# Patient Record
Sex: MALE | Race: WHITE | Employment: OTHER | URBAN - METROPOLITAN AREA
[De-identification: names, ages, dates, MRNs, and addresses within clinical notes are randomized per-mention and may not be internally consistent; named-entity substitution may affect disease eponyms.]

---

## 2017-01-12 ENCOUNTER — TELEPHONE (OUTPATIENT)
Dept: FAMILY MEDICINE | Facility: CLINIC | Age: 82
End: 2017-01-12

## 2017-01-12 NOTE — TELEPHONE ENCOUNTER
Reason for call:  Patient reporting a symptom    Symptom or request: Passed Out Shoveling Snow    Duration (how long have symptoms been present): 2 days    Have you been treated for this before? Yes    Additional comments: He passed out shoveling snow.  He got lightheaded just before before he passed out.  He was done shoveling when he passed out.  He did not hit his head.  He says that he feels fine now.    Phone Number patient can be reached at:  Home number on file [phone] (home)    Best Time:  any    Can we leave a detailed message on this number:  YES    Call taken on 1/12/2017 at 3:38 PM by Monica Menjivar

## 2017-01-12 NOTE — TELEPHONE ENCOUNTER
He was using equipment in his large driveway. He has power steering and hydraulics. He went in the house to undress and then woke up and found he had pasted out. Advised er for eval as he may need monitoring and diagnostics that cant be figured out in a 20 minute office visit. He is asymptomatic now, but we don't know what caused this and he lives alone. He has a cardiac history.   Darlene Joe RN

## 2017-01-13 ENCOUNTER — APPOINTMENT (OUTPATIENT)
Dept: GENERAL RADIOLOGY | Facility: CLINIC | Age: 82
End: 2017-01-13
Attending: EMERGENCY MEDICINE
Payer: COMMERCIAL

## 2017-01-13 ENCOUNTER — HOSPITAL ENCOUNTER (EMERGENCY)
Facility: CLINIC | Age: 82
Discharge: HOME | End: 2017-01-13
Attending: EMERGENCY MEDICINE | Admitting: EMERGENCY MEDICINE
Payer: COMMERCIAL

## 2017-01-13 ENCOUNTER — APPOINTMENT (OUTPATIENT)
Dept: CT IMAGING | Facility: CLINIC | Age: 82
End: 2017-01-13
Attending: EMERGENCY MEDICINE
Payer: COMMERCIAL

## 2017-01-13 ENCOUNTER — HOSPITAL ENCOUNTER (OUTPATIENT)
Dept: CARDIOLOGY | Facility: CLINIC | Age: 82
End: 2017-01-13
Attending: EMERGENCY MEDICINE
Payer: COMMERCIAL

## 2017-01-13 VITALS
RESPIRATION RATE: 16 BRPM | DIASTOLIC BLOOD PRESSURE: 68 MMHG | TEMPERATURE: 97.6 F | SYSTOLIC BLOOD PRESSURE: 132 MMHG | OXYGEN SATURATION: 97 %

## 2017-01-13 DIAGNOSIS — R55 SYNCOPE, UNSPECIFIED SYNCOPE TYPE: ICD-10-CM

## 2017-01-13 LAB
ALBUMIN SERPL-MCNC: 3.3 G/DL (ref 3.4–5)
ALP SERPL-CCNC: 57 U/L (ref 40–150)
ALT SERPL W P-5'-P-CCNC: 23 U/L (ref 0–70)
ANION GAP SERPL CALCULATED.3IONS-SCNC: 9 MMOL/L (ref 3–14)
AST SERPL W P-5'-P-CCNC: 23 U/L (ref 0–45)
BASOPHILS # BLD AUTO: 0.1 10E9/L (ref 0–0.2)
BASOPHILS NFR BLD AUTO: 0.5 %
BILIRUB SERPL-MCNC: 0.7 MG/DL (ref 0.2–1.3)
BUN SERPL-MCNC: 26 MG/DL (ref 7–30)
CALCIUM SERPL-MCNC: 8.7 MG/DL (ref 8.5–10.1)
CHLORIDE SERPL-SCNC: 109 MMOL/L (ref 94–109)
CO2 SERPL-SCNC: 25 MMOL/L (ref 20–32)
CREAT SERPL-MCNC: 1.18 MG/DL (ref 0.66–1.25)
D DIMER PPP FEU-MCNC: 2.6 UG/ML FEU (ref 0–0.5)
DIFFERENTIAL METHOD BLD: ABNORMAL
EOSINOPHIL # BLD AUTO: 0.6 10E9/L (ref 0–0.7)
EOSINOPHIL NFR BLD AUTO: 6.1 %
ERYTHROCYTE [DISTWIDTH] IN BLOOD BY AUTOMATED COUNT: 12.8 % (ref 10–15)
GFR SERPL CREATININE-BSD FRML MDRD: 58 ML/MIN/1.7M2
GLUCOSE SERPL-MCNC: 134 MG/DL (ref 70–99)
HCT VFR BLD AUTO: 38.9 % (ref 40–53)
HGB BLD-MCNC: 12.8 G/DL (ref 13.3–17.7)
IMM GRANULOCYTES # BLD: 0 10E9/L (ref 0–0.4)
IMM GRANULOCYTES NFR BLD: 0.2 %
LYMPHOCYTES # BLD AUTO: 4.4 10E9/L (ref 0.8–5.3)
LYMPHOCYTES NFR BLD AUTO: 44.9 %
MAGNESIUM SERPL-MCNC: 2 MG/DL (ref 1.6–2.3)
MCH RBC QN AUTO: 30.5 PG (ref 26.5–33)
MCHC RBC AUTO-ENTMCNC: 32.9 G/DL (ref 31.5–36.5)
MCV RBC AUTO: 93 FL (ref 78–100)
MONOCYTES # BLD AUTO: 0.8 10E9/L (ref 0–1.3)
MONOCYTES NFR BLD AUTO: 7.8 %
NEUTROPHILS # BLD AUTO: 4 10E9/L (ref 1.6–8.3)
NEUTROPHILS NFR BLD AUTO: 40.5 %
NT-PROBNP SERPL-MCNC: 258 PG/ML (ref 0–1800)
PLATELET # BLD AUTO: 233 10E9/L (ref 150–450)
POTASSIUM SERPL-SCNC: 4.2 MMOL/L (ref 3.4–5.3)
PROT SERPL-MCNC: 7.3 G/DL (ref 6.8–8.8)
RBC # BLD AUTO: 4.19 10E12/L (ref 4.4–5.9)
SODIUM SERPL-SCNC: 143 MMOL/L (ref 133–144)
T4 FREE SERPL-MCNC: 1.12 NG/DL (ref 0.76–1.46)
TROPONIN I SERPL-MCNC: 0.02 UG/L (ref 0–0.04)
TSH SERPL DL<=0.005 MIU/L-ACNC: 4.12 MU/L (ref 0.4–4)
WBC # BLD AUTO: 9.8 10E9/L (ref 4–11)

## 2017-01-13 PROCEDURE — 25500064 ZZH RX 255 OP 636: Performed by: RADIOLOGY

## 2017-01-13 PROCEDURE — 80053 COMPREHEN METABOLIC PANEL: CPT | Performed by: EMERGENCY MEDICINE

## 2017-01-13 PROCEDURE — 84439 ASSAY OF FREE THYROXINE: CPT | Performed by: EMERGENCY MEDICINE

## 2017-01-13 PROCEDURE — 85379 FIBRIN DEGRADATION QUANT: CPT | Performed by: EMERGENCY MEDICINE

## 2017-01-13 PROCEDURE — 0296T ZIO PATCH HOLTER: CPT | Performed by: EMERGENCY MEDICINE

## 2017-01-13 PROCEDURE — 93005 ELECTROCARDIOGRAM TRACING: CPT

## 2017-01-13 PROCEDURE — 84484 ASSAY OF TROPONIN QUANT: CPT | Performed by: EMERGENCY MEDICINE

## 2017-01-13 PROCEDURE — 99285 EMERGENCY DEPT VISIT HI MDM: CPT | Mod: 25

## 2017-01-13 PROCEDURE — 84443 ASSAY THYROID STIM HORMONE: CPT | Performed by: EMERGENCY MEDICINE

## 2017-01-13 PROCEDURE — 93010 ELECTROCARDIOGRAM REPORT: CPT | Performed by: EMERGENCY MEDICINE

## 2017-01-13 PROCEDURE — 85025 COMPLETE CBC W/AUTO DIFF WBC: CPT | Performed by: EMERGENCY MEDICINE

## 2017-01-13 PROCEDURE — 96360 HYDRATION IV INFUSION INIT: CPT

## 2017-01-13 PROCEDURE — 25000125 ZZHC RX 250: Performed by: RADIOLOGY

## 2017-01-13 PROCEDURE — 25000128 H RX IP 250 OP 636: Performed by: EMERGENCY MEDICINE

## 2017-01-13 PROCEDURE — 71260 CT THORAX DX C+: CPT

## 2017-01-13 PROCEDURE — 96361 HYDRATE IV INFUSION ADD-ON: CPT

## 2017-01-13 PROCEDURE — 83735 ASSAY OF MAGNESIUM: CPT | Performed by: EMERGENCY MEDICINE

## 2017-01-13 PROCEDURE — 99285 EMERGENCY DEPT VISIT HI MDM: CPT | Mod: 25 | Performed by: EMERGENCY MEDICINE

## 2017-01-13 PROCEDURE — 83880 ASSAY OF NATRIURETIC PEPTIDE: CPT | Performed by: EMERGENCY MEDICINE

## 2017-01-13 RX ORDER — IOPAMIDOL 755 MG/ML
80 INJECTION, SOLUTION INTRAVASCULAR ONCE
Status: COMPLETED | OUTPATIENT
Start: 2017-01-13 | End: 2017-01-13

## 2017-01-13 RX ADMIN — IOPAMIDOL 80 ML: 755 INJECTION, SOLUTION INTRAVENOUS at 08:57

## 2017-01-13 RX ADMIN — SODIUM CHLORIDE 107 ML: 9 INJECTION, SOLUTION INTRAVENOUS at 08:58

## 2017-01-13 RX ADMIN — SODIUM CHLORIDE 500 ML: 9 INJECTION, SOLUTION INTRAVENOUS at 08:52

## 2017-01-13 NOTE — ED AVS SNAPSHOT
[facility] Emergency Department    [address] [facility] [address]    Phone:  [phone]    Fax:  [phone]                                       Colin Shell   MRN: [id number]    Department:  [facility] Emergency Department   Date of Visit:  1/13/2017           After Visit Summary Signature Page     I have received my discharge instructions, and my questions have been answered. I have discussed any challenges I see with this plan with the nurse or doctor.    ..........................................................................................................................................  Patient/Patient Representative Signature      ..........................................................................................................................................  Patient Representative Print Name and Relationship to Patient    ..................................................               ................................................  Date                                            Time    ..........................................................................................................................................  Reviewed by Signature/Title    ...................................................              ..............................................  Date                                                            Time

## 2017-01-13 NOTE — ED AVS SNAPSHOT
[facility] Emergency Department    [address] [facility] [address]    Phone:  [phone]    Fax:  [phone]                                       Colin Shell   MRN: [id number]    Department:  [facility] Emergency Department   Date of Visit:  1/13/2017           Patient Information     Date Of Birth          6/25/1927        Your diagnoses for this visit were:     Syncope, unspecified syncope type        You were seen by Liang James MD.      Follow-up Information     Follow up with [facility] Emergency Department.    Specialty:  EMERGENCY MEDICINE    Why:  If symptoms worsen, further fainting spells, chest pain or new problems or concerns.    Contact information:    [address] [place] [address] [phone]    Additional information:    The medical center is located at   [address] [place] [address] (between [address]   [facility] in [place], [place] miles north   of [place]).        Schedule an appointment as soon as possible for a visit with Casie Watts DO.    Specialty:  Internal Medicine    Why:    next week for recheck    Contact information:    [facility]  [address] [place] [id number]  [phone]        Discharge References/Attachments     SYNCOPE, WHAT IS (ENGLISH)    SYNCOPE, UNK CAUSE (ENGLISH)    SYNCOPE, TREATING: PREVENTION (ENGLISH)    ANGINA, WHAT IS (ENGLISH)      Future Appointments        Provider Department Dept Phone Center    1/27/2017 10:15 AM [facility] ROOM 1 [facility] [phone] [facility]      24 Hour Appointment Hotline       To make an appointment at any [facility], call [phone] ([phone]). If you don't have a family doctor or clinic, we will help you find one. [facility] are conveniently located to serve the needs of you and your family.          ED Discharge Orders     NM Lexiscan stress test       The type of stress to be performed (pharmacologic or  physical) will be at thejscript:void(0) discretion of the supervising physician as per department protocol.            Zio Patch Holter                    Review of your medicines      Our records show that you are taking the medicines listed below. If these are incorrect, please call your family doctor or clinic.        Dose / Directions Last dose taken    ALBUTEROL INHALER 17GM   Dose:  2 puff   Quantity:  1 Inhaler        Inhale 2 puffs into the lungs every 4 hours as needed This product no longer available   Refills:  3        allopurinol 100 MG tablet   Commonly known as:  ZYLOPRIM   Dose:  50 mg   Quantity:  45 tablet        Take 0.5 tablets (50 mg) by mouth daily Gout   Refills:  3        amLODIPine 10 MG tablet   Commonly known as:  NORVASC   Dose:  10 mg   Quantity:  90 tablet        Take 1 tablet (10 mg) by mouth daily   Refills:  3        aspirin 325 MG EC tablet   Quantity:  100        1 tab po QD (Once per day)   Refills:  3        atorvastatin 40 MG tablet   Commonly known as:  LIPITOR   Dose:  40 mg   Quantity:  90 tablet        Take 1 tablet (40 mg) by mouth daily   Refills:  3        CENTRUM SILVER per tablet   Dose:  1 tablet        Take 1 tablet by mouth daily   Refills:  0        CLARITIN 10 MG tablet   Dose:  10 mg   Generic drug:  loratadine        Take 10 mg by mouth daily   Refills:  0        finasteride 5 MG tablet   Commonly known as:  PROSCAR   Dose:  5 mg   Quantity:  90 tablet        Take 1 tablet (5 mg) by mouth daily   Refills:  3        furosemide 20 MG tablet   Commonly known as:  LASIX   Dose:  10 mg   Quantity:  45 tablet        Take 0.5 tablets (10 mg) by mouth daily   Refills:  3        losartan 100 MG tablet   Commonly known as:  COZAAR   Dose:  100 mg   Quantity:  90 tablet        Take 1 tablet (100 mg) by mouth daily   Refills:  3                Procedures and tests performed during your visit     CBC with platelets, differential    CT Chest Pulmonary Embolism w Contrast     Comprehensive metabolic panel    D dimer quantitative    EKG 12-lead, tracing only    Magnesium    NT pro BNP    T4 free    TSH with free T4 reflex    Troponin I      Orders Needing Specimen Collection     None      Pending Results     Date and Time Order Name Status Description    1/13/2017 0836 CT Chest Pulmonary Embolism w Contrast Preliminary             Pending Culture Results     No orders found from 1/12/2017 to 1/14/2017.       Test Results from your hospital stay           1/13/2017  8:19 AM - Interface, Flexilab Results      Component Results     Component Value Ref Range & Units Status    WBC 9.8 4.0 - 11.0 10e9/L Final    RBC Count 4.19 (L) 4.4 - 5.9 10e12/L Final    Hemoglobin 12.8 (L) 13.3 - 17.7 g/dL Final    Hematocrit 38.9 (L) 40.0 - 53.0 % Final    MCV 93 78 - 100 fl Final    MCH 30.5 26.5 - 33.0 pg Final    MCHC 32.9 31.5 - 36.5 g/dL Final    RDW 12.8 10.0 - 15.0 % Final    Platelet Count 233 150 - 450 10e9/L Final    Diff Method Automated Method  Final    % Neutrophils 40.5 % Final    % Lymphocytes 44.9 % Final    % Monocytes 7.8 % Final    % Eosinophils 6.1 % Final    % Basophils 0.5 % Final    % Immature Granulocytes 0.2 % Final    Absolute Neutrophil 4.0 1.6 - 8.3 10e9/L Final    Absolute Lymphocytes 4.4 0.8 - 5.3 10e9/L Final    Absolute Monocytes 0.8 0.0 - 1.3 10e9/L Final    Absolute Eosinophils 0.6 0.0 - 0.7 10e9/L Final    Absolute Basophils 0.1 0.0 - 0.2 10e9/L Final    Abs Immature Granulocytes 0.0 0 - 0.4 10e9/L Final         1/13/2017  8:39 AM - Interface, Flexilab Results      Component Results     Component Value Ref Range & Units Status    Sodium 143 133 - 144 mmol/L Final    Potassium 4.2 3.4 - 5.3 mmol/L Final    Chloride 109 94 - 109 mmol/L Final    Carbon Dioxide 25 20 - 32 mmol/L Final    Anion Gap 9 3 - 14 mmol/L Final    Glucose 134 (H) 70 - 99 mg/dL Final    Urea Nitrogen 26 7 - 30 mg/dL Final    Creatinine 1.18 0.66 - 1.25 mg/dL Final    GFR Estimate 58 (L) >60  mL/min/1.7m2 Final    Non  GFR Calc    GFR Estimate If Black 70 >60 mL/min/1.7m2 Final    African American GFR Calc    Calcium 8.7 8.5 - 10.1 mg/dL Final    Bilirubin Total 0.7 0.2 - 1.3 mg/dL Final    Albumin 3.3 (L) 3.4 - 5.0 g/dL Final    Protein Total 7.3 6.8 - 8.8 g/dL Final    Alkaline Phosphatase 57 40 - 150 U/L Final    ALT 23 0 - 70 U/L Final    AST 23 0 - 45 U/L Final         1/13/2017  8:39 AM - Interface, Flexilab Results      Component Results     Component Value Ref Range & Units Status    Troponin I ES 0.023 0.000 - 0.045 ug/L Final    The 99th percentile for upper reference range is 0.045 ug/L.  Troponin values   in   the range of 0.045 - 0.120 ug/L may be associated with risks of adverse   clinical events.           1/13/2017  8:45 AM - Interface, Flexilab Results      Component Results     Component Value Ref Range & Units Status    N-Terminal Pro BNP Inpatient 258 0 - 1800 pg/mL Final    Reference range shown and results flagged as abnormal are suggested inpatient   cut points for confirming diagnosis if CHF in an acute setting. Establishing   a   baseline value for each individual patient is useful for follow-up. An   inpatient or emergency department NT-proPBNP <300 pg/mL effectively rules out   acute CHF, with 99% negative predictive value.  The outpatient non-acute reference range for ruling out CHF is:   0-125 pg/mL (age 18 to less than 75)   0-450 pg/mL (age 75 yrs and older)           1/13/2017  8:28 AM - Interface, Flexilab Results      Component Results     Component Value Ref Range & Units Status    D Dimer 2.6 (H) 0.0 - 0.50 ug/ml FEU Final    This D-dimer assay is intended for use in conjuntion with a clinical pretest   probability assessment model to exclude pulmonary embolism (PE) and as an aid   in the diagnosis of deep venous thrombosis (DVT) in outpatients suspected of   PE   or DVT. The cut-off value is 0.5 g/mL FEU.           1/13/2017  8:39 AM - Interface,  Flexilab Results      Component Results     Component Value Ref Range & Units Status    Magnesium 2.0 1.6 - 2.3 mg/dL Final         1/13/2017  8:45 AM - Interface, Flexilab Results      Component Results     Component Value Ref Range & Units Status    TSH 4.12 (H) 0.40 - 4.00 mU/L Final         1/13/2017  9:42 AM - Interface, Radiant Ib      Narrative     CT CHEST PULMONARY EMBOLISM WITH CONTRAST  1/13/2017 9:12 AM     HISTORY:   Syncope. Elevated D-dimer.    TECHNIQUE:    Helical axial scans from lung apices through lung bases  with 80 mL Isovue 370 IV contrast. Radiation dose for this scan was  reduced using automated exposure control, adjustment of the mA and/or  kV according to patient size, or iterative reconstruction technique.    COMPARISON:    4/30/2012.    FINDINGS:    There is no CT evidence for pulmonary embolism or other  acute vascular abnormality of the chest. Vascular calcifications,  including coronary artery calcifications. There are a few slightly  enlarged mediastinal lymph nodes. One of these is in the pretracheal  region measuring 1.0 x 1.7 cm, increased slightly since the prior exam  (0.9 x 1.5 cm). Another is in the aortopulmonary window region showing  maximum diameter of 1.8 cm (image 42 of series 6) increased from 1.2  cm on the prior exam. A few additional borderline to slightly enlarged  lymph nodes are also seen in in the pretracheal and aortopulmonary  window regions, increased since the prior exam. No definite hilar  adenopathy. No axillary adenopathy. Small to moderate-sized hiatal  hernia is again noted.    0.6 cm smoothly marginated right middle lobe lateral segment nodule  appears unchanged since 2012 indicating that this is benign. No other  nodules are identified. Again seen is chronic interstitial changes in  the peripheral lung bases bilaterally associated with bronchiectasis.  There are a few tiny punctate pleural calcifications scattered  bilaterally which were also present  "on the prior exam and are of  uncertain significance. Prior median sternotomy is again noted.    Visualized upper abdomen again shows cholelithiasis and no acute  abnormalities.        Impression     IMPRESSION:  1. No evidence for pulmonary embolism.  2. Vascular calcifications, including coronary artery calcifications.  3. Mild mediastinal adenopathy, increased since the prior exam.  4. Hiatal hernia.  5. 0.6 cm stable nodule right middle lobe requires no additional  follow-up.  6. Stable chronic-appearing bibasilar peripheral interstitial changes  associated with bronchiectasis.  7. Cholelithiasis.         2017  8:58 AM - Interface, Flexilab Results      Component Results     Component Value Ref Range & Units Status    T4 Free 1.12 0.76 - 1.46 ng/dL Final                Thank you for choosing [facility]       Thank you for choosing [facility] for your care. Our goal is always to provide you with excellent care. Hearing back from our patients is one way we can continue to improve our services. Please take a few minutes to complete the written survey that you may receive in the mail after you visit with us. Thank you!        [employer] Information     [employer] lets you send messages to your doctor, view your test results, renew your prescriptions, schedule appointments and more. To sign up, go to [email or web address] . Click on \"Log in\" on the left side of the screen, which will take you to the Welcome page. Then click on \"Sign up Now\" on the right side of the page.     You will be asked to enter the access code listed below, as well as some personal information. Please follow the directions to create your username and password.     Your access code is: [email or web address]  Expires: 3/30/2017 11:13 AM     Your access code will  in 90 days. If you need help or a new code, please call your [facility] clinic or [phone].        Care EveryWhere ID     This is your Care EveryWhere ID. This could be used by other " organizations to access your [facility] medical records  [id number]        After Visit Summary       This is your record. Keep this with you and show to your community pharmacist(s) and doctor(s) at your next visit.

## 2017-01-13 NOTE — ED NOTES
Pt states that he passed out last Tuesday am after plowing snow and shoveling  - woke up on basement floor uninjured - had felt lightheaded prior to incident - states he gets same feeling when going from sitting to standing - feels fine today but wanted to get checked out

## 2017-01-13 NOTE — ED PROVIDER NOTES
"  History     Chief Complaint   Patient presents with     Loss of Consciousness     syncopal episode 3 days ago after shoveling     HPI  Colin Shell is a 89 year old male who had a syncopal episode approximately 3 days ago after shoveling and plowing snow.  He now presents for evaluation of this episode.  He spent asymptomatic since the syncopal event.  Patient shoveled snow for 5 or 10 minutes and then got into his tractor and plowed snow for an hour or so.  While walking back into his house after this, an estimated 40-50 feet he developed a sensation of feeling lightheaded and \"woozy\".  He had no associated chest pain, palpitations, shortness of breath, nausea or sweating.  He walked into the house, into his basement and felt his legs give out causing him to collapse to the floor without injury or trauma.  He had unwitnessed syncope with LOC for up to \"3 or 4 minutes\" and awoke at baseline.  He lives alone and episode was unwitnessed.  He never experienced any associated chest pain, other anginal symptoms or palpitations during this period.  No other symptoms to suggest a seizure.  He has been asymptomatic since this episode.  He denies previous syncope.  Examining her he had episodes of rapid palpitations that he could resolve with relaxation and slowing his breathing.  He never had any evaluation of this.  History of five-vessel CABG with symptoms of chest pressure as anginal preceding this.    Patient takes a full strength aspirin daily.  He lives alone.    I have reviewed the Medications, Allergies, Past Medical and Surgical History, and Social History in the Epic system.  Patient Active Problem List   Diagnosis     Hypertension goal BP (blood pressure) < 140/90     Gout     Coronary artery disease involving coronary bypass graft of native heart without angina pectoris     Chronic kidney disease, stage III (moderate)     Obesity     BPH (benign prostatic hypertrophy)     HYPERLIPIDEMIA LDL GOAL <100     " Health Care Home     Bronchiectasis (H)     Cholelithiasis     Fibrosis of lung (H)     Pulmonary nodule, right     Advance Care Planning     Congestive heart failure, unspecified congestive heart failure chronicity, unspecified congestive heart failure type (H)     Past Medical History   Diagnosis Date     Foreign body in unspecified site on external eye      Rhabdomyolysis      2003- in setting of Ecoli UTI, gemfibrozil/lipitor     Microscopic hematuria       microscopic hematuria 2001 with  IVP and cystoscopy     Dizziness and giddiness 11/24/2007     uncertain etiology- MRI/MRA head , carotid duplex normal 2007     Hypertension      Past Surgical History   Procedure Laterality Date     Surgical history of -        heel spurs     Surgical history of -   2005, 2006     bilateral eye cataract     Surgical history of -   1996     CABG x 5 vessels     Tonsillectomy & adenoidectomy       Surgical history of -   2010     circumcision     No current facility-administered medications for this encounter.     Current Outpatient Prescriptions   Medication     losartan (COZAAR) 100 MG tablet     amLODIPine (NORVASC) 10 MG tablet     furosemide (LASIX) 20 MG tablet     allopurinol (ZYLOPRIM) 100 MG tablet     finasteride (PROSCAR) 5 MG tablet     atorvastatin (LIPITOR) 40 MG tablet     Multiple Vitamins-Minerals (CENTRUM SILVER) per tablet     ASPIRIN 325 MG OR TBEC     ALBUTEROL INHALER 17GM     loratadine (CLARITIN) 10 MG tablet     Allergies   Allergen Reactions     Flomax [Tamsulosin Hydrochloride]      Hctz Other (See Comments)     Caused loss of balance     Lisinopril Cough     Simvastatin Nausea and Vomiting and Diarrhea     Vytorin Nausea and Diarrhea     Social History   Substance Use Topics     Smoking status: Former Smoker     Smokeless tobacco: Never Used     Alcohol Use: Yes      Comment: 12 pack a year     Family History   Problem Relation Age of Onset     DIABETES Father      DIABETES Brother      DIABETES  Brother      Hypertension Son      Unknown/Adopted Maternal Grandmother      Unknown/Adopted Maternal Grandfather      Unknown/Adopted Paternal Grandmother      Unknown/Adopted Paternal Grandfather      Dementia Sister        Review of Systems  As mentioned above in the history present illness.  All other systems were reviewed and are negative.    Physical Exam   BP: 142/85 mmHg  Heart Rate: 82  Temp: 97.6  F (36.4  C)  Resp: 16  SpO2: 98 %  Physical Exam   Constitutional: He is oriented to person, place, and time. He appears well-developed and well-nourished. No distress.   HENT:   Head: Normocephalic and atraumatic.   Mouth/Throat: Oropharynx is clear and moist. No oropharyngeal exudate.   Eyes: Conjunctivae and EOM are normal. Pupils are equal, round, and reactive to light. No scleral icterus.   Neck: Normal range of motion. Neck supple. Normal carotid pulses and no JVD present. Carotid bruit is not present. No tracheal deviation present. No thyromegaly present.   Cardiovascular: Normal rate, regular rhythm, normal heart sounds and intact distal pulses.  Exam reveals no gallop and no friction rub.    No murmur heard.  Pulmonary/Chest: Effort normal and breath sounds normal. No respiratory distress. He has no wheezes. He has no rales.   Abdominal: Soft. There is no tenderness.   Musculoskeletal: Normal range of motion. He exhibits edema (1+ pitting on the left, normal per patient (side of his bypass graft harvest), no leg pain on palpation or cords). He exhibits no tenderness.   Neurological: He is alert and oriented to person, place, and time. He has normal strength. No cranial nerve deficit (2-12 intact) or sensory deficit. He exhibits normal muscle tone. Coordination normal.   Skin: Skin is warm and dry. No rash noted. He is not diaphoretic. No erythema. No pallor.   Psychiatric: He has a normal mood and affect. His behavior is normal.   Nursing note and vitals reviewed.      ED Course   Procedures              EKG Interpretation:      Interpreted by Liang James MD  Time reviewed: 0723 AM  Symptoms at time of EKG: Asymptomatic  Rhythm: sinus rhythm with first-degree AV block  Rate: normal  Axis: Left axis deviation  Ectopy: none  Conduction: Right bundle branch block, left anterior fascicular block  ST Segments/ T Waves: ST-T wave changes bundle branch block and LVH with strain  Q Waves: none  Comparison to prior: No significant change from 3/23/12  Clinical Impression: no significant acute EKG changes versus 3/23/12         Results for orders placed or performed during the hospital encounter of 01/13/17   CT Chest Pulmonary Embolism w Contrast    Narrative    CT CHEST PULMONARY EMBOLISM WITH CONTRAST  1/13/2017 9:12 AM     HISTORY:   Syncope. Elevated D-dimer.    TECHNIQUE:    Helical axial scans from lung apices through lung bases  with 80 mL Isovue 370 IV contrast. Radiation dose for this scan was  reduced using automated exposure control, adjustment of the mA and/or  kV according to patient size, or iterative reconstruction technique.    COMPARISON:    4/30/2012.    FINDINGS:    There is no CT evidence for pulmonary embolism or other  acute vascular abnormality of the chest. Vascular calcifications,  including coronary artery calcifications. There are a few slightly  enlarged mediastinal lymph nodes. One of these is in the pretracheal  region measuring 1.0 x 1.7 cm, increased slightly since the prior exam  (0.9 x 1.5 cm). Another is in the aortopulmonary window region showing  maximum diameter of 1.8 cm (image 42 of series 6) increased from 1.2  cm on the prior exam. A few additional borderline to slightly enlarged  lymph nodes are also seen in in the pretracheal and aortopulmonary  window regions, increased since the prior exam. No definite hilar  adenopathy. No axillary adenopathy. Small to moderate-sized hiatal  hernia is again noted.    0.6 cm smoothly marginated right middle lobe lateral segment  nodule  appears unchanged since 2012 indicating that this is benign. No other  nodules are identified. Again seen is chronic interstitial changes in  the peripheral lung bases bilaterally associated with bronchiectasis.  There are a few tiny punctate pleural calcifications scattered  bilaterally which were also present on the prior exam and are of  uncertain significance. Prior median sternotomy is again noted.    Visualized upper abdomen again shows cholelithiasis and no acute  abnormalities.      Impression    IMPRESSION:  1. No evidence for pulmonary embolism.  2. Vascular calcifications, including coronary artery calcifications.  3. Mild mediastinal adenopathy, increased since the prior exam.  4. Hiatal hernia.  5. 0.6 cm stable nodule right middle lobe requires no additional  follow-up.  6. Stable chronic-appearing bibasilar peripheral interstitial changes  associated with bronchiectasis.  7. Cholelithiasis.   CBC with platelets, differential   Result Value Ref Range    WBC 9.8 4.0 - 11.0 10e9/L    RBC Count 4.19 (L) 4.4 - 5.9 10e12/L    Hemoglobin 12.8 (L) 13.3 - 17.7 g/dL    Hematocrit 38.9 (L) 40.0 - 53.0 %    MCV 93 78 - 100 fl    MCH 30.5 26.5 - 33.0 pg    MCHC 32.9 31.5 - 36.5 g/dL    RDW 12.8 10.0 - 15.0 %    Platelet Count 233 150 - 450 10e9/L    Diff Method Automated Method     % Neutrophils 40.5 %    % Lymphocytes 44.9 %    % Monocytes 7.8 %    % Eosinophils 6.1 %    % Basophils 0.5 %    % Immature Granulocytes 0.2 %    Absolute Neutrophil 4.0 1.6 - 8.3 10e9/L    Absolute Lymphocytes 4.4 0.8 - 5.3 10e9/L    Absolute Monocytes 0.8 0.0 - 1.3 10e9/L    Absolute Eosinophils 0.6 0.0 - 0.7 10e9/L    Absolute Basophils 0.1 0.0 - 0.2 10e9/L    Abs Immature Granulocytes 0.0 0 - 0.4 10e9/L   Comprehensive metabolic panel   Result Value Ref Range    Sodium 143 133 - 144 mmol/L    Potassium 4.2 3.4 - 5.3 mmol/L    Chloride 109 94 - 109 mmol/L    Carbon Dioxide 25 20 - 32 mmol/L    Anion Gap 9 3 - 14 mmol/L     Glucose 134 (H) 70 - 99 mg/dL    Urea Nitrogen 26 7 - 30 mg/dL    Creatinine 1.18 0.66 - 1.25 mg/dL    GFR Estimate 58 (L) >60 mL/min/1.7m2    GFR Estimate If Black 70 >60 mL/min/1.7m2    Calcium 8.7 8.5 - 10.1 mg/dL    Bilirubin Total 0.7 0.2 - 1.3 mg/dL    Albumin 3.3 (L) 3.4 - 5.0 g/dL    Protein Total 7.3 6.8 - 8.8 g/dL    Alkaline Phosphatase 57 40 - 150 U/L    ALT 23 0 - 70 U/L    AST 23 0 - 45 U/L   Troponin I   Result Value Ref Range    Troponin I ES 0.023 0.000 - 0.045 ug/L   NT pro BNP   Result Value Ref Range    N-Terminal Pro BNP Inpatient 258 0 - 1800 pg/mL   D dimer quantitative   Result Value Ref Range    D Dimer 2.6 (H) 0.0 - 0.50 ug/ml FEU   Magnesium   Result Value Ref Range    Magnesium 2.0 1.6 - 2.3 mg/dL   TSH with free T4 reflex   Result Value Ref Range    TSH 4.12 (H) 0.40 - 4.00 mU/L   T4 free   Result Value Ref Range    T4 Free 1.12 0.76 - 1.46 ng/dL     10:10 AM - Reviewed case with the Hospitalist on-call Dr. Carroll (who used to be his primary care provider).  We discussed his evaluation and disposition plan.    10:25 AM -  I reviewed the results of the patient's evaluation with him and he is comfortable with discharge home and declines admission for observation and cardiac monitoring.  He understands that the cause of the syncope is unclear and that this could be secondary to a dysrhythmia, anginal event for a potentially life-threatening cause.  He accepts this and was comfortable at discharge and outpatient follow-up and workup of this.      Assessments & Plan (with Medical Decision Making)   Elderly patient with a syncopal episode 3 days ago of unclear etiology.  Asymptomatic since that time.   Today's evaluation was unremarkable, including EKG, troponin, BNP, CBC, comprehensive metabolic panel, magnesium, TSH, and CT scan of the chest (PE protocol ).  Doubt TIA, stroke or seizure.  Patient is comfortable with discharge home and outpatient evaluation to include a nuclear stress test  and Holter monitor study. Will follow up in primary care clinic next week.  Will continue a full strength aspirin daily Patient was provided instructions for supportive care and will return as needed for worsened condition or worsening symptoms, or new problems or concerns.    I have reviewed the nursing notes.    I have reviewed the findings, diagnosis, plan and need for follow up with the patient.    New Prescriptions    No medications on file       Final diagnoses:   Syncope, unspecified syncope type       1/13/2017   [facility] EMERGENCY DEPARTMENT      Liang James MD  01/13/17 [id number]

## 2017-01-18 ENCOUNTER — HOSPITAL ENCOUNTER (OUTPATIENT)
Dept: NUCLEAR MEDICINE | Facility: CLINIC | Age: 82
Setting detail: NUCLEAR MEDICINE
Discharge: HOME | End: 2017-01-18
Attending: EMERGENCY MEDICINE | Admitting: EMERGENCY MEDICINE
Payer: COMMERCIAL

## 2017-01-18 DIAGNOSIS — R55 SYNCOPE, UNSPECIFIED SYNCOPE TYPE: ICD-10-CM

## 2017-01-18 PROCEDURE — 34300033 ZZH RX 343: Performed by: EMERGENCY MEDICINE

## 2017-01-18 PROCEDURE — 93017 CV STRESS TEST TRACING ONLY: CPT

## 2017-01-18 PROCEDURE — 78452 HT MUSCLE IMAGE SPECT MULT: CPT | Mod: 26 | Performed by: INTERNAL MEDICINE

## 2017-01-18 PROCEDURE — 25000125 ZZHC RX 250: Performed by: FAMILY MEDICINE

## 2017-01-18 PROCEDURE — 78452 HT MUSCLE IMAGE SPECT MULT: CPT

## 2017-01-18 PROCEDURE — 93018 CV STRESS TEST I&R ONLY: CPT | Performed by: INTERNAL MEDICINE

## 2017-01-18 PROCEDURE — A9502 TC99M TETROFOSMIN: HCPCS | Performed by: EMERGENCY MEDICINE

## 2017-01-18 PROCEDURE — 93016 CV STRESS TEST SUPVJ ONLY: CPT | Performed by: INTERNAL MEDICINE

## 2017-01-18 RX ORDER — REGADENOSON 0.08 MG/ML
0.4 INJECTION, SOLUTION INTRAVENOUS ONCE
Status: COMPLETED | OUTPATIENT
Start: 2017-01-18 | End: 2017-01-18

## 2017-01-18 RX ADMIN — TETROFOSMIN 32.9 MCI.: 0.23 INJECTION, POWDER, LYOPHILIZED, FOR SOLUTION INTRAVENOUS at 09:40

## 2017-01-18 RX ADMIN — REGADENOSON 0.4 MG: 0.08 INJECTION, SOLUTION INTRAVENOUS at 09:40

## 2017-01-18 RX ADMIN — TETROFOSMIN 10.6 MCI.: 0.23 INJECTION, POWDER, LYOPHILIZED, FOR SOLUTION INTRAVENOUS at 08:30

## 2017-01-19 ENCOUNTER — OFFICE VISIT (OUTPATIENT)
Dept: FAMILY MEDICINE | Facility: CLINIC | Age: 82
End: 2017-01-19
Payer: COMMERCIAL

## 2017-01-19 VITALS
HEIGHT: 67 IN | TEMPERATURE: 96.1 F | RESPIRATION RATE: 20 BRPM | BODY MASS INDEX: 32.33 KG/M2 | SYSTOLIC BLOOD PRESSURE: 133 MMHG | DIASTOLIC BLOOD PRESSURE: 73 MMHG | WEIGHT: 206 LBS | HEART RATE: 59 BPM

## 2017-01-19 DIAGNOSIS — R55 SYNCOPE, UNSPECIFIED SYNCOPE TYPE: ICD-10-CM

## 2017-01-19 DIAGNOSIS — I25.810 CORONARY ARTERY DISEASE INVOLVING CORONARY BYPASS GRAFT OF NATIVE HEART WITHOUT ANGINA PECTORIS: ICD-10-CM

## 2017-01-19 DIAGNOSIS — R94.39 ABNORMAL CARDIOVASCULAR STRESS TEST: Primary | ICD-10-CM

## 2017-01-19 PROCEDURE — 99214 OFFICE O/P EST MOD 30 MIN: CPT | Performed by: INTERNAL MEDICINE

## 2017-01-19 NOTE — PATIENT INSTRUCTIONS
Thank you for choosing [facility].  You may be receiving a survey in the mail from Maryellen Dockery regarding your visit today.  Please take a few minutes to complete and return the survey to let us know how we are doing.      If you have questions or concerns, please contact us via [place] or you can contact your care team at [phone].    Our Clinic hours are:  Monday 6:40 am  to 7:00 pm  Tuesday -Friday 6:40 am to 5:00 pm    The [place] outpatient lab hours are:  Monday - Friday 6:10 am to 4:45 pm  Saturdays 7:00 am to 11:00 am  Appointments are required, call [phone]    If you have clinical questions after hours or would like to schedule an appointment,  call the clinic at [phone].      [facility] [address] [place] [address] - [address]      1. You should see a Cardiologist  2. Come back to ER right away with any chest pain, shortness of breath or more fainting episodes.

## 2017-01-19 NOTE — PROGRESS NOTES
SUBJECTIVE:                                                    Colin Shell is a 89 year old male who presents to clinic today for the following health issues:      ED/UC Followup:    Facility:  [facility]  Date of visit: 1/13/17  Reason for visit: coughing but also fainted at home  Current Status: better hasn't fainted since but still has the annoying cough     Chief Complaint   Patient presents with     Hospital F/U     ED 1/13/17 snycope     Results     stress test back but holter in process   he was shoveling, using .  He was walking inside, and felt dizzy, like he was going to pass out.  He then passed out on the floor.  He states he has had many episodes of syncope, in the winter.  Previous PCP Dr. Carroll said it was due to over-medication, and meds were reduced.  No further syncope episodes of years, until recently.    This episode was not associated with chest pain or shortness of breath  History of CABG 21 years ago.    Echo 2007: Akinesis of the basilar and mid inferior and inferoseptum. 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 No other regional wall motion   abnormalities. Septal motion is consistent with conduction abnormality. The   visual ejection fraction is estimated at 45-50%    Stress test 1/2017 - Myocardial perfusion imaging using single isotope technique demonstrated transmural scar of the inferior basal inferolateral wall with mild ellie-infarct ischemia in the basal inferior septal wall. Gated images demonstrated normal LV cavity size with mildly reduced LV systolic function.  The left ventricular systolic function is 46%.   On the rest images, a similar absence of counts is noted in the basal to mid inferior wall with moderate to severe count reduction in the distal inferior wall extension in the basal inferolateral wall however there is reversibility seen in the  basal inferior septal wall.    URI:  Still having cough.  Is improved, but not resolved.   He has  "scarring and bronchiectasis in bibasilar lungs.      Current Outpatient Prescriptions   Medication Sig Dispense Refill     losartan (COZAAR) 100 MG tablet Take 1 tablet (100 mg) by mouth daily 90 tablet 3     amLODIPine (NORVASC) 10 MG tablet Take 1 tablet (10 mg) by mouth daily 90 tablet 3     allopurinol (ZYLOPRIM) 100 MG tablet Take 0.5 tablets (50 mg) by mouth daily Gout 45 tablet 3     finasteride (PROSCAR) 5 MG tablet Take 1 tablet (5 mg) by mouth daily 90 tablet 3     atorvastatin (LIPITOR) 40 MG tablet Take 1 tablet (40 mg) by mouth daily 90 tablet 3     Multiple Vitamins-Minerals (CENTRUM SILVER) per tablet Take 1 tablet by mouth daily       ASPIRIN 325 MG OR TBEC 1 tab po QD (Once per day) 100 3     furosemide (LASIX) 20 MG tablet Take 0.5 tablets (10 mg) by mouth daily 45 tablet 3     loratadine (CLARITIN) 10 MG tablet Take 10 mg by mouth daily       Problem list, Medication list, Allergies, and Medical/Social/Surgical histories reviewed in [facility] and updated as appropriate.    ROS:  Constitutional, HEENT, cardiovascular, pulmonary, gi and gu systems are negative, except as otherwise noted.    OBJECTIVE:                                                    /73 mmHg  Pulse 59  Temp(Src) 96.1  F (35.6  C) (Tympanic)  Resp 20  Ht 5' 7.25\" (1.708 m)  Wt 206 lb (93.441 kg)  BMI 32.03 kg/m2  Body mass index is 32.03 kg/(m^2).  GENERAL APPEARANCE: healthy, alert and no distress  HENT: ear canals and TM's normal and nose and mouth without ulcers or lesions  RESP: lungs clear to auscultation - no rales, rhonchi or wheezes  CV: regular rates and rhythm, normal S1 S2, no S3 or S4 and no murmur, click or rub  LYMPHATICS: no leg swelling    Diagnostic Test Results:  Results for orders placed or performed during the hospital encounter of 01/18/17   NM Lexiscan stress test    Narrative    GATED MYOCARDIAL PERFUSION SCINTIGRAPHY WITH INTRAVENOUS PHARMACOLOGIC  VASODILATATION LEXISCAN -ONE DAY STUDY     1/18/2017 " 11:06 AM  HALI ANAYA  89 years  Male  6/25/1927.    Indication/Clinical History: Syncopal episode    Impression  1.  Myocardial perfusion imaging using single isotope technique  demonstrated transmural scar of the inferior basal inferolateral wall  with mild ellie-infarct ischemia in the basal inferior septal wall.   2. Gated images demonstrated normal LV cavity size with mildly reduced  LV systolic function.  The left ventricular systolic function is 46%.  3. Compared to the prior study from no prior study .    Procedure  Pharmacologic stress testing was performed with Lexiscan at a rate of  0.08 mg/ml rapid bolus injection, for 15 seconds, 0.4 mg/5ml  intravenously. Low-level exercise was not performed along with the  vasodilator infusion.  The heart rate was 70 at baseline and [place]lin to  91 beats per minute during the Lexiscan infusion. The rest blood  pressure was 142/64 mmHg and was 138/68 mm Hg during Lexiscan  infusion. The patient experienced no symptoms  during the test.    Myocardial perfusion imaging was performed at rest, approximately 45  minutes after the injection intravenously of 10.6 mCi of Tc-99m  Myoview. At peak pharmacologic effect, 10-20 seconds after Lexiscan,   the patient was injected intravenously with 32.9 mCi of  Tc-99m  Myoview. The post-stress tomographic imaging was performed  approximately 60 minutes after stress.    EKG Findings  The resting EKG demonstrated atrial fibrillation with a nonspecific  interventricular conduction delay. The stress EKG demonstrated no  ischemic changes.    Tomographic Findings  Overall, the study quality is adequate . On the stress images, there  is absence of counts seen in the basal to mid inferior wall and  moderate severe count reduction in the distal inferior wall. There is  extension of the basal lateral wall as well as basal inferior septal  walls. On the rest images, a similar absence of counts is noted in the  basal to mid inferior wall with  moderate to severe count reduction in  the distal inferior wall extension in the basal inferolateral wall  however there is reversibility seen in the  basal inferior septal  wall. Results suggest transmural infarction in the territory of the  right coronary artery with mild basal inferior septal ellie-infarct  ischemia . Gated images demonstrated a normal LV cavity size with  end-diastolic volumes measured at 118 and 10 9 mL and rest and stress  respectively. The LV systolic function is mildly reduced. The left  ventricular ejection fraction was calculated to be 46%. TID was  absent.    KANDI GARDUNO MD        ASSESSMENT/PLAN:                                                        ICD-10-CM    1. Abnormal cardiovascular stress test R94.39 CARDIOLOGY EVAL ADULT REFERRAL     CARDIOLOGY EVAL ADULT REFERRAL   2. Syncope, unspecified syncope type R55 CARDIOLOGY EVAL ADULT REFERRAL     CARDIOLOGY EVAL ADULT REFERRAL   3. Coronary artery disease involving coronary bypass graft of native heart without angina pectoris I25.810 CARDIOLOGY EVAL ADULT REFERRAL     CARDIOLOGY EVAL ADULT REFERRAL   patient unsure he even wants to see cards.    WMA on stress echo with reversibility suggesting graft failure or new occlusion.  Could be easily treated with cath.  Patient unsure if he wants any intervention    1. You should see a Cardiologist  2. Come back to ER right away with any chest pain, shortness of breath or more fainting episodes.      Casie Watts,   [facility]

## 2017-01-19 NOTE — MR AVS SNAPSHOT
After Visit Summary   1/19/2017    Colin Shell    MRN: [id number]           Patient Information     Date Of Birth          6/25/1927        Visit Information        Provider Department      1/19/2017 11:00 AM Casie Watts, DO [facility]        Today's Diagnoses     Abnormal cardiovascular stress test    -  1     Syncope, unspecified syncope type         Coronary artery disease involving coronary bypass graft of native heart without angina pectoris         Pulmonary nodule, right           Care Instructions          Thank you for choosing [facility].  You may be receiving a survey in the mail from Maryellen Dockery regarding your visit today.  Please take a few minutes to complete and return the survey to let us know how we are doing.      If you have questions or concerns, please contact us via TurnTide or you can contact your care team at [phone].    Our Clinic hours are:  Monday 6:40 am  to 7:00 pm  Tuesday -Friday 6:40 am to 5:00 pm    The [place] outpatient lab hours are:  Monday - Friday 6:10 am to 4:45 pm  Saturdays 7:00 am to 11:00 am  Appointments are required, call [phone]    If you have clinical questions after hours or would like to schedule an appointment,  call the clinic at [phone].      [facility] [address] [place] [address] - [facility] [address]      1. You should see a Cardiologist  2. Come back to ER right away with any chest pain, shortness of breath or more fainting episodes.        Follow-ups after your visit        Additional Services     CARDIOLOGY EVAL ADULT REFERRAL       Your provider has referred you to:  FMG: [facility] [phone]   [email or web address]  UMP: [facility] [phone]   [email or web address]    Please be aware that coverage of these services  is subject to the terms and limitations of your health insurance plan.  Call member services at your health plan with any benefit or coverage questions.      Type of Referral:  New Cardiology Consult    Timeframe requested:  1 Week    Please bring the following to your appointment:  >>   Any x-rays, CTs or MRIs which have been performed.  Contact the facility where they were done to arrange for  prior to your scheduled appointment.    >>   List of current medications  >>   This referral request   >>   Any documents/labs given to you for this referral            CARDIOLOGY EVAL ADULT REFERRAL       Your provider has referred you to:  [facility]: [facility] [phone]   [email or web address]    Please be aware that coverage of these services is subject to the terms and limitations of your health insurance plan.  Call member services at your health plan with any benefit or coverage questions.      Type of Referral:  New Cardiology Consult    Timeframe requested:  1 Week    Please bring the following to your appointment:  >>   Any x-rays, CTs or MRIs which have been performed.  Contact the facility where they were done to arrange for  prior to your scheduled appointment.    >>   List of current medications  >>   This referral request   >>   Any documents/labs given to you for this referral                  Your next 10 appointments already scheduled     Jan 27, 2017 10:15 AM   CT CHEST W/O CONTRAST with [facility]   [facility] CT ([facility])    [address] [address] [phone]           Please bring any scans or X-rays taken at other hospitals, if similar tests were done. Also bring a list of your medicines, including vitamins, minerals and over-the-counter drugs. It is safest to leave personal items at home.  Be sure to tell your doctor:   If you have any allergies.   If there s any  "chance you are pregnant.   If you are breastfeeding.   If you have any special needs.  You do not need to do anything special to prepare.  Please wear loose clothing, such as a sweat suit or jogging clothes. Avoid snaps, zippers and other metal. We may ask you to undress and put on a hospital gown.              Who to contact     If you have questions or need follow up information about today's clinic visit or your schedule please contact [facility] directly at [phone].  Normal or non-critical lab and imaging results will be communicated to you by Crowdpachart, letter or phone within 4 business days after the clinic has received the results. If you do not hear from us within 7 days, please contact the clinic through Vtapt or phone. If you have a critical or abnormal lab result, we will notify you by phone as soon as possible.  Submit refill requests through [employer] or call your pharmacy and they will forward the refill request to us. Please allow 3 business days for your refill to be completed.          Additional Information About Your Visit        [employer] Information     [employer] lets you send messages to your doctor, view your test results, renew your prescriptions, schedule appointments and more. To sign up, go to [email or web address] . Click on \"Log in\" on the left side of the screen, which will take you to the Welcome page. Then click on \"Sign up Now\" on the right side of the page.     You will be asked to enter the access code listed below, as well as some personal information. Please follow the directions to create your username and password.     Your access code is: [email or web address]  Expires: 3/30/2017 11:13 AM     Your access code will  in 90 days. If you need help or a new code, please call your [facility] or [phone].        Care EveryWhere ID     This is your Care EveryWhere ID. This could be used by other organizations to access your [place] medical records  [id number]   " "     Your Vitals Were     Pulse Temperature Respirations Height BMI (Body Mass Index)       59 96.1  F (35.6  C) (Tympanic) 20 5' 7.25\" (1.708 m) 32.03 kg/m2        Blood Pressure from Last 3 Encounters:   01/19/17 133/73   01/13/17 132/68   12/30/16 146/81    Weight from Last 3 Encounters:   01/19/17 206 lb (93.441 kg)   12/30/16 208 lb (94.348 kg)   06/21/16 203 lb (92.08 kg)              We Performed the Following     CARDIOLOGY EVAL ADULT REFERRAL     CARDIOLOGY EVAL ADULT REFERRAL        Primary Care Provider Office Phone # Fax #    Casie WattsDO [phone]       [facility] [address] [place] [id number]        Thank you!     Thank you for choosing [facility]  for your care. Our goal is always to provide you with excellent care. Hearing back from our patients is one way we can continue to improve our services. Please take a few minutes to complete the written survey that you may receive in the mail after your visit with us. Thank you!             Your Updated Medication List - Protect others around you: Learn how to safely use, store and throw away your medicines at www.disposemymeds.org.          This list is accurate as of: 1/19/17 11:49 AM.  Always use your most recent med list.                   Brand Name Dispense Instructions for use    allopurinol 100 MG tablet    ZYLOPRIM    45 tablet    Take 0.5 tablets (50 mg) by mouth daily Gout       amLODIPine 10 MG tablet    NORVASC    90 tablet    Take 1 tablet (10 mg) by mouth daily       aspirin 325 MG EC tablet     100    1 tab po QD (Once per day)       atorvastatin 40 MG tablet    LIPITOR    90 tablet    Take 1 tablet (40 mg) by mouth daily       CENTRUM SILVER per tablet      Take 1 tablet by mouth daily       CLARITIN 10 MG tablet   Generic drug:  loratadine      Take 10 mg by mouth daily       finasteride 5 MG tablet    PROSCAR    90 tablet    Take 1 tablet (5 mg) by mouth daily       furosemide " 20 MG tablet    LASIX    45 tablet    Take 0.5 tablets (10 mg) by mouth daily       losartan 100 MG tablet    COZAAR    90 tablet    Take 1 tablet (100 mg) by mouth daily

## 2017-01-31 ENCOUNTER — OFFICE VISIT (OUTPATIENT)
Dept: CARDIOLOGY | Facility: CLINIC | Age: 82
End: 2017-01-31
Attending: INTERNAL MEDICINE
Payer: COMMERCIAL

## 2017-01-31 ENCOUNTER — HOSPITAL ENCOUNTER (OUTPATIENT)
Dept: CARDIOLOGY | Facility: CLINIC | Age: 82
Discharge: HOME | End: 2017-01-31
Attending: INTERNAL MEDICINE | Admitting: INTERNAL MEDICINE
Payer: COMMERCIAL

## 2017-01-31 VITALS
HEART RATE: 82 BPM | SYSTOLIC BLOOD PRESSURE: 152 MMHG | WEIGHT: 207 LBS | DIASTOLIC BLOOD PRESSURE: 80 MMHG | OXYGEN SATURATION: 97 % | BODY MASS INDEX: 32.19 KG/M2

## 2017-01-31 DIAGNOSIS — E78.5 HYPERLIPIDEMIA LDL GOAL <70: ICD-10-CM

## 2017-01-31 DIAGNOSIS — R55 SYNCOPE, UNSPECIFIED SYNCOPE TYPE: ICD-10-CM

## 2017-01-31 DIAGNOSIS — I10 BENIGN ESSENTIAL HYPERTENSION: ICD-10-CM

## 2017-01-31 DIAGNOSIS — Z95.1 S/P CABG (CORONARY ARTERY BYPASS GRAFT): ICD-10-CM

## 2017-01-31 DIAGNOSIS — I25.10 CORONARY ARTERY DISEASE INVOLVING NATIVE CORONARY ARTERY OF NATIVE HEART WITHOUT ANGINA PECTORIS: ICD-10-CM

## 2017-01-31 DIAGNOSIS — I48.0 PAROXYSMAL ATRIAL FIBRILLATION (H): ICD-10-CM

## 2017-01-31 DIAGNOSIS — R55 SYNCOPE, UNSPECIFIED SYNCOPE TYPE: Primary | ICD-10-CM

## 2017-01-31 PROCEDURE — 25500064 ZZH RX 255 OP 636: Performed by: INTERNAL MEDICINE

## 2017-01-31 PROCEDURE — 40000264 ECHO COMPLETE WITH LUMASON

## 2017-01-31 PROCEDURE — 93306 TTE W/DOPPLER COMPLETE: CPT | Mod: 26 | Performed by: INTERNAL MEDICINE

## 2017-01-31 PROCEDURE — 99215 OFFICE O/P EST HI 40 MIN: CPT | Mod: 25 | Performed by: INTERNAL MEDICINE

## 2017-01-31 RX ADMIN — SULFUR HEXAFLUORIDE 5 ML: KIT at 13:32

## 2017-01-31 NOTE — PROGRESS NOTES
Quick Note:    Patient has appointment today with cardiology, - he has new atrial fibrillation, may be the cause of his syncope.  ______

## 2017-01-31 NOTE — PATIENT INSTRUCTIONS
"Thank you for your [facility] visit today. Your provider has recommended the following:  Medication Changes:  None today. Continue taking your medications as you have been.  Recommendations:  1. Echocardiogram to be done prior to seeing Dr. Longo on 2/7/17.  Follow-up:  1. See Dr. Longo, Electrophysiology \"EP\" Cardiologist for cardiology consult on: Tuesday, February 7th at Noon.  2. See Dr. Erickson for cardiology follow up on: Tuesday, March 21st at 11:00 am. With fasting labs to be done 1-2 days prior to this revisit.  We kindly ask that you call cardiology scheduling at [phone] three months prior to requested revisit date to schedule future cardiology appointments.  Reminder:  1. Please bring in your current medication list or your medication, over the counter supplements and vitamin bottles as we will review these at each office visit.               [facility]  [facility]~[address]~[place], [place]~[address]  Questions about your visit today?   Call your Cardiology Clinic RN's-Ekaterina Sewell and/or Monica Ramirez at [phone].  "

## 2017-01-31 NOTE — LETTER
"1/31/2017    Casie Watts, DO  [facility]  [address] [place] [address]    RE: Colin Shell       Dear Colleague,    I had the pleasure of seeing Colin Shell in the [facility].    Colin Shell is an 89-year-old mildly overweight white male with longstanding history of ischemic heart disease approximately 21 years status post coronary bypass surgery times multiple vessels who presents to Cardiology Clinic for consultation because of recent episode of apparent complete syncope.  He apparently was snowplowing and removing some snow off of his property, came into the house and after going up stairs had an episode where he felt extremely \"woozy\" and collapsed for at least a few minutes with total loss of consciousness ending up on the floor without apparently physically being hurt.  Because of the syncopal episode he was evaluated in the emergency room with no acute changes on his electrocardiogram or blood work.  A Zio patch was placed that showed evidence of paroxysmal atrial fibrillation/flutter as well as a 4-beat run of ventricular tachycardia.  Overall, heart rate varied anywhere from  with an average rate of.  The patient subsequently underwent a Cardiolite stress test because of his history of ischemic heart disease that demonstrated a transmural scar in the inferior basilar/inferior lateral wall with mild ellie-infarct ischemia in the basilar inferior septum.  Gated images demonstrated a mildly reduced left ventricular systolic function with ejection fraction of 46%.  The patient denies divya symptoms of chest discomfort, shortness of breath, palpitations, nausea, vomiting, diaphoresis.  He had had some apparent episodes of lightheadedness and near-syncope a few years ago at which time it would appear his beta blocker therapy that had been low dose was discontinued and he did not have any recurrent symptoms for this past month.  " He states that he is able to function reasonably well without symptoms of chest discomfort, shortness of breath.  He does have easy fatigability and general malaise.  He denies PND, orthopnea, fevers, chills or sweats.  His risk factors of coronary disease are positive for cigarette smoking, discontinued in 1960, hypertension treated for the past 15-20 years as well as hyperlipidemia treated for the same time and has had evidence of hyperglycemia, but no firm diagnosis of diabetes mellitus.  No family history of premature coronary disease.  He drinks 1-2 cups of caffeinated beverage per day with rare alcoholic beverage.  He has not had, until this recent stress test, much in the way of cardiac assessment over the past 20 years since his bypass and clinically has done quite well without any documentation of angina pectoris or congestive heart failure.      REVIEW OF SYSTEMS:  Unremarkable for severe headaches, seizure, stroke.  He has the syncopal episode as just described.  He does not relate dysphasia, asthma, pneumonia, bronchitis.  There is no history of active peptic ulcer disease, bowel or bladder dysfunction other than intermittent constipation requiring laxative therapy.  He has some swollen lower extremities but no focal weakness.  Remainder review of systems is noncontributory to cardiac status.  The patient was referred to Cardiology for further assessment of his syncopal episode as well as in the setting of his ischemic heart disease.  He was also noted to have significant conduction system disease and history of intermittent paroxysmal atrial fibrillation/flutter.      PHYSICAL EXAMINATION:   GENERAL:  The patient is an elderly white male in no acute distress.   VITAL SIGNS:  Blood pressure 150/80 with a heart rate of 80-90, somewhat irregular with occasional frequent premature beats.  Weight was 207 pounds, which appears to be relatively stable.   HEENT:  Pupils equal, round, react to light and  accommodate.  Ear, nose and throat unremarkable.   NECK:  Without jugular venous distention, carotid bruit or palpable thyroid.   CHEST:  Essentially clear to percussion and auscultation, with slight decreased breath sounds at the bases and slightly prolonged expiratory phase.   CARDIAC:  Regular rhythm with occasional to frequent premature beats.  There was a soft S1, physiologically split S2, soft S4 gallop that appears somewhat intermittent, 1-2/6 systolic murmur at the left sternal border radiating towards the apex.  No diastolic murmur, rub or S3.   ABDOMEN:  Overweight, soft, nontender without obvious palpable liver, spleen or masses.  Bowel sounds are present.   EXTREMITIES:  Without significant cyanosis or erythema.  There was trace to 1+ edema present.  Pulses were 1-2+ below the femorals, 2+ above.   NEUROLOGIC:  Not done in detail but appeared to be grossly intact with patient moving all extremities and respond to sensory stimuli.      LABORATORY DATA:  Electrocardiogram showed a normal sinus rhythm with first degree AV block, right bundle branch block and left anterior fascicular block.     Outpatient Encounter Prescriptions as of 1/31/2017   Medication Sig Dispense Refill     losartan (COZAAR) 100 MG tablet Take 1 tablet (100 mg) by mouth daily 90 tablet 3     amLODIPine (NORVASC) 10 MG tablet Take 1 tablet (10 mg) by mouth daily 90 tablet 3     allopurinol (ZYLOPRIM) 100 MG tablet Take 0.5 tablets (50 mg) by mouth daily Gout 45 tablet 3     finasteride (PROSCAR) 5 MG tablet Take 1 tablet (5 mg) by mouth daily 90 tablet 3     atorvastatin (LIPITOR) 40 MG tablet Take 1 tablet (40 mg) by mouth daily 90 tablet 3     Multiple Vitamins-Minerals (CENTRUM SILVER) per tablet Take 1 tablet by mouth daily       loratadine (CLARITIN) 10 MG tablet Take 10 mg by mouth daily       ASPIRIN 325 MG OR TBEC 1 tab po QD (Once per day) 100 3     [DISCONTINUED] furosemide (LASIX) 20 MG tablet Take 0.5 tablets (10 mg) by  mouth daily 45 tablet 3     No facility-administered encounter medications on file as of 1/31/2017.         CLINICAL IMPRESSION:   1.  Syncope of unclear etiology, not in the setting of vasovagal maneuver but with the setting of previously known conduction system disease.   2.  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   3.  History of ischemic heart disease, status post multivessel coronary bypass surgery in 1996.   4.  History of hypertension with slight elevation of systolic blood pressure.   5.  Distant history of cigarette smoking, stopped in 1966.     6.  History of hypercholesterolemia.   7.  History of hyperglycemia.   8.  History of benign prostatic hypertrophy.   9.  Minimal mild renal insufficiency.   10.  Previous history of paroxysmal atrial fibrillation.      DISCUSSION:  The patient presents with an episode of syncope with significant to severe conduction system disease and previous history of ischemic heart disease with abnormal Cardiolite stress test.  The patient would appear to represent a good candidate for a permanent dual-chamber pacemaker, especially in light of his previous symptoms that may have improved off beta blocker therapy, his episode of fainting and need for possibly additional medication for blood pressure control and treatment of angina.  The patient will have an echocardiogram to verify left ventricular function and ejection fraction as well as to valvular dysfunction before seeing Electrophysiology in consultation with Dr. Ruddy Longo for discussion of possible pacemaker implantation.  The patient should also be considered because of the previous history of paroxysmal atrial fibrillation, the patient should also be considered for possible anticoagulation with elevated CHADS VASC2 score, but is not ready to make that decision at this time.  If he is against anticoagulation,  consideration could be given for an alternative approach to be watched during the procedure.  The patient will need close followup of serum lipids, basic metabolic panel and blood pressure monitor but what appears to be most needed at this time is consideration for permanent dual-chamber pacemaker insertion.  The patient was instructed with previous recent history of total syncope that he should not drive a vehicle independently and should be very careful with stairs and in the bathroom to avoid falling.      RECOMMENDATIONS:   1.  Continue present medications.  May need additional adjustment over the spring.   2.  Electrophysiology consultation with Dr. Ruddy Longo to discuss possible dual-chamber pacemaker insertion and approaches to the patient's underlying paroxysmal atrial fibrillation.   3.  Close followup of serum lipids, basic metabolic panel and blood pressure with followup with nurse practitioner in 2-3 weeks and possibly pacemaker followup.   4.  Diet and exercise as tolerated, especially after consideration for a pacemaker placement, would avoid vigorous activity at this time.   5.  Consideration of anticoagulation for stroke prevention for patient with paroxysmal atrial fibrillation and elevated CHADS VASC2 score.   6.  Routine medical followup.   7.  Cardiology followup in 2-3 months.     Again, thank you for allowing me to participate in the care of your patient.      Sincerely,    Norm Erickson MD

## 2017-01-31 NOTE — PROGRESS NOTES
"HISTORY OF PRESENT ILLNESS:  Colin Shell is an 89-year-old mildly overweight white male with longstanding history of ischemic heart disease approximately 21 years status post coronary bypass surgery times multiple vessels who presents to Cardiology Clinic for consultation because of recent episode of apparent complete syncope.  He apparently was snowplowing and removing some snow off of his property, came into the house and after going up stairs had an episode where he felt extremely \"woozy\" and collapsed for at least a few minutes with total loss of consciousness ending up on the floor without apparently physically being hurt.  Because of the syncopal episode he was evaluated in the emergency room with no acute changes on his electrocardiogram or blood work.  A Zio patch was placed that showed evidence of paroxysmal atrial fibrillation/flutter as well as a 4-beat run of ventricular tachycardia.  Overall, heart rate varied anywhere from  with an average rate of.  The patient subsequently underwent a Cardiolite stress test because of his history of ischemic heart disease that demonstrated a transmural scar in the inferior basilar/inferior lateral wall with mild ellie-infarct ischemia in the basilar inferior septum.  Gated images demonstrated a mildly reduced left ventricular systolic function with ejection fraction of 46%.  The patient denies divya symptoms of chest discomfort, shortness of breath, palpitations, nausea, vomiting, diaphoresis.  He had had some apparent episodes of lightheadedness and near-syncope a few years ago at which time it would appear his beta blocker therapy that had been low dose was discontinued and he did not have any recurrent symptoms for this past month.  He states that he is able to function reasonably well without symptoms of chest discomfort, shortness of breath.  He does have easy fatigability and general malaise.  He denies PND, orthopnea, fevers, chills or sweats.  His " risk factors of coronary disease are positive for cigarette smoking, discontinued in 1960, hypertension treated for the past 15-20 years as well as hyperlipidemia treated for the same time and has had evidence of hyperglycemia, but no firm diagnosis of diabetes mellitus.  No family history of premature coronary disease.  He drinks 1-2 cups of caffeinated beverage per day with rare alcoholic beverage.  He has not had, until this recent stress test, much in the way of cardiac assessment over the past 20 years since his bypass and clinically has done quite well without any documentation of angina pectoris or congestive heart failure.      REVIEW OF SYSTEMS:  Unremarkable for severe headaches, seizure, stroke.  He has the syncopal episode as just described.  He does not relate dysphasia, asthma, pneumonia, bronchitis.  There is no history of active peptic ulcer disease, bowel or bladder dysfunction other than intermittent constipation requiring laxative therapy.  He has some swollen lower extremities but no focal weakness.  Remainder review of systems is noncontributory to cardiac status.  The patient was referred to Cardiology for further assessment of his syncopal episode as well as in the setting of his ischemic heart disease.  He was also noted to have significant conduction system disease and history of intermittent paroxysmal atrial fibrillation/flutter.      PHYSICAL EXAMINATION:   GENERAL:  The patient is an elderly white male in no acute distress.   VITAL SIGNS:  Blood pressure 150/80 with a heart rate of 80-90, somewhat irregular with occasional frequent premature beats.  Weight was 207 pounds, which appears to be relatively stable.   HEENT:  Pupils equal, round, react to light and accommodate.  Ear, nose and throat unremarkable.   NECK:  Without jugular venous distention, carotid bruit or palpable thyroid.   CHEST:  Essentially clear to percussion and auscultation, with slight decreased breath sounds at the  bases and slightly prolonged expiratory phase.   CARDIAC:  Regular rhythm with occasional to frequent premature beats.  There was a soft S1, physiologically split S2, soft S4 gallop that appears somewhat intermittent, 1-2/6 systolic murmur at the left sternal border radiating towards the apex.  No diastolic murmur, rub or S3.   ABDOMEN:  Overweight, soft, nontender without obvious palpable liver, spleen or masses.  Bowel sounds are present.   EXTREMITIES:  Without significant cyanosis or erythema.  There was trace to 1+ edema present.  Pulses were 1-2+ below the femorals, 2+ above.   NEUROLOGIC:  Not done in detail but appeared to be grossly intact with patient moving all extremities and respond to sensory stimuli.      LABORATORY DATA:  Electrocardiogram showed a normal sinus rhythm with first degree AV block, right bundle branch block and left anterior fascicular block.      CLINICAL IMPRESSION:   1.  Syncope of unclear etiology, not in the setting of vasovagal maneuver but with the setting of previously known conduction system disease.   2.  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   3.  History of ischemic heart disease, status post multivessel coronary bypass surgery in 1996.   4.  History of hypertension with slight elevation of systolic blood pressure.   5.  Distant history of cigarette smoking, stopped in 1966.     6.  History of hypercholesterolemia.   7.  History of hyperglycemia.   8.  History of benign prostatic hypertrophy.   9.  Minimal mild renal insufficiency.   10.  Previous history of paroxysmal atrial fibrillation.      DISCUSSION:  The patient presents with an episode of syncope with significant to severe conduction system disease and previous history of ischemic heart disease with abnormal Cardiolite stress test.  The patient would appear to represent a good candidate for a permanent  dual-chamber pacemaker, especially in light of his previous symptoms that may have improved off beta blocker therapy, his episode of fainting and need for possibly additional medication for blood pressure control and treatment of angina.  The patient will have an echocardiogram to verify left ventricular function and ejection fraction as well as to valvular dysfunction before seeing Electrophysiology in consultation with Dr. Ruddy Longo for discussion of possible pacemaker implantation.  The patient should also be considered because of the previous history of paroxysmal atrial fibrillation, the patient should also be considered for possible anticoagulation with elevated CHADS VASC2 score, but is not ready to make that decision at this time.  If he is against anticoagulation, consideration could be given for an alternative approach to be watched during the procedure.  The patient will need close followup of serum lipids, basic metabolic panel and blood pressure monitor but what appears to be most needed at this time is consideration for permanent dual-chamber pacemaker insertion.  The patient was instructed with previous recent history of total syncope that he should not drive a vehicle independently and should be very careful with stairs and in the bathroom to avoid falling.      RECOMMENDATIONS:   1.  Continue present medications.  May need additional adjustment over the spring.   2.  Electrophysiology consultation with Dr. Ruddy Longo to discuss possible dual-chamber pacemaker insertion and approaches to the patient's underlying paroxysmal atrial fibrillation.   3.  Close followup of serum lipids, basic metabolic panel and blood pressure with followup with nurse practitioner in 2-3 weeks and possibly pacemaker followup.   4.  Diet and exercise as tolerated, especially after consideration for a pacemaker placement, would avoid vigorous activity at this time.   5.  Consideration of anticoagulation for stroke prevention  for patient with paroxysmal atrial fibrillation and elevated CHADS VASC2 score.   6.  Routine medical followup.   7.  Cardiology followup in 2-3 months.         LESIA QUEEN MD, [facility]             D: 2017 15:31   T: 2017 16:03   MT: SEVERO#[id number]      Name:     HALI ANAYA   MRN:      [id number]        Account:      [id number]   :      1927           Visit Date:   2017      Document: [id number]       cc: Casie Watts DO

## 2017-01-31 NOTE — PROGRESS NOTES
CURRENT MEDICATIONS:  Current Outpatient Prescriptions   Medication Sig Dispense Refill     losartan (COZAAR) 100 MG tablet Take 1 tablet (100 mg) by mouth daily 90 tablet 3     amLODIPine (NORVASC) 10 MG tablet Take 1 tablet (10 mg) by mouth daily 90 tablet 3     allopurinol (ZYLOPRIM) 100 MG tablet Take 0.5 tablets (50 mg) by mouth daily Gout 45 tablet 3     finasteride (PROSCAR) 5 MG tablet Take 1 tablet (5 mg) by mouth daily 90 tablet 3     atorvastatin (LIPITOR) 40 MG tablet Take 1 tablet (40 mg) by mouth daily 90 tablet 3     Multiple Vitamins-Minerals (CENTRUM SILVER) per tablet Take 1 tablet by mouth daily       loratadine (CLARITIN) 10 MG tablet Take 10 mg by mouth daily       ASPIRIN 325 MG OR TBEC 1 tab po QD (Once per day) 100 3       ALLERGIES     Allergies   Allergen Reactions     Flomax [Tamsulosin Hydrochloride]      Hctz Other (See Comments)     Caused loss of balance     Lisinopril Cough     Simvastatin Nausea and Vomiting and Diarrhea     Vytorin Nausea and Diarrhea       PAST MEDICAL HISTORY:  Past Medical History   Diagnosis Date     Foreign body in unspecified site on external eye      Rhabdomyolysis      2003- in setting of Ecoli UTI, gemfibrozil/lipitor     Microscopic hematuria       microscopic hematuria 2001 with  IVP and cystoscopy     Dizziness and giddiness 11/24/2007     uncertain etiology- MRI/MRA head , carotid duplex normal 2007     Hypertension        PAST SURGICAL HISTORY:  Past Surgical History   Procedure Laterality Date     Surgical history of -        heel spurs     Surgical history of -   2005, 2006     bilateral eye cataract     Surgical history of -   1996     CABG x 5 vessels     Tonsillectomy & adenoidectomy       Surgical history of -   2010     circumcision       Social History:  Social History   Substance Use Topics     Smoking status: Former Smoker     Smokeless tobacco: Never Used     Alcohol Use: Yes      Comment: 12 pack a year       FAMILY HISTORY:  Family History    Problem Relation Age of Onset     DIABETES Father      DIABETES Brother      DIABETES Brother      Hypertension Son      Unknown/Adopted Maternal Grandmother      Unknown/Adopted Maternal Grandfather      Unknown/Adopted Paternal Grandmother      Unknown/Adopted Paternal Grandfather      Dementia Sister        Review of Systems:  Skin:  Negative       Eyes:  Negative      ENT:  Negative      Respiratory:  Positive for shortness of breath     Cardiovascular:    palpitations;Positive for;syncope or near-syncope;lightheadedness    Gastroenterology: Negative      Genitourinary:  Positive for   CKD- patient does not see nephrology  Musculoskeletal:  Negative      Neurologic:  Positive for numbness or tingling of feet;memory problems    Psychiatric:  Negative      Heme/Lymph/Imm:  Negative      Endocrine:  Negative

## 2017-01-31 NOTE — MR AVS SNAPSHOT
"              After Visit Summary   1/31/2017    Colin Shell    MRN: [id number]           Patient Information     Date Of Birth          6/25/1927        Visit Information        Provider Department      1/31/2017 11:30 AM Norm Erickson MD [facility] PHYSICIAN HEART AT [facility]        Today's Diagnoses     Syncope, unspecified syncope type    -  1     Paroxysmal atrial fibrillation (H)         Coronary artery disease involving native coronary artery of native heart without angina pectoris         S/P CABG (coronary artery bypass graft)         Benign essential hypertension         Hyperlipidemia LDL goal <70           Care Instructions    Thank you for your  Heart Care visit today. Your provider has recommended the following:  Medication Changes:  None today. Continue taking your medications as you have been.  Recommendations:  1. Echocardiogram to be done prior to seeing Dr. Longo on 2/7/17.  Follow-up:  1. See Dr. Longo, Electrophysiology \"EP\" Cardiologist for cardiology consult on: Tuesday, February 7th at Noon.  2. See Dr. Erickson for cardiology follow up on: Tuesday, March 21st at 11:00 am. With fasting labs to be done 1-2 days prior to this revisit.  We kindly ask that you call cardiology scheduling at [phone] three months prior to requested revisit date to schedule future cardiology appointments.  Reminder:  1. Please bring in your current medication list or your medication, over the counter supplements and vitamin bottles as we will review these at each office visit.               [facility] HEART CARE  [facility]~[address]~[place], [place]~[address]  Questions about your visit today?   Call your Cardiology Clinic RN's-Ekaterina Sewell and/or Monica Ramirez at [phone].        Follow-ups after your visit        Additional Services     Follow-Up with Cardiologist           Follow-Up with Cardiologist                 Your next 10 " appointments already scheduled     Feb 07, 2017 12:00 PM   EP NEW with Ruddy Garcia MD   [facility] PHYSICIAN HEART AT [facility] ([facility])    [address] [address]   [phone]            Mar 21, 2017 11:00 AM   Return Visit with Norm Erickson MD   [facility] PHYSICIAN HEART AT [facility] ([facility])    [address] [address]   [phone]              Future tests that were ordered for you today     Open Future Orders        Priority Expected Expires Ordered    Basic metabolic panel Routine 5/1/2017 1/31/2018 1/31/2017    Follow-Up with Cardiologist Routine 5/1/2017 1/31/2018 1/31/2017    Lipid Profile Routine 5/1/2017 1/31/2018 1/31/2017    ALT Routine 5/1/2017 1/31/2018 1/31/2017    Echocardiogram Routine 2/7/2017 1/31/2018 1/31/2017    Follow-Up with Cardiologist Routine 2/7/2017 1/31/2018 1/31/2017            Who to contact     If you have questions or need follow up information about today's clinic visit or your schedule please contact [facility] PHYSICIAN HEART [facility] directly at [phone].  Normal or non-critical lab and imaging results will be communicated to you by MyChart, letter or phone within 4 business days after the clinic has received the results. If you do not hear from us within 7 days, please contact the clinic through MyChart or phone. If you have a critical or abnormal lab result, we will notify you by phone as soon as possible.  Submit refill requests through what3words or call your pharmacy and they will forward the refill request to us. Please allow 3 business days for your refill to be completed.          Additional Information About Your Visit        MultifondsharCrowdly Information     what3words lets you send messages to your doctor, view your test results, renew your prescriptions, schedule appointments and more. To sign up, go to [email or web address] . Click on  "\"Log in\" on the left side of the screen, which will take you to the Welcome page. Then click on \"Sign up Now\" on the right side of the page.     You will be asked to enter the access code listed below, as well as some personal information. Please follow the directions to create your username and password.     Your access code is: [email or web address]  Expires: 3/30/2017 11:13 AM     Your access code will  in 90 days. If you need help or a new code, please call your [place] clinic or [phone].        Care EveryWhere ID     This is your Care EveryWhere ID. This could be used by other organizations to access your [facility] [place] medical records  [id number]        Your Vitals Were     Pulse Pulse Oximetry                82 97%           Blood Pressure from Last 3 Encounters:   17 152/80   17 133/73   17 132/68    Weight from Last 3 Encounters:   17 93.895 kg (207 lb)   17 93.441 kg (206 lb)   16 94.348 kg (208 lb)               Primary Care Provider Office Phone # Fax #    Casie Watts,  [phone]       [address] [address]        Thank you!     Thank you for choosing [facility] PHYSICIAN HEART AT [facility]  for your care. Our goal is always to provide you with excellent care. Hearing back from our patients is one way we can continue to improve our services. Please take a few minutes to complete the written survey that you may receive in the mail after your visit with us. Thank you!             Your Updated Medication List - Protect others around you: Learn how to safely use, store and throw away your medicines at www.disposemymeds.org.          This list is accurate as of: 17 12:47 PM.  Always use your most recent med list.                   Brand Name Dispense Instructions for use    allopurinol 100 MG tablet    ZYLOPRIM    45 tablet    Take 0.5 tablets (50 mg) by mouth daily Gout       amLODIPine " 10 MG tablet    NORVASC    90 tablet    Take 1 tablet (10 mg) by mouth daily       aspirin 325 MG EC tablet     100    1 tab po QD (Once per day)       atorvastatin 40 MG tablet    LIPITOR    90 tablet    Take 1 tablet (40 mg) by mouth daily       CENTRUM SILVER per tablet      Take 1 tablet by mouth daily       CLARITIN 10 MG tablet   Generic drug:  loratadine      Take 10 mg by mouth daily       finasteride 5 MG tablet    PROSCAR    90 tablet    Take 1 tablet (5 mg) by mouth daily       losartan 100 MG tablet    COZAAR    90 tablet    Take 1 tablet (100 mg) by mouth daily

## 2017-02-07 ENCOUNTER — OFFICE VISIT (OUTPATIENT)
Dept: CARDIOLOGY | Facility: CLINIC | Age: 82
End: 2017-02-07
Payer: COMMERCIAL

## 2017-02-07 VITALS
SYSTOLIC BLOOD PRESSURE: 138 MMHG | WEIGHT: 203.8 LBS | DIASTOLIC BLOOD PRESSURE: 69 MMHG | HEART RATE: 59 BPM | OXYGEN SATURATION: 98 % | BODY MASS INDEX: 31.69 KG/M2

## 2017-02-07 DIAGNOSIS — R55 SYNCOPE, UNSPECIFIED SYNCOPE TYPE: Primary | ICD-10-CM

## 2017-02-07 PROCEDURE — 99205 OFFICE O/P NEW HI 60 MIN: CPT | Performed by: INTERNAL MEDICINE

## 2017-02-07 NOTE — PROGRESS NOTES
CURRENT MEDICATIONS:  Current Outpatient Prescriptions   Medication Sig Dispense Refill     losartan (COZAAR) 100 MG tablet Take 1 tablet (100 mg) by mouth daily 90 tablet 3     amLODIPine (NORVASC) 10 MG tablet Take 1 tablet (10 mg) by mouth daily 90 tablet 3     allopurinol (ZYLOPRIM) 100 MG tablet Take 0.5 tablets (50 mg) by mouth daily Gout 45 tablet 3     finasteride (PROSCAR) 5 MG tablet Take 1 tablet (5 mg) by mouth daily 90 tablet 3     atorvastatin (LIPITOR) 40 MG tablet Take 1 tablet (40 mg) by mouth daily 90 tablet 3     Multiple Vitamins-Minerals (CENTRUM SILVER) per tablet Take 1 tablet by mouth daily       loratadine (CLARITIN) 10 MG tablet Take 10 mg by mouth daily       ASPIRIN 325 MG OR TBEC 1 tab po QD (Once per day) 100 3       ALLERGIES     Allergies   Allergen Reactions     Flomax [Tamsulosin Hydrochloride]      Hctz Other (See Comments)     Caused loss of balance     Lisinopril Cough     Simvastatin Nausea and Vomiting and Diarrhea     Vytorin Nausea and Diarrhea       PAST MEDICAL HISTORY:  Past Medical History   Diagnosis Date     Foreign body in unspecified site on external eye      Rhabdomyolysis      2003- in setting of Ecoli UTI, gemfibrozil/lipitor     Microscopic hematuria       microscopic hematuria 2001 with  IVP and cystoscopy     Dizziness and giddiness 11/24/2007     uncertain etiology- MRI/MRA head , carotid duplex normal 2007     Hypertension        PAST SURGICAL HISTORY:  Past Surgical History   Procedure Laterality Date     Surgical history of -        heel spurs     Surgical history of -   2005, 2006     bilateral eye cataract     Surgical history of -   1996     CABG x 5 vessels     Tonsillectomy & adenoidectomy       Surgical history of -   2010     circumcision       Social History:  Social History   Substance Use Topics     Smoking status: Former Smoker     Smokeless tobacco: Never Used     Alcohol Use: Yes      Comment: 12 pack a year       FAMILY HISTORY:  Family History    Problem Relation Age of Onset     DIABETES Father      DIABETES Brother      DIABETES Brother      Hypertension Son      Unknown/Adopted Maternal Grandmother      Unknown/Adopted Maternal Grandfather      Unknown/Adopted Paternal Grandmother      Unknown/Adopted Paternal Grandfather      Dementia Sister        Review of Systems:  Skin:  Negative       Eyes:  Negative      ENT:  Negative      Respiratory:  Positive for shortness of breath     Cardiovascular:    palpitations;Positive for;syncope or near-syncope;lightheadedness    Gastroenterology: Negative      Genitourinary:  Positive for   CKD- patient does not see nephrology  Musculoskeletal:         Neurologic:  Positive for numbness or tingling of feet;memory problems    Psychiatric:  Negative      Heme/Lymph/Imm:  Negative      Endocrine:  Negative

## 2017-02-07 NOTE — Clinical Note
"2017      Casie Watts,     [facility]    [address] [place]  [address]       RE: Colin Shell   MRN: [id number]   : 1927      Dear Chanda:      Thank you for allowing me to participate in the care of this very delightful patient.  As you know, Mr. Shell is an 89-year-old gentleman with a history of stable coronary artery disease, status post 5-vessel bypass in , who recently had a syncopal episode and saw my partner, Dr. Erickson, for it.  Dr. Erickson subsequently asked the patient to see me for further evaluation.        The patient has been living independently at home and quite healthy in most regards without any recent hospitalizations except a few weeks ago when he was hospitalized for recurrent syncope.  As you may recall, the patient on that day was snow-plowing around his property.  Afterward he felt quite lightheaded without any palpitations and walked to his house about 70 feet away from his truck.  His dizziness got worse, and then he went downstairs to change, where he collapsed onto the floor for a few minutes.  He did not seek medical attention until a few days later.  The last episode of syncope occurred about 4 years ago.  At that time the patient was thought to be \"overmedicated.\"  His amlodipine was reduced along with his metoprolol.  The patient was given a diagnosis of orthostatic hypotension at that time and since then had not had any episodes.  Interestingly, he stated that prior to that hospitalization in , he had recurrent syncope almost in the wintertime when he either was shoveling or some other physical activities.      The patient subsequently underwent cardiac ischemic evaluation with a nuclear stress test which demonstrated a transmural scar in the inferobasal and inferolateral wall with mild ellie-infarct.  EF was estimated at 46%.  Echocardiogram also demonstrates the same area of hypokinesis with an ejection " fraction of about 45%.  Additionally, a Zio Patch monitor which the patient wore for 7 days demonstrated a predominant sinus rhythm.  The patient, however, was noted to be in atrial fibrillation with a ventricular rate of about  beats per minute 12% of the time.  He has very rare PVCs but did have a 4-beat run of nonsustained VT at a rate of 120 beats per minute.  The patient was asymptomatic during these episodes.      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  This has worsened over the past 4 years since the last EKG.      The patient otherwise denies any chest pain or chest discomfort, remains very physically active without any change in exercise tolerance.  Given his history of myocardial infarction and incident of nonsustained VT and the fact that this episode occurred with activities, I am a bit concerned about potential ventricular tachyarrhythmias as a cause for it.  Additionally, one cannot rule out the fact that he does have conduction abnormality as I described and therefore bradyarrhythmia could be the cause as well.  We discussed at length the fact that he is 89 years old, but the patient stated that he felt quite good and healthy, and I would like to support the continuation of his independent lifestyle.      In light of that, I would recommend an EP study for risk stratification and also to look at his conduction abnormality as well.  If the EP study shows a readily inducible ventricular tachyarrhythmia, then an ICD implant.  If, however, that is negative but has significant conduction abnormality, a pacemaker can be helpful. An implantable loop recorder can be considered if no abnormalities found.  Regarding his paroxysmal atrial fibrillation for which the patient is asymptomatic, he would benefit from long-term anticoagulation given his CHADS-VASc score of at least 4, and I would  encourage him to be on one of anticoagulants after the above procedure.  In the meantime, I would agree that he should not drive for the next few months to complete 3 months since his syncope.      Sincerely,            Ruddy Longo MD

## 2017-02-07 NOTE — PROGRESS NOTES
HPI and Plan:   See dictation    Orders Placed This Encounter   Procedures     EP Ablation/ EP Studies       No orders of the defined types were placed in this encounter.       There are no discontinued medications.      Encounter Diagnosis   Name Primary?     Syncope, unspecified syncope type Yes       CURRENT MEDICATIONS:  Current Outpatient Prescriptions   Medication Sig Dispense Refill     losartan (COZAAR) 100 MG tablet Take 1 tablet (100 mg) by mouth daily 90 tablet 3     amLODIPine (NORVASC) 10 MG tablet Take 1 tablet (10 mg) by mouth daily 90 tablet 3     allopurinol (ZYLOPRIM) 100 MG tablet Take 0.5 tablets (50 mg) by mouth daily Gout 45 tablet 3     finasteride (PROSCAR) 5 MG tablet Take 1 tablet (5 mg) by mouth daily 90 tablet 3     atorvastatin (LIPITOR) 40 MG tablet Take 1 tablet (40 mg) by mouth daily 90 tablet 3     Multiple Vitamins-Minerals (CENTRUM SILVER) per tablet Take 1 tablet by mouth daily       loratadine (CLARITIN) 10 MG tablet Take 10 mg by mouth daily       ASPIRIN 325 MG OR TBEC 1 tab po QD (Once per day) 100 3       ALLERGIES     Allergies   Allergen Reactions     Flomax [Tamsulosin Hydrochloride]      Hctz Other (See Comments)     Caused loss of balance     Lisinopril Cough     Simvastatin Nausea and Vomiting and Diarrhea     Vytorin Nausea and Diarrhea       PAST MEDICAL HISTORY:  Past Medical History   Diagnosis Date     Foreign body in unspecified site on external eye      Rhabdomyolysis      2003- in setting of Ecoli UTI, gemfibrozil/lipitor     Microscopic hematuria       microscopic hematuria 2001 with  IVP and cystoscopy     Dizziness and giddiness 11/24/2007     uncertain etiology- MRI/MRA head , carotid duplex normal 2007     Hypertension        PAST SURGICAL HISTORY:  Past Surgical History   Procedure Laterality Date     Surgical history of -        heel spurs     Surgical history of -   2005, 2006     bilateral eye cataract     Surgical history of -   1996     CABG x 5  vessels     Tonsillectomy & adenoidectomy       Surgical history of -   2010     circumcision       FAMILY HISTORY:  Family History   Problem Relation Age of Onset     DIABETES Father      DIABETES Brother      DIABETES Brother      Hypertension Son      Unknown/Adopted Maternal Grandmother      Unknown/Adopted Maternal Grandfather      Unknown/Adopted Paternal Grandmother      Unknown/Adopted Paternal Grandfather      Dementia Sister        SOCIAL HISTORY:  Social History     Social History     Marital Status:      Spouse Name: N/A     Number of Children: N/A     Years of Education: N/A     Social History Main Topics     Smoking status: Former Smoker     Smokeless tobacco: Never Used     Alcohol Use: Yes      Comment: 12 pack a year     Drug Use: No     Sexual Activity: Yes     Other Topics Concern     Parent/Sibling W/ Cabg, Mi Or Angioplasty Before 65f 55m? No     Social History Narrative       Review of Systems:  Skin:  Negative       Eyes:  Negative      ENT:  Negative      Respiratory:  Positive for shortness of breath     Cardiovascular:    palpitations;Positive for;syncope or near-syncope;lightheadedness    Gastroenterology: Negative      Genitourinary:  Positive for   CKD- patient does not see nephrology  Musculoskeletal:         Neurologic:  Positive for numbness or tingling of feet;memory problems    Psychiatric:  Negative      Heme/Lymph/Imm:  Negative      Endocrine:  Negative        Physical Exam:  Vitals: /69 mmHg  Pulse 59  Wt 92.443 kg (203 lb 12.8 oz)  SpO2 98%    Constitutional:  cooperative, alert and oriented, well developed, well nourished, in no acute distress appears younger than stated age      Skin:  warm and dry to the touch, no apparent skin lesions or masses noted        Head:  normocephalic, no masses or lesions        Eyes:  pupils equal and round, conjunctivae and lids unremarkable, sclera white, no xanthalasma, EOMS intact, no nystagmus        ENT:  no pallor or  cyanosis, dentition good        Neck:  carotid pulses are full and equal bilaterally, JVP normal, no carotid bruit, no thyromegaly        Chest:  normal breath sounds, clear to auscultation, normal A-P diameter, normal symmetry, normal respiratory excursion, no use of accessory muscles          Cardiac: regular rhythm, normal S1/S2, no S3 or S4, apical impulse not displaced, no murmurs, gallops or rubs                  Abdomen:  abdomen soft, non-tender, BS normoactive, no mass, no HSM, no bruits        Vascular: pulses full and equal, no bruits auscultated                                        Extremities and Back:  no deformities, clubbing, cyanosis, erythema observed              Neurological:  affect appropriate, oriented to time, person and place              CC  No referring provider defined for this encounter.

## 2017-02-08 NOTE — PROGRESS NOTES
"2017      Casie Watts,     [facility]    [address] [place]  [address]       RE: Colin Shell   MRN: [id number]   : 1927      Dear Chanda:      Thank you for allowing me to participate in the care of this very delightful patient.  As you know, Mr. Shell is an 89-year-old gentleman with a history of stable coronary artery disease, status post 5-vessel bypass in , who recently had a syncopal episode and saw my partner, Dr. Erickson, for it.  Dr. Erickson subsequently asked the patient to see me for further evaluation.        The patient has been living independently at home and quite healthy in most regards without any recent hospitalizations except a few weeks ago when he was hospitalized for recurrent syncope.  As you may recall, the patient on that day was snow-plowing around his property.  Afterward he felt quite lightheaded without any palpitations and walked to his house about 70 feet away from his truck.  His dizziness got worse, and then he went downstairs to change, where he collapsed onto the floor for a few minutes.  He did not seek medical attention until a few days later.  The last episode of syncope occurred about 4 years ago.  At that time the patient was thought to be \"overmedicated.\"  His amlodipine was reduced along with his metoprolol.  The patient was given a diagnosis of orthostatic hypotension at that time and since then had not had any episodes.  Interestingly, he stated that prior to that hospitalization in , he had recurrent syncope almost in the wintertime when he either was shoveling or some other physical activities.      The patient subsequently underwent cardiac ischemic evaluation with a nuclear stress test which demonstrated a transmural scar in the inferobasal and inferolateral wall with mild ellie-infarct.  EF was estimated at 46%.  Echocardiogram also demonstrates the same area of hypokinesis with an ejection " fraction of about 45%.  Additionally, a Zio Patch monitor which the patient wore for 7 days demonstrated a predominant sinus rhythm.  The patient, however, was noted to be in atrial fibrillation with a ventricular rate of about  beats per minute 12% of the time.  He has very rare PVCs but did have a 4-beat run of nonsustained VT at a rate of 120 beats per minute.  The patient was asymptomatic during these episodes.      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  This has worsened over the past 4 years since the last EKG.      The patient otherwise denies any chest pain or chest discomfort, remains very physically active without any change in exercise tolerance.  Given his history of myocardial infarction and incident of nonsustained VT and the fact that this episode occurred with activities, I am a bit concerned about potential ventricular tachyarrhythmias as a cause for it.  Additionally, one cannot rule out the fact that he does have conduction abnormality as I described and therefore bradyarrhythmia could be the cause as well.  We discussed at length the fact that he is 89 years old, but the patient stated that he felt quite good and healthy, and I would like to support the continuation of his independent lifestyle.      In light of that, I would recommend an EP study for risk stratification and also to look at his conduction abnormality as well.  If the EP study shows a readily inducible ventricular tachyarrhythmia, then an ICD implant.  If, however, that is negative but has significant conduction abnormality, a pacemaker can be helpful. An implantable loop recorder can be considered if no abnormalities found.  Regarding his paroxysmal atrial fibrillation for which the patient is asymptomatic, he would benefit from long-term anticoagulation given his CHADS-VASc score of at least 4, and I would  encourage him to be on one of anticoagulants after the above procedure.  In the meantime, I would agree that he should not drive for the next few months to complete 3 months since his syncope.      Sincerely,            MD CHIQUIS Rosenberg MD             D: 2017 12:40   T: 2017 21:07   MT: ALLISON      Name:     HALI ANAYA   MRN:      [id number]        Account:      [id number]   :      1927           Service Date: 2017      Document: [id number]

## 2017-02-13 DIAGNOSIS — R55 SYNCOPE, UNSPECIFIED SYNCOPE TYPE: Primary | ICD-10-CM

## 2017-02-13 RX ORDER — SODIUM CHLORIDE 450 MG/100ML
INJECTION, SOLUTION INTRAVENOUS CONTINUOUS
Status: CANCELLED | OUTPATIENT
Start: 2017-02-13

## 2017-02-13 RX ORDER — LIDOCAINE 40 MG/G
CREAM TOPICAL
Status: CANCELLED | OUTPATIENT
Start: 2017-02-13

## 2017-02-14 ENCOUNTER — HOSPITAL ENCOUNTER (OUTPATIENT)
Facility: CLINIC | Age: 82
Discharge: HOME | End: 2017-02-14
Attending: INTERNAL MEDICINE | Admitting: INTERNAL MEDICINE
Payer: COMMERCIAL

## 2017-02-14 ENCOUNTER — APPOINTMENT (OUTPATIENT)
Dept: CARDIOLOGY | Facility: CLINIC | Age: 82
End: 2017-02-14
Attending: INTERNAL MEDICINE
Payer: COMMERCIAL

## 2017-02-14 ENCOUNTER — APPOINTMENT (OUTPATIENT)
Dept: GENERAL RADIOLOGY | Facility: CLINIC | Age: 82
End: 2017-02-14
Attending: INTERNAL MEDICINE
Payer: COMMERCIAL

## 2017-02-14 VITALS
HEART RATE: 66 BPM | TEMPERATURE: 97.4 F | BODY MASS INDEX: 30.57 KG/M2 | HEIGHT: 69 IN | RESPIRATION RATE: 16 BRPM | DIASTOLIC BLOOD PRESSURE: 72 MMHG | WEIGHT: 206.4 LBS | OXYGEN SATURATION: 96 % | SYSTOLIC BLOOD PRESSURE: 132 MMHG

## 2017-02-14 DIAGNOSIS — R55 SYNCOPE, UNSPECIFIED SYNCOPE TYPE: ICD-10-CM

## 2017-02-14 LAB
ANION GAP SERPL CALCULATED.3IONS-SCNC: 7 MMOL/L (ref 3–14)
BUN SERPL-MCNC: 26 MG/DL (ref 7–30)
CALCIUM SERPL-MCNC: 8.7 MG/DL (ref 8.5–10.1)
CHLORIDE SERPL-SCNC: 109 MMOL/L (ref 94–109)
CO2 SERPL-SCNC: 26 MMOL/L (ref 20–32)
CREAT SERPL-MCNC: 1.11 MG/DL (ref 0.66–1.25)
ERYTHROCYTE [DISTWIDTH] IN BLOOD BY AUTOMATED COUNT: 13.4 % (ref 10–15)
GFR SERPL CREATININE-BSD FRML MDRD: 62 ML/MIN/1.7M2
GLUCOSE SERPL-MCNC: 97 MG/DL (ref 70–99)
HCT VFR BLD AUTO: 38.4 % (ref 40–53)
HGB BLD-MCNC: 13.2 G/DL (ref 13.3–17.7)
MCH RBC QN AUTO: 31.7 PG (ref 26.5–33)
MCHC RBC AUTO-ENTMCNC: 34.4 G/DL (ref 31.5–36.5)
MCV RBC AUTO: 92 FL (ref 78–100)
PLATELET # BLD AUTO: 206 10E9/L (ref 150–450)
POTASSIUM SERPL-SCNC: 4.2 MMOL/L (ref 3.4–5.3)
RBC # BLD AUTO: 4.16 10E12/L (ref 4.4–5.9)
SODIUM SERPL-SCNC: 142 MMOL/L (ref 133–144)
WBC # BLD AUTO: 11 10E9/L (ref 4–11)

## 2017-02-14 PROCEDURE — C1777 LEAD, AICD, ENDO SINGLE COIL: HCPCS

## 2017-02-14 PROCEDURE — 40000065 ZZH STATISTIC EKG NON-CHARGEABLE

## 2017-02-14 PROCEDURE — 27210784 ZZH KIT PACEMAKER CR8

## 2017-02-14 PROCEDURE — C1721 AICD, DUAL CHAMBER: HCPCS

## 2017-02-14 PROCEDURE — 02HK3KZ INSERTION OF DEFIBRILLATOR LEAD INTO RIGHT VENTRICLE, PERCUTANEOUS APPROACH: ICD-10-PCS | Performed by: INTERNAL MEDICINE

## 2017-02-14 PROCEDURE — 85027 COMPLETE CBC AUTOMATED: CPT | Performed by: INTERNAL MEDICINE

## 2017-02-14 PROCEDURE — 93010 ELECTROCARDIOGRAM REPORT: CPT | Performed by: INTERNAL MEDICINE

## 2017-02-14 PROCEDURE — 99153 MOD SED SAME PHYS/QHP EA: CPT | Performed by: INTERNAL MEDICINE

## 2017-02-14 PROCEDURE — C1898 LEAD, PMKR, OTHER THAN TRANS: HCPCS

## 2017-02-14 PROCEDURE — 99152 MOD SED SAME PHYS/QHP 5/>YRS: CPT | Performed by: INTERNAL MEDICINE

## 2017-02-14 PROCEDURE — 93620 COMP EP EVL R AT VEN PAC&REC: CPT

## 2017-02-14 PROCEDURE — 4A0234Z MEASUREMENT OF CARDIAC ELECTRICAL ACTIVITY, PERCUTANEOUS APPROACH: ICD-10-PCS | Performed by: INTERNAL MEDICINE

## 2017-02-14 PROCEDURE — 27210965

## 2017-02-14 PROCEDURE — 27210795 ZZH PAD DEFIB QUICK CR4

## 2017-02-14 PROCEDURE — 99153 MOD SED SAME PHYS/QHP EA: CPT

## 2017-02-14 PROCEDURE — 25000125 ZZHC RX 250: Performed by: INTERNAL MEDICINE

## 2017-02-14 PROCEDURE — 0JH608Z INSERTION OF DEFIBRILLATOR GENERATOR INTO CHEST SUBCUTANEOUS TISSUE AND FASCIA, OPEN APPROACH: ICD-10-PCS | Performed by: INTERNAL MEDICINE

## 2017-02-14 PROCEDURE — 27210782 ZZH KIT EP TOTES DISP CR7

## 2017-02-14 PROCEDURE — 93641 EP EVL 1/2CHMB PAC CVDFB TST: CPT | Mod: 26 | Performed by: INTERNAL MEDICINE

## 2017-02-14 PROCEDURE — 80048 BASIC METABOLIC PNL TOTAL CA: CPT | Performed by: INTERNAL MEDICINE

## 2017-02-14 PROCEDURE — 40000940 XR CHEST 2 VW

## 2017-02-14 PROCEDURE — C1731 CATH, EP, 20 OR MORE ELEC: HCPCS

## 2017-02-14 PROCEDURE — 40000235 ZZH STATISTIC TELEMETRY

## 2017-02-14 PROCEDURE — 02H63KZ INSERTION OF DEFIBRILLATOR LEAD INTO RIGHT ATRIUM, PERCUTANEOUS APPROACH: ICD-10-PCS | Performed by: INTERNAL MEDICINE

## 2017-02-14 PROCEDURE — 4A023FZ MEASUREMENT OF CARDIAC RHYTHM, PERCUTANEOUS APPROACH: ICD-10-PCS | Performed by: INTERNAL MEDICINE

## 2017-02-14 PROCEDURE — 93621 COMP EP EVL L PAC&REC C SINS: CPT

## 2017-02-14 PROCEDURE — 33249 INSJ/RPLCMT DEFIB W/LEAD(S): CPT | Mod: Q0 | Performed by: INTERNAL MEDICINE

## 2017-02-14 PROCEDURE — 25000128 H RX IP 250 OP 636: Performed by: INTERNAL MEDICINE

## 2017-02-14 PROCEDURE — C1892 INTRO/SHEATH,FIXED,PEEL-AWAY: HCPCS

## 2017-02-14 PROCEDURE — 33249 INSJ/RPLCMT DEFIB W/LEAD(S): CPT

## 2017-02-14 PROCEDURE — 40000852 ZZH STATISTIC HEART CATH LAB OR EP LAB

## 2017-02-14 PROCEDURE — 93620 COMP EP EVL R AT VEN PAC&REC: CPT | Mod: 26 | Performed by: INTERNAL MEDICINE

## 2017-02-14 PROCEDURE — 93005 ELECTROCARDIOGRAM TRACING: CPT

## 2017-02-14 PROCEDURE — 99152 MOD SED SAME PHYS/QHP 5/>YRS: CPT

## 2017-02-14 PROCEDURE — C1730 CATH, EP, 19 OR FEW ELECT: HCPCS

## 2017-02-14 PROCEDURE — 27210995 ZZH RX 272: Performed by: INTERNAL MEDICINE

## 2017-02-14 RX ORDER — BUPIVACAINE HYDROCHLORIDE 2.5 MG/ML
10-30 INJECTION, SOLUTION EPIDURAL; INFILTRATION; INTRACAUDAL
Status: COMPLETED | OUTPATIENT
Start: 2017-02-14 | End: 2017-02-14

## 2017-02-14 RX ORDER — LIDOCAINE HYDROCHLORIDE 10 MG/ML
10-30 INJECTION, SOLUTION EPIDURAL; INFILTRATION; INTRACAUDAL; PERINEURAL
Status: COMPLETED | OUTPATIENT
Start: 2017-02-14 | End: 2017-02-14

## 2017-02-14 RX ORDER — NALOXONE HYDROCHLORIDE 0.4 MG/ML
0.4 INJECTION, SOLUTION INTRAMUSCULAR; INTRAVENOUS; SUBCUTANEOUS EVERY 5 MIN PRN
Status: DISCONTINUED | OUTPATIENT
Start: 2017-02-14 | End: 2017-02-14 | Stop reason: HOSPADM

## 2017-02-14 RX ORDER — ADENOSINE 3 MG/ML
6-12 INJECTION, SOLUTION INTRAVENOUS EVERY 5 MIN PRN
Status: DISCONTINUED | OUTPATIENT
Start: 2017-02-14 | End: 2017-02-14 | Stop reason: HOSPADM

## 2017-02-14 RX ORDER — BUPIVACAINE HYDROCHLORIDE 2.5 MG/ML
10-30 INJECTION, SOLUTION EPIDURAL; INFILTRATION; INTRACAUDAL
Status: DISCONTINUED | OUTPATIENT
Start: 2017-02-14 | End: 2017-02-14 | Stop reason: HOSPADM

## 2017-02-14 RX ORDER — PROTAMINE SULFATE 10 MG/ML
5-40 INJECTION, SOLUTION INTRAVENOUS EVERY 10 MIN PRN
Status: DISCONTINUED | OUTPATIENT
Start: 2017-02-14 | End: 2017-02-14 | Stop reason: HOSPADM

## 2017-02-14 RX ORDER — PROMETHAZINE HYDROCHLORIDE 25 MG/ML
6.25-25 INJECTION, SOLUTION INTRAMUSCULAR; INTRAVENOUS EVERY 4 HOURS PRN
Status: DISCONTINUED | OUTPATIENT
Start: 2017-02-14 | End: 2017-02-14 | Stop reason: HOSPADM

## 2017-02-14 RX ORDER — KETOROLAC TROMETHAMINE 30 MG/ML
15 INJECTION, SOLUTION INTRAMUSCULAR; INTRAVENOUS
Status: DISCONTINUED | OUTPATIENT
Start: 2017-02-14 | End: 2017-02-14 | Stop reason: HOSPADM

## 2017-02-14 RX ORDER — NALOXONE HYDROCHLORIDE 0.4 MG/ML
.1-.4 INJECTION, SOLUTION INTRAMUSCULAR; INTRAVENOUS; SUBCUTANEOUS
Status: DISCONTINUED | OUTPATIENT
Start: 2017-02-14 | End: 2017-02-14 | Stop reason: HOSPADM

## 2017-02-14 RX ORDER — SODIUM CHLORIDE 450 MG/100ML
INJECTION, SOLUTION INTRAVENOUS CONTINUOUS
Status: DISCONTINUED | OUTPATIENT
Start: 2017-02-14 | End: 2017-02-14 | Stop reason: HOSPADM

## 2017-02-14 RX ORDER — CEFAZOLIN SODIUM 1 G/3ML
2 INJECTION, POWDER, FOR SOLUTION INTRAMUSCULAR; INTRAVENOUS
Status: COMPLETED | OUTPATIENT
Start: 2017-02-14 | End: 2017-02-14

## 2017-02-14 RX ORDER — PROTAMINE SULFATE 10 MG/ML
1-5 INJECTION, SOLUTION INTRAVENOUS
Status: DISCONTINUED | OUTPATIENT
Start: 2017-02-14 | End: 2017-02-14 | Stop reason: HOSPADM

## 2017-02-14 RX ORDER — LORAZEPAM 2 MG/ML
.5-2 INJECTION INTRAMUSCULAR EVERY 10 MIN PRN
Status: DISCONTINUED | OUTPATIENT
Start: 2017-02-14 | End: 2017-02-14 | Stop reason: HOSPADM

## 2017-02-14 RX ORDER — DOBUTAMINE HYDROCHLORIDE 200 MG/100ML
5-40 INJECTION INTRAVENOUS CONTINUOUS PRN
Status: DISCONTINUED | OUTPATIENT
Start: 2017-02-14 | End: 2017-02-14 | Stop reason: HOSPADM

## 2017-02-14 RX ORDER — IBUTILIDE FUMARATE 1 MG/10ML
0.01 INJECTION, SOLUTION INTRAVENOUS
Status: DISCONTINUED | OUTPATIENT
Start: 2017-02-14 | End: 2017-02-14 | Stop reason: HOSPADM

## 2017-02-14 RX ORDER — MORPHINE SULFATE 2 MG/ML
1-2 INJECTION, SOLUTION INTRAMUSCULAR; INTRAVENOUS EVERY 5 MIN PRN
Status: DISCONTINUED | OUTPATIENT
Start: 2017-02-14 | End: 2017-02-14 | Stop reason: HOSPADM

## 2017-02-14 RX ORDER — FUROSEMIDE 10 MG/ML
20-100 INJECTION INTRAMUSCULAR; INTRAVENOUS
Status: DISCONTINUED | OUTPATIENT
Start: 2017-02-14 | End: 2017-02-14 | Stop reason: HOSPADM

## 2017-02-14 RX ORDER — LIDOCAINE 40 MG/G
CREAM TOPICAL
Status: DISCONTINUED | OUTPATIENT
Start: 2017-02-14 | End: 2017-02-14 | Stop reason: HOSPADM

## 2017-02-14 RX ORDER — DIPHENHYDRAMINE HYDROCHLORIDE 50 MG/ML
25-50 INJECTION INTRAMUSCULAR; INTRAVENOUS
Status: DISCONTINUED | OUTPATIENT
Start: 2017-02-14 | End: 2017-02-14 | Stop reason: HOSPADM

## 2017-02-14 RX ORDER — LIDOCAINE HYDROCHLORIDE AND EPINEPHRINE 10; 10 MG/ML; UG/ML
10-30 INJECTION, SOLUTION INFILTRATION; PERINEURAL
Status: DISCONTINUED | OUTPATIENT
Start: 2017-02-14 | End: 2017-02-14 | Stop reason: HOSPADM

## 2017-02-14 RX ORDER — IBUTILIDE FUMARATE 1 MG/10ML
1 INJECTION, SOLUTION INTRAVENOUS
Status: DISCONTINUED | OUTPATIENT
Start: 2017-02-14 | End: 2017-02-14 | Stop reason: HOSPADM

## 2017-02-14 RX ORDER — FENTANYL CITRATE 50 UG/ML
25-50 INJECTION, SOLUTION INTRAMUSCULAR; INTRAVENOUS
Status: DISCONTINUED | OUTPATIENT
Start: 2017-02-14 | End: 2017-02-14 | Stop reason: HOSPADM

## 2017-02-14 RX ORDER — HEPARIN SODIUM 1000 [USP'U]/ML
1000-10000 INJECTION, SOLUTION INTRAVENOUS; SUBCUTANEOUS EVERY 5 MIN PRN
Status: DISCONTINUED | OUTPATIENT
Start: 2017-02-14 | End: 2017-02-14 | Stop reason: HOSPADM

## 2017-02-14 RX ORDER — ONDANSETRON 2 MG/ML
4 INJECTION INTRAMUSCULAR; INTRAVENOUS EVERY 4 HOURS PRN
Status: DISCONTINUED | OUTPATIENT
Start: 2017-02-14 | End: 2017-02-14 | Stop reason: HOSPADM

## 2017-02-14 RX ORDER — FLUMAZENIL 0.1 MG/ML
0.2 INJECTION, SOLUTION INTRAVENOUS
Status: DISCONTINUED | OUTPATIENT
Start: 2017-02-14 | End: 2017-02-14 | Stop reason: HOSPADM

## 2017-02-14 RX ADMIN — FENTANYL CITRATE 25 MCG: 50 INJECTION, SOLUTION INTRAMUSCULAR; INTRAVENOUS at 08:49

## 2017-02-14 RX ADMIN — FENTANYL CITRATE 25 MCG: 50 INJECTION, SOLUTION INTRAMUSCULAR; INTRAVENOUS at 09:40

## 2017-02-14 RX ADMIN — FENTANYL CITRATE 25 MCG: 50 INJECTION, SOLUTION INTRAMUSCULAR; INTRAVENOUS at 10:08

## 2017-02-14 RX ADMIN — SODIUM CHLORIDE 25000 UNITS: 900 IRRIGANT IRRIGATION at 09:53

## 2017-02-14 RX ADMIN — LIDOCAINE HYDROCHLORIDE 100 MG: 10 INJECTION, SOLUTION EPIDURAL; INFILTRATION; INTRACAUDAL; PERINEURAL at 09:48

## 2017-02-14 RX ADMIN — MIDAZOLAM HYDROCHLORIDE 0.5 MG: 1 INJECTION, SOLUTION INTRAMUSCULAR; INTRAVENOUS at 09:50

## 2017-02-14 RX ADMIN — MIDAZOLAM HYDROCHLORIDE 1 MG: 1 INJECTION, SOLUTION INTRAMUSCULAR; INTRAVENOUS at 08:53

## 2017-02-14 RX ADMIN — BUPIVACAINE HYDROCHLORIDE 25 MG: 2.5 INJECTION, SOLUTION EPIDURAL; INFILTRATION; INTRACAUDAL at 09:48

## 2017-02-14 RX ADMIN — FENTANYL CITRATE 25 MCG: 50 INJECTION, SOLUTION INTRAMUSCULAR; INTRAVENOUS at 09:50

## 2017-02-14 RX ADMIN — SODIUM CHLORIDE: 4.5 INJECTION, SOLUTION INTRAVENOUS at 07:17

## 2017-02-14 RX ADMIN — LIDOCAINE HYDROCHLORIDE 100 MG: 10 INJECTION, SOLUTION EPIDURAL; INFILTRATION; INTRACAUDAL; PERINEURAL at 08:52

## 2017-02-14 RX ADMIN — CEFAZOLIN 2 G: 1 INJECTION, POWDER, FOR SOLUTION INTRAMUSCULAR; INTRAVENOUS at 09:00

## 2017-02-14 RX ADMIN — MIDAZOLAM HYDROCHLORIDE 1 MG: 1 INJECTION, SOLUTION INTRAMUSCULAR; INTRAVENOUS at 08:49

## 2017-02-14 NOTE — PROGRESS NOTES
Murmur, no femoral bruit.  Here with son for EP study.  Dr Longo here and pt aware pt had milk and toast at 0330.  Explained pre procedure and answered questions.  Pt resting in bed with call light in reach.

## 2017-02-14 NOTE — PROCEDURES
EPS performed for recurrent near-syncope and syncope. Easily inducible hemodynamically unstable ventricular tachyarrhythmias with triples requiring defibrillation. A dual chamber ICD was implanted. Home in 4 hours after bedrest.

## 2017-02-14 NOTE — DISCHARGE INSTRUCTIONS
Given ICD booklet and card.    ICD Implant Discharge Instructions    After you go home:      Have an adult stay with you until tomorrow.    You may resume your normal diet.       For 24 hours - due to the sedation you received:    Relax and take it easy.    Do NOT make any important or legal decisions.    Do NOT drive or operate machines at home or at work.    Do NOT drink alcohol.    Care of Chest Incision:      Keep the bandage on at least 3 days. You may remove the dressing on Feb 17, Friday. Change it only if it gets loose or soaked. If you need to change it, use 4x4-inch gauze and a large clear bandage.     If there is a pressure dressing (gauze & tape) - 24 hours after your procedure you may remove ONLY the top dressing. Leave the bottom dressing on.    Leave the strips of tape on. They will fall off on their own, or we will remove them at your first check-up.    Check your wound daily for signs of infection, such as increased redness, severe swelling or draining. Fever may also be a sign of infection. Call us if you see any of these signs.    If there are no signs of infection, you may shower after the bandage comes off in 3 days. If you take a tub bath, keep the wound dry.    No soaking the incision (swimming pool, bathtub, hot tub) for 2 weeks.    You may have mild to medium pain for 3 to 5 days. Take Acetaminophen (Tylenol) or Ibuprofen (Advil) for the pain. If the pain persists or is severe, call us.    Activity:      For at least 2 weeks: Do not raise your elbow above your shoulder. You can begin to use your arm as it feels comfortable to you.    Do not use arm on implant side to lift more than 10 pounds for 2 weeks.    In 6 to 8 weeks: You may begin to golf, play tennis, swim and do similar activities.    No driving for one day & limit to necessary driving for one week. Please talk to your doctor for specific recommendations.    Bleeding:      If you start bleeding from the incision site, sit down and  press firmly on the site for 10 minutes.     Once bleeding stops, call [facility] as soon as you can.    Call 911 right away if you have heavy bleeding or bleeding that does not stop.      Medicines:      Take your medications, including blood thinners, unless your provider tells you not to.    If you have stopped any other medicines, check with your provider about when to restart them.    Follow Up Appointments:      Follow up with Device Clinic at [facility] of patient preference in 7-10 days.    Call the clinic if:      You have a large or growing hard lump around the site.    The site is red, swollen, hot or tender.    Blood or fluid is draining from the site.    You have chills or a fever greater than 101 F (38 C).    You feel dizzy or light-headed.    Questions or concerns.    Telling others about your device:      Before you leave the hospital, you will receive a temporary ID card. A permanent card will be mailed to you about 6 to 8 weeks later. Always carry the ID card with you. It has important details about your device.    You may also get a Medical Alert bracelet or tag that says you have a defibrillator (ICD).  Go to [email or web address].     Always tell doctors, dentists and other care providers that you have a device implanted in you.    Let us know before you plan any surgeries. Your care team must take special steps to keep you safe during certain procedures. These steps will depend on the type of device you have. Your provider will need to see your ID card. They may need to call us for instructions.    Device Safety:      Please refer to device  s booklet for further information.          [facility] [place]:    [phone] [facility] (7 days a week)          EP Study  Discharge Instructions    After you go home:      Have an adult stay with you until tomorrow.    You may resume your normal diet.       For 24 hours - due to the sedation you  received:    Relax and take it easy.    Do NOT make any important or legal decisions.    Do NOT drive or operate machines at home or at work.    Do NOT drink alcohol.        Care of Groin Puncture Site:      For the first 24 hrs - check the puncture site every 1-2 hours while awake.    For the first 2 days, when you cough, sneeze, laugh or move your bowels, hold your hand over the puncture site and press firmly.    Remove the bandaid after 24 hours. If there is minor oozing, apply another bandaid and remove it after 12 hours.    It is normal to have a small bruise or pea size lump at the site.    Do NOT take a bath, or use a hot tub or pool for at least 3 days. Do NOT scrub the site. Do not use lotion or powder near the puncture site.    Activity    For 2 days:    No stooping or squatting    Do NOT do any heavy activity such as exercise, lifting, or straining.     No housework, yard work or any activity that make you sweat    Do NOT lift more than 10 pounds    Bleeding:      If you start bleeding from the site in your groin, lie down flat and press firmly on the site for 10 minutes.     Once bleeding stops, lay flat for 2 hours.     Call [facility] as soon as you can.       Call 911 right away if you have heavy bleeding or bleeding that does not stop.      Medicines:      Take your medications, including blood thinners, unless your provider tells you not to.    If you have stopped any other medicines, check with your provider about when to restart them.    If you have pain or shortness of breath, you may take Advil (ibuprofen) or Tylenol (acetaminophen).    Follow Up Appointments:      Follow up with Device Clinic at [facility] of patient preference in 7-10 days.    Follow up with [facility] Nurse Practitioner at [facility] of patient preference in 7-10 days.    Call the clinic if:      You have increased pain or a large or growing hard lump around the site.    The site is red, swollen, hot or  tender.    Blood or fluid is draining from the site.    You have chills or a fever greater than 101 F (38 C).    Your leg turns feels numb, cool or changes color.    Increased pain in the chest and/or groin.    New pain in the back or belly that you cannot control with Tylenol.    Shortness of breath or chest pain that is not relieved by Advil or Tylenol.    Recurrent irregular or fast heart rate lasting over 2 hours.    Any questions or concerns.    Heart rhythms:    You may have some premature heartbeats. These feel very strong. They may make you feel that the fast heart rhythm (tachycardia) is going to start again.  Give it time. The premature beats should occur less often.    Telling others about your device:      Before you leave the hospital, you will receive a temporary ID card. A permanent card will be mailed to you about 6 to 8 weeks later. Always carry the ID card with you. It has important details about your device.    You may also get a Medical Alert bracelet or tag that says you have a pacemaker or a defibrillator (ICD). Go to [email or web address].     Always tell doctors, dentists and other care providers that you have a device implanted in you.    Let us know before you plan any surgeries. Your care team must take special steps to keep you safe during certain procedures. These steps will depend on the type of device you have. Your provider will need to see your ID card. They may need to call us for instructions.    Device Safety:      Please refer to device  s booklet for further information.      [facility] [place]:    [phone] [facility] (7 days a week)

## 2017-02-14 NOTE — PROGRESS NOTES
Has been sleepy since ICD-see flow sheet is more awake now and eating.  Denies c/o. Bed rest until 1430 and the CXR.  Weaning O2 off.

## 2017-02-14 NOTE — IP AVS SNAPSHOT
MRN:[id number]                      After Visit Summary   2/14/2017    Colin Shell    MRN: [id number]           Visit Information        Department      2/14/2017  6:02 AM [facility]          Review of your medicines      CONTINUE these medicines which have NOT CHANGED        Dose / Directions    allopurinol 100 MG tablet   Commonly known as:  ZYLOPRIM   Used for:  Gout of foot, unspecified cause, unspecified chronicity, unspecified laterality        Dose:  50 mg   Take 0.5 tablets (50 mg) by mouth daily Gout   Quantity:  45 tablet   Refills:  3       amLODIPine 10 MG tablet   Commonly known as:  NORVASC   Used for:  Essential hypertension with goal blood pressure less than 140/90        Dose:  10 mg   Take 1 tablet (10 mg) by mouth daily   Quantity:  90 tablet   Refills:  3       aspirin 325 MG EC tablet   Used for:  Coronary atherosclerosis of unspecified type of vessel, native or graft        1 tab po QD (Once per day)   Quantity:  100   Refills:  3       atorvastatin 40 MG tablet   Commonly known as:  LIPITOR   Used for:  Hyperlipidemia LDL goal <100        Dose:  40 mg   Take 1 tablet (40 mg) by mouth daily   Quantity:  90 tablet   Refills:  3       CENTRUM SILVER per tablet        Dose:  1 tablet   Take 1 tablet by mouth daily   Refills:  0       CLARITIN 10 MG tablet   Generic drug:  loratadine        Dose:  10 mg   Take 10 mg by mouth daily   Refills:  0       finasteride 5 MG tablet   Commonly known as:  PROSCAR   Used for:  Benign non-nodular prostatic hyperplasia without lower urinary tract symptoms        Dose:  5 mg   Take 1 tablet (5 mg) by mouth daily   Quantity:  90 tablet   Refills:  3       losartan 100 MG tablet   Commonly known as:  COZAAR   Used for:  Essential hypertension with goal blood pressure less than 140/90        Dose:  100 mg   Take 1 tablet (100 mg) by mouth daily   Quantity:  90 tablet   Refills:  3                Protect others around  you: Learn how to safely use, store and throw away your medicines at www.disposemymeds.org.         Follow-ups after your visit        Your next 10 appointments already scheduled     Mar 20, 2017  9:55 AM CDT   LAB with [facility] [facility]   [facility]    [address] [address]   [phone]           Patient must bring picture ID.  Patient should be prepared to give a urine specimen  Please do not eat 10-12 hours before your appointment if you are coming in fasting for labs on lipids, cholesterol, or glucose (sugar).  Pregnant women should follow their Care Team instructions. Water with medications is okay. Do not drink coffee or other fluids.   If you have concerns about taking  your medications, please ask at office or if scheduling via [place], send a message by clicking on Secure Messaging, Message Your Care Team.            Mar 21, 2017 11:00 AM CDT   Return Visit with Norm Erickson MD   [facility] ([facility])    [address] [address]   [phone]               Care Instructions        After Care Instructions     Discharge Instructions - Do not raise elbow of the implant arm over shoulder level for 2 weeks       Do not raise elbow of the implant arm over shoulder level for 2 weeks.            Discharge Instructions - Follow up with Device Check RN        Follow up with Device Check RN in 7-10 days.            Discharge Instructions - Keep incision dry for 72 hours       Keep incision dry for 72 hours (unless Derma Leija was applied)                  Further instructions from your care team       Given ICD booklet and card.    ICD Implant Discharge Instructions    After you go home:      Have an adult stay with you until tomorrow.    You may resume your normal diet.       For 24 hours - due to the sedation you received:    Relax and take it easy.    Do NOT make any  important or legal decisions.    Do NOT drive or operate machines at home or at work.    Do NOT drink alcohol.    Care of Chest Incision:      Keep the bandage on at least 3 days. You may remove the dressing on Feb 17, Friday. Change it only if it gets loose or soaked. If you need to change it, use 4x4-inch gauze and a large clear bandage.     If there is a pressure dressing (gauze & tape) - 24 hours after your procedure you may remove ONLY the top dressing. Leave the bottom dressing on.    Leave the strips of tape on. They will fall off on their own, or we will remove them at your first check-up.    Check your wound daily for signs of infection, such as increased redness, severe swelling or draining. Fever may also be a sign of infection. Call us if you see any of these signs.    If there are no signs of infection, you may shower after the bandage comes off in 3 days. If you take a tub bath, keep the wound dry.    No soaking the incision (swimming pool, bathtub, hot tub) for 2 weeks.    You may have mild to medium pain for 3 to 5 days. Take Acetaminophen (Tylenol) or Ibuprofen (Advil) for the pain. If the pain persists or is severe, call us.    Activity:      For at least 2 weeks: Do not raise your elbow above your shoulder. You can begin to use your arm as it feels comfortable to you.    Do not use arm on implant side to lift more than 10 pounds for 2 weeks.    In 6 to 8 weeks: You may begin to golf, play tennis, swim and do similar activities.    No driving for one day & limit to necessary driving for one week. Please talk to your doctor for specific recommendations.    Bleeding:      If you start bleeding from the incision site, sit down and press firmly on the site for 10 minutes.     Once bleeding stops, call [facility] as soon as you can.    Call 911 right away if you have heavy bleeding or bleeding that does not stop.      Medicines:      Take your medications, including blood thinners, unless your  provider tells you not to.    If you have stopped any other medicines, check with your provider about when to restart them.    Follow Up Appointments:      Follow up with Device Clinic at [facility] of patient preference in 7-10 days.    Call the clinic if:      You have a large or growing hard lump around the site.    The site is red, swollen, hot or tender.    Blood or fluid is draining from the site.    You have chills or a fever greater than 101 F (38 C).    You feel dizzy or light-headed.    Questions or concerns.    Telling others about your device:      Before you leave the hospital, you will receive a temporary ID card. A permanent card will be mailed to you about 6 to 8 weeks later. Always carry the ID card with you. It has important details about your device.    You may also get a Medical Alert bracelet or tag that says you have a defibrillator (ICD).  Go to [email or web address].     Always tell doctors, dentists and other care providers that you have a device implanted in you.    Let us know before you plan any surgeries. Your care team must take special steps to keep you safe during certain procedures. These steps will depend on the type of device you have. Your provider will need to see your ID card. They may need to call us for instructions.    Device Safety:      Please refer to device  s booklet for further information.          [facility] at [place]:    [phone] [facility] (7 days a week)          EP Study  Discharge Instructions    After you go home:      Have an adult stay with you until tomorrow.    You may resume your normal diet.       For 24 hours - due to the sedation you received:    Relax and take it easy.    Do NOT make any important or legal decisions.    Do NOT drive or operate machines at home or at work.    Do NOT drink alcohol.        Care of Groin Puncture Site:      For the first 24 hrs - check the puncture site every 1-2 hours while  awake.    For the first 2 days, when you cough, sneeze, laugh or move your bowels, hold your hand over the puncture site and press firmly.    Remove the bandaid after 24 hours. If there is minor oozing, apply another bandaid and remove it after 12 hours.    It is normal to have a small bruise or pea size lump at the site.    Do NOT take a bath, or use a hot tub or pool for at least 3 days. Do NOT scrub the site. Do not use lotion or powder near the puncture site.    Activity    For 2 days:    No stooping or squatting    Do NOT do any heavy activity such as exercise, lifting, or straining.     No housework, yard work or any activity that make you sweat    Do NOT lift more than 10 pounds    Bleeding:      If you start bleeding from the site in your groin, lie down flat and press firmly on the site for 10 minutes.     Once bleeding stops, lay flat for 2 hours.     Call [facility] as soon as you can.       Call 911 right away if you have heavy bleeding or bleeding that does not stop.      Medicines:      Take your medications, including blood thinners, unless your provider tells you not to.    If you have stopped any other medicines, check with your provider about when to restart them.    If you have pain or shortness of breath, you may take Advil (ibuprofen) or Tylenol (acetaminophen).    Follow Up Appointments:      Follow up with Device Clinic at [facility] of patient preference in 7-10 days.    Follow up with [facility] Nurse Practitioner at [facility] of patient preference in 7-10 days.    Call the clinic if:      You have increased pain or a large or growing hard lump around the site.    The site is red, swollen, hot or tender.    Blood or fluid is draining from the site.    You have chills or a fever greater than 101 F (38 C).    Your leg turns feels numb, cool or changes color.    Increased pain in the chest and/or groin.    New pain in the back or belly that you cannot control with  "Tylenol.    Shortness of breath or chest pain that is not relieved by Advil or Tylenol.    Recurrent irregular or fast heart rate lasting over 2 hours.    Any questions or concerns.    Heart rhythms:    You may have some premature heartbeats. These feel very strong. They may make you feel that the fast heart rhythm (tachycardia) is going to start again.  Give it time. The premature beats should occur less often.    Telling others about your device:      Before you leave the hospital, you will receive a temporary ID card. A permanent card will be mailed to you about 6 to 8 weeks later. Always carry the ID card with you. It has important details about your device.    You may also get a Medical Alert bracelet or tag that says you have a pacemaker or a defibrillator (ICD). Go to [email or web address].     Always tell doctors, dentists and other care providers that you have a device implanted in you.    Let us know before you plan any surgeries. Your care team must take special steps to keep you safe during certain procedures. These steps will depend on the type of device you have. Your provider will need to see your ID card. They may need to call us for instructions.    Device Safety:      Please refer to device  s booklet for further information.      [facility] at [place]:    [phone] [facility] (7 days a week)              Statement of Approval     Ordered          02/14/17 1013  I have reviewed and agree with all the recommendations and orders detailed in this document.  EFFECTIVE NOW     Approved and electronically signed by:  Ruddy Longo MD              Additional Information About Your Visit        MyChart Information     [employer] lets you send messages to your doctor, view your test results, renew your prescriptions, schedule appointments and more. To sign up, go to [email or web address] . Click on \"Log in\" on the left side of the screen, which will take you to the " "Welcome page. Then click on \"Sign up Now\" on the right side of the page.     You will be asked to enter the access code listed below, as well as some personal information. Please follow the directions to create your username and password.     Your access code is: [email or web address]  Expires: 3/30/2017 11:13 AM     Your access code will  in 90 days. If you need help or a new code, please call your [facility] clinic or [phone].        Care EveryWhere ID     This is your Care EveryWhere ID. This could be used by other organizations to access your [facility] medical records  [id number]        Your Vitals Were     Blood Pressure Pulse Temperature Respirations Height Weight    127/57 66 97.4  F (36.3  C) (Oral) 18 1.753 m (5' 9\") 93.6 kg (206 lb 6.4 oz)    Pulse Oximetry BMI (Body Mass Index)                99% 30.48 kg/m2           Primary Care Provider Office Phone # Fax #    Casie AhnDO dasha [phone]      Thank you!     Thank you for choosing [facility] for your care. Our goal is always to provide you with excellent care. Hearing back from our patients is one way we can continue to improve our services. Please take a few minutes to complete the written survey that you may receive in the mail after you visit with us. Thank you!             Medication List: This is a list of all your medications and when to take them. Check marks below indicate your daily home schedule. Keep this list as a reference.      Medications           Morning Afternoon Evening Bedtime As Needed    allopurinol 100 MG tablet   Commonly known as:  ZYLOPRIM   Take 0.5 tablets (50 mg) by mouth daily Gout                                amLODIPine 10 MG tablet   Commonly known as:  NORVASC   Take 1 tablet (10 mg) by mouth daily                                aspirin 325 MG EC tablet   1 tab po QD (Once per day)                                atorvastatin 40 MG tablet   Commonly known as:  LIPITOR   Take 1 tablet (40 mg) by mouth " daily                                CENTRUM SILVER per tablet   Take 1 tablet by mouth daily                                CLARITIN 10 MG tablet   Take 10 mg by mouth daily   Generic drug:  loratadine                                finasteride 5 MG tablet   Commonly known as:  PROSCAR   Take 1 tablet (5 mg) by mouth daily                                losartan 100 MG tablet   Commonly known as:  COZAAR   Take 1 tablet (100 mg) by mouth daily

## 2017-02-14 NOTE — IP AVS SNAPSHOT
[place] [address]    [place] [address]    Phone:  [phone]                                       After Visit Summary   2/14/2017    Colin Shell    MRN: [id number]           After Visit Summary Signature Page     I have received my discharge instructions, and my questions have been answered. I have discussed any challenges I see with this plan with the nurse or doctor.    ..........................................................................................................................................  Patient/Patient Representative Signature      ..........................................................................................................................................  Patient Representative Print Name and Relationship to Patient    ..................................................               ................................................  Date                                            Time    ..........................................................................................................................................  Reviewed by Signature/Title    ...................................................              ..............................................  Date                                                            Time

## 2017-02-14 NOTE — PROGRESS NOTES
Pt taken via wheelchair to radiology 1440.  1450 Chest xray complete et pt returned to [facility].  1510 Dr. Longo here et saw pt. Device representative here earlier et interrogated pacemaker. Pt has been up et ambulated et voided in bathroom. Denies pain. Pacemaker dressing CDI; no bleeding or hematoma noted. Pt meets discharge criteria. Dr. Longo stated pt could be discharged; stated no pneumothorax et leads in correct placement.  1530 Pt taken via wheelchair to car accompanied by nurse

## 2017-02-16 LAB — INTERPRETATION ECG - MUSE: NORMAL

## 2017-02-21 ENCOUNTER — HOSPITAL ENCOUNTER (OUTPATIENT)
Dept: CARDIOLOGY | Facility: CLINIC | Age: 82
Discharge: HOME | End: 2017-02-21
Attending: INTERNAL MEDICINE | Admitting: INTERNAL MEDICINE
Payer: COMMERCIAL

## 2017-02-21 ENCOUNTER — DOCUMENTATION ONLY (OUTPATIENT)
Dept: CARDIOLOGY | Facility: CLINIC | Age: 82
End: 2017-02-21

## 2017-02-21 PROCEDURE — 93289 INTERROG DEVICE EVAL HEART: CPT | Mod: 26 | Performed by: INTERNAL MEDICINE

## 2017-02-21 PROCEDURE — 93289 INTERROG DEVICE EVAL HEART: CPT

## 2017-02-21 NOTE — PROGRESS NOTES
Medtronic Evera 7 Day Post ICD Device Check  AP: 3.9 % : 93.5 %  Mode: DDD 50        Underlying Rhythm: SR with Ist degree AVB and PACs  Heart Rate: Stable with excellent variability  Sensing: Stable    Pacing Threshold: Stable    Impedance: Stable  Battery Status: 7.9 years  Incision: Edges well approximated without redness, drainage, or swelling noted.   Atrial Arrhythmia: 1 episode for 4.1 % of the time. Episode lasted 9 hours. EGM shows AF with controlled VR, patient only on aspirin  Ventricular Arrhythmia: None  ATP: None    Shocks: None  Setting Change: Atrial auto capture turned off due to PACs and programmed 2.0 V, Paced and Sensed AVD increased to 230 ms from 180 and 150 respectively to allow for intrinsic conduction    Care Plan: Patient reminded to avoid lifting left arm above shoulder for another 2 weeks. 6 week ICD follow-up scheduled slightly earlier in order to coordinate OV with Dr. Erickson. Per Dr. Longo's last OV note, patient should be on NOAC after device implant due to high CHADs-VASc score. Patient states that he would prefer to wait to start OAC when he sees Dr. Erickson in March. Patient was given names of NOAC to provide insurance company to see which NOAC was covered under his insurance as he would prefer not to take Warfarin. Mehdi

## 2017-03-20 DIAGNOSIS — E78.5 HYPERLIPIDEMIA LDL GOAL <70: ICD-10-CM

## 2017-03-20 DIAGNOSIS — R55 SYNCOPE, UNSPECIFIED SYNCOPE TYPE: ICD-10-CM

## 2017-03-20 DIAGNOSIS — I48.0 PAROXYSMAL ATRIAL FIBRILLATION (H): ICD-10-CM

## 2017-03-20 DIAGNOSIS — I25.10 CORONARY ARTERY DISEASE INVOLVING NATIVE CORONARY ARTERY OF NATIVE HEART WITHOUT ANGINA PECTORIS: ICD-10-CM

## 2017-03-20 DIAGNOSIS — I10 BENIGN ESSENTIAL HYPERTENSION: ICD-10-CM

## 2017-03-20 DIAGNOSIS — Z95.1 S/P CABG (CORONARY ARTERY BYPASS GRAFT): ICD-10-CM

## 2017-03-20 LAB
ALT SERPL W P-5'-P-CCNC: 22 U/L (ref 0–70)
ANION GAP SERPL CALCULATED.3IONS-SCNC: 8 MMOL/L (ref 3–14)
BUN SERPL-MCNC: 28 MG/DL (ref 7–30)
CALCIUM SERPL-MCNC: 9.3 MG/DL (ref 8.5–10.1)
CHLORIDE SERPL-SCNC: 104 MMOL/L (ref 94–109)
CHOLEST SERPL-MCNC: 221 MG/DL
CO2 SERPL-SCNC: 25 MMOL/L (ref 20–32)
CREAT SERPL-MCNC: 1.01 MG/DL (ref 0.66–1.25)
GFR SERPL CREATININE-BSD FRML MDRD: 69 ML/MIN/1.7M2
GLUCOSE SERPL-MCNC: 122 MG/DL (ref 70–99)
HDLC SERPL-MCNC: 41 MG/DL
LDLC SERPL CALC-MCNC: 137 MG/DL
NONHDLC SERPL-MCNC: 180 MG/DL
POTASSIUM SERPL-SCNC: 4.4 MMOL/L (ref 3.4–5.3)
SODIUM SERPL-SCNC: 137 MMOL/L (ref 133–144)
TRIGL SERPL-MCNC: 217 MG/DL

## 2017-03-20 PROCEDURE — 84460 ALANINE AMINO (ALT) (SGPT): CPT | Performed by: INTERNAL MEDICINE

## 2017-03-20 PROCEDURE — 80061 LIPID PANEL: CPT | Performed by: INTERNAL MEDICINE

## 2017-03-20 PROCEDURE — 80048 BASIC METABOLIC PNL TOTAL CA: CPT | Performed by: INTERNAL MEDICINE

## 2017-03-20 PROCEDURE — 36415 COLL VENOUS BLD VENIPUNCTURE: CPT | Performed by: INTERNAL MEDICINE

## 2017-03-21 ENCOUNTER — HOSPITAL ENCOUNTER (OUTPATIENT)
Dept: CARDIOLOGY | Facility: CLINIC | Age: 82
Discharge: HOME | End: 2017-03-21
Attending: INTERNAL MEDICINE | Admitting: INTERNAL MEDICINE
Payer: COMMERCIAL

## 2017-03-21 ENCOUNTER — DOCUMENTATION ONLY (OUTPATIENT)
Dept: CARDIOLOGY | Facility: CLINIC | Age: 82
End: 2017-03-21

## 2017-03-21 ENCOUNTER — OFFICE VISIT (OUTPATIENT)
Dept: CARDIOLOGY | Facility: CLINIC | Age: 82
End: 2017-03-21
Payer: COMMERCIAL

## 2017-03-21 VITALS
SYSTOLIC BLOOD PRESSURE: 142 MMHG | DIASTOLIC BLOOD PRESSURE: 77 MMHG | HEART RATE: 62 BPM | OXYGEN SATURATION: 98 % | BODY MASS INDEX: 30.13 KG/M2 | WEIGHT: 204 LBS

## 2017-03-21 DIAGNOSIS — I25.10 CORONARY ARTERY DISEASE INVOLVING NATIVE CORONARY ARTERY OF NATIVE HEART WITHOUT ANGINA PECTORIS: ICD-10-CM

## 2017-03-21 DIAGNOSIS — I10 ESSENTIAL HYPERTENSION WITH GOAL BLOOD PRESSURE LESS THAN 140/90: ICD-10-CM

## 2017-03-21 DIAGNOSIS — E78.5 HYPERLIPIDEMIA LDL GOAL <70: ICD-10-CM

## 2017-03-21 DIAGNOSIS — I48.0 PAROXYSMAL ATRIAL FIBRILLATION (H): ICD-10-CM

## 2017-03-21 DIAGNOSIS — I10 BENIGN ESSENTIAL HYPERTENSION: ICD-10-CM

## 2017-03-21 DIAGNOSIS — R55 SYNCOPE, UNSPECIFIED SYNCOPE TYPE: ICD-10-CM

## 2017-03-21 DIAGNOSIS — Z95.1 S/P CABG (CORONARY ARTERY BYPASS GRAFT): ICD-10-CM

## 2017-03-21 PROCEDURE — 99214 OFFICE O/P EST MOD 30 MIN: CPT | Mod: 25 | Performed by: INTERNAL MEDICINE

## 2017-03-21 PROCEDURE — 93289 INTERROG DEVICE EVAL HEART: CPT

## 2017-03-21 PROCEDURE — 93289 INTERROG DEVICE EVAL HEART: CPT | Mod: 26 | Performed by: INTERNAL MEDICINE

## 2017-03-21 RX ORDER — AMLODIPINE BESYLATE 10 MG/1
10 TABLET ORAL DAILY
Qty: 90 TABLET | Refills: 3 | Status: SHIPPED | OUTPATIENT
Start: 2017-03-21 | End: 2018-01-08

## 2017-03-21 NOTE — LETTER
"3/21/2017    Casie Watts, DO  [facility]   [address] [address]    RE: Colin KORIN Shell       Dear Colleague,    I had the pleasure of seeing Colin Shell in the [facility].    The patient is an 89-year-old mildly overweight white male with a longstanding history of ischemic heart disease, approximately 21-22 years status post coronary bypass surgery times multiple vessels, who presented to Cardiology Clinic initially for an episode of apparent complete syncope.  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      He had a ZIO Patch placed that showed some evidence of atrial tachycardia/atrial dysrhythmia with a 4-beat run of ventricular tachycardia.  He subsequently underwent a Cardiolite stress test because of the history of ischemic heart disease that demonstrated a transmural scar in the inferior basilar/inferolateral wall with mild ellie-infarct ischemia in the basilar inferior septum.  Gated images demonstrated mildly reduced left ventricular systolic function with ejection fraction of 46%.        The patient has denied symptoms of divya chest discomfort, shortness of breath, palpitations, nausea, vomiting or diaphoresis.  He has had apparent episodes of lightheadedness/near-syncope for apparently a few years prior to the most recent event.  He has had some difficulty with bradycardia which limited beta blocker therapy.  He subsequently underwent an EP study and had an AICD implanted because of left ventricular dysfunction and history of ischemic heart disease as well as tachydysrhythmia concern.        He continues to drink 1-2 cups of caffeinated beverage a day with a rare alcoholic beverage.  His previous history of risk factors for coronary " disease are positive for cigarette smoking, discontinued in 1960, hypertension for the past 15-20 years with hyperlipidemia but no diagnosis of diabetes mellitus or family history of premature coronary disease.  Since the placement of device, he has had no other episodes of palpitations, lightheadedness or syncope.        MEDICATIONS:   1.  Amlodipine 10 mg a day.   2.  Magnesium hydroxide as needed.   3.  Losartan 100 mg a day.   4.  Allopurinol 50 mg a day.   5.  Finasteride 5 mg a day.   6.  Atorvastatin 40 mg a day.   7.  Multivitamins 1 per day.   8.  Loratadine 10 mg a day.   9.  Aspirin 325 mg a day.      LABORATORY DATA:  Demonstrated cholesterol of 221, HDL 41, , triglyceride 217.  Sodium 137, potassium 4.4, BUN 20, creatinine 1.01.  Hemoglobin 13.2, platelet count 206,000.  AST 22.      The patient presents to Cardiology Clinic for followup of his ischemic heart disease and recent EP evaluation and intervention.  He has had episodes of isolated atrial tachycardia that are brief but no evidence of sustained ventricular dysrhythmia or irregular atrial dysrhythmia at this time by interrogation of his device.      PHYSICAL EXAMINATION:   VITAL SIGNS:  Blood pressure 142/77 with a heart rate of 60-70 and essentially regular.  Weight was 204 pounds.   NECK:  Without significant jugular venous distention, carotid bruit or palpable thyroid.   CHEST:  Essentially clear to percussion and auscultation with slight decreased breath sounds at the bases, slightly prolonged expiratory phase.   CARDIAC:  Regular rhythm with occasional premature beat.  There is a soft S4 gallop.  There is a 1-2/6 systolic murmur at the left sternal border radiating to the apex.  No diastolic murmur, rub or S3.   EXTREMITIES:  Without significant cyanosis or erythema.  There was trace to 1+ edema present.      CLINICAL IMPRESSION:   1.  Stable cardiac condition.   2.  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   3.  History of ischemic heart disease, status post multivessel coronary bypass surgery in 1996.   4.  History of hypertension with slight increased elevation in systolic blood pressure.   5.  Status post AICD implantation because of atrial and ventricular dysrhythmia as well as bradycardia and conduction system disease.   6.  Distant history of cigarette smoking, discontinued in 1960.   7.  History of hypercholesterolemia.   8.  History of hyperglycemia.   9.  History of benign prostatic hypertrophy.   10.  Previous history of paroxysmal atrial fibrillation.      DISCUSSION:  The patient appears to be doing well status post his AICD implantation.  He has had no further episodes of lightheadedness, dizziness or syncope.  He has had no palpitations.  The patient was felt to be a good candidate for a dual-chamber pacemaker in the first place and it was decided to do an AICD after his electrophysiologic testing.  The patient does have an elevated CHADS-VASc score and he is not anxious to go on anticoagulation beyond aspirin at this time.  The burden of his atrial dysrhythmia is not great and recent atrial fibrillation has been difficult to discern on both ZIO Patch and device interrogation.  He will need close followup of serum lipids, basic metabolic panel and blood pressure.      RECOMMENDATIONS:   1.  Continue present medications.   2.  Device followup.   3.  Close followup of serum lipids, basic metabolic panel and blood pressure.   4.  Diet and exercise as tolerated.   5.  Consider anticoagulation for stroke prevention if any further documentation of paroxysmal atrial fibrillation on the device.   6.  Routine medical followup.   7.  Cardiology followup in 3 months.     Again, thank you for allowing me to participate in the care of your patient.      Sincerely,    Norm Erickson MD     [facility]

## 2017-03-21 NOTE — PROGRESS NOTES
Medtronic Evera ICD 6 week Post ICD Device Check/ [place]/ Dr Erickson  AP: 5.4 % : 84.8 %  Mode: DDD        Underlying Rhythm: SR  Heart Rate: Histogram shows a stable HR distribution  Sensing: Stable    Pacing Threshold: Stable    Impedance: WNL  Battery Status: 9.4 years  Incision: Has healed well  Atrial Arrhythmia: 2 AHR; AT vs Flutter. Reviewed with Dr Longo. Durations 1hour and 6 hours. [place] 1.1%  NOT ON ANTICOAGUALTION  Ventricular Arrhythmia: none  ATP: none    Shocks: none  Setting Change: none    Care Plan: Remote in 3 months

## 2017-03-21 NOTE — PATIENT INSTRUCTIONS
Thank you for your [facility] visit today. Your provider has recommended the following:  Medication Changes:  None today. Continue taking your medications as you have been.  Recommendations:  As discussed at your visit today with Dr. Erickson  Follow-up:  See Dr. Erickson for cardiology follow up in 3-4 months with fasting labs done 1-2 days prior.  We kindly ask that you call cardiology scheduling at [phone] three months prior to requested revisit date to schedule future cardiology appointments.  Reminder:  1. Please bring in your current medication list or your medication, over the counter supplements and vitamin bottles as we will review these at each office visit.               [facility] HEART CARE  [facility]~[address]~[place], [place]~[address]  Questions about your visit today?  Call your Cardiology Clinic RN's-Ekaterina Sewell and/or Monica Ramirez at [phone].

## 2017-03-21 NOTE — MR AVS SNAPSHOT
After Visit Summary   3/21/2017    Colin Shell    MRN: [id number]           Patient Information     Date Of Birth          6/25/1927        Visit Information        Provider Department      3/21/2017 11:00 AM Norm Erickson MD [facility] PHYSICIAN HEART AT [facility]        Today's Diagnoses     Syncope, unspecified syncope type        Paroxysmal atrial fibrillation (H)        Coronary artery disease involving native coronary artery of native heart without angina pectoris        S/P CABG (coronary artery bypass graft)        Benign essential hypertension        Hyperlipidemia LDL goal <70        Essential hypertension with goal blood pressure less than 140/90          Care Instructions    Thank you for your  Heart Care visit today. Your provider has recommended the following:  Medication Changes:  None today. Continue taking your medications as you have been.  Recommendations:  As discussed at your visit today with Dr. Erickson  Follow-up:  See Dr. Erickson for cardiology follow up in 3-4 months with fasting labs done 1-2 days prior.  We kindly ask that you call cardiology scheduling at [phone] three months prior to requested revisit date to schedule future cardiology appointments.  Reminder:  1. Please bring in your current medication list or your medication, over the counter supplements and vitamin bottles as we will review these at each office visit.               [facility] HEART CARE  [facility]~[address]~[place], [place]~[address]  Questions about your visit today?  Call your Cardiology Clinic RN's-Ekaterina Sewell and/or Monica Ramirez at [phone].              Follow-ups after your visit        Additional Services     Follow-Up with Cardiologist                 Your next 10 appointments already scheduled     Jun 28, 2017  4:30 PM CDT   Remote ICD Check with CASEY DCR2   [facility] HEART AT [place]  "([facility])    [id number]  [place] [address] [phone]           This appointment is for a remote check of your debrillator.  This is not an appointment at the office.              Future tests that were ordered for you today     Open Future Orders        Priority Expected Expires Ordered    Basic metabolic panel Routine 7/19/2017 3/21/2018 3/21/2017    Lipid Profile Routine 7/19/2017 3/21/2018 3/21/2017    ALT Routine 7/19/2017 3/21/2018 3/21/2017    Follow-Up with Cardiologist Routine 7/19/2017 3/21/2018 3/21/2017            Who to contact     If you have questions or need follow up information about today's clinic visit or your schedule please contact [facility] PHYSICIAN HEART AT [facility] directly at [phone].  Normal or non-critical lab and imaging results will be communicated to you by MyChart, letter or phone within 4 business days after the clinic has received the results. If you do not hear from us within 7 days, please contact the clinic through [facility] [employer] or phone. If you have a critical or abnormal lab result, we will notify you by phone as soon as possible.  Submit refill requests through [employer] or call your pharmacy and they will forward the refill request to us. Please allow 3 business days for your refill to be completed.          Additional Information About Your Visit        [facility] [employer] Information     [employer] lets you send messages to your doctor, view your test results, renew your prescriptions, schedule appointments and more. To sign up, go to [email or web address] . Click on \"Log in\" on the left side of the screen, which will take you to the Welcome page. Then click on \"Sign up Now\" on the right side of the page.     You will be asked to enter the access code listed below, as well as some personal information. Please follow the directions to create your username and password.     Your access code is: [email or web address]  Expires: 3/30/2017 12:13 " PM     Your access code will  in 90 days. If you need help or a new code, please call your [facility] clinic or [phone].        Care EveryWhere ID     This is your Care EveryWhere ID. This could be used by other organizations to access your [facility] medical records  [id number]        Your Vitals Were     Pulse Pulse Oximetry BMI (Body Mass Index)             62 98% 30.13 kg/m2          Blood Pressure from Last 3 Encounters:   17 142/77   17 132/72   17 138/69    Weight from Last 3 Encounters:   17 92.5 kg (204 lb)   17 93.6 kg (206 lb 6.4 oz)   17 92.4 kg (203 lb 12.8 oz)              We Performed the Following     Follow-Up with Cardiologist          Where to get your medicines      These medications were sent to [facility] [address]   [address] [facility] [address]     Phone:  [phone]     amLODIPine 10 MG tablet          Primary Care Provider Office Phone # Fax #    Casie Watts,  [phone]       [address] [address]        Thank you!     Thank you for choosing [facility] PHYSICIAN HEART AT [facility]  for your care. Our goal is always to provide you with excellent care. Hearing back from our patients is one way we can continue to improve our services. Please take a few minutes to complete the written survey that you may receive in the mail after your visit with us. Thank you!             Your Updated Medication List - Protect others around you: Learn how to safely use, store and throw away your medicines at www.disposemymeds.org.          This list is accurate as of: 3/21/17 11:52 AM.  Always use your most recent med list.                   Brand Name Dispense Instructions for use    allopurinol 100 MG tablet    ZYLOPRIM    45 tablet    Take 0.5 tablets (50 mg) by mouth daily Gout       amLODIPine 10 MG tablet    NORVASC    90 tablet     Take 1 tablet (10 mg) by mouth daily       aspirin 325 MG EC tablet     100    1 tab po QD (Once per day)       atorvastatin 40 MG tablet    LIPITOR    90 tablet    Take 1 tablet (40 mg) by mouth daily       CENTRUM SILVER per tablet      Take 1 tablet by mouth daily       CLARITIN 10 MG tablet   Generic drug:  loratadine      Take 10 mg by mouth daily       finasteride 5 MG tablet    PROSCAR    90 tablet    Take 1 tablet (5 mg) by mouth daily       losartan 100 MG tablet    COZAAR    90 tablet    Take 1 tablet (100 mg) by mouth daily       ELIJAH MILK OF MAGNESIA PO

## 2017-03-21 NOTE — PROGRESS NOTES
"HISTORY OF PRESENT ILLNESS:  The patient is an 89-year-old mildly overweight white male with a longstanding history of ischemic heart disease, approximately 21-22 years status post coronary bypass surgery times multiple vessels, who presented to Cardiology Clinic initially for an episode of apparent complete syncope.  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      He had a ZIO Patch placed that showed some evidence of atrial tachycardia/atrial dysrhythmia with a 4-beat run of ventricular tachycardia.  He subsequently underwent a Cardiolite stress test because of the history of ischemic heart disease that demonstrated a transmural scar in the inferior basilar/inferolateral wall with mild ellie-infarct ischemia in the basilar inferior septum.  Gated images demonstrated mildly reduced left ventricular systolic function with ejection fraction of 46%.        The patient has denied symptoms of divya chest discomfort, shortness of breath, palpitations, nausea, vomiting or diaphoresis.  He has had apparent episodes of lightheadedness/near-syncope for apparently a few years prior to the most recent event.  He has had some difficulty with bradycardia which limited beta blocker therapy.  He subsequently underwent an EP study and had an AICD implanted because of left ventricular dysfunction and history of ischemic heart disease as well as tachydysrhythmia concern.        He continues to drink 1-2 cups of caffeinated beverage a day with a rare alcoholic beverage.  His previous history of risk factors for coronary disease are positive for cigarette smoking, discontinued in 1960, hypertension for the past 15-20 years with hyperlipidemia but no diagnosis of diabetes mellitus or family history of premature coronary disease.  Since the placement " of device, he has had no other episodes of palpitations, lightheadedness or syncope.      MEDICATIONS:   1.  Amlodipine 10 mg a day.   2.  Magnesium hydroxide as needed.   3.  Losartan 100 mg a day.   4.  Allopurinol 50 mg a day.   5.  Finasteride 5 mg a day.   6.  Atorvastatin 40 mg a day.   7.  Multivitamins 1 per day.   8.  Loratadine 10 mg a day.   9.  Aspirin 325 mg a day.      LABORATORY DATA:  Demonstrated cholesterol of 221, HDL 41, , triglyceride 217.  Sodium 137, potassium 4.4, BUN 20, creatinine 1.01.  Hemoglobin 13.2, platelet count 206,000.  AST 22.      The patient presents to Cardiology Clinic for followup of his ischemic heart disease and recent EP evaluation and intervention.  He has had episodes of isolated atrial tachycardia that are brief but no evidence of sustained ventricular dysrhythmia or irregular atrial dysrhythmia at this time by interrogation of his device.      PHYSICAL EXAMINATION:   VITAL SIGNS:  Blood pressure 142/77 with a heart rate of 60-70 and essentially regular.  Weight was 204 pounds.   NECK:  Without significant jugular venous distention, carotid bruit or palpable thyroid.   CHEST:  Essentially clear to percussion and auscultation with slight decreased breath sounds at the bases, slightly prolonged expiratory phase.   CARDIAC:  Regular rhythm with occasional premature beat.  There is a soft S4 gallop.  There is a 1-2/6 systolic murmur at the left sternal border radiating to the apex.  No diastolic murmur, rub or S3.   EXTREMITIES:  Without significant cyanosis or erythema.  There was trace to 1+ edema present.      CLINICAL IMPRESSION:   1.  Stable cardiac condition.   2.  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   3.  History of ischemic heart disease, status post multivessel coronary bypass surgery in 1996.   4.  History of hypertension with  slight increased elevation in systolic blood pressure.   5.  Status post AICD implantation because of atrial and ventricular dysrhythmia as well as bradycardia and conduction system disease.   6.  Distant history of cigarette smoking, discontinued in .   7.  History of hypercholesterolemia.   8.  History of hyperglycemia.   9.  History of benign prostatic hypertrophy.   10.  Previous history of paroxysmal atrial fibrillation.      DISCUSSION:  The patient appears to be doing well status post his AICD implantation.  He has had no further episodes of lightheadedness, dizziness or syncope.  He has had no palpitations.  The patient was felt to be a good candidate for a dual-chamber pacemaker in the first place and it was decided to do an AICD after his electrophysiologic testing.  The patient does have an elevated CHADS-VASc score and he is not anxious to go on anticoagulation beyond aspirin at this time.  The burden of his atrial dysrhythmia is not great and recent atrial fibrillation has been difficult to discern on both ZIO Patch and device interrogation.  He will need close followup of serum lipids, basic metabolic panel and blood pressure.      RECOMMENDATIONS:   1.  Continue present medications.   2.  Device followup.   3.  Close followup of serum lipids, basic metabolic panel and blood pressure.   4.  Diet and exercise as tolerated.   5.  Consider anticoagulation for stroke prevention if any further documentation of paroxysmal atrial fibrillation on the device.   6.  Routine medical followup.   7.  Cardiology followup in 3 months.      cc:      [place] Watts, [address] [address]         LESIA QUEEN MD, [facility]             D: 2017 12:10   T: 2017 13:31   MT: OMAR      Name:     HALI ANAYA   MRN:      [id number]        Account:      [id number]   :      1927           Service Date: 2017      Document: [id number]

## 2017-03-29 ENCOUNTER — OFFICE VISIT (OUTPATIENT)
Dept: DERMATOLOGY | Facility: CLINIC | Age: 82
End: 2017-03-29
Payer: COMMERCIAL

## 2017-03-29 VITALS — SYSTOLIC BLOOD PRESSURE: 150 MMHG | DIASTOLIC BLOOD PRESSURE: 73 MMHG | HEART RATE: 70 BPM | OXYGEN SATURATION: 98 %

## 2017-03-29 DIAGNOSIS — D48.5 NEOPLASM OF UNCERTAIN BEHAVIOR OF SKIN: ICD-10-CM

## 2017-03-29 DIAGNOSIS — L57.0 AK (ACTINIC KERATOSIS): Primary | ICD-10-CM

## 2017-03-29 PROCEDURE — 11100 HC BIOPSY SKIN/SUBQ/MUC MEM, SINGLE LESION: CPT | Performed by: PHYSICIAN ASSISTANT

## 2017-03-29 PROCEDURE — 99203 OFFICE O/P NEW LOW 30 MIN: CPT | Mod: 25 | Performed by: PHYSICIAN ASSISTANT

## 2017-03-29 PROCEDURE — 88305 TISSUE EXAM BY PATHOLOGIST: CPT | Performed by: PHYSICIAN ASSISTANT

## 2017-03-29 RX ORDER — FLUOROURACIL 50 MG/G
CREAM TOPICAL
Qty: 40 G | Refills: 0 | Status: SHIPPED | OUTPATIENT
Start: 2017-03-29 | End: 2017-09-06

## 2017-03-29 NOTE — PATIENT INSTRUCTIONS
Wound Care Instructions     FOR SUPERFICIAL WOUNDS     [facility] [phone]    [facility] [phone]    Left cheek                   AFTER 24 HOURS YOU SHOULD REMOVE THE BANDAGE AND BEGIN DAILY DRESSING CHANGES AS FOLLOWS:     1) Remove Dressing.     2) Clean and dry the area with tap water using a Q-tip or sterile gauze pad.     3) Apply Vaseline, Aquaphor, Polysporin ointment or Bacitracin ointment over entire wound.  Do NOT use Neosporin ointment.     4) Cover the wound with a band-aid, or a sterile non-stick gauze pad and micropore paper tape      REPEAT THESE INSTRUCTIONS AT LEAST ONCE A DAY UNTIL THE WOUND HAS COMPLETELY HEALED.    It is an old wives tale that a wound heals better when it is exposed to air and allowed to dry out. The wound will heal faster with a better cosmetic result if it is kept moist with ointment and covered with a bandage.    **Do not let the wound dry out.**      Supplies Needed:      *Cotton tipped applicators (Q-tips)    *Polysporin Ointment or Bacitracin Ointment (NOT NEOSPORIN)    *Band-aids or non-stick gauze pads and micropore paper tape.      PATIENT INFORMATION:    During the healing process you will notice a number of changes. All wounds develop a small halo of redness surrounding the wound.  This means healing is occurring. Severe itching with extensive redness usually indicates sensitivity to the ointment or bandage tape used to dress the wound.  You should call our office if this develops.      Swelling  and/or discoloration around your surgical site is common, particularly when performed around the eye.    All wounds normally drain.  The larger the wound the more drainage there will be.  After 7-10 days, you will notice the wound beginning to shrink and new skin will begin to grow.  The wound is healed when you can see skin has formed over the entire area.  A healed wound has a healthy, shiny look to the surface and is red to dark pink  in color to normalize.  Wounds may take approximately 4-6 weeks to heal.  Larger wounds may take 6-8 weeks.  After the wound is healed you may discontinue dressing changes.    You may experience a sensation of tightness as your wound heals. This is normal and will gradually subside.    Your healed wound may be sensitive to temperature changes. This sensitivity improves with time, but if you re having a lot of discomfort, try to avoid temperature extremes.    Patients frequently experience itching after their wound appears to have healed because of the continue healing under the skin.  Plain Vaseline will help relieve the itching.        POSSIBLE COMPLICATIONS    BLEEDIN. Leave the bandage in place.  2. Use tightly rolled up gauze or a cloth to apply direct pressure over the bandage for 30  minutes.  3. Reapply pressure for an additional 30 minutes if necessary  4. Use additional gauze and tape to maintain pressure once the bleeding has stopped.

## 2017-03-29 NOTE — MR AVS SNAPSHOT
After Visit Summary   3/29/2017    Colin Shell    MRN: [id number]           Patient Information     Date Of Birth          6/25/1927        Visit Information        Provider Department      3/29/2017 10:00 AM Kristen Gonzalez PA-C [facility]        Today's Diagnoses     AK (actinic keratosis)    -  1      Care Instructions          Wound Care Instructions     FOR SUPERFICIAL WOUNDS     [facility] [phone]    [facility] [phone]    Left cheek                   AFTER 24 HOURS YOU SHOULD REMOVE THE BANDAGE AND BEGIN DAILY DRESSING CHANGES AS FOLLOWS:     1) Remove Dressing.     2) Clean and dry the area with tap water using a Q-tip or sterile gauze pad.     3) Apply Vaseline, Aquaphor, Polysporin ointment or Bacitracin ointment over entire wound.  Do NOT use Neosporin ointment.     4) Cover the wound with a band-aid, or a sterile non-stick gauze pad and micropore paper tape      REPEAT THESE INSTRUCTIONS AT LEAST ONCE A DAY UNTIL THE WOUND HAS COMPLETELY HEALED.    It is an old wives tale that a wound heals better when it is exposed to air and allowed to dry out. The wound will heal faster with a better cosmetic result if it is kept moist with ointment and covered with a bandage.    **Do not let the wound dry out.**      Supplies Needed:      *Cotton tipped applicators (Q-tips)    *Polysporin Ointment or Bacitracin Ointment (NOT NEOSPORIN)    *Band-aids or non-stick gauze pads and micropore paper tape.      PATIENT INFORMATION:    During the healing process you will notice a number of changes. All wounds develop a small halo of redness surrounding the wound.  This means healing is occurring. Severe itching with extensive redness usually indicates sensitivity to the ointment or bandage tape used to dress the wound.  You should call our office if this develops.      Swelling  and/or discoloration around your surgical site is common, particularly  when performed around the eye.    All wounds normally drain.  The larger the wound the more drainage there will be.  After 7-10 days, you will notice the wound beginning to shrink and new skin will begin to grow.  The wound is healed when you can see skin has formed over the entire area.  A healed wound has a healthy, shiny look to the surface and is red to dark pink in color to normalize.  Wounds may take approximately 4-6 weeks to heal.  Larger wounds may take 6-8 weeks.  After the wound is healed you may discontinue dressing changes.    You may experience a sensation of tightness as your wound heals. This is normal and will gradually subside.    Your healed wound may be sensitive to temperature changes. This sensitivity improves with time, but if you re having a lot of discomfort, try to avoid temperature extremes.    Patients frequently experience itching after their wound appears to have healed because of the continue healing under the skin.  Plain Vaseline will help relieve the itching.        POSSIBLE COMPLICATIONS    BLEEDIN. Leave the bandage in place.  2. Use tightly rolled up gauze or a cloth to apply direct pressure over the bandage for 30  minutes.  3. Reapply pressure for an additional 30 minutes if necessary  4. Use additional gauze and tape to maintain pressure once the bleeding has stopped.          Follow-ups after your visit        Your next 10 appointments already scheduled     2017  4:30 PM CDT   Remote ICD Check with CASEY DCR2   [facility] PHYSICIANS [facility] [place] [facility]    [address] [place] [id number] [phone]           This appointment is for a remote check of your debrillator.  This is not an appointment at the office.              Who to contact     If you have questions or need follow up information about today's clinic visit or your schedule please contact [facility] directly at [phone].  Normal  "or non-critical lab and imaging results will be communicated to you by [facility], letter or phone within 4 business days after the clinic has received the results. If you do not hear from us within 7 days, please contact the clinic through [facility] or phone. If you have a critical or abnormal lab result, we will notify you by phone as soon as possible.  Submit refill requests through [facility] or call your pharmacy and they will forward the refill request to us. Please allow 3 business days for your refill to be completed.          Additional Information About Your Visit        [facility] Information     [facility] lets you send messages to your doctor, view your test results, renew your prescriptions, schedule appointments and more. To sign up, go to [email or web address] . Click on \"Log in\" on the left side of the screen, which will take you to the Welcome page. Then click on \"Sign up Now\" on the right side of the page.     You will be asked to enter the access code listed below, as well as some personal information. Please follow the directions to create your username and password.     Your access code is: [email or web address]  Expires: 3/30/2017 12:13 PM     Your access code will  in 90 days. If you need help or a new code, please call your [facility] clinic or [phone].        Care EveryWhere ID     This is your Care EveryWhere ID. This could be used by other organizations to access your [facility] medical records  [id number]        Your Vitals Were     Pulse Pulse Oximetry                70 98%           Blood Pressure from Last 3 Encounters:   17 150/73   17 142/77   17 132/72    Weight from Last 3 Encounters:   17 92.5 kg (204 lb)   17 93.6 kg (206 lb 6.4 oz)   17 92.4 kg (203 lb 12.8 oz)              Today, you had the following     No orders found for display         Today's Medication Changes          These changes are accurate as of: 3/29/17 10:17 AM.  If you have any " questions, ask your nurse or doctor.               Start taking these medicines.        Dose/Directions    fluorouracil 5 % cream   Commonly known as:  EFUDEX   Used for:  AK (actinic keratosis)   Started by:  Kristen Gonzalez PA-C        Apply sparingly twice daily to scalp, ears for 2-3 weeks.   Quantity:  40 g   Refills:  0            Where to get your medicines      These medications were sent to [facility] - [place], [place] - [address]   [address] [facility] [address]     Phone:  [phone]     fluorouracil 5 % cream                Primary Care Provider Office Phone # Fax #    Casie Watts,  [phone]       [facility] [address] [facility] [address]        Thank you!     Thank you for choosing [facility]  for your care. Our goal is always to provide you with excellent care. Hearing back from our patients is one way we can continue to improve our services. Please take a few minutes to complete the written survey that you may receive in the mail after your visit with us. Thank you!             Your Updated Medication List - Protect others around you: Learn how to safely use, store and throw away your medicines at www.disposemymeds.org.          This list is accurate as of: 3/29/17 10:17 AM.  Always use your most recent med list.                   Brand Name Dispense Instructions for use    allopurinol 100 MG tablet    ZYLOPRIM    45 tablet    Take 0.5 tablets (50 mg) by mouth daily Gout       amLODIPine 10 MG tablet    NORVASC    90 tablet    Take 1 tablet (10 mg) by mouth daily       aspirin 325 MG EC tablet     100    1 tab po QD (Once per day)       atorvastatin 40 MG tablet    LIPITOR    90 tablet    Take 1 tablet (40 mg) by mouth daily       CENTRUM SILVER per tablet      Take 1 tablet by mouth daily       CLARITIN 10 MG tablet   Generic drug:  loratadine      Take 10 mg by mouth daily       finasteride 5 MG tablet     PROSCAR    90 tablet    Take 1 tablet (5 mg) by mouth daily       fluorouracil 5 % cream    EFUDEX    40 g    Apply sparingly twice daily to scalp, ears for 2-3 weeks.       losartan 100 MG tablet    COZAAR    90 tablet    Take 1 tablet (100 mg) by mouth daily       NAVA MILK OF MAGNESIA PO

## 2017-03-29 NOTE — NURSING NOTE
"Estimated body mass index is 30.13 kg/(m^2) as calculated from the following:    Height as of 2/14/17: 1.753 m (5' 9\").    Weight as of 3/21/17: 92.5 kg (204 lb).  BP Readings from Last 1 Encounters:   03/29/17 150/73   ]  BP cuff size:  adeola Watts LPN.................3/29/2017    "

## 2017-03-31 LAB — COPATH REPORT: NORMAL

## 2017-04-03 NOTE — PROGRESS NOTES
Colin Shell is a 89 year old year old male patient here today for scabs on scalp.  Patient states this has been present for many years.  Patient reports the following symptoms:  none .  Patient reports that he does have a sensitive area on left cheek. He denies any bleeding. No pain with any other lesions on his head.  Remainder of the HPI, Meds, PMH, Allergies, FH, and SH was reviewed in chart.    Pertinent Hx:   No personal history of skin cancer.   Past Medical History:   Diagnosis Date     Dizziness and giddiness 11/24/2007    uncertain etiology- MRI/MRA head , carotid duplex normal 2007     Foreign body in unspecified site on external eye      Hypertension      Microscopic hematuria      microscopic hematuria 2001 with  IVP and cystoscopy     Rhabdomyolysis     2003- in setting of Ecoli UTI, gemfibrozil/lipitor       Past Surgical History:   Procedure Laterality Date     SURGICAL HISTORY OF -       heel spurs     SURGICAL HISTORY OF -   2005, 2006    bilateral eye cataract     SURGICAL HISTORY OF -   1996    CABG x 5 vessels     SURGICAL HISTORY OF -   2010    circumcision     TONSILLECTOMY & ADENOIDECTOMY          Family History   Problem Relation Age of Onset     DIABETES Father      DIABETES Brother      DIABETES Brother      Hypertension Son      Unknown/Adopted Maternal Grandmother      Unknown/Adopted Maternal Grandfather      Unknown/Adopted Paternal Grandmother      Unknown/Adopted Paternal Grandfather      Dementia Sister        Social History     Social History     Marital status:      Spouse name: N/A     Number of children: N/A     Years of education: N/A     Occupational History     Not on file.     Social History Main Topics     Smoking status: Former Smoker     Smokeless tobacco: Never Used      Comment: Quit at age 32     Alcohol use Yes      Comment: 12 pack a year     Drug use: No     Sexual activity: Yes     Other Topics Concern     Parent/Sibling W/ Cabg, Mi Or Angioplasty  Before 65f 55m? No     Social History Narrative       Outpatient Encounter Prescriptions as of 3/29/2017   Medication Sig Dispense Refill     fluorouracil (EFUDEX) 5 % cream Apply sparingly twice daily to scalp, ears for 2-3 weeks. 40 g 0     amLODIPine (NORVASC) 10 MG tablet Take 1 tablet (10 mg) by mouth daily 90 tablet 3     Magnesium Hydroxide (NAVA MILK OF MAGNESIA PO)        losartan (COZAAR) 100 MG tablet Take 1 tablet (100 mg) by mouth daily 90 tablet 3     allopurinol (ZYLOPRIM) 100 MG tablet Take 0.5 tablets (50 mg) by mouth daily Gout 45 tablet 3     finasteride (PROSCAR) 5 MG tablet Take 1 tablet (5 mg) by mouth daily 90 tablet 3     atorvastatin (LIPITOR) 40 MG tablet Take 1 tablet (40 mg) by mouth daily 90 tablet 3     Multiple Vitamins-Minerals (CENTRUM SILVER) per tablet Take 1 tablet by mouth daily       loratadine (CLARITIN) 10 MG tablet Take 10 mg by mouth daily       ASPIRIN 325 MG OR TBEC 1 tab po QD (Once per day) 100 3     No facility-administered encounter medications on file as of 3/29/2017.              Review Of Systems  Skin: As above  Eyes: negative  Ears/Nose/Throat: negative  Respiratory: No shortness of breath, dyspnea on exertion, cough, or hemoptysis  Cardiovascular: negative  Gastrointestinal: negative  Genitourinary: negative  Musculoskeletal: negative  Neurologic: negative  Psychiatric: negative  Hematologic/Lymphatic/Immunologic: negative  Endocrine: negative      O:   NAD, WDWN, Alert & Oriented, Mood & Affect wnl, Vitals stable   Here today alone   /73  Pulse 70  SpO2 98%   General appearance normal   Vitals stable   Alert, oriented and in no acute distress      Pink gritty papules on scalp, face    1.4 cm pink scaly papule on left cheek       Eyes: Conjunctivae/lids:Normal     ENT: Lips    MSK:Normal    Pulm: Breathing Normal    Neuro/Psych: Orientation:Normal; Mood/Affect:Normal  A/P:  1. R/O AK vs SCIS on left cheek   TANGENTIAL BIOPSY SENT OUT:  After consent,  anesthesia with LEC and prep, tangential excision performed and specimen sent out for permanent section histology.  No complications and routine wound care. Patient told to call our office in 1-2 weeks for result.      2. Actinic Keratoses on face and scalp.   Discussed PDT vs efudex. Informational handout was given regarding PDT.   Patient preferred efudex as long as insurance covered this well.   Apply sparingly twice daily to affect areas on scalp, ears for 2-3 weeks. Will get red and raw.   Return in 1-2 months for recheck.

## 2017-04-25 ENCOUNTER — OFFICE VISIT (OUTPATIENT)
Dept: DERMATOLOGY | Facility: CLINIC | Age: 82
End: 2017-04-25
Payer: COMMERCIAL

## 2017-04-25 VITALS — HEART RATE: 71 BPM | OXYGEN SATURATION: 96 % | SYSTOLIC BLOOD PRESSURE: 149 MMHG | DIASTOLIC BLOOD PRESSURE: 79 MMHG

## 2017-04-25 DIAGNOSIS — L30.9 DERMATITIS: ICD-10-CM

## 2017-04-25 DIAGNOSIS — L57.0 AK (ACTINIC KERATOSIS): Primary | ICD-10-CM

## 2017-04-25 PROCEDURE — 99213 OFFICE O/P EST LOW 20 MIN: CPT | Performed by: PHYSICIAN ASSISTANT

## 2017-04-25 RX ORDER — DESONIDE 0.5 MG/G
CREAM TOPICAL
Qty: 15 G | Refills: 0 | Status: SHIPPED | OUTPATIENT
Start: 2017-04-25 | End: 2017-09-06

## 2017-04-25 NOTE — PROGRESS NOTES
Colin Shell is a 89 year old year old male patient here today for recheck after efudex. Patient reports that he stopped efudex about one week ago. He reports that redness and scabbing have improved since he has stopped the treatment. Remainder of the HPI, Meds, PMH, Allergies, FH, and SH was reviewed in chart.   Past Medical History:   Diagnosis Date     Dizziness and giddiness 11/24/2007    uncertain etiology- MRI/MRA head , carotid duplex normal 2007     Foreign body in unspecified site on external eye      Hypertension      Microscopic hematuria      microscopic hematuria 2001 with  IVP and cystoscopy     Rhabdomyolysis     2003- in setting of Ecoli UTI, gemfibrozil/lipitor       Past Surgical History:   Procedure Laterality Date     SURGICAL HISTORY OF -       heel spurs     SURGICAL HISTORY OF -   2005, 2006    bilateral eye cataract     SURGICAL HISTORY OF -   1996    CABG x 5 vessels     SURGICAL HISTORY OF -   2010    circumcision     TONSILLECTOMY & ADENOIDECTOMY          Family History   Problem Relation Age of Onset     DIABETES Father      DIABETES Brother      DIABETES Brother      Hypertension Son      Unknown/Adopted Maternal Grandmother      Unknown/Adopted Maternal Grandfather      Unknown/Adopted Paternal Grandmother      Unknown/Adopted Paternal Grandfather      Dementia Sister        Social History     Social History     Marital status:      Spouse name: N/A     Number of children: N/A     Years of education: N/A     Occupational History     Not on file.     Social History Main Topics     Smoking status: Former Smoker     Smokeless tobacco: Never Used      Comment: Quit at age 32     Alcohol use Yes      Comment: 12 pack a year     Drug use: No     Sexual activity: Yes     Other Topics Concern     Parent/Sibling W/ Cabg, Mi Or Angioplasty Before 65f 55m? No     Social History Narrative       Outpatient Encounter Prescriptions as of 4/25/2017   Medication Sig Dispense Refill      desonide (DESOWEN) 0.05 % cream Apply sparingly to affected area twice daily as needed for next two weeks. 15 g 0     fluorouracil (EFUDEX) 5 % cream Apply sparingly twice daily to scalp, ears for 2-3 weeks. 40 g 0     amLODIPine (NORVASC) 10 MG tablet Take 1 tablet (10 mg) by mouth daily 90 tablet 3     Magnesium Hydroxide (NAVA MILK OF MAGNESIA PO)        losartan (COZAAR) 100 MG tablet Take 1 tablet (100 mg) by mouth daily 90 tablet 3     allopurinol (ZYLOPRIM) 100 MG tablet Take 0.5 tablets (50 mg) by mouth daily Gout 45 tablet 3     finasteride (PROSCAR) 5 MG tablet Take 1 tablet (5 mg) by mouth daily 90 tablet 3     atorvastatin (LIPITOR) 40 MG tablet Take 1 tablet (40 mg) by mouth daily 90 tablet 3     Multiple Vitamins-Minerals (CENTRUM SILVER) per tablet Take 1 tablet by mouth daily       loratadine (CLARITIN) 10 MG tablet Take 10 mg by mouth daily       ASPIRIN 325 MG OR TBEC 1 tab po QD (Once per day) 100 3     No facility-administered encounter medications on file as of 4/25/2017.              Review Of Systems  Skin: As above  Eyes: negative  Ears/Nose/Throat: negative  Respiratory: No shortness of breath, dyspnea on exertion, cough, or hemoptysis  Cardiovascular: negative  Gastrointestinal: negative  Genitourinary: negative  Musculoskeletal: negative  Neurologic: negative  Psychiatric: negative  Hematologic/Lymphatic/Immunologic: negative  Endocrine: negative      O:   NAD, WDWN, Alert & Oriented, Mood & Affect wnl, Vitals stable   Here today alone   /79  Pulse 71  SpO2 96%   General appearance normal   Vitals stable   Alert, oriented and in no acute distress      Erythematous scabbed thin plaques on scalp       Eyes: Conjunctivae/lids:Normal     ENT: Lips    MSK:Normal    Pulm: Breathing Normal    Neuro/Psych: Orientation:Normal; Mood/Affect:Normal  A/P:  1. Actinic Keratoses- normal reaction after efudex.   Too soon to tell how efudex did, since skin is still healing.   Apply desonide  cream twice daily as needed for next two weeks.   Return in one month to see if needed a second treatment with efudex.

## 2017-04-25 NOTE — NURSING NOTE
"Initial /79  Pulse 71  SpO2 96% Estimated body mass index is 30.13 kg/(m^2) as calculated from the following:    Height as of 2/14/17: 1.753 m (5' 9\").    Weight as of 3/21/17: 92.5 kg (204 lb). .      "

## 2017-04-25 NOTE — PATIENT INSTRUCTIONS
Apply desonide cream twice daily to affected areas on face and scalp for next two weeks.     Recheck in one month.

## 2017-04-25 NOTE — MR AVS SNAPSHOT
After Visit Summary   4/25/2017    Colin Shell    MRN: [id number]           Patient Information     Date Of Birth          6/25/1927        Visit Information        Provider Department      4/25/2017 9:00 AM Kristen Gonzalez PA-C [facility]        Today's Diagnoses     AK (actinic keratosis)    -  1    Dermatitis          Care Instructions      Apply desonide cream twice daily to affected areas on face and scalp for next two weeks.     Recheck in one month.         Follow-ups after your visit        Your next 10 appointments already scheduled     Apr 25, 2017  9:00 AM CDT   Return Visit with Kristen Gonzalez PA-C   [facility] ([facility]    [address] [address]   [phone]            Jun 28, 2017  4:30 PM CDT   Remote ICD Check with CASEY DCR2   [facility] PHYSICIANS HEART AT [place] ([facility])    [address] [place] [id number] [phone]           This appointment is for a remote check of your debrillator.  This is not an appointment at the office.            Jul 07, 2017  9:30 AM CDT   LAB with [facility] ([facility])    [address] [address]   [phone]           Patient must bring picture ID.  Patient should be prepared to give a urine specimen  Please do not eat 10-12 hours before your appointment if you are coming in fasting for labs on lipids, cholesterol, or glucose (sugar).  Pregnant women should follow their Care Team instructions. Water with medications is okay. Do not drink coffee or other fluids.   If you have concerns about taking  your medications, please ask at office or if scheduling via [employer], send a message by clicking on Secure Messaging, Message Your Care Team.            Jul 10, 2017 10:30 AM CDT   Return Visit with Norm Erickson MD   [facility] PHYSICIAN  "HEART AT [facility] ([facility])    [address]  [facility] [address] [phone]              Who to contact     If you have questions or need follow up information about today's clinic visit or your schedule please contact [facility] directly at [phone].  Normal or non-critical lab and imaging results will be communicated to you by MyChart, letter or phone within 4 business days after the clinic has received the results. If you do not hear from us within 7 days, please contact the clinic through MyChart or phone. If you have a critical or abnormal lab result, we will notify you by phone as soon as possible.  Submit refill requests through MoPix or call your pharmacy and they will forward the refill request to us. Please allow 3 business days for your refill to be completed.          Additional Information About Your Visit        [employer] Information     MoPix lets you send messages to your doctor, view your test results, renew your prescriptions, schedule appointments and more. To sign up, go to [email or web address] . Click on \"Log in\" on the left side of the screen, which will take you to the Welcome page. Then click on \"Sign up Now\" on the right side of the page.     You will be asked to enter the access code listed below, as well as some personal information. Please follow the directions to create your username and password.     Your access code is: [email or web address]  Expires: 2017  8:56 AM     Your access code will  in 90 days. If you need help or a new code, please call your [facility] or [phone].        Care EveryWhere ID     This is your Care EveryWhere ID. This could be used by other organizations to access your [place] medical records  [id number]        Your Vitals Were     Pulse Pulse Oximetry                71 96%           Blood Pressure from Last 3 Encounters:   17 149/79   17 150/73   17 142/77    Weight from Last " 3 Encounters:   03/21/17 92.5 kg (204 lb)   02/14/17 93.6 kg (206 lb 6.4 oz)   02/07/17 92.4 kg (203 lb 12.8 oz)              Today, you had the following     No orders found for display         Today's Medication Changes          These changes are accurate as of: 4/25/17  8:56 AM.  If you have any questions, ask your nurse or doctor.               Start taking these medicines.        Dose/Directions    desonide 0.05 % cream   Commonly known as:  DESOWEN   Used for:  AK (actinic keratosis), Dermatitis   Started by:  Kristen Gonzalez PA-C        Apply sparingly to affected area twice daily as needed for next two weeks.   Quantity:  15 g   Refills:  0            Where to get your medicines      These medications were sent to [facility] - [place], [place] - [address]  [address] [facility] [address]     Phone:  [phone]     desonide 0.05 % cream                Primary Care Provider Office Phone # Fax #    Casie Sammie Watts [phone]       [facility] [address] [facility] [address]        Thank you!     Thank you for choosing [facility]  for your care. Our goal is always to provide you with excellent care. Hearing back from our patients is one way we can continue to improve our services. Please take a few minutes to complete the written survey that you may receive in the mail after your visit with us. Thank you!             Your Updated Medication List - Protect others around you: Learn how to safely use, store and throw away your medicines at www.disposemymeds.org.          This list is accurate as of: 4/25/17  8:56 AM.  Always use your most recent med list.                   Brand Name Dispense Instructions for use    allopurinol 100 MG tablet    ZYLOPRIM    45 tablet    Take 0.5 tablets (50 mg) by mouth daily Gout       amLODIPine 10 MG tablet    NORVASC    90 tablet    Take 1 tablet (10 mg) by mouth daily       aspirin 325 MG  EC tablet     100    1 tab po QD (Once per day)       atorvastatin 40 MG tablet    LIPITOR    90 tablet    Take 1 tablet (40 mg) by mouth daily       CENTRUM SILVER per tablet      Take 1 tablet by mouth daily       CLARITIN 10 MG tablet   Generic drug:  loratadine      Take 10 mg by mouth daily       desonide 0.05 % cream    DESOWEN    15 g    Apply sparingly to affected area twice daily as needed for next two weeks.       finasteride 5 MG tablet    PROSCAR    90 tablet    Take 1 tablet (5 mg) by mouth daily       fluorouracil 5 % cream    EFUDEX    40 g    Apply sparingly twice daily to scalp, ears for 2-3 weeks.       losartan 100 MG tablet    COZAAR    90 tablet    Take 1 tablet (100 mg) by mouth daily       NAVA MILK OF MAGNESIA PO

## 2017-05-22 ENCOUNTER — OFFICE VISIT (OUTPATIENT)
Dept: DERMATOLOGY | Facility: CLINIC | Age: 82
End: 2017-05-22
Payer: COMMERCIAL

## 2017-05-22 VITALS — OXYGEN SATURATION: 96 % | SYSTOLIC BLOOD PRESSURE: 139 MMHG | HEART RATE: 65 BPM | DIASTOLIC BLOOD PRESSURE: 67 MMHG

## 2017-05-22 DIAGNOSIS — L57.0 AK (ACTINIC KERATOSIS): ICD-10-CM

## 2017-05-22 DIAGNOSIS — D48.5 NEOPLASM OF UNCERTAIN BEHAVIOR OF SKIN: Primary | ICD-10-CM

## 2017-05-22 PROCEDURE — 17003 DESTRUCT PREMALG LES 2-14: CPT | Performed by: PHYSICIAN ASSISTANT

## 2017-05-22 PROCEDURE — 99212 OFFICE O/P EST SF 10 MIN: CPT | Mod: 25 | Performed by: PHYSICIAN ASSISTANT

## 2017-05-22 PROCEDURE — 88305 TISSUE EXAM BY PATHOLOGIST: CPT | Performed by: PHYSICIAN ASSISTANT

## 2017-05-22 PROCEDURE — 17000 DESTRUCT PREMALG LESION: CPT | Performed by: PHYSICIAN ASSISTANT

## 2017-05-22 PROCEDURE — 11100 HC BIOPSY SKIN/SUBQ/MUC MEM, SINGLE LESION: CPT | Mod: 59 | Performed by: PHYSICIAN ASSISTANT

## 2017-05-22 NOTE — MR AVS SNAPSHOT
After Visit Summary   5/22/2017    Colin Shell    MRN: [id number]           Patient Information     Date Of Birth          6/25/1927        Visit Information        Provider Department      5/22/2017 11:15 AM Griselda Banegas PA-C [facility]        Today's Diagnoses     Neoplasm of uncertain behavior of skin    -  1      Care Instructions          Wound Care Instructions     FOR SUPERFICIAL WOUNDS     [facility] [phone]    [facility] [phone]                       AFTER 24 HOURS YOU SHOULD REMOVE THE BANDAGE AND BEGIN DAILY DRESSING CHANGES AS FOLLOWS:     1) Remove Dressing.     2) Clean and dry the area with tap water using a Q-tip or sterile gauze pad.     3) Apply Vaseline, Aquaphor, Polysporin ointment or Bacitracin ointment over entire wound.  Do NOT use Neosporin ointment.     4) Cover the wound with a band-aid, or a sterile non-stick gauze pad and micropore paper tape      REPEAT THESE INSTRUCTIONS AT LEAST ONCE A DAY UNTIL THE WOUND HAS COMPLETELY HEALED.    It is an old wives tale that a wound heals better when it is exposed to air and allowed to dry out. The wound will heal faster with a better cosmetic result if it is kept moist with ointment and covered with a bandage.    **Do not let the wound dry out.**      Supplies Needed:      *Cotton tipped applicators (Q-tips)    *Polysporin Ointment or Bacitracin Ointment (NOT NEOSPORIN)    *Band-aids or non-stick gauze pads and micropore paper tape.      PATIENT INFORMATION:    During the healing process you will notice a number of changes. All wounds develop a small halo of redness surrounding the wound.  This means healing is occurring. Severe itching with extensive redness usually indicates sensitivity to the ointment or bandage tape used to dress the wound.  You should call our office if this develops.      Swelling  and/or discoloration around your surgical site is common,  particularly when performed around the eye.    All wounds normally drain.  The larger the wound the more drainage there will be.  After 7-10 days, you will notice the wound beginning to shrink and new skin will begin to grow.  The wound is healed when you can see skin has formed over the entire area.  A healed wound has a healthy, shiny look to the surface and is red to dark pink in color to normalize.  Wounds may take approximately 4-6 weeks to heal.  Larger wounds may take 6-8 weeks.  After the wound is healed you may discontinue dressing changes.    You may experience a sensation of tightness as your wound heals. This is normal and will gradually subside.    Your healed wound may be sensitive to temperature changes. This sensitivity improves with time, but if you re having a lot of discomfort, try to avoid temperature extremes.    Patients frequently experience itching after their wound appears to have healed because of the continue healing under the skin.  Plain Vaseline will help relieve the itching.        POSSIBLE COMPLICATIONS    BLEEDIN. Leave the bandage in place.  2. Use tightly rolled up gauze or a cloth to apply direct pressure over the bandage for 30  minutes.  3. Reapply pressure for an additional 30 minutes if necessary  4. Use additional gauze and tape to maintain pressure once the bleeding has stopped.      WOUND CARE INSTRUCTIONS   FOR CRYOSURGERY   This area treated with liquid nitrogen will form a blister. You do not need to bandage the area until after the blister forms and breaks (which may be a few days). When the blister breaks, begin daily dressing changes as follows:   1) Clean and dry the area with tap water using clean Q-tip or sterile gauze pad.   2) Apply Polysporin ointment or Bacitracin ointment over entire wound. Do NOT use Neosporin ointment.   3) Cover the wound with a band-aid or sterile non-stick gauze pad and micropore paper tape.   REPEAT THESE INSTRUCTIONS AT LEAST  ONCE A DAY UNTIL THE WOUND HAS COMPLETELY HEALED.   It is an old wives tale that a wound heals better when it is exposed to air and allowed to dry out. The wound will heal faster with a better cosmetic result if it is kept moist with ointment and covered with a bandage.   Do not let the wound dry out.   IMPORTANT INFORMATION ON REVERSE SIDE   Supplies Needed:   *Cotton tipped applicators (Q-tips)   *Polysporin ointment or Bacitracin ointment (NOT NEOSPORIN)   *Band-aids, or non stick gauze pads and micropore paper tape   PATIENT INFORMATION   During the healing process you will notice a number of changes. All wounds develop a small halo of redness surrounding the wound. This means healing is occurring. Severe itching with extensive redness usually indicates sensitivity to the ointment or bandage tape used to dress the wound. You should call our office if this develops.   Swelling and/or discoloration around your surgical site is common, particularly when performed around the eye.   All wounds normally drain. The larger the wound the more drainage there will be. After 7-10 days, you will notice the wound beginning to shrink and new skin will begin to grow. The wound is healed when you can see skin has formed over the entire area. A healed wound has a healthy, shiny look to the surface and is red to dark pink in color to normalize. Wounds may take approximately 4-6 weeks to heal. Larger wounds may take 6-8 weeks. After the wound is healed you may discontinue dressing changes.   You may experience a sensation of tightness as your wound heals. This is normal and will gradually subside.   Your healed wound may be sensitive to temperature changes. This sensitivity improves with time, but if you re having a lot of discomfort, try to avoid temperature extremes.   Patients frequently experience itching after their wound appears to have healed because of the continue healing under the skin. Plain Vaseline will help relieve  the itching.               Follow-ups after your visit        Your next 10 appointments already scheduled     Jun 28, 2017  4:30 PM CDT   Remote ICD Check with CASEY DCR2   [facility] PHYSICIANS HEART AT [place] ([facility])    [address] [place] [address] [address]  [place] [id number] [phone]           This appointment is for a remote check of your debrillator.  This is not an appointment at the office.            Jul 07, 2017  9:30 AM CDT   LAB with [place] [facility]   [facility] ([facility]    [address] [facility] [address] [phone]           Patient must bring picture ID.  Patient should be prepared to give a urine specimen  Please do not eat 10-12 hours before your appointment if you are coming in fasting for labs on lipids, cholesterol, or glucose (sugar).  Pregnant women should follow their Care Team instructions. Water with medications is okay. Do not drink coffee or other fluids.   If you have concerns about taking  your medications, please ask at office or if scheduling via [employer], send a message by clicking on Secure Messaging, Message Your Care Team.            Jul 10, 2017 10:30 AM CDT   Return Visit with Norm Erickson MD   [facility] PHYSICIAN HEART AT [facility] ([facility])    [address] [facility] [id number] [phone]              Who to contact     If you have questions or need follow up information about today's clinic visit or your schedule please contact [facility] directly at [phone].  Normal or non-critical lab and imaging results will be communicated to you by MyChart, letter or phone within 4 business days after the clinic has received the results. If you do not hear from us within 7 days, please contact the clinic through MyChart or phone. If you have a critical or abnormal lab result, we will notify you by phone as soon as possible.  Submit refill requests  "through [employer] One or call your pharmacy and they will forward the refill request to us. Please allow 3 business days for your refill to be completed.          Additional Information About Your Visit        [employer] Information     [employer] One lets you send messages to your doctor, view your test results, renew your prescriptions, schedule appointments and more. To sign up, go to [email or web address] . Click on \"Log in\" on the left side of the screen, which will take you to the Welcome page. Then click on \"Sign up Now\" on the right side of the page.     You will be asked to enter the access code listed below, as well as some personal information. Please follow the directions to create your username and password.     Your access code is: [email or web address]  Expires: 2017  8:56 AM     Your access code will  in 90 days. If you need help or a new code, please call your [place] clinic or [phone].        Care EveryWhere ID     This is your Care EveryWhere ID. This could be used by other organizations to access your [place] medical records  [id number]        Your Vitals Were     Pulse Pulse Oximetry                65 96%           Blood Pressure from Last 3 Encounters:   17 139/67   17 149/79   17 150/73    Weight from Last 3 Encounters:   17 92.5 kg (204 lb)   17 93.6 kg (206 lb 6.4 oz)   17 92.4 kg (203 lb 12.8 oz)              We Performed the Following     BIOPSY SKIN/SUBQ/MUC MEM, SINGLE LESION     Surgical pathology exam        Primary Care Provider Office Phone # Fax #    Casie Cochran Mac,  [phone]       [facility] [address] [facility] [address]        Thank you!     Thank you for choosing [facility]  for your care. Our goal is always to provide you with excellent care. Hearing back from our patients is one way we can continue to improve our services. Please take a few minutes to complete the written survey that you " may receive in the mail after your visit with us. Thank you!             Your Updated Medication List - Protect others around you: Learn how to safely use, store and throw away your medicines at www.disposemymeds.org.          This list is accurate as of: 5/22/17 11:22 AM.  Always use your most recent med list.                   Brand Name Dispense Instructions for use    allopurinol 100 MG tablet    ZYLOPRIM    45 tablet    Take 0.5 tablets (50 mg) by mouth daily Gout       amLODIPine 10 MG tablet    NORVASC    90 tablet    Take 1 tablet (10 mg) by mouth daily       aspirin 325 MG EC tablet     100    1 tab po QD (Once per day)       atorvastatin 40 MG tablet    LIPITOR    90 tablet    Take 1 tablet (40 mg) by mouth daily       CENTRUM SILVER per tablet      Take 1 tablet by mouth daily       CLARITIN 10 MG tablet   Generic drug:  loratadine      Take 10 mg by mouth daily       desonide 0.05 % cream    DESOWEN    15 g    Apply sparingly to affected area twice daily as needed for next two weeks.       finasteride 5 MG tablet    PROSCAR    90 tablet    Take 1 tablet (5 mg) by mouth daily       fluorouracil 5 % cream    EFUDEX    40 g    Apply sparingly twice daily to scalp, ears for 2-3 weeks.       losartan 100 MG tablet    COZAAR    90 tablet    Take 1 tablet (100 mg) by mouth daily       NAVA MILK OF MAGNESIA PO

## 2017-05-22 NOTE — PROGRESS NOTES
HPI:   Colin Shell is a 89 year old male who presents for recheck of scalp post Efudex  chief complaint  Location: scalp   Condition present for:  Scaly spots present for years. Used Efudex for 2 weeks, and then another 1 week after.   Previous treatments include: Efudex    Review Of Systems  Eyes: negative  Ears/Nose/Throat: negative  Respiratory: No shortness of breath, dyspnea on exertion, cough, or hemoptysis  Cardiovascular: negative  Gastrointestinal: negative  Genitourinary: negative  Musculoskeletal: negative  Neurologic: negative  Psychiatric: negative        PHYSICAL EXAM:      Skin exam performed as follows: Type 2 skin. Mood appropriate  Alert and Oriented X 3. Well developed, well nourished in no distress.  General appearance: Normal  Head including face: Normal  Eyes: conjunctiva and lids: Normal  Mouth: Lips, teeth, gums: Normal  Neck: Normal  Chest-breast/axillae: Normal  Back: Normal  Spleen and liver: Normal  Cardiovascular: Exam of peripheral vascular system by observation for swelling, varicosities, edema: Normal  Genitalia: groin, buttocks: Normal  Extremities: digits/nails (clubbing): Normal  Eccrine and Apocrine glands: Normal  Right upper extremity: Normal  Left upper extremity: Normal  Right lower extremity: Normal  Left lower extremity: Normal  Skin: Scalp and body hair: See below    1. 4 mm pink papule on left lateral orbit  2. Gritty papule on left scalp x 4, right scalp x 3    ASSESSMENT/PLAN:     1. R/o BCC vs cyst on right lateral orbit. Shave bx in typical fashion .  Area cleaned with betadyne and anesthetized with 1% lidocaine with epi .  Dermablade used to remove the lesion and sent to pathology. Bleeding was cauterized. Pt tolerated procedure well.  2. Actinic keratosis on left scalp x 4, right scalp x 3. As precancerous, cryosurgery performed. Advised on blistering and post-op care. Advised if not resolved in 1-2 months to return for evaluation  3. Continue yearly  FSE.        Follow-up: yearly FSE/PRN sooner  CC:   Scribed By: Griselda Banegas, MS, PA-C

## 2017-05-22 NOTE — PATIENT INSTRUCTIONS
Wound Care Instructions     FOR SUPERFICIAL WOUNDS     [facility] [phone]    [facility] [phone]                       AFTER 24 HOURS YOU SHOULD REMOVE THE BANDAGE AND BEGIN DAILY DRESSING CHANGES AS FOLLOWS:     1) Remove Dressing.     2) Clean and dry the area with tap water using a Q-tip or sterile gauze pad.     3) Apply Vaseline, Aquaphor, Polysporin ointment or Bacitracin ointment over entire wound.  Do NOT use Neosporin ointment.     4) Cover the wound with a band-aid, or a sterile non-stick gauze pad and micropore paper tape      REPEAT THESE INSTRUCTIONS AT LEAST ONCE A DAY UNTIL THE WOUND HAS COMPLETELY HEALED.    It is an old wives tale that a wound heals better when it is exposed to air and allowed to dry out. The wound will heal faster with a better cosmetic result if it is kept moist with ointment and covered with a bandage.    **Do not let the wound dry out.**      Supplies Needed:      *Cotton tipped applicators (Q-tips)    *Polysporin Ointment or Bacitracin Ointment (NOT NEOSPORIN)    *Band-aids or non-stick gauze pads and micropore paper tape.      PATIENT INFORMATION:    During the healing process you will notice a number of changes. All wounds develop a small halo of redness surrounding the wound.  This means healing is occurring. Severe itching with extensive redness usually indicates sensitivity to the ointment or bandage tape used to dress the wound.  You should call our office if this develops.      Swelling  and/or discoloration around your surgical site is common, particularly when performed around the eye.    All wounds normally drain.  The larger the wound the more drainage there will be.  After 7-10 days, you will notice the wound beginning to shrink and new skin will begin to grow.  The wound is healed when you can see skin has formed over the entire area.  A healed wound has a healthy, shiny look to the surface and is red to dark pink in color  to normalize.  Wounds may take approximately 4-6 weeks to heal.  Larger wounds may take 6-8 weeks.  After the wound is healed you may discontinue dressing changes.    You may experience a sensation of tightness as your wound heals. This is normal and will gradually subside.    Your healed wound may be sensitive to temperature changes. This sensitivity improves with time, but if you re having a lot of discomfort, try to avoid temperature extremes.    Patients frequently experience itching after their wound appears to have healed because of the continue healing under the skin.  Plain Vaseline will help relieve the itching.        POSSIBLE COMPLICATIONS    BLEEDIN. Leave the bandage in place.  2. Use tightly rolled up gauze or a cloth to apply direct pressure over the bandage for 30  minutes.  3. Reapply pressure for an additional 30 minutes if necessary  4. Use additional gauze and tape to maintain pressure once the bleeding has stopped.      WOUND CARE INSTRUCTIONS   FOR CRYOSURGERY   This area treated with liquid nitrogen will form a blister. You do not need to bandage the area until after the blister forms and breaks (which may be a few days). When the blister breaks, begin daily dressing changes as follows:   1) Clean and dry the area with tap water using clean Q-tip or sterile gauze pad.   2) Apply Polysporin ointment or Bacitracin ointment over entire wound. Do NOT use Neosporin ointment.   3) Cover the wound with a band-aid or sterile non-stick gauze pad and micropore paper tape.   REPEAT THESE INSTRUCTIONS AT LEAST ONCE A DAY UNTIL THE WOUND HAS COMPLETELY HEALED.   It is an old wives tale that a wound heals better when it is exposed to air and allowed to dry out. The wound will heal faster with a better cosmetic result if it is kept moist with ointment and covered with a bandage.   Do not let the wound dry out.   IMPORTANT INFORMATION ON REVERSE SIDE   Supplies Needed:   *Cotton tipped applicators  (Q-tips)   *Polysporin ointment or Bacitracin ointment (NOT NEOSPORIN)   *Band-aids, or non stick gauze pads and micropore paper tape   PATIENT INFORMATION   During the healing process you will notice a number of changes. All wounds develop a small halo of redness surrounding the wound. This means healing is occurring. Severe itching with extensive redness usually indicates sensitivity to the ointment or bandage tape used to dress the wound. You should call our office if this develops.   Swelling and/or discoloration around your surgical site is common, particularly when performed around the eye.   All wounds normally drain. The larger the wound the more drainage there will be. After 7-10 days, you will notice the wound beginning to shrink and new skin will begin to grow. The wound is healed when you can see skin has formed over the entire area. A healed wound has a healthy, shiny look to the surface and is red to dark pink in color to normalize. Wounds may take approximately 4-6 weeks to heal. Larger wounds may take 6-8 weeks. After the wound is healed you may discontinue dressing changes.   You may experience a sensation of tightness as your wound heals. This is normal and will gradually subside.   Your healed wound may be sensitive to temperature changes. This sensitivity improves with time, but if you re having a lot of discomfort, try to avoid temperature extremes.   Patients frequently experience itching after their wound appears to have healed because of the continue healing under the skin. Plain Vaseline will help relieve the itching.

## 2017-05-22 NOTE — NURSING NOTE
"Initial /67 (BP Location: Right leg, Patient Position: Chair, Cuff Size: Adult Regular)  Pulse 65  SpO2 96% Estimated body mass index is 30.13 kg/(m^2) as calculated from the following:    Height as of 2/14/17: 1.753 m (5' 9\").    Weight as of 3/21/17: 92.5 kg (204 lb). .      "

## 2017-05-23 LAB — COPATH REPORT: NORMAL

## 2017-06-28 ENCOUNTER — ALLIED HEALTH/NURSE VISIT (OUTPATIENT)
Dept: CARDIOLOGY | Facility: CLINIC | Age: 82
End: 2017-06-28
Payer: COMMERCIAL

## 2017-06-28 DIAGNOSIS — Z95.810 ICD (IMPLANTABLE CARDIOVERTER-DEFIBRILLATOR) IN PLACE: Primary | ICD-10-CM

## 2017-06-28 PROCEDURE — 93296 REM INTERROG EVL PM/IDS: CPT | Performed by: INTERNAL MEDICINE

## 2017-06-28 PROCEDURE — 93295 DEV INTERROG REMOTE 1/2/MLT: CPT | Performed by: INTERNAL MEDICINE

## 2017-06-28 NOTE — PROGRESS NOTES
Medtronic Evera ICD Remote Device Check  AP: 6.3 % : 90.6 %  Mode: DDD 50        Presenting Rhythm: AS/  Heart Rate: Stable with adequate rates  Sensing: Stable    Pacing Threshold: Stable     Impedance: Stable  Battery Status: 9.1 years  Atrial Arrhythmia: 21 episodes for 2.1 % of the time. EGMs show AF/AFlutter. Longest episode was 15 hours. Patient is not on AC. Will update Dr. Erickson as episodes are getting longer.   Ventricular Arrhythmia: NOne  ATP: None    Shocks: None    Care Plan: Patient informed of results. 3month remote scheduled. Patient is seeing Dr. Erickson 07/10/17. Appointment letter mailed. Mehdi    I have reviewed and interpreted the device interrogation, settings, programming and summary. The device is functioning within normal device parameters. I agree with the current findings, assessment and plan.

## 2017-06-28 NOTE — MR AVS SNAPSHOT
After Visit Summary   6/28/2017    Colin Shell    MRN: [id number]           Patient Information     Date Of Birth          6/25/1927        Visit Information        Provider Department      6/28/2017 4:30 PM CASEY JOSE RAUL2 [facility]        Today's Diagnoses     ICD (implantable cardioverter-defibrillator) in place    -  1       Follow-ups after your visit        Your next 10 appointments already scheduled     Jul 07, 2017  9:30 AM CDT   LAB with [facility] ([facility])    [address] [address]   [phone]           Patient must bring picture ID.  Patient should be prepared to give a urine specimen  Please do not eat 10-12 hours before your appointment if you are coming in fasting for labs on lipids, cholesterol, or glucose (sugar).  Pregnant women should follow their Care Team instructions. Water with medications is okay. Do not drink coffee or other fluids.   If you have concerns about taking  your medications, please ask at office or if scheduling via [employer], send a message by clicking on Secure Messaging, Message Your Care Team.            Jul 10, 2017 10:30 AM CDT   Return Visit with Norm Erickson MD   [facility] HEART AT [facility] ([facility])    [address] [address]   [phone]            Oct 12, 2017  4:30 PM CDT   Remote ICD Check with JACINTO BLUNT2   [facility] ([facility])    [address] [place] [id number] [phone]           This appointment is for a remote check of your debrillator.  This is not an appointment at the office.              Who to contact     If you have questions or need follow up information about today's clinic visit or your schedule please contact [facility] directly at  "[phone].  Normal or non-critical lab and imaging results will be communicated to you by MyChart, letter or phone within 4 business days after the clinic has received the results. If you do not hear from us within 7 days, please contact the clinic through MyChart or phone. If you have a critical or abnormal lab result, we will notify you by phone as soon as possible.  Submit refill requests through [employer] or call your pharmacy and they will forward the refill request to us. Please allow 3 business days for your refill to be completed.          Additional Information About Your Visit        cookdinnerhart Information     [employer]Forensics lets you send messages to your doctor, view your test results, renew your prescriptions, schedule appointments and more. To sign up, go to [email or web address] . Click on \"Log in\" on the left side of the screen, which will take you to the Welcome page. Then click on \"Sign up Now\" on the right side of the page.     You will be asked to enter the access code listed below, as well as some personal information. Please follow the directions to create your username and password.     Your access code is: [email or web address]  Expires: 2017  8:56 AM     Your access code will  in 90 days. If you need help or a new code, please call your [place] clinic or [phone].        Care EveryWhere ID     This is your Care EveryWhere ID. This could be used by other organizations to access your [place] medical records  [id number]         Blood Pressure from Last 3 Encounters:   No data found for BP    Weight from Last 3 Encounters:   No data found for Wt              Today, you had the following     No orders found for display       Primary Care Provider Office Phone # Fax #    [place] Sammie Watts [phone]       [address] [address]        Equal Access to Services     ALEXIS PACE AH: Hadii kwan Schuster, westley " fina valientelinette lake ahRegina [facility] [phone].    ATENCIÓN: Si rubi banks, tiene a rojo disposición servicios gratuitos de asistencia lingüística. Laureano al [phone].    We comply with applicable federal civil rights laws and [place] laws. We do not discriminate on the basis of race, color, national origin, age, disability sex, sexual orientation or gender identity.            Thank you!     Thank you for choosing [facility] PHYSICIANS HEART AT [place]  for your care. Our goal is always to provide you with excellent care. Hearing back from our patients is one way we can continue to improve our services. Please take a few minutes to complete the written survey that you may receive in the mail after your visit with us. Thank you!             Your Updated Medication List - Protect others around you: Learn how to safely use, store and throw away your medicines at www.disposemymeds.org.          This list is accurate as of: 6/28/17 11:59 PM.  Always use your most recent med list.                   Brand Name Dispense Instructions for use Diagnosis    allopurinol 100 MG tablet    ZYLOPRIM    45 tablet    Take 0.5 tablets (50 mg) by mouth daily Gout    Gout of foot, unspecified cause, unspecified chronicity, unspecified laterality       amLODIPine 10 MG tablet    NORVASC    90 tablet    Take 1 tablet (10 mg) by mouth daily    Essential hypertension with goal blood pressure less than 140/90       aspirin 325 MG EC tablet     100    1 tab po QD (Once per day)    Coronary atherosclerosis of unspecified type of vessel, native or graft       atorvastatin 40 MG tablet    LIPITOR    90 tablet    Take 1 tablet (40 mg) by mouth daily    Hyperlipidemia LDL goal <100       CENTRUM SILVER per tablet      Take 1 tablet by mouth daily        CLARITIN 10 MG tablet   Generic drug:  loratadine      Take 10 mg by mouth daily        desonide 0.05 % cream    DESOWEN    15 g    Apply  sparingly to affected area twice daily as needed for next two weeks.    AK (actinic keratosis), Dermatitis       finasteride 5 MG tablet    PROSCAR    90 tablet    Take 1 tablet (5 mg) by mouth daily    Benign non-nodular prostatic hyperplasia without lower urinary tract symptoms       fluorouracil 5 % cream    EFUDEX    40 g    Apply sparingly twice daily to scalp, ears for 2-3 weeks.    AK (actinic keratosis)       losartan 100 MG tablet    COZAAR    90 tablet    Take 1 tablet (100 mg) by mouth daily    Essential hypertension with goal blood pressure less than 140/90       ELIJAH SILVA PO

## 2017-07-08 DIAGNOSIS — I10 BENIGN ESSENTIAL HYPERTENSION: ICD-10-CM

## 2017-07-08 DIAGNOSIS — R55 SYNCOPE, UNSPECIFIED SYNCOPE TYPE: ICD-10-CM

## 2017-07-08 DIAGNOSIS — I25.10 CORONARY ARTERY DISEASE INVOLVING NATIVE CORONARY ARTERY OF NATIVE HEART WITHOUT ANGINA PECTORIS: ICD-10-CM

## 2017-07-08 DIAGNOSIS — Z95.1 S/P CABG (CORONARY ARTERY BYPASS GRAFT): ICD-10-CM

## 2017-07-08 DIAGNOSIS — I48.0 PAROXYSMAL ATRIAL FIBRILLATION (H): ICD-10-CM

## 2017-07-08 DIAGNOSIS — E78.5 HYPERLIPIDEMIA LDL GOAL <70: ICD-10-CM

## 2017-07-08 DIAGNOSIS — I10 ESSENTIAL HYPERTENSION WITH GOAL BLOOD PRESSURE LESS THAN 140/90: ICD-10-CM

## 2017-07-08 LAB
ALT SERPL W P-5'-P-CCNC: 24 U/L (ref 0–70)
ANION GAP SERPL CALCULATED.3IONS-SCNC: 8 MMOL/L (ref 3–14)
BUN SERPL-MCNC: 22 MG/DL (ref 7–30)
CALCIUM SERPL-MCNC: 8.4 MG/DL (ref 8.5–10.1)
CHLORIDE SERPL-SCNC: 105 MMOL/L (ref 94–109)
CHOLEST SERPL-MCNC: 179 MG/DL
CO2 SERPL-SCNC: 26 MMOL/L (ref 20–32)
CREAT SERPL-MCNC: 1.14 MG/DL (ref 0.66–1.25)
GFR SERPL CREATININE-BSD FRML MDRD: 60 ML/MIN/1.7M2
GLUCOSE SERPL-MCNC: 105 MG/DL (ref 70–99)
HDLC SERPL-MCNC: 36 MG/DL
LDLC SERPL CALC-MCNC: 101 MG/DL
NONHDLC SERPL-MCNC: 143 MG/DL
POTASSIUM SERPL-SCNC: 4.2 MMOL/L (ref 3.4–5.3)
SODIUM SERPL-SCNC: 139 MMOL/L (ref 133–144)
TRIGL SERPL-MCNC: 210 MG/DL

## 2017-07-08 PROCEDURE — 80061 LIPID PANEL: CPT | Performed by: INTERNAL MEDICINE

## 2017-07-08 PROCEDURE — 84460 ALANINE AMINO (ALT) (SGPT): CPT | Performed by: INTERNAL MEDICINE

## 2017-07-08 PROCEDURE — 36415 COLL VENOUS BLD VENIPUNCTURE: CPT | Performed by: INTERNAL MEDICINE

## 2017-07-08 PROCEDURE — 80048 BASIC METABOLIC PNL TOTAL CA: CPT | Performed by: INTERNAL MEDICINE

## 2017-07-10 ENCOUNTER — OFFICE VISIT (OUTPATIENT)
Dept: CARDIOLOGY | Facility: CLINIC | Age: 82
End: 2017-07-10
Attending: INTERNAL MEDICINE
Payer: COMMERCIAL

## 2017-07-10 VITALS
SYSTOLIC BLOOD PRESSURE: 142 MMHG | OXYGEN SATURATION: 97 % | DIASTOLIC BLOOD PRESSURE: 75 MMHG | WEIGHT: 208 LBS | BODY MASS INDEX: 30.72 KG/M2 | HEART RATE: 66 BPM

## 2017-07-10 DIAGNOSIS — Z95.1 S/P CABG (CORONARY ARTERY BYPASS GRAFT): ICD-10-CM

## 2017-07-10 DIAGNOSIS — R55 SYNCOPE, UNSPECIFIED SYNCOPE TYPE: ICD-10-CM

## 2017-07-10 DIAGNOSIS — I25.10 CORONARY ARTERY DISEASE INVOLVING NATIVE CORONARY ARTERY OF NATIVE HEART WITHOUT ANGINA PECTORIS: ICD-10-CM

## 2017-07-10 DIAGNOSIS — E78.5 HYPERLIPIDEMIA LDL GOAL <70: ICD-10-CM

## 2017-07-10 DIAGNOSIS — I10 ESSENTIAL HYPERTENSION WITH GOAL BLOOD PRESSURE LESS THAN 140/90: ICD-10-CM

## 2017-07-10 DIAGNOSIS — I48.0 PAROXYSMAL ATRIAL FIBRILLATION (H): ICD-10-CM

## 2017-07-10 DIAGNOSIS — I10 BENIGN ESSENTIAL HYPERTENSION: ICD-10-CM

## 2017-07-10 PROCEDURE — 99214 OFFICE O/P EST MOD 30 MIN: CPT | Performed by: INTERNAL MEDICINE

## 2017-07-10 NOTE — PATIENT INSTRUCTIONS
Thank you for your [facility] visit today. Your provider has recommended the following:  Medication Changes:  None today. Continue taking your medications as you have been.  Recommendations:  As discussed with Dr. Erickson at your visit today.  Follow-up:  See Dr. Erickson for cardiology follow up in 4 months with an echocardiogram, holter monitor and fasting labs to be done 1-2 days prior.  We kindly ask that you call cardiology scheduling at [phone] three months prior to requested revisit date to schedule future cardiology appointments.  Reminder:  1. Please bring in your current medication list or your medication, over the counter supplements and vitamin bottles as we will review these at each office visit.               [facility] HE[facility] CARE  [facility]~[address]~[place], [place]~[address]  Questions about your visit today?  Call your Cardiology Clinic RN's-Ekaterina Sewell and/or Monica Ramirez at [phone].

## 2017-07-10 NOTE — MR AVS SNAPSHOT
After Visit Summary   7/10/2017    Colin Shell    MRN: [id number]           Patient Information     Date Of Birth          6/25/1927        Visit Information        Provider Department      7/10/2017 10:30 AM Norm Erickson MD [facility] PHYSICIAN HEART AT [facility]        Today's Diagnoses     Syncope, unspecified syncope type        Paroxysmal atrial fibrillation (H)        Coronary artery disease involving native coronary artery of native heart without angina pectoris        S/P CABG (coronary artery bypass graft)        Benign essential hypertension        Hyperlipidemia LDL goal <70        Essential hypertension with goal blood pressure less than 140/90          Care Instructions    Thank you for your  Heart Care visit today. Your provider has recommended the following:  Medication Changes:  None today. Continue taking your medications as you have been.  Recommendations:  As discussed with Dr. Erickson at your visit today.  Follow-up:  See Dr. Erickson for cardiology follow up in 4 months with an echocardiogram, holter monitor and fasting labs to be done 1-2 days prior.  We kindly ask that you call cardiology scheduling at [phone] three months prior to requested revisit date to schedule future cardiology appointments.  Reminder:  1. Please bring in your current medication list or your medication, over the counter supplements and vitamin bottles as we will review these at each office visit.               [facility] HEART CARE  [facility]~[address]~[place], [place]~[address]  Questions about your visit today?  Call your Cardiology Clinic RN's-Ekaterina Sewell and/or Monica Ramirez at [phone].                Follow-ups after your visit        Additional Services     Follow-Up with Cardiologist                 Your next 10 appointments already scheduled     Oct 12, 2017  4:30 PM CDT   Remote ICD Check with [address]  "[facility] PHYSICIANS HEART AT [place] ([facility])    [id number]  [place] [id number] [phone]           This appointment is for a remote check of your debrillator.  This is not an appointment at the office.              Future tests that were ordered for you today     Open Future Orders        Priority Expected Expires Ordered    Basic metabolic panel Routine 11/7/2017 7/10/2018 7/10/2017    Lipid Profile Routine 11/7/2017 7/10/2018 7/10/2017    ALT Routine 11/7/2017 7/10/2018 7/10/2017    Echocardiogram Routine 11/7/2017 7/10/2018 7/10/2017    Holter Monitor 24 hour - Adult Routine 11/7/2017 7/10/2018 7/10/2017    Follow-Up with Cardiologist Routine 11/7/2017 7/10/2018 7/10/2017            Who to contact     If you have questions or need follow up information about today's clinic visit or your schedule please contact [facility] PHYSICIAN HEART AT [place] directly at [phone].  Normal or non-critical lab and imaging results will be communicated to you by Avanserahart, letter or phone within 4 business days after the clinic has received the results. If you do not hear from us within 7 days, please contact the clinic through Aidhen[employer] or phone. If you have a critical or abnormal lab result, we will notify you by phone as soon as possible.  Submit refill requests through 360T or call your pharmacy and they will forward the refill request to us. Please allow 3 business days for your refill to be completed.          Additional Information About Your Visit        Avanserahart Information     360T lets you send messages to your doctor, view your test results, renew your prescriptions, schedule appointments and more. To sign up, go to [email or web address] . Click on \"Log in\" on the left side of the screen, which will take you to the Welcome page. Then click on \"Sign up Now\" on the right side of the page.     You will be asked to enter the access code listed " below, as well as some personal information. Please follow the directions to create your username and password.     Your access code is: [email or web address]  Expires: 2017  8:56 AM     Your access code will  in 90 days. If you need help or a new code, please call your [place] clinic or [phone].        Care EveryWhere ID     This is your Care EveryWhere ID. This could be used by other organizations to access your [place] medical records  [id number]        Your Vitals Were     Pulse Pulse Oximetry BMI (Body Mass Index)             66 97% 30.72 kg/m2          Blood Pressure from Last 3 Encounters:   07/10/17 142/75   17 139/67   17 149/79    Weight from Last 3 Encounters:   07/10/17 94.3 kg (208 lb)   17 92.5 kg (204 lb)   17 93.6 kg (206 lb 6.4 oz)              We Performed the Following     Follow-Up with Cardiologist          Today's Medication Changes          These changes are accurate as of: 7/10/17 10:51 AM.  If you have any questions, ask your nurse or doctor.               Stop taking these medicines if you haven't already. Please contact your care team if you have questions.     CLARITIN 10 MG tablet   Generic drug:  loratadine   Stopped by:  Norm Erickson MD                    Primary Care Provider Office Phone # Fax #    [place] Cochran WattsDO [phone]       [address] [address]        Equal Access to Services     LESLIE PACE : Hadii lanny lewis hadasho Sobernadine, waaxda luqadaha, qaybta kaalmada tim, fina vo. So [facility] [phone].    ATENCIÓN: Si habla español, tiene a rojo disposición servicios gratuitos de asistencia lingüística. Llame al [phone].    We comply with applicable federal civil rights laws and [place] laws. We do not discriminate on the basis of race, color, national origin, age, disability sex, sexual orientation or gender identity.            Thank  you!     Thank you for choosing [facility] PHYSICIAN HEART AT [facility]  for your care. Our goal is always to provide you with excellent care. Hearing back from our patients is one way we can continue to improve our services. Please take a few minutes to complete the written survey that you may receive in the mail after your visit with us. Thank you!             Your Updated Medication List - Protect others around you: Learn how to safely use, store and throw away your medicines at www.disposemymeds.org.          This list is accurate as of: 7/10/17 10:51 AM.  Always use your most recent med list.                   Brand Name Dispense Instructions for use Diagnosis    allopurinol 100 MG tablet    ZYLOPRIM    45 tablet    Take 0.5 tablets (50 mg) by mouth daily Gout    Gout of foot, unspecified cause, unspecified chronicity, unspecified laterality       amLODIPine 10 MG tablet    NORVASC    90 tablet    Take 1 tablet (10 mg) by mouth daily    Essential hypertension with goal blood pressure less than 140/90       aspirin 325 MG EC tablet     100    1 tab po QD (Once per day)    Coronary atherosclerosis of unspecified type of vessel, native or graft       atorvastatin 40 MG tablet    LIPITOR    90 tablet    Take 1 tablet (40 mg) by mouth daily    Hyperlipidemia LDL goal <100       CENTRUM SILVER per tablet      Take 1 tablet by mouth daily        desonide 0.05 % cream    DESOWEN    15 g    Apply sparingly to affected area twice daily as needed for next two weeks.    AK (actinic keratosis), Dermatitis       finasteride 5 MG tablet    PROSCAR    90 tablet    Take 1 tablet (5 mg) by mouth daily    Benign non-nodular prostatic hyperplasia without lower urinary tract symptoms       fluorouracil 5 % cream    EFUDEX    40 g    Apply sparingly twice daily to scalp, ears for 2-3 weeks.    AK (actinic keratosis)       losartan 100 MG tablet    COZAAR    90 tablet    Take 1 tablet (100 mg) by mouth daily     Essential hypertension with goal blood pressure less than 140/90       ELIJAH MILK OF [place] PO

## 2017-07-10 NOTE — LETTER
"7/10/2017    Casie Watts, DO  [facility]   [address] [address]    RE: Colin KORIN Shell       Dear Colleague,    I had the pleasure of seeing Colin Shell in the [facility].    Office Visit     7/10/2017  [facility] PHYSICIAN HEART AT [facility]    Norm Erickson MD   Cardiology    Syncope, unspecified syncope type +6 more   Dx    RECHECK; Referred by Norm Erickson MD   Reason for visit    Progress Notes      HISTORY OF PRESENT ILLNESS:  The patient is an 89-year-old mildly overweight white male with a longstanding history of ischemic heart disease, approximately 21-22 years status post coronary bypass surgery times multiple vessels, who presented to Cardiology Clinic initially for an episode of apparent complete syncope.  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       He had a ZIO Patch placed that showed some evidence of atrial tachycardia/atrial dysrhythmia with a 4-beat run of ventricular tachycardia.  He subsequently underwent a Cardiolite stress test because of the history of ischemic heart disease that demonstrated a transmural scar in the inferior basilar/inferolateral wall with mild ellie-infarct ischemia in the basilar inferior septum.  Gated images demonstrated mildly reduced left ventricular systolic function with ejection fraction of 46%.         The patient has denied symptoms of divya chest discomfort, shortness of breath, palpitations, nausea, vomiting or diaphoresis.  He has had apparent episodes of lightheadedness/near-syncope for apparently a few years prior to the most recent event.  He has had some difficulty with bradycardia which limited beta blocker therapy.  He subsequently underwent an EP " study and had an AICD implanted because of left ventricular dysfunction and history of ischemic heart disease as well as tachydysrhythmia concern.         He continues to drink 1-2 cups of caffeinated beverage a day with a rare alcoholic beverage.  His previous history of risk factors for coronary disease are positive for cigarette smoking, discontinued in 1960, hypertension for the past 15-20 years with hyperlipidemia but no diagnosis of diabetes mellitus or family history of premature coronary disease.  Since the placement of device, he has had no other episodes of palpitations, lightheadedness or syncope. The patient does have symptoms of fatigue and general malaise, but no limitation of his activity in a significant way.      MEDICATIONS:   1.  Amlodipine 10 mg a day.   2.  Magnesium hydroxide as needed.   3.  Losartan 100 mg a day.   4.  Allopurinol 50 mg a day.   5.  Finasteride 5 mg a day.   6.  Atorvastatin 40 mg a day.   7.  Multivitamins 1 per day.   8.  Loratadine 10 mg a day.   9.  Aspirin 325 mg a day.       LABORATORY DATA:  Demonstrated cholesterol of 179, HDL 36, , triglyceride 210.  Sodium 139, potassium 4.2, BUN 22, creatinine 1.14.  Hemoglobin 13.2, platelet count 206,000.  AST 22.       The patient presents to Cardiology Clinic for followup of his ischemic heart disease and recent EP evaluation and intervention.  He has had episodes of isolated atrial tachycardia that are brief but no evidence of sustained ventricular dysrhythmia or irregular atrial dysrhythmia at this time by interrogation of his device.       PHYSICAL EXAMINATION:   VITAL SIGNS:  Blood pressure 142/75 with a heart rate of 60-70 and essentially regular.  Weight was 208 pounds up 4 lbs since last clinic visit..   NECK:  Without significant jugular venous distention, carotid bruit or palpable thyroid.   CHEST:  Essentially clear to percussion and auscultation with slight decreased breath sounds at the bases, slightly  prolonged expiratory phase.   CARDIAC:  Regular rhythm with occasional premature beat.  There is a soft S4 gallop.  There is a 1-2/6 systolic murmur at the left sternal border radiating to the apex.  No diastolic murmur, rub or S3.   EXTREMITIES:  Without significant cyanosis or erythema.  There was trace to 1+ edema present.       CLINICAL IMPRESSION:   1.  Stable cardiac condition.   2.  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   3.  History of ischemic heart disease, status post multivessel coronary bypass surgery in 1996.   4.  History of hypertension with slight increased elevation in systolic blood pressure.   5.  Status post AICD implantation because of atrial and ventricular dysrhythmia as well as bradycardia and conduction system disease.   6.  Distant history of cigarette smoking, discontinued in 1960.   7.  History of hypercholesterolemia.   8.  History of hyperglycemia.   9.  History of benign prostatic hypertrophy.   10.  Previous history of paroxysmal atrial fibrillation.       DISCUSSION:  The patient appears to be doing well status post his AICD implantation.  He has had no further episodes of lightheadedness, dizziness or syncope.  He has had no palpitations.  The patient was felt to be a good candidate for a dual-chamber pacemaker in the first place and it was decided to do an AICD after his electrophysiologic testing.  The patient does have an elevated CHADS-VASc score and he is not anxious to go on anticoagulation beyond aspirin at this time.  The burden of his atrial dysrhythmia is not great and recent atrial fibrillation has been difficult to discern on both ZIO Patch and device interrogation.  He will need close followup of serum lipids, basic metabolic panel and blood pressure. The patient will have a holter monitor to document rhythm and echocardiogram to document  LV function in 4 months      RECOMMENDATIONS:   1.  Continue present medications.   2.  Device followup.   3.  Close followup of serum lipids, basic metabolic panel and blood pressure.   4.  Diet and exercise as tolerated.   5.  Consider anticoagulation for stroke prevention if any further documentation of paroxysmal atrial fibrillation on the device.   6.  Routine medical followup.   7.  Cardiology followup in 4 months.  8.  ECHO in 4 months  9.  Holter monitor in 4 months       cc:      Casie Watts DO   [facility]    [address] [place] [address]           LESIA ERICKSON MD, [facility]                Thank you for allowing me to participate in the care of your patient.    Sincerely,     Lesia Erickson MD     [facility]

## 2017-07-10 NOTE — PROGRESS NOTES
"Office Visit     7/10/2017  [facility] PHYSICIAN HEART AT [facility]    Norm Erickson MD   Cardiology    Syncope, unspecified syncope type +6 more   Dx    RECHECK; Referred by Norm Erickson MD   Reason for visit    Progress Notes      HISTORY OF PRESENT ILLNESS:  The patient is an 89-year-old mildly overweight white male with a longstanding history of ischemic heart disease, approximately 21-22 years status post coronary bypass surgery times multiple vessels, who presented to Cardiology Clinic initially for an episode of apparent complete syncope.  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       He had a ZIO Patch placed that showed some evidence of atrial tachycardia/atrial dysrhythmia with a 4-beat run of ventricular tachycardia.  He subsequently underwent a Cardiolite stress test because of the history of ischemic heart disease that demonstrated a transmural scar in the inferior basilar/inferolateral wall with mild ellie-infarct ischemia in the basilar inferior septum.  Gated images demonstrated mildly reduced left ventricular systolic function with ejection fraction of 46%.         The patient has denied symptoms of divya chest discomfort, shortness of breath, palpitations, nausea, vomiting or diaphoresis.  He has had apparent episodes of lightheadedness/near-syncope for apparently a few years prior to the most recent event.  He has had some difficulty with bradycardia which limited beta blocker therapy.  He subsequently underwent an EP study and had an AICD implanted because of left ventricular dysfunction and history of ischemic heart disease as well as tachydysrhythmia concern.         He continues to drink 1-2 cups of caffeinated beverage a day with a rare alcoholic beverage.  His " previous history of risk factors for coronary disease are positive for cigarette smoking, discontinued in 1960, hypertension for the past 15-20 years with hyperlipidemia but no diagnosis of diabetes mellitus or family history of premature coronary disease.  Since the placement of device, he has had no other episodes of palpitations, lightheadedness or syncope. The patient does have symptoms of fatigue and general malaise, but no limitation of his activity in a significant way.      MEDICATIONS:   1.  Amlodipine 10 mg a day.   2.  Magnesium hydroxide as needed.   3.  Losartan 100 mg a day.   4.  Allopurinol 50 mg a day.   5.  Finasteride 5 mg a day.   6.  Atorvastatin 40 mg a day.   7.  Multivitamins 1 per day.   8.  Loratadine 10 mg a day.   9.  Aspirin 325 mg a day.       LABORATORY DATA:  Demonstrated cholesterol of 179, HDL 36, , triglyceride 210.  Sodium 139, potassium 4.2, BUN 22, creatinine 1.14.  Hemoglobin 13.2, platelet count 206,000.  AST 22.       The patient presents to Cardiology Clinic for followup of his ischemic heart disease and recent EP evaluation and intervention.  He has had episodes of isolated atrial tachycardia that are brief but no evidence of sustained ventricular dysrhythmia or irregular atrial dysrhythmia at this time by interrogation of his device.       PHYSICAL EXAMINATION:   VITAL SIGNS:  Blood pressure 142/75 with a heart rate of 60-70 and essentially regular.  Weight was 208 pounds up 4 lbs since last clinic visit..   NECK:  Without significant jugular venous distention, carotid bruit or palpable thyroid.   CHEST:  Essentially clear to percussion and auscultation with slight decreased breath sounds at the bases, slightly prolonged expiratory phase.   CARDIAC:  Regular rhythm with occasional premature beat.  There is a soft S4 gallop.  There is a 1-2/6 systolic murmur at the left sternal border radiating to the apex.  No diastolic murmur, rub or S3.   EXTREMITIES:  Without  significant cyanosis or erythema.  There was trace to 1+ edema present.       CLINICAL IMPRESSION:   1.  Stable cardiac condition.   2.  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   3.  History of ischemic heart disease, status post multivessel coronary bypass surgery in 1996.   4.  History of hypertension with slight increased elevation in systolic blood pressure.   5.  Status post AICD implantation because of atrial and ventricular dysrhythmia as well as bradycardia and conduction system disease.   6.  Distant history of cigarette smoking, discontinued in 1960.   7.  History of hypercholesterolemia.   8.  History of hyperglycemia.   9.  History of benign prostatic hypertrophy.   10.  Previous history of paroxysmal atrial fibrillation.       DISCUSSION:  The patient appears to be doing well status post his AICD implantation.  He has had no further episodes of lightheadedness, dizziness or syncope.  He has had no palpitations.  The patient was felt to be a good candidate for a dual-chamber pacemaker in the first place and it was decided to do an AICD after his electrophysiologic testing.  The patient does have an elevated CHADS-VASc score and he is not anxious to go on anticoagulation beyond aspirin at this time.  The burden of his atrial dysrhythmia is not great and recent atrial fibrillation has been difficult to discern on both ZIO Patch and device interrogation.  He will need close followup of serum lipids, basic metabolic panel and blood pressure. The patient will have a holter monitor to document rhythm and echocardiogram to document LV function in 4 months      RECOMMENDATIONS:   1.  Continue present medications.   2.  Device followup.   3.  Close followup of serum lipids, basic metabolic panel and blood pressure.   4.  Diet and exercise as tolerated.   5.  Consider anticoagulation for  stroke prevention if any further documentation of paroxysmal atrial fibrillation on the device.   6.  Routine medical followup.   7.  Cardiology followup in 4 months.  8.  ECHO in 4 months  9.  Holter monitor in 4 months       cc:      Casie Watts DO   [facility]    [address] [place] [address]           LESIA QUEEN MD, FACC

## 2017-09-02 ENCOUNTER — APPOINTMENT (OUTPATIENT)
Dept: CT IMAGING | Facility: CLINIC | Age: 82
End: 2017-09-02
Attending: EMERGENCY MEDICINE
Payer: COMMERCIAL

## 2017-09-02 ENCOUNTER — HOSPITAL ENCOUNTER (EMERGENCY)
Facility: CLINIC | Age: 82
Discharge: HOME | End: 2017-09-02
Attending: EMERGENCY MEDICINE | Admitting: EMERGENCY MEDICINE
Payer: COMMERCIAL

## 2017-09-02 VITALS
RESPIRATION RATE: 16 BRPM | HEART RATE: 104 BPM | SYSTOLIC BLOOD PRESSURE: 120 MMHG | BODY MASS INDEX: 29.53 KG/M2 | TEMPERATURE: 97.8 F | DIASTOLIC BLOOD PRESSURE: 83 MMHG | OXYGEN SATURATION: 96 % | WEIGHT: 200 LBS

## 2017-09-02 DIAGNOSIS — M26.69 OTHER SPECIFIED DISORDERS OF TEMPOROMANDIBULAR JOINT: ICD-10-CM

## 2017-09-02 DIAGNOSIS — M26.623 BILATERAL TEMPOROMANDIBULAR JOINT PAIN: ICD-10-CM

## 2017-09-02 PROCEDURE — 99284 EMERGENCY DEPT VISIT MOD MDM: CPT | Mod: 25 | Performed by: EMERGENCY MEDICINE

## 2017-09-02 PROCEDURE — 99284 EMERGENCY DEPT VISIT MOD MDM: CPT | Mod: Z6 | Performed by: EMERGENCY MEDICINE

## 2017-09-02 PROCEDURE — 70486 CT MAXILLOFACIAL W/O DYE: CPT

## 2017-09-02 RX ORDER — PREDNISONE 20 MG/1
40 TABLET ORAL DAILY
Qty: 6 TABLET | Refills: 0 | Status: SHIPPED | OUTPATIENT
Start: 2017-09-02 | End: 2017-09-06

## 2017-09-02 NOTE — ED AVS SNAPSHOT
[facility] Emergency Department    [address] [address]    Phone:  [phone]    Fax:  [phone]                                       Colin Shell   MRN: [id number]    Department:  [facility] Emergency Department   Date of Visit:  9/2/2017           Patient Information     Date Of Birth          6/25/1927        Your diagnoses for this visit were:     Bilateral temporomandibular joint pain        You were seen by Vinay Vinson MD.      Follow-up Information     Call [facility].    Specialty:  ENT    Contact information:    [address] [place] [address] [phone]    Additional information:    The medical center is located at   [address] [place] [address] (between [address] and   [address] in [place], [place] miles north   of [place]).      Future Appointments        Provider Department Dept Phone Center    10/12/2017 4:30 PM CASEY P Heart Device RN [facility] PHYSICIANS HEART AT [place] [phone] [facility] PSA CLIN      24 Hour Appointment Hotline       To make an appointment at any [facility], call [phone] ([phone]). If you don't have a family doctor or clinic, we will help you find one. [facility] are conveniently located to serve the needs of you and your family.             Review of your medicines      START taking        Dose / Directions Last dose taken    predniSONE 20 MG tablet   Commonly known as:  DELTASONE   Dose:  40 mg   Quantity:  6 tablet        Take 2 tablets (40 mg) by mouth daily for 3 days   Refills:  0          Our records show that you are taking the medicines listed below. If these are incorrect, please call your family doctor or clinic.        Dose / Directions Last dose taken    allopurinol 100 MG tablet   Commonly known as:  ZYLOPRIM   Dose:  50 mg   Quantity:  45 tablet        Take 0.5 tablets (50 mg) by mouth daily Gout   Refills:  3        amLODIPine 10 MG tablet   Commonly  known as:  NORVASC   Dose:  10 mg   Quantity:  90 tablet        Take 1 tablet (10 mg) by mouth daily   Refills:  3        aspirin 325 MG EC tablet   Quantity:  100        1 tab po QD (Once per day)   Refills:  3        atorvastatin 40 MG tablet   Commonly known as:  LIPITOR   Dose:  40 mg   Quantity:  90 tablet        Take 1 tablet (40 mg) by mouth daily   Refills:  3        CENTRUM SILVER per tablet   Dose:  1 tablet        Take 1 tablet by mouth daily   Refills:  0        desonide 0.05 % cream   Commonly known as:  DESOWEN   Quantity:  15 g        Apply sparingly to affected area twice daily as needed for next two weeks.   Refills:  0        finasteride 5 MG tablet   Commonly known as:  PROSCAR   Dose:  5 mg   Quantity:  90 tablet        Take 1 tablet (5 mg) by mouth daily   Refills:  3        fluorouracil 5 % cream   Commonly known as:  EFUDEX   Quantity:  40 g        Apply sparingly twice daily to scalp, ears for 2-3 weeks.   Refills:  0        losartan 100 MG tablet   Commonly known as:  COZAAR   Dose:  100 mg   Quantity:  90 tablet        Take 1 tablet (100 mg) by mouth daily   Refills:  3        NAVA MILK OF MAGNESIA PO        Refills:  0                Prescriptions were sent or printed at these locations (1 Prescription)                   Other Prescriptions                Printed at Department/Unit printer (1 of 1)         predniSONE (DELTASONE) 20 MG tablet                Procedures and tests performed during your visit     Maxillofacial  CT w/o contrast      Orders Needing Specimen Collection     None      Pending Results     Date and Time Order Name Status Description    9/2/2017 0743 Maxillofacial  CT w/o contrast Preliminary             Pending Culture Results     No orders found from 8/31/2017 to 9/3/2017.            Pending Results Instructions     If you had any lab results that were not finalized at the time of your Discharge, you can call the ED Lab Result RN at [phone]. You will be  contacted by this team for any positive Lab results or changes in treatment. The nurses are available 7 days a week from 10A to 6:30P.  You can leave a message 24 hours per day and they will return your call.        Test Results From Your Hospital Stay        9/2/2017  8:51 AM      Narrative     CT MAXILLOFACIAL WITHOUT CONTRAST  9/2/2017 8:39 AM     HISTORY: Jaw pain, malocclusion, right-sided pain for two weeks.    TECHNIQUE: Axial images were obtained through the facial bones without  contrast. Coronal and sagittal reconstructions were also acquired.  Radiation dose for this scan was reduced using automated exposure  control, adjustment of the mA and/or kV according to patient size, or  iterative reconstruction technique.    FINDINGS: The facial bones are intact. Specifically, the bony  mandible, zygomatic arches, nasal bones, maxillary alveolus, pterygoid  plates, and bony orbits are intact. There is some patchy opacification  of a few ethmoid air cells. The remainder of the paranasal sinuses are  clear. No air-fluid levels are present. The orbits and retro-orbital  structures are normal.    Temporomandibular joints appear to be relatively symmetric on this  study, but there does appear to be some mild degenerative changes.        Impression     IMPRESSION:  1. No evidence for any facial fractures.  2. Minimal mucosal thickening in ethmoid air cells bilaterally.  3. Mild degenerative changes in temporomandibular joints without any  significant asymmetry. If clinically indicated, an MRI of the  temporomandibular joints might be helpful in evaluating for possible  internal derangement.                Thank you for choosing [facility]       Thank you for choosing [facility] for your care. Our goal is always to provide you with excellent care. Hearing back from our patients is one way we can continue to improve our services. Please take a few minutes to complete the written survey that you may receive in the mail after  "you visit with us. Thank you!        [employer] Information     [employer] lets you send messages to your doctor, view your test results, renew your prescriptions, schedule appointments and more. To sign up, go to [email or web address] . Click on \"Log in\" on the left side of the screen, which will take you to the Welcome page. Then click on \"Sign up Now\" on the right side of the page.     You will be asked to enter the access code listed below, as well as some personal information. Please follow the directions to create your username and password.     Your access code is: [email or web address]  Expires: 2017  9:13 AM     Your access code will  in 90 days. If you need help or a new code, please call your [facility] clinic or [phone].        Care EveryWhere ID     This is your Care EveryWhere ID. This could be used by other organizations to access your [facility] medical records  [id number]        Equal Access to Services     ALEXIS PACE : Hadii lanny lewis hadasho Sochiali, waaxda luqadaha, qaybta kaalmada adeegyayasmin, fina [place] . So [facility] [phone].    ATENCIÓN: Si habla español, tiene a rojo disposición servicios gratuitos de asistencia lingüística. Llame al [phone].    We comply with applicable federal civil rights laws and [place] laws. We do not discriminate on the basis of race, color, national origin, age, disability sex, sexual orientation or gender identity.            After Visit Summary       This is your record. Keep this with you and show to your community pharmacist(s) and doctor(s) at your next visit.                  "

## 2017-09-02 NOTE — ED PROVIDER NOTES
Chief complaint jaw pain    89-year-old male presents with reported jaw pain and malocclusion over the past 3 weeks.  He denies any initial injury, he noticed that his left side was not meeting up normally several weeks ago, and over the past few days has developed right-sided pain.  He reports seeing dentist and having Panorex views which reportedly were unremarkable.  Unavailable for review at this time.  Pain is worse on the right side radiates from the right TMJ up into the temporalis muscle, he notices it most in the morning, and then as he talks and shoes his pain diminishes throughout the day.  He has not had such pain in the past.  He denies fever sore throat or otalgia.    Past medical history, medications, allergies, social history, family history all reviewed.  Patient Active Problem List   Diagnosis     Hypertension goal BP (blood pressure) < 140/90     Gout     Coronary artery disease involving coronary bypass graft of native heart without angina pectoris     Chronic kidney disease, stage III (moderate)     Obesity     BPH (benign prostatic hypertrophy)     HYPERLIPIDEMIA LDL GOAL <100     Health Care Home     Bronchiectasis (H)     Cholelithiasis     Fibrosis of lung (H)     Pulmonary nodule, right     Advance Care Planning     Congestive heart failure, unspecified congestive heart failure chronicity, unspecified congestive heart failure type (H)     Problem focused review of systems otherwise negative    /74  Pulse 104  Temp 97.8  F (36.6  C) (Temporal)  Resp 16  Wt 90.7 kg (200 lb)  SpO2 94%  BMI 29.53 kg/m2  Nontoxic appearing respiratory distress alert and oriented  Head atraumatic normocephalic  EAC/TMs unremarkable  Oropharynx moist, dentition intact, multiple crowns.  Gingiva unremarkable no swelling or redness  No cervical adenopathy  Mandible full range of motion, mild trismus right temporalis right TMJ, minimal tenderness to palpation of the TMJ, range of motion elicits no  crepitus of TMJ.    Results for orders placed or performed during the hospital encounter of 09/02/17   Maxillofacial  CT w/o contrast    Narrative    CT MAXILLOFACIAL WITHOUT CONTRAST  9/2/2017 8:39 AM     HISTORY: Jaw pain, malocclusion, right-sided pain for two weeks.    TECHNIQUE: Axial images were obtained through the facial bones without  contrast. Coronal and sagittal reconstructions were also acquired.  Radiation dose for this scan was reduced using automated exposure  control, adjustment of the mA and/or kV according to patient size, or  iterative reconstruction technique.    FINDINGS: The facial bones are intact. Specifically, the bony  mandible, zygomatic arches, nasal bones, maxillary alveolus, pterygoid  plates, and bony orbits are intact. There is some patchy opacification  of a few ethmoid air cells. The remainder of the paranasal sinuses are  clear. No air-fluid levels are present. The orbits and retro-orbital  structures are normal.    Temporomandibular joints appear to be relatively symmetric on this  study, but there does appear to be some mild degenerative changes.      Impression    IMPRESSION:  1. No evidence for any facial fractures.  2. Minimal mucosal thickening in ethmoid air cells bilaterally.  3. Mild degenerative changes in temporomandibular joints without any  significant asymmetry. If clinically indicated, an MRI of the  temporomandibular joints might be helpful in evaluating for possible  internal derangement.    GRAHAM JOHNSON MD     MDM: 89-year-old male presents with malocclusion and right TMJ pain.  No evidence for fracture or other significant abnormality maxillofacial CT scan.  Recommended follow-up with ENT.  Short course prednisone.  Return criteria reviewed.    Impression: Malocclusion, TMJ pain       Vinay Vinson MD  09/03/17 2008

## 2017-09-02 NOTE — ED AVS SNAPSHOT
[facility]    [address] [address]    Phone:  [phone]    Fax:  [phone]                                       Colin Shell   MRN: [id number]    Department:  [facility]   Date of Visit:  9/2/2017           After Visit Summary Signature Page     I have received my discharge instructions, and my questions have been answered. I have discussed any challenges I see with this plan with the nurse or doctor.    ..........................................................................................................................................  Patient/Patient Representative Signature      ..........................................................................................................................................  Patient Representative Print Name and Relationship to Patient    ..................................................               ................................................  Date                                            Time    ..........................................................................................................................................  Reviewed by Signature/Title    ...................................................              ..............................................  Date                                                            Time

## 2017-09-02 NOTE — ED NOTES
"Pt started having problems with R jaw 3-4 weeks ago, started feeling \"off kilter\" while eating grapes.  Teeth are not coming together on L side of mouth, pt needing to chew on R side.  Pt went to see a dentist and they did not find anything wrong with teeth.  Pt still felt jaw was off, saw dentist again.eating supper 3 days ago, tacos, and started having pain in R upper jaw, worse with chewing.  Pt feels like jaw could be cracked or something.    "

## 2017-09-06 ENCOUNTER — APPOINTMENT (OUTPATIENT)
Dept: GENERAL RADIOLOGY | Facility: CLINIC | Age: 82
DRG: 191 | End: 2017-09-06
Attending: PHYSICIAN ASSISTANT
Payer: COMMERCIAL

## 2017-09-06 ENCOUNTER — HOSPITAL ENCOUNTER (INPATIENT)
Facility: CLINIC | Age: 82
LOS: 3 days | Discharge: HOME | DRG: 191 | End: 2017-09-09
Attending: PHYSICIAN ASSISTANT | Admitting: INTERNAL MEDICINE
Payer: COMMERCIAL

## 2017-09-06 DIAGNOSIS — R79.89 ELEVATED TROPONIN: ICD-10-CM

## 2017-09-06 DIAGNOSIS — J18.9 CAP (COMMUNITY ACQUIRED PNEUMONIA): Primary | ICD-10-CM

## 2017-09-06 DIAGNOSIS — N17.9 ACUTE RENAL FAILURE SUPERIMPOSED ON STAGE 3 CHRONIC KIDNEY DISEASE, UNSPECIFIED ACUTE RENAL FAILURE TYPE: ICD-10-CM

## 2017-09-06 DIAGNOSIS — N18.3 ACUTE RENAL FAILURE SUPERIMPOSED ON STAGE 3 CHRONIC KIDNEY DISEASE, UNSPECIFIED ACUTE RENAL FAILURE TYPE: ICD-10-CM

## 2017-09-06 LAB
ANION GAP SERPL CALCULATED.3IONS-SCNC: 11 MMOL/L (ref 3–14)
BASOPHILS # BLD AUTO: 0 10E9/L (ref 0–0.2)
BASOPHILS NFR BLD AUTO: 0.1 %
BUN SERPL-MCNC: 35 MG/DL (ref 7–30)
CALCIUM SERPL-MCNC: 8.7 MG/DL (ref 8.5–10.1)
CHLORIDE SERPL-SCNC: 103 MMOL/L (ref 94–109)
CO2 SERPL-SCNC: 24 MMOL/L (ref 20–32)
CREAT SERPL-MCNC: 1.41 MG/DL (ref 0.66–1.25)
DEPRECATED S PYO AG THROAT QL EIA: NORMAL
DIFFERENTIAL METHOD BLD: ABNORMAL
EOSINOPHIL # BLD AUTO: 0.3 10E9/L (ref 0–0.7)
EOSINOPHIL NFR BLD AUTO: 2 %
ERYTHROCYTE [DISTWIDTH] IN BLOOD BY AUTOMATED COUNT: 13.9 % (ref 10–15)
GFR SERPL CREATININE-BSD FRML MDRD: 47 ML/MIN/1.7M2
GLUCOSE SERPL-MCNC: 137 MG/DL (ref 70–99)
HCT VFR BLD AUTO: 36.8 % (ref 40–53)
HGB BLD-MCNC: 12.4 G/DL (ref 13.3–17.7)
IMM GRANULOCYTES # BLD: 0.1 10E9/L (ref 0–0.4)
IMM GRANULOCYTES NFR BLD: 0.5 %
LACTATE BLD-SCNC: 1.4 MMOL/L (ref 0.7–2)
LYMPHOCYTES # BLD AUTO: 6 10E9/L (ref 0.8–5.3)
LYMPHOCYTES NFR BLD AUTO: 36.9 %
MCH RBC QN AUTO: 30.9 PG (ref 26.5–33)
MCHC RBC AUTO-ENTMCNC: 33.7 G/DL (ref 31.5–36.5)
MCV RBC AUTO: 92 FL (ref 78–100)
MONOCYTES # BLD AUTO: 2.8 10E9/L (ref 0–1.3)
MONOCYTES NFR BLD AUTO: 17.2 %
NEUTROPHILS # BLD AUTO: 7.1 10E9/L (ref 1.6–8.3)
NEUTROPHILS NFR BLD AUTO: 43.3 %
NT-PROBNP SERPL-MCNC: 1841 PG/ML (ref 0–1800)
PLATELET # BLD AUTO: 238 10E9/L (ref 150–450)
PLATELET # BLD EST: NORMAL 10*3/UL
POTASSIUM SERPL-SCNC: 4.2 MMOL/L (ref 3.4–5.3)
RBC # BLD AUTO: 4.01 10E12/L (ref 4.4–5.9)
RBC MORPH BLD: NORMAL
SODIUM SERPL-SCNC: 138 MMOL/L (ref 133–144)
SPECIMEN SOURCE: NORMAL
TROPONIN I SERPL-MCNC: 0.05 UG/L (ref 0–0.04)
TROPONIN I SERPL-MCNC: 0.05 UG/L (ref 0–0.04)
WBC # BLD AUTO: 16.4 10E9/L (ref 4–11)

## 2017-09-06 PROCEDURE — 87081 CULTURE SCREEN ONLY: CPT | Performed by: PHYSICIAN ASSISTANT

## 2017-09-06 PROCEDURE — 87641 MR-STAPH DNA AMP PROBE: CPT | Performed by: PHYSICIAN ASSISTANT

## 2017-09-06 PROCEDURE — 83605 ASSAY OF LACTIC ACID: CPT | Performed by: INTERNAL MEDICINE

## 2017-09-06 PROCEDURE — 93005 ELECTROCARDIOGRAM TRACING: CPT

## 2017-09-06 PROCEDURE — 12000010 ZZH R&B MS INTERMEDIATE OVERFLOW

## 2017-09-06 PROCEDURE — 99285 EMERGENCY DEPT VISIT HI MDM: CPT | Mod: 25 | Performed by: FAMILY MEDICINE

## 2017-09-06 PROCEDURE — 84484 ASSAY OF TROPONIN QUANT: CPT | Performed by: PHYSICIAN ASSISTANT

## 2017-09-06 PROCEDURE — 93010 ELECTROCARDIOGRAM REPORT: CPT | Mod: Z6 | Performed by: FAMILY MEDICINE

## 2017-09-06 PROCEDURE — 87880 STREP A ASSAY W/OPTIC: CPT | Performed by: PHYSICIAN ASSISTANT

## 2017-09-06 PROCEDURE — 25000128 H RX IP 250 OP 636: Performed by: PHYSICIAN ASSISTANT

## 2017-09-06 PROCEDURE — 99285 EMERGENCY DEPT VISIT HI MDM: CPT | Mod: 25

## 2017-09-06 PROCEDURE — 99223 1ST HOSP IP/OBS HIGH 75: CPT | Mod: AI | Performed by: PHYSICIAN ASSISTANT

## 2017-09-06 PROCEDURE — 71020 XR CHEST 2 VW: CPT

## 2017-09-06 PROCEDURE — 25000132 ZZH RX MED GY IP 250 OP 250 PS 637: Performed by: FAMILY MEDICINE

## 2017-09-06 PROCEDURE — 36415 COLL VENOUS BLD VENIPUNCTURE: CPT | Performed by: INTERNAL MEDICINE

## 2017-09-06 PROCEDURE — 87640 STAPH A DNA AMP PROBE: CPT | Performed by: PHYSICIAN ASSISTANT

## 2017-09-06 PROCEDURE — 83880 ASSAY OF NATRIURETIC PEPTIDE: CPT | Performed by: PHYSICIAN ASSISTANT

## 2017-09-06 PROCEDURE — 96365 THER/PROPH/DIAG IV INF INIT: CPT

## 2017-09-06 PROCEDURE — 25000132 ZZH RX MED GY IP 250 OP 250 PS 637: Performed by: PHYSICIAN ASSISTANT

## 2017-09-06 PROCEDURE — 85025 COMPLETE CBC W/AUTO DIFF WBC: CPT | Performed by: PHYSICIAN ASSISTANT

## 2017-09-06 PROCEDURE — 96366 THER/PROPH/DIAG IV INF ADDON: CPT

## 2017-09-06 PROCEDURE — 80048 BASIC METABOLIC PNL TOTAL CA: CPT | Performed by: PHYSICIAN ASSISTANT

## 2017-09-06 RX ORDER — AZITHROMYCIN 250 MG/1
250 TABLET, FILM COATED ORAL EVERY 24 HOURS
Status: DISCONTINUED | OUTPATIENT
Start: 2017-09-07 | End: 2017-09-07 | Stop reason: ALTCHOICE

## 2017-09-06 RX ORDER — AMLODIPINE BESYLATE 10 MG/1
10 TABLET ORAL DAILY
Status: DISCONTINUED | OUTPATIENT
Start: 2017-09-07 | End: 2017-09-09 | Stop reason: HOSPADM

## 2017-09-06 RX ORDER — LOSARTAN POTASSIUM 50 MG/1
100 TABLET ORAL DAILY
Status: DISCONTINUED | OUTPATIENT
Start: 2017-09-07 | End: 2017-09-09 | Stop reason: HOSPADM

## 2017-09-06 RX ORDER — ONDANSETRON 2 MG/ML
4 INJECTION INTRAMUSCULAR; INTRAVENOUS EVERY 6 HOURS PRN
Status: DISCONTINUED | OUTPATIENT
Start: 2017-09-06 | End: 2017-09-09 | Stop reason: HOSPADM

## 2017-09-06 RX ORDER — ONDANSETRON 4 MG/1
4 TABLET, ORALLY DISINTEGRATING ORAL EVERY 6 HOURS PRN
Status: CANCELLED | OUTPATIENT
Start: 2017-09-06

## 2017-09-06 RX ORDER — AMOXICILLIN 250 MG
1-2 CAPSULE ORAL 2 TIMES DAILY PRN
Status: DISCONTINUED | OUTPATIENT
Start: 2017-09-06 | End: 2017-09-09 | Stop reason: HOSPADM

## 2017-09-06 RX ORDER — ACETAMINOPHEN 325 MG/1
650 TABLET ORAL EVERY 4 HOURS PRN
Status: DISCONTINUED | OUTPATIENT
Start: 2017-09-06 | End: 2017-09-09 | Stop reason: HOSPADM

## 2017-09-06 RX ORDER — SODIUM CHLORIDE 9 MG/ML
INJECTION, SOLUTION INTRAVENOUS CONTINUOUS
Status: DISCONTINUED | OUTPATIENT
Start: 2017-09-06 | End: 2017-09-08

## 2017-09-06 RX ORDER — ONDANSETRON 4 MG/1
4 TABLET, ORALLY DISINTEGRATING ORAL EVERY 6 HOURS PRN
Status: DISCONTINUED | OUTPATIENT
Start: 2017-09-06 | End: 2017-09-09 | Stop reason: HOSPADM

## 2017-09-06 RX ORDER — OXYCODONE HYDROCHLORIDE 5 MG/1
5-10 TABLET ORAL
Status: CANCELLED | OUTPATIENT
Start: 2017-09-06

## 2017-09-06 RX ORDER — ONDANSETRON 2 MG/ML
4 INJECTION INTRAMUSCULAR; INTRAVENOUS EVERY 6 HOURS PRN
Status: CANCELLED | OUTPATIENT
Start: 2017-09-06

## 2017-09-06 RX ORDER — CEFTRIAXONE 2 G/1
2 INJECTION, POWDER, FOR SOLUTION INTRAMUSCULAR; INTRAVENOUS EVERY 24 HOURS
Status: DISCONTINUED | OUTPATIENT
Start: 2017-09-06 | End: 2017-09-09 | Stop reason: HOSPADM

## 2017-09-06 RX ORDER — ATORVASTATIN CALCIUM 20 MG/1
40 TABLET, FILM COATED ORAL DAILY
Status: DISCONTINUED | OUTPATIENT
Start: 2017-09-07 | End: 2017-09-09 | Stop reason: HOSPADM

## 2017-09-06 RX ORDER — IPRATROPIUM BROMIDE AND ALBUTEROL SULFATE 2.5; .5 MG/3ML; MG/3ML
3 SOLUTION RESPIRATORY (INHALATION) EVERY 4 HOURS PRN
Status: DISCONTINUED | OUTPATIENT
Start: 2017-09-06 | End: 2017-09-08

## 2017-09-06 RX ORDER — SODIUM CHLORIDE 9 MG/ML
INJECTION, SOLUTION INTRAVENOUS CONTINUOUS
Status: DISCONTINUED | OUTPATIENT
Start: 2017-09-06 | End: 2017-09-06

## 2017-09-06 RX ORDER — PROCHLORPERAZINE MALEATE 5 MG
5 TABLET ORAL EVERY 6 HOURS PRN
Status: DISCONTINUED | OUTPATIENT
Start: 2017-09-06 | End: 2017-09-09 | Stop reason: HOSPADM

## 2017-09-06 RX ORDER — ACETAMINOPHEN 325 MG/1
650 TABLET ORAL ONCE
Status: COMPLETED | OUTPATIENT
Start: 2017-09-06 | End: 2017-09-06

## 2017-09-06 RX ORDER — ALLOPURINOL 100 MG/1
50 TABLET ORAL DAILY
Status: DISCONTINUED | OUTPATIENT
Start: 2017-09-07 | End: 2017-09-09 | Stop reason: HOSPADM

## 2017-09-06 RX ORDER — NALOXONE HYDROCHLORIDE 0.4 MG/ML
.1-.4 INJECTION, SOLUTION INTRAMUSCULAR; INTRAVENOUS; SUBCUTANEOUS
Status: CANCELLED | OUTPATIENT
Start: 2017-09-06

## 2017-09-06 RX ORDER — FINASTERIDE 5 MG/1
5 TABLET, FILM COATED ORAL DAILY
Status: DISCONTINUED | OUTPATIENT
Start: 2017-09-07 | End: 2017-09-09 | Stop reason: HOSPADM

## 2017-09-06 RX ORDER — NALOXONE HYDROCHLORIDE 0.4 MG/ML
.1-.4 INJECTION, SOLUTION INTRAMUSCULAR; INTRAVENOUS; SUBCUTANEOUS
Status: DISCONTINUED | OUTPATIENT
Start: 2017-09-06 | End: 2017-09-09 | Stop reason: HOSPADM

## 2017-09-06 RX ORDER — PROCHLORPERAZINE 25 MG
12.5 SUPPOSITORY, RECTAL RECTAL EVERY 12 HOURS PRN
Status: DISCONTINUED | OUTPATIENT
Start: 2017-09-06 | End: 2017-09-09 | Stop reason: HOSPADM

## 2017-09-06 RX ADMIN — AZITHROMYCIN MONOHYDRATE 500 MG: 500 INJECTION, POWDER, LYOPHILIZED, FOR SOLUTION INTRAVENOUS at 19:38

## 2017-09-06 RX ADMIN — MAGNESIUM HYDROXIDE 30 ML: 400 SUSPENSION ORAL at 20:41

## 2017-09-06 RX ADMIN — SODIUM CHLORIDE: 9 INJECTION, SOLUTION INTRAVENOUS at 20:25

## 2017-09-06 RX ADMIN — ACETAMINOPHEN 650 MG: 325 TABLET, FILM COATED ORAL at 19:39

## 2017-09-06 RX ADMIN — CEFTRIAXONE SODIUM 2 G: 2 INJECTION, POWDER, FOR SOLUTION INTRAMUSCULAR; INTRAVENOUS at 18:59

## 2017-09-06 RX ADMIN — SODIUM CHLORIDE: 9 INJECTION, SOLUTION INTRAVENOUS at 19:38

## 2017-09-06 RX ADMIN — GUAIFENESIN 10 ML: 100 SOLUTION ORAL at 21:06

## 2017-09-06 ASSESSMENT — ENCOUNTER SYMPTOMS
SHORTNESS OF BREATH: 1
CARDIOVASCULAR NEGATIVE: 1
NEUROLOGICAL NEGATIVE: 1
CHILLS: 0
CONSTITUTIONAL NEGATIVE: 1
FEVER: 0
RHINORRHEA: 1
COUGH: 1
SORE THROAT: 1
MUSCULOSKELETAL NEGATIVE: 1
GASTROINTESTINAL NEGATIVE: 1

## 2017-09-06 NOTE — LETTER
Transition Communication Hand-off for Care Transitions to Next Level of Care Provider    Name: Colin Shell  MRN #: [id number]  Primary Care Provider: Casie Watts  Primary Care MD Name: Casie Watts  Primary Clinic: [address] [place] [id number]  Primary Care Clinic Name: [facility]  Reason for Hospitalization:  Elevated troponin [R74.8]  CAP (community acquired pneumonia) [J18.9]  Acute renal failure superimposed on stage 3 chronic kidney disease, unspecified acute renal failure type (H) [N17.9, N18.3]  Admit Date/Time: 9/6/2017  4:16 PM  Discharge Date: 9/8/17  Payor Source: Payor: UCARE / Plan: [employer] FOR [employer]ORS / Product Type: HMO /     Readmission Assessment Measure (FLORECITA) Risk Score/category: Average      Reason for Communication Hand-off Referral: Admission diagnoses: PN  Multiple providers/specialties    Discharge Plan:  Discharge Plan:       Most Recent Value    Disposition Comments Has a private pay  and another for writing out checks for his bills           Concern for non-adherence with plan of care: No     Discharge Needs Assessment:  Needs       Most Recent Value    Anticipated Changes Related to Illness none    Equipment Currently Used at Home grab bar, bath bench    Transportation Available family or friend will provide [patient still drives]    # of Referrals Placed by CTS MTM, Internal Clinic Care Coordination    Senior Linkage Line Referral Placed -- [patient declined brochure]    F/U Appointment Brochure Provided 09/07/17 [discussed importance]    Referrals Placed MTM          Already enrolled in Tele-monitoring program and name of program:  No  Follow-up specialty is recommended: Yes, with Cardiology as scheduled    Follow-up plan:  Future Appointments  Date Time Provider Department Center   10/12/2017 4:30 PM CASEY DCR2 [facility]       Any outstanding tests or procedures:  None            Key Recommendations: Patient declines any needs at this time. With  multiple chronic diagnoses he may benefit from Clinic Care Coordinator RN.    Carlotta Reveles    AVS/Discharge Summary is the source of truth; this is a helpful guide for improved communication of patient story

## 2017-09-06 NOTE — IP AVS SNAPSHOT
MRN:[id number]                      After Visit Summary   9/6/2017    Colin Shell    MRN: [id number]           Thank you!     Thank you for choosing [facility] for your care. Our goal is always to provide you with excellent care. Hearing back from our patients is one way we can continue to improve our services. Please take a few minutes to complete the written survey that you may receive in the mail after you visit with us. Thank you!        Patient Information     Date Of Birth          6/25/1927        Designated Caregiver       Most Recent Value    Caregiver    Will someone help with your care after discharge? yes    Name of designated caregiver Vinay Shell    Phone number of caregiver [phone]    Caregiver address [place]      About your hospital stay     You were admitted on:  September 6, 2017 You last received care in the:  [facility]    You were discharged on:  September 9, 2017       Who to Call     For medical emergencies, please call 911.  For non-urgent questions about your medical care, please call your primary care provider or clinic, [phone]          Attending Provider     Provider Specialty    Alisa Boyce PA-C Physician Assistant    Echo Turner MD Internal Medicine       Primary Care Provider Office Phone # Fax #    Casie Watts [phone]      After Care Instructions     Activity       Your activity upon discharge: activity as tolerated            Diet       Follow this diet upon discharge: Orders Placed This Encounter      Advance Diet as Tolerated: Regular Diet Adult                  Follow-up Appointments     Follow-up and recommended labs and tests        Follow up with primary care provider, Casie Watts, within 2 weeks, for hospital follow- up.                  Your next 10 appointments already scheduled     Sep 15, 2017  9:40 AM CDT   Office Visit with Casie Watts DO   [facility]  "[place] [facility])    [address]  [facility] [address]   [phone]           Bring a current list of meds and any records pertaining to this visit. For Physicals, please bring immunization records and any forms needing to be filled out. Please arrive 10 minutes early to complete paperwork.            Oct 12, 2017  4:30 PM CDT   Remote ICD Check with CASEY DCR2   [facility] [place] ([facility]    [address]  [place] [id number] [phone]           This appointment is for a remote check of your debrillator.  This is not an appointment at the office.              Pending Results     No orders found from 9/4/2017 to 9/7/2017.            Statement of Approval     Ordered          09/09/17 1013  I have reviewed and agree with all the recommendations and orders detailed in this document.  EFFECTIVE NOW     Approved and electronically signed by:  Echo Turner MD             Admission Information     Date & Time Provider Department Dept. Phone    9/6/2017 Echo Turner MD [facility] [phone]      Your Vitals Were     Blood Pressure Pulse Temperature Respirations Height Weight    128/58 (BP Location: Right arm) 82 97.8  F (36.6  C) (Oral) 18 1.778 m (5' 10\") 96 kg (211 lb 10.3 oz)    Pulse Oximetry BMI (Body Mass Index)                94% 30.37 kg/m2          MyChart Information     [facility] lets you send messages to your doctor, view your test results, renew your prescriptions, schedule appointments and more. To sign up, go to [email or web address] . Click on \"Log in\" on the left side of the screen, which will take you to the Welcome page. Then click on \"Sign up Now\" on the right side of the page.     You will be asked to enter the access code listed below, as well as some personal information. Please follow the directions to create your username and password.     Your access code is: " [email or web address]  Expires: 2017  9:13 AM     Your access code will  in 90 days. If you need help or a new code, please call your [facility] clinic or [phone].        Care EveryWhere ID     This is your Care EveryWhere ID. This could be used by other organizations to access your [facility] medical records  [id number]        Equal Access to Services     LESLIE PACE : Hadii aad ku hadasho Soomaali, waaxda luqadaha, qaybta kaalmada adeegyada, waxay idiin hayaan adelinette robertson laGilaapolinar . So [facility] [phone].    ATENCIÓN: Si habla espdavina, tiene a rojo disposición servicios gratuitos de asistencia lingüística. Laureano al [phone].    We comply with applicable federal civil rights laws and [place] laws. We do not discriminate on the basis of race, color, national origin, age, disability sex, sexual orientation or gender identity.               Review of your medicines      START taking        Dose / Directions    levofloxacin 500 MG tablet   Commonly known as:  LEVAQUIN        Dose:  500 mg   Take 1 tablet (500 mg) by mouth daily for 5 days   Quantity:  5 tablet   Refills:  0         CONTINUE these medicines which have NOT CHANGED        Dose / Directions    allopurinol 100 MG tablet   Commonly known as:  ZYLOPRIM   Used for:  Gout of foot, unspecified cause, unspecified chronicity, unspecified laterality        Dose:  50 mg   Take 0.5 tablets (50 mg) by mouth daily Gout   Quantity:  45 tablet   Refills:  3       amLODIPine 10 MG tablet   Commonly known as:  NORVASC   Used for:  Essential hypertension with goal blood pressure less than 140/90        Dose:  10 mg   Take 1 tablet (10 mg) by mouth daily   Quantity:  90 tablet   Refills:  3       aspirin 325 MG EC tablet   Used for:  Coronary atherosclerosis of unspecified type of vessel, native or graft        1 tab po QD (Once per day)   Quantity:  100   Refills:  3       atorvastatin 40 MG tablet   Commonly known as:  LIPITOR   Used for:  Hyperlipidemia LDL goal  <100        Dose:  40 mg   Take 1 tablet (40 mg) by mouth daily   Quantity:  90 tablet   Refills:  3       CENTRUM SILVER per tablet   Notes to Patient:  Resume home dosing routine.        Dose:  1 tablet   Take 1 tablet by mouth daily   Refills:  0       finasteride 5 MG tablet   Commonly known as:  PROSCAR   Used for:  Benign non-nodular prostatic hyperplasia without lower urinary tract symptoms        Dose:  5 mg   Take 1 tablet (5 mg) by mouth daily   Quantity:  90 tablet   Refills:  3       losartan 100 MG tablet   Commonly known as:  COZAAR   Used for:  Essential hypertension with goal blood pressure less than 140/90        Dose:  100 mg   Take 1 tablet (100 mg) by mouth daily   Quantity:  90 tablet   Refills:  3       NAVA MILK OF MAGNESIA PO        Refills:  0            Where to get your medicines      These medications were sent to [facility] [place], [place] - [address]  [address] [address]     Phone:  [phone]     levofloxacin 500 MG tablet                Protect others around you: Learn how to safely use, store and throw away your medicines at www.disposemymeds.org.             Medication List: This is a list of all your medications and when to take them. Check marks below indicate your daily home schedule. Keep this list as a reference.      Medications           Morning Afternoon Evening Bedtime As Needed    allopurinol 100 MG tablet   Commonly known as:  ZYLOPRIM   Take 0.5 tablets (50 mg) by mouth daily Gout   Last time this was given:  50 mg on 9/9/2017  8:23 AM                                   amLODIPine 10 MG tablet   Commonly known as:  NORVASC   Take 1 tablet (10 mg) by mouth daily   Last time this was given:  10 mg on 9/9/2017  8:21 AM                                   aspirin 325 MG EC tablet   1 tab po QD (Once per day)   Last time this was given:  325 mg on 9/9/2017  8:21 AM                                atorvastatin 40 MG tablet   Commonly  known as:  LIPITOR   Take 1 tablet (40 mg) by mouth daily   Last time this was given:  40 mg on 9/9/2017  8:21 AM                                   CENTRUM SILVER per tablet   Take 1 tablet by mouth daily   Notes to Patient:  Resume home dosing routine.                                finasteride 5 MG tablet   Commonly known as:  PROSCAR   Take 1 tablet (5 mg) by mouth daily   Last time this was given:  5 mg on 9/9/2017  8:21 AM                                   levofloxacin 500 MG tablet   Commonly known as:  LEVAQUIN   Take 1 tablet (500 mg) by mouth daily for 5 days   Last time this was given:  IV dose given at 8AM today. Take next does tomorrow morning.                                   losartan 100 MG tablet   Commonly known as:  COZAAR   Take 1 tablet (100 mg) by mouth daily   Last time this was given:  100 mg on 9/9/2017  8:21 AM                                   NAVA MILK OF MAGNESIA PO   Last time this was given:  30 mLs on 9/6/2017  8:41 PM                                             More Information        Pneumonia (Adult)  Pneumonia is an infection deep within the lungs. It is in the small air sacs (alveoli). Pneumonia may be caused by a virus or bacteria. Pneumonia caused by bacteria is usually treated with an antibiotic. Severe cases may need to be treated in the hospital. Milder cases can be treated at home. Symptoms usually start to get better during the first 2 days of treatment.    Home care  Follow these guidelines when caring for yourself at home:    Rest at home for the first 2 to 3 days, or until you feel stronger. Don t let yourself get overly tired when you go back to your activities.    Stay away from cigarette smoke - yours or other people s.    You may use acetaminophen or ibuprofen to control fever or pain, unless another medicine was prescribed. If you have chronic liver or kidney disease, talk with your healthcare provider before using these medicines. Also talk with your provider  if you ve had a stomach ulcer or gastrointestinal bleeding. Don t give aspirin to anyone younger than 18 years of age who is ill with a fever. It may cause severe liver damage.    Your appetite may be poor, so a light diet is fine.    Drink 6 to 8 glasses of fluids every day to make sure you are getting enough fluids. Beverages can include water, sport drinks, sodas without caffeine, juices, tea, or soup. Fluids will help loosen secretions in the lung. This will make it easier for you to cough up the phlegm (sputum). If you also have heart or kidney disease, check with your healthcare provider before you drink extra fluids.    Take antibiotic medicine prescribed until it is all gone, even if you are feeling better after a few days.  Follow-up care  Follow up with your healthcare provider in the next 2 to 3 days, or as advised. This is to be sure the medicine is helping you get better.  If you are 65 or older, you should get a pneumococcal vaccine and a yearly flu (influenza) shot. You should also get these vaccines if you have chronic lung disease like asthma, emphysema, or COPD. Recently, a second type of pneumonia vaccine has become available for everyone over 65 years old. This is in addition to the previous vaccine. Ask your provider about this.  When to seek medical advice  Call your healthcare provider right away if any of these occur:    You don t get better within the first 48 hours of treatment    Shortness of breath gets worse    Rapid breathing (more than 25 breaths per minute)    Coughing up blood    Chest pain gets worse with breathing    Fever of 100.4 F (38 C) or higher that doesn t get better with fever medicine    Weakness, dizziness, or fainting that gets worse    Thirst or dry mouth that gets worse    Sinus pain, headache, or a stiff neck    Chest pain not caused by coughing  Date Last Reviewed: 1/1/2017 2000-2017 [employer]. [address] [place] [address], [place] [address]. All rights  reserved. This information is not intended as a substitute for professional medical care. Always follow your healthcare professional's instructions.

## 2017-09-06 NOTE — ED PROVIDER NOTES
History     Chief Complaint   Patient presents with     Shortness of Breath     was seen here last week no improvements noted     Cough     HPI  Colin Shell is a 89 year old male with history of hypertension, coronary artery disease, stage III CKD, fibrosis of lung, CHF who presents with complaints of cough for the past 3 days.  He describes the cough as productive of yellow sputum and worse with lying flat.  Patient complains of associated shortness of breath with exertion.  He denies any chest pain.  Patient also complains of associated sore throat, nasal congestion, and rhinorrhea.  Denies fevers, chills, nausea, vomiting, abdominal pain, or leg pain/swelling.  He denies any ill contacts  with similar symptoms.  Patient has been prescribed Lasix in the past but states he has stopped taking this as it was making him urinate too much.  Pt has history of ischemic heart disease, status post multivessel coronary bypass surgery in 1996.  He also has a pacemaker defibrillator in place because of atrial and ventricular dysrhythmia as well as bradycardia and conduction system disease.  Patient has never required oxygen at home.  Patient states he quit smoking in his 30s.      I have reviewed the Medications, Allergies, Past Medical and Surgical History, and Social History in the Epic system.    Allergies:   Allergies   Allergen Reactions     Flomax [Tamsulosin Hydrochloride]      Hctz Other (See Comments)     Caused loss of balance     Lisinopril Cough     Simvastatin Nausea and Vomiting and Diarrhea     Vytorin Nausea and Diarrhea         No current facility-administered medications on file prior to encounter.   Current Outpatient Prescriptions on File Prior to Encounter:  amLODIPine (NORVASC) 10 MG tablet Take 1 tablet (10 mg) by mouth daily   Magnesium Hydroxide (NAVA MILK OF MAGNESIA PO)    losartan (COZAAR) 100 MG tablet Take 1 tablet (100 mg) by mouth daily   allopurinol (ZYLOPRIM) 100 MG tablet Take  "0.5 tablets (50 mg) by mouth daily Gout   finasteride (PROSCAR) 5 MG tablet Take 1 tablet (5 mg) by mouth daily   atorvastatin (LIPITOR) 40 MG tablet Take 1 tablet (40 mg) by mouth daily   Multiple Vitamins-Minerals (CENTRUM SILVER) per tablet Take 1 tablet by mouth daily   ASPIRIN 325 MG OR TBEC 1 tab po QD (Once per day)       Patient Active Problem List   Diagnosis     Hypertension goal BP (blood pressure) < 140/90     Gout     Coronary artery disease involving coronary bypass graft of native heart without angina pectoris     Chronic kidney disease, stage III (moderate)     Obesity     Benign prostatic hyperplasia     Hyperlipidemia LDL goal <100     Health Care Home     Bronchiectasis (H)     Cholelithiasis     Fibrosis of lung (H)     Pulmonary nodule, right     Advance Care Planning     Congestive heart failure, unspecified congestive heart failure chronicity, unspecified congestive heart failure type (H)     BRIEN (acute kidney injury) (H)       Past Surgical History:   Procedure Laterality Date     SURGICAL HISTORY OF -       heel spurs     SURGICAL HISTORY OF -   2005, 2006    bilateral eye cataract     SURGICAL HISTORY OF -   1996    CABG x 5 vessels     SURGICAL HISTORY OF -   2010    circumcision     TONSILLECTOMY & ADENOIDECTOMY         Social History   Substance Use Topics     Smoking status: Former Smoker     Smokeless tobacco: Never Used      Comment: Quit at age 32     Alcohol use Yes      Comment: 12 pack a year       Most Recent Immunizations   Administered Date(s) Administered     Influenza (H1N1) 01/22/2010     Influenza (High Dose) 3 valent vaccine 10/14/2015     Influenza (IIV3) 10/16/2012     Pneumococcal 23 valent 07/06/1994       BMI: Estimated body mass index is 28.7 kg/(m^2) as calculated from the following:    Height as of this encounter: 1.778 m (5' 10\").    Weight as of this encounter: 90.7 kg (200 lb).      Review of Systems   Constitutional: Negative.  Negative for chills and fever. " "  HENT: Positive for congestion, rhinorrhea and sore throat.    Respiratory: Positive for cough and shortness of breath.    Cardiovascular: Negative.  Negative for chest pain.   Gastrointestinal: Negative.    Musculoskeletal: Negative.    Skin: Negative.    Neurological: Negative.    All other systems reviewed and are negative.      Physical Exam   BP: 137/69  Pulse: 98  Heart Rate: 98  Temp: 99.1  F (37.3  C)  Resp: 20  Height: 177.8 cm (5' 10\")  Weight: 90.7 kg (200 lb)  SpO2: 94 %  Physical Exam   Constitutional: He is oriented to person, place, and time. He appears well-developed and well-nourished. No distress.   HENT:   Head: Normocephalic and atraumatic.   Right Ear: Tympanic membrane, external ear and ear canal normal.   Left Ear: Tympanic membrane, external ear and ear canal normal.   Nose: Mucosal edema and rhinorrhea present.   Mouth/Throat: Mucous membranes are dry. No uvula swelling. Posterior oropharyngeal erythema present. No oropharyngeal exudate, posterior oropharyngeal edema or tonsillar abscesses.   Eyes: Conjunctivae and EOM are normal. Pupils are equal, round, and reactive to light.   Neck: Normal range of motion and full passive range of motion without pain. Neck supple. No rigidity. Normal range of motion present.   Cardiovascular: Normal rate, regular rhythm and normal heart sounds.    Pulmonary/Chest: Effort normal and breath sounds normal. No respiratory distress. He has no wheezes. He has no rales.   Crackles to lung bases bilaterally, frequent productive sounding cough   Abdominal: Soft. He exhibits no distension. There is no tenderness. There is no rebound and no guarding.   Musculoskeletal: Normal range of motion. He exhibits no edema or tenderness.   Lymphadenopathy:     He has no cervical adenopathy.   Neurological: He is alert and oriented to person, place, and time.   Skin: Skin is warm and dry. No rash noted.       ED Course     ED Course     Procedures             EKG " Interpretation:      Interpreted by Alisa Boyce and Dr. Matamoros  Time reviewed: 1643  Symptoms at time of EKG: Shortness of breath   Rhythm: Atrial fibrillation - controlled  Rate: 92  Axis: Normal  Ectopy: None  Conduction: Right bundle branch block  ST Segments/ T Waves: No acute ischemic changes  Q Waves: None  Comparison to prior: Unchanged from 1/13/17    Clinical Impression: No acute changes    Results for orders placed or performed during the hospital encounter of 09/06/17   XR Chest 2 Views    Narrative    CHEST TWO VIEWS   9/6/2017 5:29 PM     HISTORY: Cough    COMPARISON: 2/14/2017.    FINDINGS: Sternotomy. Mild cardiomegaly. Scattered interstitial  opacities in both lungs appear chronic and unchanged from 2/14/2017.  This should represent fibrosis. Cardiac pacer with two leads in place.      Impression    IMPRESSION: Fibrosis in both lungs, stable since 2/14/2017. Nothing  clearly acute.   CBC with platelets differential   Result Value Ref Range    WBC 16.4 (H) 4.0 - 11.0 10e9/L    RBC Count 4.01 (L) 4.4 - 5.9 10e12/L    Hemoglobin 12.4 (L) 13.3 - 17.7 g/dL    Hematocrit 36.8 (L) 40.0 - 53.0 %    MCV 92 78 - 100 fl    MCH 30.9 26.5 - 33.0 pg    MCHC 33.7 31.5 - 36.5 g/dL    RDW 13.9 10.0 - 15.0 %    Platelet Count 238 150 - 450 10e9/L    Diff Method Automated Method     % Neutrophils 43.3 %    % Lymphocytes 36.9 %    % Monocytes 17.2 %    % Eosinophils 2.0 %    % Basophils 0.1 %    % Immature Granulocytes 0.5 %    Absolute Neutrophil 7.1 1.6 - 8.3 10e9/L    Absolute Lymphocytes 6.0 (H) 0.8 - 5.3 10e9/L    Absolute Monocytes 2.8 (H) 0.0 - 1.3 10e9/L    Absolute Eosinophils 0.3 0.0 - 0.7 10e9/L    Absolute Basophils 0.0 0.0 - 0.2 10e9/L    Abs Immature Granulocytes 0.1 0 - 0.4 10e9/L    RBC Morphology Normal     Platelet Estimate Normal    Basic metabolic panel   Result Value Ref Range    Sodium 138 133 - 144 mmol/L    Potassium 4.2 3.4 - 5.3 mmol/L    Chloride 103 94 - 109 mmol/L    Carbon Dioxide 24 20  - 32 mmol/L    Anion Gap 11 3 - 14 mmol/L    Glucose 137 (H) 70 - 99 mg/dL    Urea Nitrogen 35 (H) 7 - 30 mg/dL    Creatinine 1.41 (H) 0.66 - 1.25 mg/dL    GFR Estimate 47 (L) >60 mL/min/1.7m2    GFR Estimate If Black 57 (L) >60 mL/min/1.7m2    Calcium 8.7 8.5 - 10.1 mg/dL   Troponin I   Result Value Ref Range    Troponin I ES 0.047 (H) 0.000 - 0.045 ug/L   Nt probnp inpatient (BNP)   Result Value Ref Range    N-Terminal Pro BNP Inpatient 1841 (H) 0 - 1800 pg/mL   Rapid Strep Screen   Result Value Ref Range    Specimen Description Throat     Rapid Strep A Screen       NEGATIVE: No Group A streptococcal antigen detected by immunoassay, await culture report.         Assessments & Plan (with Medical Decision Making)     DDx: pneumonia, bronchitis, ACS, CHF, pneumothorax, pleural effusion    Pt is a 89 year old male with history of hypertension, coronary artery disease, stage III CKD, fibrosis of lung, CHF who presents with complaints of cough for the past 3 days.  He describes the cough as productive of yellow sputum and worse with lying flat.  Patient complains of associated shortness of breath with exertion.  He denies any chest pain.  Patient also complains of associated sore throat, nasal congestion, and rhinorrhea.  Patient has been prescribed Lasix in the past but states he has stopped taking this as it was making him urinate too much.  Pt has history of ischemic heart disease, status post multivessel coronary bypass surgery in 1996.  He also has a pacemaker defibrillator in place.  Pt is afebrile on arrival.  Exam as above.  O2 sats are 94% on room air.  EKG appears unchanged from most recent; no evidence of acute ischemia.  Troponin is slightly elevated at 0.047.  BNP is also slightly elevated at 1800.  The patient's creatinine is up from his baseline to 1.4 and BUN of 35.  Patient appears dry on exam.  Patient's white count is 16,000.  Chest x-ray was negative for pneumonia or acute pathology.  Fibrosis of the  lungs appear stable.  Discussed results with patient.  There is still concern for pneumonia given patient's clinical presentation along with elevated white count.  Therefore patient was started on treatment for suspected community acquired pneumonia including IV Ceftriaxone and Azithromycin.  Discussed with patient that we think he would benefit from admission for IV antibiotics and fluids as well as continued cardiac monitoring and trending of his troponin and creatinine.  We have lower suspicion that patient's troponin elevation is due to a cardiac etiology as patient has not had any chest pain.  Will plan on trending his troponin and creatinine after fluid replacement.  Will judiciously start IV fluids on patient in attempts to avoid putting him into heart failure.    Discussed pt's case with the ED physician on staff (Dr. Padilla).  He evaluated patient and agreed with the assessment, diagnosis, treatment, and plan of the patient.    I have reviewed the nursing notes.    I have reviewed the findings, diagnosis, plan with the patient.       ED to Inpatient Handoff:    Discussed with Linda Cedeño PA-C at 1840  Patient accepted for Inpatient Stay  Pending studies include None  Code Status: Full Code           New Prescriptions    No medications on file       Final diagnoses:   CAP (community acquired pneumonia)   Elevated troponin   Acute renal failure superimposed on stage 3 chronic kidney disease, unspecified acute renal failure type (H)       9/6/2017   [facility] EMERGENCY DEPARTMENT     Alisa Boyce PA-C  09/06/17 1913       Alisa Boyce PA-C  09/06/17 1914

## 2017-09-06 NOTE — IP AVS SNAPSHOT
[facility]    [address] [address]    Phone:  [phone]    Fax:  [phone]                                       After Visit Summary   9/6/2017    Colin Shell    MRN: [id number]           After Visit Summary Signature Page     I have received my discharge instructions, and my questions have been answered. I have discussed any challenges I see with this plan with the nurse or doctor.    ..........................................................................................................................................  Patient/Patient Representative Signature      ..........................................................................................................................................  Patient Representative Print Name and Relationship to Patient    ..................................................               ................................................  Date                                            Time    ..........................................................................................................................................  Reviewed by Signature/Title    ...................................................              ..............................................  Date                                                            Time

## 2017-09-07 ENCOUNTER — APPOINTMENT (OUTPATIENT)
Dept: PHYSICAL THERAPY | Facility: CLINIC | Age: 82
DRG: 191 | End: 2017-09-07
Payer: COMMERCIAL

## 2017-09-07 LAB
ANION GAP SERPL CALCULATED.3IONS-SCNC: 10 MMOL/L (ref 3–14)
BASOPHILS # BLD AUTO: 0 10E9/L (ref 0–0.2)
BASOPHILS NFR BLD AUTO: 0.2 %
BUN SERPL-MCNC: 28 MG/DL (ref 7–30)
CALCIUM SERPL-MCNC: 7.9 MG/DL (ref 8.5–10.1)
CHLORIDE SERPL-SCNC: 103 MMOL/L (ref 94–109)
CO2 SERPL-SCNC: 25 MMOL/L (ref 20–32)
CREAT SERPL-MCNC: 1.23 MG/DL (ref 0.66–1.25)
DIFFERENTIAL METHOD BLD: ABNORMAL
EOSINOPHIL # BLD AUTO: 0.5 10E9/L (ref 0–0.7)
EOSINOPHIL NFR BLD AUTO: 3.3 %
ERYTHROCYTE [DISTWIDTH] IN BLOOD BY AUTOMATED COUNT: 13.8 % (ref 10–15)
GFR SERPL CREATININE-BSD FRML MDRD: 55 ML/MIN/1.7M2
GLUCOSE SERPL-MCNC: 125 MG/DL (ref 70–99)
HCT VFR BLD AUTO: 34.6 % (ref 40–53)
HGB BLD-MCNC: 11.5 G/DL (ref 13.3–17.7)
IMM GRANULOCYTES # BLD: 0 10E9/L (ref 0–0.4)
IMM GRANULOCYTES NFR BLD: 0.3 %
LYMPHOCYTES # BLD AUTO: 5.9 10E9/L (ref 0.8–5.3)
LYMPHOCYTES NFR BLD AUTO: 37.9 %
MCH RBC QN AUTO: 30.7 PG (ref 26.5–33)
MCHC RBC AUTO-ENTMCNC: 33.2 G/DL (ref 31.5–36.5)
MCV RBC AUTO: 92 FL (ref 78–100)
MONOCYTES # BLD AUTO: 2.4 10E9/L (ref 0–1.3)
MONOCYTES NFR BLD AUTO: 15.4 %
MRSA DNA SPEC QL NAA+PROBE: NEGATIVE
NEUTROPHILS # BLD AUTO: 6.7 10E9/L (ref 1.6–8.3)
NEUTROPHILS NFR BLD AUTO: 42.9 %
PLATELET # BLD AUTO: 220 10E9/L (ref 150–450)
PLATELET # BLD EST: NORMAL 10*3/UL
POTASSIUM SERPL-SCNC: 4 MMOL/L (ref 3.4–5.3)
RBC # BLD AUTO: 3.75 10E12/L (ref 4.4–5.9)
RBC MORPH BLD: NORMAL
SODIUM SERPL-SCNC: 138 MMOL/L (ref 133–144)
SPECIMEN SOURCE: NORMAL
TROPONIN I SERPL-MCNC: 0.04 UG/L (ref 0–0.04)
WBC # BLD AUTO: 15.5 10E9/L (ref 4–11)

## 2017-09-07 PROCEDURE — 84484 ASSAY OF TROPONIN QUANT: CPT | Performed by: PHYSICIAN ASSISTANT

## 2017-09-07 PROCEDURE — 25000128 H RX IP 250 OP 636: Performed by: INTERNAL MEDICINE

## 2017-09-07 PROCEDURE — 85025 COMPLETE CBC W/AUTO DIFF WBC: CPT | Performed by: PHYSICIAN ASSISTANT

## 2017-09-07 PROCEDURE — 97161 PT EVAL LOW COMPLEX 20 MIN: CPT | Mod: GP | Performed by: PHYSICAL THERAPIST

## 2017-09-07 PROCEDURE — 99233 SBSQ HOSP IP/OBS HIGH 50: CPT | Performed by: INTERNAL MEDICINE

## 2017-09-07 PROCEDURE — 40000193 ZZH STATISTIC PT WARD VISIT: Performed by: PHYSICAL THERAPIST

## 2017-09-07 PROCEDURE — 12000000 ZZH R&B MED SURG/OB

## 2017-09-07 PROCEDURE — 36415 COLL VENOUS BLD VENIPUNCTURE: CPT | Performed by: PHYSICIAN ASSISTANT

## 2017-09-07 PROCEDURE — S0138 FINASTERIDE, 5 MG: HCPCS | Performed by: PHYSICIAN ASSISTANT

## 2017-09-07 PROCEDURE — 80048 BASIC METABOLIC PNL TOTAL CA: CPT | Performed by: PHYSICIAN ASSISTANT

## 2017-09-07 PROCEDURE — 25000128 H RX IP 250 OP 636: Performed by: PHYSICIAN ASSISTANT

## 2017-09-07 PROCEDURE — 25000132 ZZH RX MED GY IP 250 OP 250 PS 637: Performed by: PHYSICIAN ASSISTANT

## 2017-09-07 RX ORDER — LEVOFLOXACIN 5 MG/ML
500 INJECTION, SOLUTION INTRAVENOUS EVERY 24 HOURS
Status: DISCONTINUED | OUTPATIENT
Start: 2017-09-07 | End: 2017-09-09 | Stop reason: HOSPADM

## 2017-09-07 RX ADMIN — LEVOFLOXACIN 500 MG: 5 INJECTION, SOLUTION INTRAVENOUS at 09:00

## 2017-09-07 RX ADMIN — AMLODIPINE BESYLATE 10 MG: 10 TABLET ORAL at 08:08

## 2017-09-07 RX ADMIN — ALLOPURINOL 50 MG: 100 TABLET ORAL at 08:09

## 2017-09-07 RX ADMIN — CEFTRIAXONE SODIUM 2 G: 2 INJECTION, POWDER, FOR SOLUTION INTRAMUSCULAR; INTRAVENOUS at 18:09

## 2017-09-07 RX ADMIN — FINASTERIDE 5 MG: 5 TABLET, FILM COATED ORAL at 08:08

## 2017-09-07 RX ADMIN — ASPIRIN 325 MG: 325 TABLET, DELAYED RELEASE ORAL at 08:08

## 2017-09-07 RX ADMIN — ATORVASTATIN CALCIUM 40 MG: 20 TABLET, FILM COATED ORAL at 08:08

## 2017-09-07 RX ADMIN — LOSARTAN POTASSIUM 100 MG: 50 TABLET, FILM COATED ORAL at 08:08

## 2017-09-07 NOTE — PROGRESS NOTES
09/07/17 1032   Quick Adds   Type of Visit Initial PT Evaluation   Living Environment   Lives With alone   Living Arrangements house   Number of Stairs Within Home 12   Self-Care   Activity/Exercise/Self-Care Comment goes out with sons twice a week, sons stop by frequently, pt does the cooking and cleaning, grocery shopping   Functional Level Prior   Ambulation 0-->independent   Transferring 0-->independent   General Information   Onset of Illness/Injury or Date of Surgery - Date 09/06/17   Referring Physician Linda Cedeño PA-C   Patient/Family Goals Statement to return home at discharge   Pertinent History of Current Problem (include personal factors and/or comorbidities that impact the POC) 3 days of cough, was in ER 9/2 for jaw pain, elevated trops   Precautions/Limitations fall precautions   Cognitive Status Examination   Orientation orientation to person, place and time   Level of Consciousness alert   Follows Commands and Answers Questions 100% of the time;able to follow multistep instructions   Personal Safety and Judgment intact   Memory intact   Pain Assessment   Patient Currently in Pain No   Strength   Strength Comments 5/5 B hip flexion and knee ext   Transfer Skills   Transfer Comments indep sit<>stand   Gait   Gait Comments indep/supervision for gait 200 ft with swing through pattern, no LOB, SpO2 87% after walk but back up to 93% within 1 min   Sensory Examination   Sensory Perception Comments reports some baseline tingling in feet, ets sensation of sock bunched up under toes when walks but checks and reports it is not bunched up, false sensation   Clinical Impression   Criteria for Skilled Therapeutic Intervention evaluation only   Clinical Presentation Stable/Uncomplicated   Clinical Decision Making (Complexity) Low complexity   Anticipated Discharge Disposition Home   Risk & Benefits of therapy have been explained Yes   Patient, Family & other staff in agreement with plan of care Yes   [place]  "[facility] TM \"6 Clicks\"   2016, Trustees of [facility], under license to "[employer].  All rights reserved.   6 Clicks Short Forms Basic Mobility Inpatient Short Form   [facility]  \"6 Clicks\" V.2 Basic Mobility Inpatient Short Form   1. Turning from your back to your side while in a flat bed without using bedrails? 4 - None   2. Moving from lying on your back to sitting on the side of a flat bed without using bedrails? 4 - None   3. Moving to and from a bed to a chair (including a wheelchair)? 4 - None   4. Standing up from a chair using your arms (e.g., wheelchair, or bedside chair)? 4 - None   5. To walk in hospital room? 4 - None   6. Climbing 3-5 steps with a railing? 4 - None   Basic Mobility Raw Score (Score out of 24.Lower scores equate to lower levels of function) 24   Total Evaluation Time   Total Evaluation Time (Minutes) 10     "

## 2017-09-07 NOTE — H&P
[facility]    History and Physical  Hospital Medicine       Date of Admission:  9/6/2017  Date of Service: 9/6/2017     Assessment & Plan   Colin Shell is a 89 year old male with PMH significant for hx of pacemaker, hx of fibrosis of the lung (has never seen pulmonology for this), BPH, CKD stage III, CAD, chronic systolic CHF, gout, HLD, and HTN who now presents with worsening productive cough x3 days.    Probable Community Acquired Pneumonia  Productive Cough    Hx of Bronchiectasis (H), Hx of Pulmonary Fibrosis  Productive cough x3 days associated with mild anorexia. CXR on admission revealed no acute abnormality.  However, WBC 16.4 and worsening productive cough x3 days.  Febrile to 100.2. CT in 2012 with evidence of bronchiectasis.   - consider CT chest if not clinically improved in AM given history of fibrosis, no obvious opacity on CXR   - empiric antibiotic coverage with Ceftriaxone plus azithromycin  - albuterol nebs ordered Q4, PRN  - titrate supplemental oxygen to keep sats>92%      BRIEN (acute kidney injury) (H)    Chronic kidney disease, stage III (moderate)  Baseline creatinine 1.0-1.2.  Creatinine 1.41 on admission with BUN of 35.  Poor PO intake x3 days due to cough, malaise.  Suspect BRIEN is pre-renal.  Given 1L of NS in ED  - will give 50cc/hr of 0.9NS overnight; gentle due to hx of CAD/systolic CHF    Troponin Elevation  New.  0.047 on admission. Never elevated in the past.  No chest pain.  EKG is unchanged from baseline.  Suspect secondary to stress due to dehydration and probable pneumonia.  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    Coronary artery disease involving coronary bypass graft of native heart without angina pectoris  S/p CABG 1994.  Last stress 01/2017 shows chronic scar.  No acute perfusion changes  -  continue PTA atorvastatin  - continue PTA aspirin      Hypertension goal BP (blood pressure) < 140/90  BP reviewed, stable  - continue PTA amlodipine, losartan    Chronic systolic CHF (congestive heart failure) (H)  Last ECHO 01/2017.  EF of 45%.  - recommend outpatient follow up; not currently on beta blockade therapy    Gout  - continue PTA allopurinol      Benign prostatic hyperplasia  - continue PTA allopurinol       F: 50cc/hr 0.9NS  E: BMP in AM  N: regular diet  DVT Prophylaxis: mechanical    Code Status: DNI    Disposition: Anticipate discharge in 2-3 days. Appropriate for inpatient care.    Case discussed with Dr. Echo Turner.  Assessment and plan as written above.    Linda Cedeño PA-C  [facility]    History is obtained from the patient and review of the EMR.    Past Medical History    Past Medical History:   Diagnosis Date     Dizziness and giddiness 11/24/2007    uncertain etiology- MRI/MRA head , carotid duplex normal 2007     Foreign body in unspecified site on external eye      Hypertension      Microscopic hematuria      microscopic hematuria 2001 with  IVP and cystoscopy     Rhabdomyolysis     2003- in setting of Ecoli UTI, gemfibrozil/lipitor       Past Surgical History   Past Surgical History:   Procedure Laterality Date     SURGICAL HISTORY OF -       heel spurs     SURGICAL HISTORY OF -   2005, 2006    bilateral eye cataract     SURGICAL HISTORY OF -   1996    CABG x 5 vessels     SURGICAL HISTORY OF -   2010    circumcision     TONSILLECTOMY & ADENOIDECTOMY         Family History    Family History   Problem Relation Age of Onset     DIABETES Father      DIABETES Brother      DIABETES Brother      Hypertension Son      Unknown/Adopted Maternal Grandmother      Unknown/Adopted Maternal Grandfather      Unknown/Adopted Paternal Grandmother      Unknown/Adopted Paternal Grandfather      Dementia Sister        History of Present Illness   Colin KORIN Shell is a 89 year old  male with PMH significant for hx of fibrosis of the lung (has never seen pulmonology for this), BPH, CKD stage III, CAD, chronic systolic CHF, gout, HLD, and HTN who now presents with worsening productive cough x3 days.    The patient was seen 2017 in the ER for jaw pain.  This resolved.  The morning after, the patient began to note productive cough.  He does not have a chronic cough.  He has a remote smoking history.  The cough was persistent and productive of yellow sputum.  In addition, he notes acute onset of sore throat.  He has had no appetite over the last several days due to odynophagia; he has been able to keep fluids down. He endorses new onset nasal congestion. He denies associated shortness of breath.  He does endorse abdominal pain which is diffuse, and present only with coughing.  He denies fever/chills.  Given worsening/persistent symptoms today, the patient presents to the ER for further evaluation.    Upon arrival to ER, the patient was sating well on RA.  WBC was elevated.  CXR failed to reveal acute infiltrate, but with choric fibrosis (and chronic, bibasilar inspiratory crackles), it is difficult to assess for infiltrate on CXR.      Of note, the patient had a pacemaker placed for recurrent syncope and ventricular arrhythmias 2017.    ROS: Denies  nausea, vomiting, myalgias, swollen glands, headaches, lightheadedness, dizziness, chest pain, palpitations/flutters, SOB, wheezes, dysuria, hematuria, joint swelling, joint pain, leg swelling, and rashes. The patient as chronic intermittent lightheadedness when standing; this lasts seconds.  The patient has had mild constipation for the last several days.      Prior to Admission Medications   Prior to Admission Medications   Prescriptions Last Dose Informant Patient Reported? Taking?   ASPIRIN 325 MG OR TBEC 2017 at am Self No Yes   Si tab po QD (Once per day)   Magnesium Hydroxide (NAVA MILK OF MAGNESIA PO) Past Week at Unknown  "time Self Yes Yes   Multiple Vitamins-Minerals (CENTRUM SILVER) per tablet 9/6/2017 at am Self Yes Yes   Sig: Take 1 tablet by mouth daily   allopurinol (ZYLOPRIM) 100 MG tablet 9/6/2017 at am Self No Yes   Sig: Take 0.5 tablets (50 mg) by mouth daily Gout   amLODIPine (NORVASC) 10 MG tablet 9/6/2017 at am Self No Yes   Sig: Take 1 tablet (10 mg) by mouth daily   atorvastatin (LIPITOR) 40 MG tablet 9/6/2017 at am Self No Yes   Sig: Take 1 tablet (40 mg) by mouth daily   finasteride (PROSCAR) 5 MG tablet 9/6/2017 at am Self No Yes   Sig: Take 1 tablet (5 mg) by mouth daily   losartan (COZAAR) 100 MG tablet 9/6/2017 at am Self No Yes   Sig: Take 1 tablet (100 mg) by mouth daily      Facility-Administered Medications: None       Allergies   Allergies   Allergen Reactions     Flomax [Tamsulosin Hydrochloride]      Hctz Other (See Comments)     Caused loss of balance     Lisinopril Cough     Simvastatin Nausea and Vomiting and Diarrhea     Vytorin Nausea and Diarrhea       Social History   Social History     Social History     Marital status:      Spouse name: N/A     Number of children: N/A     Years of education: N/A     Occupational History     Not on file.     Social History Main Topics     Smoking status: Former Smoker     Smokeless tobacco: Never Used      Comment: Quit at age 32     Alcohol use Yes      Comment: 12 pack a year     Drug use: No     Sexual activity: Yes     Other Topics Concern     Parent/Sibling W/ Cabg, Mi Or Angioplasty Before 65f 55m? No     Social History Narrative          Physical Exam     /80  Pulse 98  Temp 99.1  F (37.3  C) (Oral)  Resp 28  Ht 1.778 m (5' 10\")  Wt 90.7 kg (200 lb)  SpO2 92%  BMI 28.7 kg/m2     Weight: 200 lbs 0 oz Body mass index is 28.7 kg/(m^2).     Constitutional: Alert and oriented x4.  Cooperative.  Appears stated age.  Appears in no acute distress.  No accessory muscles used for respiration.  HEENT: Oropharynx is clear and moist. No evidence of " cranial trauma.  Lymph/Hematologic: No occipital, submental, submandibular, anterior or posterior cervical, or supraclavicular lymphadenopathy is appreciated.  Cardiovascular: Regular rate/ rhythm.  S1 and S2 grossly normal.  No appreciable murmur, rub, gallop. No lower extremity edema. Appears clinically euvolemic  Respiratory: Bibasilar inspiratory rales to mid-lung fields (known pulmonary fibrosis).  Upper lobes are clear to auscultation bilaterally. Equal chest expansion.  GI: Soft, non-tender, normal bowel sounds, no hepatosplenomegaly.  Genitourinary: Deferred  Musculoskeletal: Normal muscle bulk and tone.  Skin: Warm and dry, no rashes.   Neurologic: Neck supple. Cranial nerves 3-12 are grossly intact.  is symmetric.     Data   Data reviewed today:     Recent Labs  Lab 09/06/17  1654   WBC 16.4*   HGB 12.4*   MCV 92         POTASSIUM 4.2   CHLORIDE 103   CO2 24   BUN 35*   CR 1.41*   ANIONGAP 11   MARTA 8.7   *   TROPI 0.047*       Recent Results (from the past 24 hour(s))   XR Chest 2 Views    Narrative    CHEST TWO VIEWS   9/6/2017 5:29 PM     HISTORY: Cough    COMPARISON: 2/14/2017.    FINDINGS: Sternotomy. Mild cardiomegaly. Scattered interstitial  opacities in both lungs appear chronic and unchanged from 2/14/2017.  This should represent fibrosis. Cardiac pacer with two leads in place.      Impression    IMPRESSION: Fibrosis in both lungs, stable since 2/14/2017. Nothing  clearly acute.       I personally reviewed the EKG tracing showing no acute ST changes or T-wave inversions.    Linda Cedeño PA-C  [facility]

## 2017-09-07 NOTE — CONSULTS
Care Transition Initial Assessment - RN        Met with: Patient.    DATA:   Active Problems:    Hypertension goal BP (blood pressure) < 140/90    Gout    Coronary artery disease involving coronary bypass graft of native heart without angina pectoris    Chronic kidney disease, stage III (moderate)    Benign prostatic hyperplasia    Hyperlipidemia LDL goal <100    Bronchiectasis (H)    Chronic systolic CHF (congestive heart failure) (H)    BRIEN (acute kidney injury) (H)    CAP (community acquired pneumonia)    Troponin level elevated       Cognitive Status: awake, alert and oriented.  Primary Care Clinic Name: [facility]  Primary Care MD Name: Casie Watts  Contact information and PCP information verified: Yes  Lives With: alone  Living Arrangements: house    Description of Support System: Supportive, Involved Who is your support system?: Children  Support Assessment: Adequate family and caregiver support   Insurance concerns: No Insurance issues identified    ASSESSMENT:  Patient currently receives the following services:  Private pay  and another lady that writes out his checks for his bills. Patient declined any other services at this time        Identified issues/concerns regarding health management: Has multiple chronic diagnoses, ie. CHF, COPD, CAD  Transportation concerns: patient still drives, but his sons will drive him if he is not feeling well    PLAN:  Financial costs for the patient include: none identified.  Patient given options and choices for discharge: Yes, patient declined Home Care services at this time.  Patient/family is agreeable to the plan?  Yes  Patient anticipates discharging to: home.        Patient anticipates needs for home equipment: No  Discharge Planner   Discharge Plans in progress: Hand off to Clinic care coordinator  Barriers to discharge plan: none  Plan/Disposition: Home       Care  (CTS) will continue to follow as needed.

## 2017-09-07 NOTE — PROGRESS NOTES
"[facility] [facility] ADMISSION NOTE    Patient admitted to room 1005 at approximately 2000 via cart from emergency room. Patient was accompanied by transport tech.     Verbal SBAR report received from EDILMA Geronimo prior to patient arrival.     Patient ambulated to bed with stand-by assist. Patient alert and oriented X 3. The patient is not having any pain. 0-10 Pain Scale: 8. Admission vital signs: Blood pressure 131/68, pulse 98, temperature 98.8  F (37.1  C), temperature source Oral, resp. rate 25, height 1.778 m (5' 10\"), weight 91.6 kg (201 lb 15.1 oz), SpO2 94 %. Patient was oriented to plan of care, call light, bed controls, tv, telephone, bathroom and visiting hours.     The following safety risks were identified during admission: fall. Yellow risk band applied: YES.     Cindy Johanson    "

## 2017-09-07 NOTE — PLAN OF CARE
Problem: Goal Outcome Summary  Goal: Goal Outcome Summary  Outcome: No Change  OT:  Per discussion with PT, patient has no acute inpatient needs, will complete order at this time. Thank you for the referral.       Kristyn LU

## 2017-09-07 NOTE — PROGRESS NOTES
[facility] Internal Medicine Progress Note     Date of Service (when I saw the patient): 09/07/2017      REASON FOR ADMISSION / INTERVAL HISTORY:  Cough, sputum, fever x 3 days.     ASSESSMENT/PLAN:   Probable Community Acquired Pneumonia  Productive Cough    Hx of Bronchiectasis (H), Hx of Pulmonary Fibrosis  Productive cough x3 days associated with mild anorexia. CXR on admission revealed no acute abnormality.  However, WBC 16.4 and worsening productive cough x3 days.  Febrile to 100.2. CT in 2012 with evidence of bronchiectasis. Started on rocephin/zithromax on admission.  tmax 100.2 last evening. sputuc cx-p  -will continue current antbx, add levaquin for pseudomonas coverage.     BRIEN (acute kidney injury) (H)    Chronic kidney disease, stage III (moderate)  Baseline creatinine 1.0-1.2.  Creatinine 1.41 on admission with BUN of 35.  Poor PO intake x3 days due to cough, malaise.  Suspect BRIEN is pre-renal.    -resolved     Troponin Elevation  New.  0.047 on admission. Never elevated in the past.  No chest pain.  EKG is unchanged from baseline.  Suspect secondary to stress due to dehydration and probable pneumonia.  No interevntion. D/c tele.      Hx AICD implantation because of atrial and ventricular dysrhythmia, 2017  Device last interrogated 06/2017 - this was within normal limits.     Coronary artery disease involving coronary bypass graft of native heart without angina pectoris  S/p CABG 1994.  Last stress 01/2017 shows chronic scar.  No acute perfusion changes  - continue PTA atorvastatin  - continue PTA aspirin      Hypertension goal BP (blood pressure) < 140/90  BP reviewed, stable  - continue PTA amlodipine, losartan     Chronic systolic CHF (congestive heart failure) (H)  Last ECHO 01/2017.  EF of 45%.  - recommend outpatient follow up; not currently on beta blockade therapy     Gout  - continue PTA allopurinol      Benign prostatic hyperplasia  - continue PTA allopurinol     DISPO  Home in 1-2  "days if stable      SUJEY HYDE MD   Pg [phone]    DVT Prhylaxis: Low Risk/Ambulatory with no VTE prophylaxis indicated  Code Status: DNI    ROS:  As described in A/P and Exam.  Otherwise ALL are  negative.    PHYSICAL EXAM:  All vitals have been reviewed    Blood pressure 120/65, pulse 79, temperature 99  F (37.2  C), temperature source Oral, resp. rate 20, height 1.778 m (5' 10\"), weight 93.4 kg (205 lb 14.6 oz), SpO2 94 %.    I/O this shift:  In: 412.5 [I.V.:412.5]  Out: -     GENERAL APPEARANCE: healthy, alert and no distress  EYES: conjunctiva clear, eyes grossly normal  HENT: external ears and nose normal   NECK: supple, no masses or adenopathy  RESP: lungs -coarse BS/ crackles B bases  CV: regular rate and rhythm, normal S1 S2, no S3 or S4 and no murmur, click or rub   ABDOMEN: soft, nontender, no HSM or masses and bowel sounds normal  MS: no clubbing, cyanosis; no edema  SKIN: clear without significant rashes or lesions  NEURO: -non-focal moves all 4 extr    ROUTINE  LABS (Last four results)  CMP  Recent Labs  Lab 09/07/17  0646 09/06/17  1654    138   POTASSIUM 4.0 4.2   CHLORIDE 103 103   CO2 25 24   ANIONGAP 10 11   * 137*   BUN 28 35*   CR 1.23 1.41*   GFRESTIMATED 55* 47*   GFRESTBLACK 67 57*   MARTA 7.9* 8.7     CBC  Recent Labs  Lab 09/07/17  0646 09/06/17  1654   WBC 15.5* 16.4*   RBC 3.75* 4.01*   HGB 11.5* 12.4*   HCT 34.6* 36.8*   MCV 92 92   MCH 30.7 30.9   MCHC 33.2 33.7   RDW 13.8 13.9    238     INRNo lab results found in last 7 days.  Arterial Blood GasNo lab results found in last 7 days.    Recent Results (from the past 24 hour(s))   XR Chest 2 Views    Narrative    CHEST TWO VIEWS   9/6/2017 5:29 PM     HISTORY: Cough    COMPARISON: 2/14/2017.    FINDINGS: Sternotomy. Mild cardiomegaly. Scattered interstitial  opacities in both lungs appear chronic and unchanged from 2/14/2017.  This should represent fibrosis. Cardiac pacer with two leads in place.      Impression    " IMPRESSION: Fibrosis in both lungs, stable since 2/14/2017. Nothing  clearly acute.    MONICA WERNER MD

## 2017-09-07 NOTE — PLAN OF CARE
Problem: Goal Outcome Summary  Goal: Goal Outcome Summary  PT: Tamara complete, moving indep in room and ambulated in kenney 200 ft, on room air SpO2 87% after walking, recovered to 93% within 1 minute, denies feeling SOB or any chest pain. Pt lives alone in a rambler with a walkout basement, does not use cane or walker, indep with ADLs and cooking/cleaning, sons come twice a week to take him out and to check on him. No further skilled PT or OT needs, recommend home at discharge.

## 2017-09-07 NOTE — PLAN OF CARE
Problem: Pneumonia (Adult)  Goal: Signs and Symptoms of Listed Potential Problems Will be Absent or Manageable (Pneumonia)  Signs and symptoms of listed potential problems will be absent or manageable by discharge/transition of care (reference Pneumonia (Adult) CPG).   Outcome: No Change  Patient has been resting well thru the night.  Denies dyspnea now with exertion.  Lung sounds are about the same with diminished breath sounds, fine crackles to bases both anteriorly and posterior, clear to upper lobes.  Troponin although elevated are about the same as on admission 0.047 & now 0.046  Will continue to monitor.

## 2017-09-08 ENCOUNTER — CARE COORDINATION (OUTPATIENT)
Dept: CARE COORDINATION | Facility: CLINIC | Age: 82
End: 2017-09-08

## 2017-09-08 LAB
ERYTHROCYTE [DISTWIDTH] IN BLOOD BY AUTOMATED COUNT: 13.8 % (ref 10–15)
HCT VFR BLD AUTO: 34 % (ref 40–53)
HGB BLD-MCNC: 11.3 G/DL (ref 13.3–17.7)
MCH RBC QN AUTO: 30.5 PG (ref 26.5–33)
MCHC RBC AUTO-ENTMCNC: 33.2 G/DL (ref 31.5–36.5)
MCV RBC AUTO: 92 FL (ref 78–100)
PLATELET # BLD AUTO: 226 10E9/L (ref 150–450)
RBC # BLD AUTO: 3.71 10E12/L (ref 4.4–5.9)
WBC # BLD AUTO: 15.9 10E9/L (ref 4–11)

## 2017-09-08 PROCEDURE — 85027 COMPLETE CBC AUTOMATED: CPT | Performed by: INTERNAL MEDICINE

## 2017-09-08 PROCEDURE — 25000128 H RX IP 250 OP 636: Performed by: PHYSICIAN ASSISTANT

## 2017-09-08 PROCEDURE — 12000000 ZZH R&B MED SURG/OB

## 2017-09-08 PROCEDURE — 36415 COLL VENOUS BLD VENIPUNCTURE: CPT | Performed by: INTERNAL MEDICINE

## 2017-09-08 PROCEDURE — 99233 SBSQ HOSP IP/OBS HIGH 50: CPT | Performed by: INTERNAL MEDICINE

## 2017-09-08 PROCEDURE — 25000128 H RX IP 250 OP 636: Performed by: INTERNAL MEDICINE

## 2017-09-08 PROCEDURE — S0138 FINASTERIDE, 5 MG: HCPCS | Performed by: PHYSICIAN ASSISTANT

## 2017-09-08 PROCEDURE — 25000132 ZZH RX MED GY IP 250 OP 250 PS 637: Performed by: PHYSICIAN ASSISTANT

## 2017-09-08 RX ADMIN — FINASTERIDE 5 MG: 5 TABLET, FILM COATED ORAL at 09:45

## 2017-09-08 RX ADMIN — AMLODIPINE BESYLATE 10 MG: 10 TABLET ORAL at 09:46

## 2017-09-08 RX ADMIN — LOSARTAN POTASSIUM 100 MG: 50 TABLET, FILM COATED ORAL at 09:45

## 2017-09-08 RX ADMIN — ALLOPURINOL 50 MG: 100 TABLET ORAL at 09:49

## 2017-09-08 RX ADMIN — ASPIRIN 325 MG: 325 TABLET, DELAYED RELEASE ORAL at 09:45

## 2017-09-08 RX ADMIN — LEVOFLOXACIN 500 MG: 5 INJECTION, SOLUTION INTRAVENOUS at 09:44

## 2017-09-08 RX ADMIN — ATORVASTATIN CALCIUM 40 MG: 20 TABLET, FILM COATED ORAL at 09:45

## 2017-09-08 RX ADMIN — ONDANSETRON 4 MG: 2 INJECTION INTRAMUSCULAR; INTRAVENOUS at 00:40

## 2017-09-08 RX ADMIN — CEFTRIAXONE SODIUM 2 G: 2 INJECTION, POWDER, FOR SOLUTION INTRAMUSCULAR; INTRAVENOUS at 18:52

## 2017-09-08 NOTE — PROGRESS NOTES
Clinic Care Coordination Contact  [facility] [place]    Referral Source: CTS  Referral reason: Multiple chronic diagnoses may benefit from Clinic Care Coordinator RN  Clinical Data: Care Coordinator Outreach  Plan: RN CC will follow up with patient after he is discharged home.     Hannah Shell RN  Clinic Care Coordinator  [facility]  [phone]

## 2017-09-08 NOTE — PLAN OF CARE
Problem: Pneumonia (Adult)  Goal: Signs and Symptoms of Listed Potential Problems Will be Absent or Manageable (Pneumonia)  Signs and symptoms of listed potential problems will be absent or manageable by discharge/transition of care (reference Pneumonia (Adult) CPG).   Outcome: No Change  Patient alert and oriented, but forgetful.  Denies pain. Temperature 99.3 and 99.6 overnight. Infrequent good productive cough. Crackles heard in lung bases. Dyspneic with exertion. On room air.  Up with stand by assist. Patient often has wash cloth in mouth, he has a cracked molar and he uses it to keep it from cutting tongue or cheek.  Bed alarm on for safety, he is impulsive at times.

## 2017-09-08 NOTE — PROGRESS NOTES
[facility] Internal Medicine Progress Note     Date of Service (when I saw the patient): 09/08/2017      REASON FOR ADMISSION / INTERVAL HISTORY:  Cough, sputum, fever x 3 days.     ASSESSMENT/PLAN:   Probable Community Acquired Pneumonia  Productive Cough    Hx of Bronchiectasis (H), Hx of Pulmonary Fibrosis  Productive cough x3 days associated with mild anorexia. CXR on admission revealed no acute abnormality.  However, WBC 16.4 and had fever.   CT in 2012 with evidence of bronchiectasis. Started on rocephin/zithromax on admission. added levaquin for pseudomonas coverage.  Afebrile last 24 hrs. Sputum cx NTD. Continue iv antbx one more day      BRIEN (acute kidney injury) (H)    Chronic kidney disease, stage III (moderate)  Baseline creatinine 1.0-1.2.  Creatinine 1.41 on admission with BUN of 35.  Poor PO intake x3 days due to cough, malaise.  Suspect BRIEN is pre-renal.    -resolved     Troponin Elevation  New.  0.047 on admission. Never elevated in the past.  No chest pain.  EKG is unchanged from baseline.  Suspect secondary to stress due to dehydration and probable pneumonia.  No interevntion. D/yasmin tele.      Hx AICD implantation because of atrial and ventricular dysrhythmia, 2017  Device last interrogated 06/2017 - this was within normal limits.     Coronary artery disease involving coronary bypass graft of native heart without angina pectoris  S/p CABG 1994.  Last stress 01/2017 shows chronic scar.  No acute perfusion changes  - continue PTA atorvastatin  - continue PTA aspirin      Hypertension goal BP (blood pressure) < 140/90  BP reviewed, stable  - continue PTA amlodipine, losartan     Chronic systolic CHF (congestive heart failure) (H)  Last ECHO 01/2017.  EF of 45%.  - recommend outpatient follow up; not currently on beta blockade therapy     Gout  - continue PTA allopurinol      Benign prostatic hyperplasia  - continue PTA allopurinol     DISPO  Home in AM if stable      SUJEY HYDE MD   Pg  "[phone]    DVT Prhylaxis: Low Risk/Ambulatory with no VTE prophylaxis indicated  Code Status: DNI    ROS:  As described in A/P and Exam.  Otherwise ALL are  negative.    PHYSICAL EXAM:  All vitals have been reviewed    Blood pressure 108/50, pulse 82, temperature 98.5  F (36.9  C), temperature source Oral, resp. rate 16, height 1.778 m (5' 10\"), weight 94.1 kg (207 lb 7.3 oz), SpO2 94 %.         GENERAL APPEARANCE: healthy, alert and no distress  EYES: conjunctiva clear, eyes grossly normal  HENT: external ears and nose normal   NECK: supple, no masses or adenopathy  RESP: lungs -coarse BS/ crackles B bases  CV: regular rate and rhythm, normal S1 S2, no S3 or S4 and no murmur, click or rub   ABDOMEN: soft, nontender, no HSM or masses and bowel sounds normal  MS: no clubbing, cyanosis; no edema  SKIN: clear without significant rashes or lesions  NEURO: -non-focal moves all 4 extr    ROUTINE  LABS (Last four results)  CMP    Recent Labs  Lab 09/07/17  0646 09/06/17  1654    138   POTASSIUM 4.0 4.2   CHLORIDE 103 103   CO2 25 24   ANIONGAP 10 11   * 137*   BUN 28 35*   CR 1.23 1.41*   GFRESTIMATED 55* 47*   GFRESTBLACK 67 57*   MARTA 7.9* 8.7     CBC    Recent Labs  Lab 09/08/17  0622 09/07/17  0646 09/06/17  1654   WBC 15.9* 15.5* 16.4*   RBC 3.71* 3.75* 4.01*   HGB 11.3* 11.5* 12.4*   HCT 34.0* 34.6* 36.8*   MCV 92 92 92   MCH 30.5 30.7 30.9   MCHC 33.2 33.2 33.7   RDW 13.8 13.8 13.9    220 238     INRNo lab results found in last 7 days.  Arterial Blood GasNo lab results found in last 7 days.    No results found for this or any previous visit (from the past 24 hour(s)).             "

## 2017-09-08 NOTE — PROGRESS NOTES
[facility] TRANSPORT NOTE  Data:   Reason for Transport:  Room change  Colin Shell was transported to Med Surg Room 2304 from ICU via wheel chair at 2045.  Patient was accompanied by Transport Aide. Equipment used for transport: IV pump. Family was aware of reason for transport: yes    Action:  Report: received from Mary Hutchins    Response:  Patient's condition: Stable on arrival to floor.   Lucille Norton

## 2017-09-09 VITALS
TEMPERATURE: 97.8 F | HEART RATE: 82 BPM | WEIGHT: 211.64 LBS | OXYGEN SATURATION: 94 % | SYSTOLIC BLOOD PRESSURE: 128 MMHG | RESPIRATION RATE: 18 BRPM | DIASTOLIC BLOOD PRESSURE: 58 MMHG | BODY MASS INDEX: 30.3 KG/M2 | HEIGHT: 70 IN

## 2017-09-09 LAB
ANION GAP SERPL CALCULATED.3IONS-SCNC: 4 MMOL/L (ref 3–14)
BACTERIA SPEC CULT: NORMAL
BUN SERPL-MCNC: 23 MG/DL (ref 7–30)
CALCIUM SERPL-MCNC: 8 MG/DL (ref 8.5–10.1)
CHLORIDE SERPL-SCNC: 104 MMOL/L (ref 94–109)
CO2 SERPL-SCNC: 30 MMOL/L (ref 20–32)
CREAT SERPL-MCNC: 1.52 MG/DL (ref 0.66–1.25)
ERYTHROCYTE [DISTWIDTH] IN BLOOD BY AUTOMATED COUNT: 13.9 % (ref 10–15)
GFR SERPL CREATININE-BSD FRML MDRD: 43 ML/MIN/1.7M2
GLUCOSE SERPL-MCNC: 130 MG/DL (ref 70–99)
HCT VFR BLD AUTO: 35.1 % (ref 40–53)
HGB BLD-MCNC: 11.6 G/DL (ref 13.3–17.7)
MCH RBC QN AUTO: 30.6 PG (ref 26.5–33)
MCHC RBC AUTO-ENTMCNC: 33 G/DL (ref 31.5–36.5)
MCV RBC AUTO: 93 FL (ref 78–100)
PLATELET # BLD AUTO: 244 10E9/L (ref 150–450)
POTASSIUM SERPL-SCNC: 4.9 MMOL/L (ref 3.4–5.3)
RBC # BLD AUTO: 3.79 10E12/L (ref 4.4–5.9)
SODIUM SERPL-SCNC: 138 MMOL/L (ref 133–144)
SPECIMEN SOURCE: NORMAL
WBC # BLD AUTO: 15.1 10E9/L (ref 4–11)

## 2017-09-09 PROCEDURE — 85027 COMPLETE CBC AUTOMATED: CPT | Performed by: INTERNAL MEDICINE

## 2017-09-09 PROCEDURE — 99207 ZZC CDG-CHARGE REQUIRED MANUAL ENTRY: CPT | Performed by: INTERNAL MEDICINE

## 2017-09-09 PROCEDURE — 99239 HOSP IP/OBS DSCHRG MGMT >30: CPT | Performed by: INTERNAL MEDICINE

## 2017-09-09 PROCEDURE — 80048 BASIC METABOLIC PNL TOTAL CA: CPT | Performed by: INTERNAL MEDICINE

## 2017-09-09 PROCEDURE — 25000132 ZZH RX MED GY IP 250 OP 250 PS 637: Performed by: PHYSICIAN ASSISTANT

## 2017-09-09 PROCEDURE — S0138 FINASTERIDE, 5 MG: HCPCS | Performed by: PHYSICIAN ASSISTANT

## 2017-09-09 PROCEDURE — 36415 COLL VENOUS BLD VENIPUNCTURE: CPT | Performed by: INTERNAL MEDICINE

## 2017-09-09 PROCEDURE — 25000128 H RX IP 250 OP 636: Performed by: INTERNAL MEDICINE

## 2017-09-09 RX ORDER — LEVOFLOXACIN 500 MG/1
500 TABLET, FILM COATED ORAL DAILY
Qty: 5 TABLET | Refills: 0 | Status: SHIPPED | OUTPATIENT
Start: 2017-09-09 | End: 2017-09-14

## 2017-09-09 RX ADMIN — LEVOFLOXACIN 500 MG: 5 INJECTION, SOLUTION INTRAVENOUS at 08:24

## 2017-09-09 RX ADMIN — FINASTERIDE 5 MG: 5 TABLET, FILM COATED ORAL at 08:21

## 2017-09-09 RX ADMIN — ATORVASTATIN CALCIUM 40 MG: 20 TABLET, FILM COATED ORAL at 08:21

## 2017-09-09 RX ADMIN — LOSARTAN POTASSIUM 100 MG: 50 TABLET, FILM COATED ORAL at 08:21

## 2017-09-09 RX ADMIN — ALLOPURINOL 50 MG: 100 TABLET ORAL at 08:23

## 2017-09-09 RX ADMIN — ASPIRIN 325 MG: 325 TABLET, DELAYED RELEASE ORAL at 08:21

## 2017-09-09 RX ADMIN — AMLODIPINE BESYLATE 10 MG: 10 TABLET ORAL at 08:21

## 2017-09-09 NOTE — PLAN OF CARE
Problem: Goal Outcome Summary  Goal: Goal Outcome Summary  Outcome: Improving  Pt denies any pain, N/V, or SOB. Has an infrequent productive cough, lung sounds are diminished. Vital signs are stable, O2 sat 98% on room air. Pt sat up in recliner until about 0200, is currently sleeping in bed at this time. Pt is independent. He is able to make his needs be known.

## 2017-09-09 NOTE — PLAN OF CARE
Problem: Discharge Planning  Goal: Discharge Planning (Adult, OB, Behavioral, Peds)  Outcome: Adequate for Discharge Date Met:  09/09/17  [facility] DISCHARGE NOTE     Patient discharged to home at 10:44 AM via wheel chair. Accompanied by son Martin,and staff. Discharge instructions reviewed with patient and caregiver, opportunity offered to ask questions. Prescriptions filled and sent with patient upon discharge. All belongings sent with patient.     Kamla Tapia

## 2017-09-09 NOTE — DISCHARGE SUMMARY
[facility]    Colin Shell MRN# [id number]   Age: 89 year old YOB: 1927     Date of Admission:  9/6/2017  Date of Discharge::  9/9/2017  Admitting Physician:  Echo Turner MD  Discharge Physician:  Echo Turner MD  Primary Physician: Casie Watts  Transferring Facility: N/A     Home clinic: [facility]          Admission Diagnoses:   Elevated troponin [R74.8]  CAP (community acquired pneumonia) [J18.9]  Acute renal failure superimposed on stage 3 chronic kidney disease, unspecified acute renal failure type (H) [N17.9, N18.3]          Discharge Diagnosis:   Principle diagnosis: Probable Community Acquired Pneumonia  Productive Cough    Hx of Bronchiectasis (H), Hx of Pulmonary Fibrosis  Secondary diagnoses:  Active Problems:    Coronary artery disease involving coronary bypass graft of native heart without angina pectoris    Hypertension goal BP (blood pressure) < 140/90    Chronic kidney disease, stage III (moderate)    Hyperlipidemia LDL goal <100    Bronchiectasis (H)    Chronic systolic CHF (congestive heart failure) (H)    BRIEN (acute kidney injury) (H)    CAP (community acquired pneumonia)    Troponin level elevated    Gout    Benign prostatic hyperplasia         Brief History of Presenting Illness:   As per admit hx  Colin Shell is a 89 year old male with PMH significant for hx of fibrosis of the lung (has never seen pulmonology for this), BPH, CKD stage III, CAD, chronic systolic CHF, gout, HLD, and HTN who now presents with worsening productive cough x3 days.     The patient was seen 09/02/2017 in the ER for jaw pain.  This resolved.  The morning after, the patient began to note productive cough.  He does not have a chronic cough.  He has a remote smoking history.  The cough was persistent and productive of yellow sputum.  In addition, he notes acute onset of sore throat.  He has had no appetite over the last several days due to  odynophagia; he has been able to keep fluids down. He endorses new onset nasal congestion. He denies associated shortness of breath.  He does endorse abdominal pain which is diffuse, and present only with coughing.  He denies fever/chills.  Given worsening/persistent symptoms today, the patient presents to the ER for further evaluation.       No results found for this or any previous visit (from the past 24 hour(s)).         Hospital Course:   Probable Community Acquired Pneumonia-possible GP/GN/PSEUDOMONAS  Productive Cough    Hx of Bronchiectasis (H), Hx of Pulmonary Fibrosis  Productive cough x3 days associated with mild anorexia. CXR on admission revealed no acute abnormality.  However, WBC 16.4 and had fever.   CT in 2012 with evidence of bronchiectasis. Started on rocephin/zithromax on admission. added levaquin for pseudomonas coverage.  Afebrile >2 days. Sputum cx NTD. WBC unchanged  -d/c on levaquin to complete 7-8 day course       BRIEN (acute kidney injury) (H)    Chronic kidney disease, stage III (moderate)  Baseline creatinine 1.0-1.2.  Creatinine 1.41 on admission with BUN of 35.  Poor PO intake x3 days due to cough, malaise.  Suspect BRIEN is pre-renal.    -resolved      Troponin Elevation  New.  0.047 on admission. Never elevated in the past.  No chest pain.  EKG is unchanged from baseline.  Suspect secondary to stress due to dehydration and probable pneumonia.  No interevntion. D/yasmin tele.       Hx AICD implantation because of atrial and ventricular dysrhythmia, 2017  Device last interrogated 06/2017 - this was within normal limits.      Coronary artery disease involving coronary bypass graft of native heart without angina pectoris  S/p CABG 1994.  Last stress 01/2017 shows chronic scar.  No acute perfusion changes  - continue PTA atorvastatin  - continue PTA aspirin      Hypertension goal BP (blood pressure) < 140/90  BP reviewed, stable  - continue PTA amlodipine, losartan      Chronic systolic CHF  (congestive heart failure) (H)  Last ECHO 01/2017.  EF of 45%.  - recommend outpatient follow up; not currently on beta blockade therapy      Gout  - continue PTA allopurinol      Benign prostatic hyperplasia  - continue PTA allopurinol     DISPO  Home today          Procedures:   No procedures performed during this admission         Allergies:      Allergies   Allergen Reactions     Flomax [Tamsulosin Hydrochloride]      Hctz Other (See Comments)     Caused loss of balance     Lisinopril Cough     Simvastatin Nausea and Vomiting and Diarrhea     Vytorin Nausea and Diarrhea             Medications Prior to Admission:     Prescriptions Prior to Admission   Medication Sig Dispense Refill Last Dose     amLODIPine (NORVASC) 10 MG tablet Take 1 tablet (10 mg) by mouth daily 90 tablet 3 9/6/2017 at am     Magnesium Hydroxide (NAVA MILK OF MAGNESIA PO)    Past Week at Unknown time     losartan (COZAAR) 100 MG tablet Take 1 tablet (100 mg) by mouth daily 90 tablet 3 9/6/2017 at am     allopurinol (ZYLOPRIM) 100 MG tablet Take 0.5 tablets (50 mg) by mouth daily Gout 45 tablet 3 9/6/2017 at am     finasteride (PROSCAR) 5 MG tablet Take 1 tablet (5 mg) by mouth daily 90 tablet 3 9/6/2017 at am     atorvastatin (LIPITOR) 40 MG tablet Take 1 tablet (40 mg) by mouth daily 90 tablet 3 9/6/2017 at am     Multiple Vitamins-Minerals (CENTRUM SILVER) per tablet Take 1 tablet by mouth daily   9/6/2017 at am     ASPIRIN 325 MG OR TBEC 1 tab po QD (Once per day) 100 3 9/6/2017 at am             Discharge Medications:     Current Discharge Medication List      START taking these medications    Details   levofloxacin (LEVAQUIN) 500 MG tablet Take 1 tablet (500 mg) by mouth daily for 5 days  Qty: 5 tablet, Refills: 0    Associated Diagnoses: CAP (community acquired pneumonia)         CONTINUE these medications which have NOT CHANGED    Details   amLODIPine (NORVASC) 10 MG tablet Take 1 tablet (10 mg) by mouth daily  Qty: 90 tablet,  "Refills: 3    Associated Diagnoses: Essential hypertension with goal blood pressure less than 140/90      Magnesium Hydroxide (NAVA MILK OF MAGNESIA PO)       losartan (COZAAR) 100 MG tablet Take 1 tablet (100 mg) by mouth daily  Qty: 90 tablet, Refills: 3    Associated Diagnoses: Essential hypertension with goal blood pressure less than 140/90      allopurinol (ZYLOPRIM) 100 MG tablet Take 0.5 tablets (50 mg) by mouth daily Gout  Qty: 45 tablet, Refills: 3    Associated Diagnoses: Gout of foot, unspecified cause, unspecified chronicity, unspecified laterality      finasteride (PROSCAR) 5 MG tablet Take 1 tablet (5 mg) by mouth daily  Qty: 90 tablet, Refills: 3    Associated Diagnoses: Benign non-nodular prostatic hyperplasia without lower urinary tract symptoms      atorvastatin (LIPITOR) 40 MG tablet Take 1 tablet (40 mg) by mouth daily  Qty: 90 tablet, Refills: 3    Associated Diagnoses: Hyperlipidemia LDL goal <100      Multiple Vitamins-Minerals (CENTRUM SILVER) per tablet Take 1 tablet by mouth daily      ASPIRIN 325 MG OR TBEC 1 tab po QD (Once per day)  Qty: 100, Refills: 3    Associated Diagnoses: Coronary atherosclerosis of unspecified type of vessel, native or graft                   Consultations:   No consultations were requested during this admission            Discharge Exam:   Blood pressure 128/58, pulse 82, temperature 97.8  F (36.6  C), temperature source Oral, resp. rate 18, height 1.778 m (5' 10\"), weight 96 kg (211 lb 10.3 oz), SpO2 94 %.  GENERAL APPEARANCE: healthy, alert and no distress  EYES: conjunctiva clear, eyes grossly normal  HENT: external ears and nose normal   NECK: supple, no masses or adenopathy  RESP: lungs -B basal crackles - no rales, rhonchi or wheezes  CV: regular rate and rhythm, normal S1 S2, no S3 or S4 and 2/6 murmur(cronic), no click or rub   ABDOMEN: soft, nontender, no HSM or masses and bowel sounds normal  MS: no clubbing, cyanosis; no edema  SKIN: clear without " significant rashes or lesions  NEURO: Normal strength and tone, sensory exam grossly normal, mentation intact and speech normal    Unresulted Labs Ordered in the Past 30 Days of this Admission     No orders found from 7/8/2017 to 9/7/2017.          No results found for this or any previous visit (from the past 24 hour(s)).         Pending Tests at Discharge:   None         Discharge Instructions and Follow-Up:   Discharge diet: Regular   Discharge activity: Activity as tolerated   Discharge follow-up: Follow up with primary care provider in 2 weeks           Discharge Disposition:   Discharged to home      Attestation:  I have reviewed today's vital signs, notes, medications, labs and imaging.    Time Spent on this Encounter   I, Echo Turner, personally saw the patient today and spent greater than 30 minutes discharging this patient.      Echo Turner MD

## 2017-09-11 ENCOUNTER — TELEPHONE (OUTPATIENT)
Dept: FAMILY MEDICINE | Facility: CLINIC | Age: 82
End: 2017-09-11

## 2017-09-11 NOTE — TELEPHONE ENCOUNTER
Care coordination has attempted to contact this patient  Waiting for phone call back from patient     Left message for patient to return call to clinic.    Araceli MAR Rn

## 2017-09-11 NOTE — PROGRESS NOTES
Clinic Care Coordination Contact  [facility]/Voicemail  Referral Source: CTS  Reason for Communication Hand-off Referral: Admission diagnoses: PN, Multiple providers/specialties  Clinical Data: Care Coordinator Outreach  Hospital admit 9/6/17  Hospital discharge 9/9/17    Discharge diagnosis  Elevated troponin   CAP (community acquired pneumonia)   Acute renal failure superimposed on stage 3 chronic kidney disease, unspecified acute renal failure type     Outreach attempted x 1.  Left message on voicemail with call back information .    Plan: Care Coordinator will try to reach patient again in 1-2 business days.    Hannah Shell RN  Clinic Care Coordinator  [facility]  [phone]

## 2017-09-12 ENCOUNTER — CARE COORDINATION (OUTPATIENT)
Dept: CARE COORDINATION | Facility: CLINIC | Age: 82
End: 2017-09-12

## 2017-09-12 NOTE — TELEPHONE ENCOUNTER
"ED/Discharge Protocol    \"Hi, my name is Bettina Mendoza, a registered nurse, and I am calling on behalf of Dr. Watts's office at [place].  I am calling to follow up and see how things are going for you after your recent visit.\"    \"I see that you were in the (ER/UC/IP) on Pt was hospitalized from 9/6/17-9/9/17  How are you doing now that you are home?\" I'm not doing to good, but I am 89.     Is patient experiencing symptoms that may require a hospital visit?  No    Discharge Instructions    \"Let's review your discharge instructions.  What is/are the follow-up recommendations?  Pt. Response: pt is to follow up with PCP in 2 weeks.     \"Were you instructed to make a follow-up appointment?\"  Pt. Response: Yes.  Has appointment been made?   Yes      \"When you see the provider, I would recommend that you bring your discharge instructions with you.    Medications    \"How many new medications are you on since your hospitalization/ED visit?\"    0-1  \"How many of your current medicines changed (dose, timing, name, etc.) while you were in the hospital/ED visit?\"   0-1  \"Do you have questions about your medications?\"   No  \"Were you newly diagnosed with heart failure, COPD, diabetes or did you have a heart attack?\"   No  For patients on insulin: \"Did you start on insulin in the hospital or did you have your insulin dose changed?\"   No    Medication reconciliation completed? Yes    Was MTM referral placed (*Make sure to put transitions as reason for referral)?   No    Call Summary    \"Do you have any questions or concerns about your condition or care plan at the moment?\"    No  Triage nurse advice given: Pt reports that he has some SOB, but reports that it is better than it has been in the past. He has been resting a lot and that makes it better. Pt reports dizziness with position changes. He states that this is not new and that he had it while he was in the hospital. I have discuss with the pt to take care in making position " "changes slowly. He states that he is aware of this information. He was provided this while in the hospital. Pt reports that his son lives near by and stops in to check on the pt frequently. Pt will follow up with an OV on Friday and has been advised to seek medical attention sooner if symptoms worsen.     Patient was in ER 2 in the past year (assess appropriateness of ER visits.)      \"If you have questions or things don't continue to improve, we encourage you contact us through the main clinic number, [phone].  Even if the clinic is not open, triage nurses are available 24/7 to help you.     We would like you to know that our clinic has extended hours (provide information).  We also have urgent care (provide details on closest location and hours/contact info)\"      \"Thank you for your time and take care!\"  Bettina Mendoza RN        "

## 2017-09-12 NOTE — PROGRESS NOTES
I have called and spoke to the pt. He reports having his son living close by and receives frequent visits from him  Please see our follow up visit encounter for more detailed information.   Bettina Mendoza RN

## 2017-09-12 NOTE — PROGRESS NOTES
"Clinic Care Coordination Contact  OUTREACH    Referral Information:  Referral Source: CTS  Patient declines any needs at this time. With multiple chronic diagnoses he may benefit from Clinic Care Coordinator RN.  Hospital admit 9/6/17   Hospital discharge 9/9/17  Reason for Hospitalization:  Elevated troponin   CAP (community acquired pneumonia)   Acute renal failure superimposed on stage 3 chronic kidney disease, unspecified acute renal failure   Reason for Contact: Hospital discharge/CTS  Care Conference: No     Universal Utilization:   ED Visits in last year: 1  Hospital visits in last year: 2  Last PCP appointment: 01/19/17  Missed Appointments:  (N/A)  Concerns: Appropriate utilization  Multiple Providers or Specialists: Cardiology, Opthalmology, Dermatology    Clinical Concerns:  Current Medical Concerns: Pneumonia    Current Behavioral Concerns: No concerns   Education Provided to patient: Reviewed discharge instructions   Clinical Pathway Name: None      Medication Management: Unable to assess      Functional Status:  Mobility Status: Independent  Equipment Currently Used at Home: grab bar, bath bench  Transportation: Drives, family, friends           Psychosocial:  Current living arrangement:: I live in a private home  Financial/Insurance: [employer]       Resources and Interventions:  Current Resources:  (N/A);     Advanced Care Plans/Directives on file:: Yes  Referrals Placed:  (N/A)     Patient/Caregiver understanding: Patient reports he still isn't feeling very well. He has been SOB, and feels dizzy when he gets out of bed, and sometimes when walking. When asked if he has been drinking fluids. He stated he has gained 10 pounds after being in the hospital. \"The weight I gained wasn't from the food it was from all the fluids you gave me in the hospital.\"  He does not have chest pain at this time. Informed patient he has an appointment with Dr. Watts on Friday. Stated he didn't think he needed to " "see anyone but Dr. Erickson in November. Discussed the importance of following up with Dr. Watts after his hospital discharge. RN CC offered to contact his clinic regarding his SOB and dizziness. Stated \"I will be okay until Friday.\"      Frequency of Care Coordination:  (N/A)  Upcoming appointment: 09/15/17 (Dr. Watts)     Plan:   Patient will keep appointment with Dr. Watts 9/15/17  RN CC contacted clinic care team and left message for Triage Nurse regarding patient's dizziness and SOB.     Hannah Shell RN  Clinic Care Coordinator  [facility]  [phone]    "

## 2017-09-15 ENCOUNTER — OFFICE VISIT (OUTPATIENT)
Dept: FAMILY MEDICINE | Facility: CLINIC | Age: 82
End: 2017-09-15
Payer: COMMERCIAL

## 2017-09-15 ENCOUNTER — CARE COORDINATION (OUTPATIENT)
Dept: SURGERY | Facility: CLINIC | Age: 82
End: 2017-09-15

## 2017-09-15 ENCOUNTER — TELEPHONE (OUTPATIENT)
Dept: FAMILY MEDICINE | Facility: CLINIC | Age: 82
End: 2017-09-15

## 2017-09-15 VITALS
WEIGHT: 204 LBS | DIASTOLIC BLOOD PRESSURE: 67 MMHG | HEART RATE: 78 BPM | HEIGHT: 70 IN | TEMPERATURE: 98.5 F | OXYGEN SATURATION: 98 % | SYSTOLIC BLOOD PRESSURE: 123 MMHG | BODY MASS INDEX: 29.2 KG/M2

## 2017-09-15 DIAGNOSIS — R51.9 SEVERE HEADACHE: ICD-10-CM

## 2017-09-15 DIAGNOSIS — M54.2 CERVICALGIA: ICD-10-CM

## 2017-09-15 DIAGNOSIS — R51.9 SEVERE HEADACHE: Primary | ICD-10-CM

## 2017-09-15 DIAGNOSIS — J84.10 FIBROSIS OF LUNG (H): Chronic | ICD-10-CM

## 2017-09-15 DIAGNOSIS — J47.0 BRONCHIECTASIS WITH ACUTE LOWER RESPIRATORY INFECTION (H): Primary | ICD-10-CM

## 2017-09-15 DIAGNOSIS — R68.84 JAW PAIN: Primary | ICD-10-CM

## 2017-09-15 DIAGNOSIS — R68.84 JAW PAIN: ICD-10-CM

## 2017-09-15 PROBLEM — R79.89 TROPONIN LEVEL ELEVATED: Status: RESOLVED | Noted: 2017-09-06 | Resolved: 2017-09-15

## 2017-09-15 PROBLEM — N17.9 AKI (ACUTE KIDNEY INJURY) (H): Status: RESOLVED | Noted: 2017-09-06 | Resolved: 2017-09-15

## 2017-09-15 PROBLEM — J18.9 CAP (COMMUNITY ACQUIRED PNEUMONIA): Status: RESOLVED | Noted: 2017-09-06 | Resolved: 2017-09-15

## 2017-09-15 LAB
ANION GAP SERPL CALCULATED.3IONS-SCNC: 7 MMOL/L (ref 3–14)
BASOPHILS # BLD AUTO: 0 10E9/L (ref 0–0.2)
BASOPHILS NFR BLD AUTO: 0.2 %
BUN SERPL-MCNC: 16 MG/DL (ref 7–30)
CALCIUM SERPL-MCNC: 8.8 MG/DL (ref 8.5–10.1)
CHLORIDE SERPL-SCNC: 102 MMOL/L (ref 94–109)
CO2 SERPL-SCNC: 25 MMOL/L (ref 20–32)
CREAT SERPL-MCNC: 1.3 MG/DL (ref 0.66–1.25)
CRP SERPL-MCNC: 76.8 MG/L (ref 0–8)
DIFFERENTIAL METHOD BLD: ABNORMAL
EOSINOPHIL # BLD AUTO: 0.2 10E9/L (ref 0–0.7)
EOSINOPHIL NFR BLD AUTO: 1.9 %
ERYTHROCYTE [DISTWIDTH] IN BLOOD BY AUTOMATED COUNT: 13.8 % (ref 10–15)
ERYTHROCYTE [SEDIMENTATION RATE] IN BLOOD BY WESTERGREN METHOD: 88 MM/H (ref 0–20)
GFR SERPL CREATININE-BSD FRML MDRD: 52 ML/MIN/1.7M2
GLUCOSE SERPL-MCNC: 134 MG/DL (ref 70–99)
HCT VFR BLD AUTO: 36.7 % (ref 40–53)
HGB BLD-MCNC: 12.1 G/DL (ref 13.3–17.7)
IMM GRANULOCYTES # BLD: 0 10E9/L (ref 0–0.4)
IMM GRANULOCYTES NFR BLD: 0.3 %
LYMPHOCYTES # BLD AUTO: 4.4 10E9/L (ref 0.8–5.3)
LYMPHOCYTES NFR BLD AUTO: 34.5 %
MCH RBC QN AUTO: 30.3 PG (ref 26.5–33)
MCHC RBC AUTO-ENTMCNC: 33 G/DL (ref 31.5–36.5)
MCV RBC AUTO: 92 FL (ref 78–100)
MONOCYTES # BLD AUTO: 1.2 10E9/L (ref 0–1.3)
MONOCYTES NFR BLD AUTO: 9.3 %
NEUTROPHILS # BLD AUTO: 6.8 10E9/L (ref 1.6–8.3)
NEUTROPHILS NFR BLD AUTO: 53.8 %
PLATELET # BLD AUTO: 368 10E9/L (ref 150–450)
POTASSIUM SERPL-SCNC: 4.3 MMOL/L (ref 3.4–5.3)
RBC # BLD AUTO: 3.99 10E12/L (ref 4.4–5.9)
SODIUM SERPL-SCNC: 134 MMOL/L (ref 133–144)
WBC # BLD AUTO: 12.7 10E9/L (ref 4–11)

## 2017-09-15 PROCEDURE — 86140 C-REACTIVE PROTEIN: CPT | Performed by: INTERNAL MEDICINE

## 2017-09-15 PROCEDURE — 85025 COMPLETE CBC W/AUTO DIFF WBC: CPT | Performed by: INTERNAL MEDICINE

## 2017-09-15 PROCEDURE — 85652 RBC SED RATE AUTOMATED: CPT | Performed by: INTERNAL MEDICINE

## 2017-09-15 PROCEDURE — 36415 COLL VENOUS BLD VENIPUNCTURE: CPT | Performed by: INTERNAL MEDICINE

## 2017-09-15 PROCEDURE — 99215 OFFICE O/P EST HI 40 MIN: CPT | Performed by: INTERNAL MEDICINE

## 2017-09-15 PROCEDURE — 80048 BASIC METABOLIC PNL TOTAL CA: CPT | Performed by: INTERNAL MEDICINE

## 2017-09-15 RX ORDER — PREDNISONE 20 MG/1
40 TABLET ORAL DAILY
Qty: 20 TABLET | Refills: 0 | Status: SHIPPED | OUTPATIENT
Start: 2017-09-15 | End: 2017-09-22

## 2017-09-15 NOTE — PATIENT INSTRUCTIONS
Thank you for choosing [facility].  You may be receiving a survey in the mail from Maryellen Dockery regarding your visit today.  Please take a few minutes to complete and return the survey to let us know how we are doing.      If you have questions or concerns, please contact us via [employer] or you can contact your care team at [phone].    Our Clinic hours are:  Monday 6:40 am  to 7:00 pm  Tuesday -Friday 6:40 am to 5:00 pm    The [place] outpatient lab hours are:  Monday - Friday 6:10 am to 4:45 pm  Saturdays 7:00 am to 11:00 am  Appointments are required, call [phone]    If you have clinical questions after hours or would like to schedule an appointment,  call the clinic at [phone].      [facility] [address] [place] [address] - [address]      1. Blood work today to check on kidney function, white blood cell count, inflammatory markers.   2. If inflammatory markers are negative,t hen Giant Cell Arteritis is less likely.  This condition can cause permanent vision changes and needs to be treated and diagnosed aggressively.  3. If after treatment as below, early next week the headache/neck pain are not improving, come back to see Dr. Watts for repeat Neurologic exam.  May consider another image of brain, this time MRI.  4. For headache/neck pain - do not recommend taking this since I don't know what it is or if it is still safe to take.  Try Tylenol 500-1000 mg up to 3 x day as needed for pain.  Try topicals such as Aspercream with Lidocaine, Capsaicin, Icy Hot, Biofreeze, Salon Pas.  Try heat and/or ice to head and neck.  For heat - try rice sock.  5. Make your own rice sock - long clean sock, put uncooked rice in it, tie off end, microwave for 1-2 minutes.         Neck Problems: Relieving Your Symptoms  The first goal of treatment is to relieve your symptoms. Your healthcare provider may recommend self-care treatments. These include resting, applying ice and heat, taking medicine, and  doing exercises. Your healthcare provider may also recommend that you see a physical therapist who can teach you ways to care for and strengthen your neck.     Heat relaxes sore muscles and helps relieve spasms.   Self-care treatments  Pain can end quickly or last awhile. Either way, you ll want relief as soon as possible. Your healthcare provider can tell you which treatments to do at home to help relieve your pain.    Lying down for a short time takes pressure from the head off the neck.    Ice and heat can help reduce pain. To bring down swelling, rest an ice pack wrapped in a thin towel on your neck for 10 to 15 minutes. To relax sore muscles, apply a warm, wet towel to the area. Or you can take a warm bath or shower.    Over-the-counter medicines, such as ibuprofen, naproxen, and aspirin, can help reduce pain and swelling. Acetaminophen can help relieve pain. Use these only as directed.    Exercises can relax muscles and ease stiffness. To prepare, drape a warm, wet towel around your neck and shoulders for 5 minutes. Remove the towel. Then do any exercises recommended to you by your healthcare provider.  Physical therapy  If self-care treatments aren t helping relieve neck pain, your healthcare provider may suggest physical therapy. Physical therapy is done by a specialist trained to treat injuries. Your physical therapist (PT) will teach you how to strengthen muscles, improve the spine s alignment, and help you move properly. Treatment methods used in physical therapy may include:    Heat. A special heating pad called a neck pack may be applied to your neck.    Exercises. Your PT will teach you exercises to help strengthen your neck and improve its range of motion.    Joint mobilization. The PT gently moves your vertebrae to help restore motion in your neck joints and reduce neck pain.    Soft tissue mobilization. The PT massages and stretches the muscles in your neck and shoulders.    Electrical  stimulation. Electrical impulses are sent into your neck. This helps reduce soreness and inflammation.    Education in body mechanics. The PT shows you ways to position and move your body that protect the neck.  Other treatments  If physical therapy doesn t relieve your neck pain, your healthcare provider may suggest other treatments. For example, medicines or injections can help relieve pain and swelling. In some cases, surgery may be needed to treat neck problems.  Date Last Reviewed: 8/23/2015 2000-2017 [employer]. [address] [place] [address], [place] [address]. All rights reserved. This information is not intended as a substitute for professional medical care. Always follow your healthcare professional's instructions.

## 2017-09-15 NOTE — PROGRESS NOTES
SUBJECTIVE:   Colin Shell is a 89 year old male who presents to clinic today for the following health issues:    Chief Complaint   Patient presents with     Hospital F/U     ER 09/02/ and hospital 09/06/17-09/09/17     Health Maintenance     patient wants to discuss Oct 12 appointment doesn't want to go to the [facility].  Will wait on any shots today due to illness.     Headache     10 days started on left side and now right side also and thinks that is why he is dizzy.       Pain     neck and shoulders sore and painful for over 2 weeks.  Affecting sleeping.      Hospital Follow-up Visit:    Hospital/Nursing Home/IP Rehab Facility: [facility]  Date of Admission: 09/06/17  Date of Discharge: 09/09/17  Reason(s) for Admission: coughing       Joint Pain    Onset: over 2 weeks    Description:   Location: left and right shoulder and neck  Character: Sharp and Dull ache    Intensity: severe    Progression of Symptoms: worse    Accompanying Signs & Symptoms:  Other symptoms: when using arms to rise from sitting can feel pain in his arms    History:   Previous similar pain: NO    Precipitating factors:   Trauma or overuse: no     Alleviating factors:Improved by: nothing    Therapies Tried and outcome: none    Headache  Onset: 10 days    Description:   Location: left side and now both sides   Character: throbbing pain  Frequency:  10 days if doesn't move will stop short time  Duration:  10 days    Intensity: moderate    Progression of Symptoms:  same    Accompanying Signs & Symptoms:  Stiff neck: YES  Neck or upper back pain: YES  Fever: no  Sinus pressure: no  Nausea or vomiting: YES  Dizziness: YES off balance.  Needs to wall walk.  Numbness: no  Weakness: YES  Visual changes: no     History:   Head trauma: no  Family history of migraines: no  Previous tests for headaches: no  Neurologist evaluations: no  Able to do daily activities: YES  Wake with a headaches: YES  Do headaches wake you up: YES  Daily  pain medication use: no  Work/school stressors/changes: no    Precipitating factors:   Does light make it worse: no  Does sound make it worse: no    Alleviating factors:Does sleep help: no    Therapies Tried and outcome: none           Problems taking medications regularly:  None       Medication changes since discharge: None       Problems adhering to non-medication therapy:  None    Summary of hospitalization:  [facility] discharge summary reviewed  Diagnostic Tests/Treatments reviewed.  Follow up needed: Cardiology  Other Healthcare Providers Involved in Patient s Care:         Care Coordination  Update since discharge: stable.     Post Discharge Medication Reconciliation: discharge medications reconciled and changed, per note/orders (see AVS).  Plan of care communicated with patient     Coding guidelines for this visit:  Type of Medical   Decision Making Face-to-Face Visit       within 7 Days of discharge Face-to-Face Visit        within 14 days of discharge   Moderate Complexity [id number]   High Complexity [id number]        History of bronchiectasis and pulmonary fibrosis. Recent admission for pneumonia. Had demand ischemia. Had acute on chronic renal failure.  Discharged on Levaquin to complete a 7 day course. Renal function improved with fluids. No changes to chronic medications    He is not prone to headache.  Headache/neck pain is constant.  Keeping him awake at night.  Occipital, radiates bilateral to front.  Hasn't tried anything for headache.  Rest improves headache/neck pain.  Going from laying to sitting makes pain worse.    Cough is what brought him to ER.  The cough has nearly resolved.  Never had dyspnea.    Was in ER on 9/2 for right jaw pain.  He was given short course prednisone, CT head showed possible arthritis?  Recommend MRI.    He has leftover pain med from 5 years ago and wonders if he can use this.  [facility] doesn't know name of it.        Current Outpatient Prescriptions   Medication  "Sig Dispense Refill     amLODIPine (NORVASC) 10 MG tablet Take 1 tablet (10 mg) by mouth daily 90 tablet 3     Magnesium Hydroxide (NAVA MILK OF MAGNESIA PO)        losartan (COZAAR) 100 MG tablet Take 1 tablet (100 mg) by mouth daily 90 tablet 3     allopurinol (ZYLOPRIM) 100 MG tablet Take 0.5 tablets (50 mg) by mouth daily Gout 45 tablet 3     finasteride (PROSCAR) 5 MG tablet Take 1 tablet (5 mg) by mouth daily 90 tablet 3     atorvastatin (LIPITOR) 40 MG tablet Take 1 tablet (40 mg) by mouth daily 90 tablet 3     Multiple Vitamins-Minerals (CENTRUM SILVER) per tablet Take 1 tablet by mouth daily       ASPIRIN 325 MG OR TBEC 1 tab po QD (Once per day) 100 3       Reviewed and updated as needed this visit by clinical staff  Allergies       Reviewed and updated as needed this visit by Provider         ROS:  Constitutional, HEENT, cardiovascular, pulmonary, GI, , musculoskeletal, neuro, skin, endocrine and psych systems are negative, except as otherwise noted.      OBJECTIVE:   /67  Pulse 78  Temp 98.5  F (36.9  C) (Tympanic)  Ht 5' 10\" (1.778 m)  Wt 204 lb (92.5 kg)  SpO2 98%  BMI 29.27 kg/m2  Body mass index is 29.27 kg/(m^2).  GENERAL APPEARANCE: alert and no distress  EYES: Eyes grossly normal to inspection, PERRL and conjunctivae and sclerae normal  HENT: oral mucous membranes moist and oropharynx clear  NECK: no adenopathy, no asymmetry, masses, or scars, thyroid normal to palpation and normal range of motion of neck, no meningeal signs on exam  RESP: Crackles, fine expiratory, bilateral bases, no respiratory distress, lungs are clear otherwise  CV: regular rates and rhythm, normal S1 S2, no S3 or S4 and no murmur, click or rub  LYMPHATICS: 2+ pitting edema, left greater than right  ABDOMEN: soft, nontender, without hepatosplenomegaly or masses and bowel sounds normal  NEURO: Normal strength and tone, mentation intact, speech normal, DTR symmetrically normal in lower extremities, gait " normal including heel/toe/tandem walking, cranial nerves 2-12 intact, Romberg negative and rapid alternating movements normal    Diagnostic Test Results:  Results for orders placed or performed in visit on 09/15/17 (from the past 24 hour(s))   ESR: Erythrocyte sedimentation rate   Result Value Ref Range    Sed Rate 88 (H) 0 - 20 mm/h   CRP, inflammation   Result Value Ref Range    CRP Inflammation 76.8 (H) 0.0 - 8.0 mg/L   CBC with platelets and differential   Result Value Ref Range    WBC 12.7 (H) 4.0 - 11.0 10e9/L    RBC Count 3.99 (L) 4.4 - 5.9 10e12/L    Hemoglobin 12.1 (L) 13.3 - 17.7 g/dL    Hematocrit 36.7 (L) 40.0 - 53.0 %    MCV 92 78 - 100 fl    MCH 30.3 26.5 - 33.0 pg    MCHC 33.0 31.5 - 36.5 g/dL    RDW 13.8 10.0 - 15.0 %    Platelet Count 368 150 - 450 10e9/L    Diff Method Automated Method     % Neutrophils 53.8 %    % Lymphocytes 34.5 %    % Monocytes 9.3 %    % Eosinophils 1.9 %    % Basophils 0.2 %    % Immature Granulocytes 0.3 %    Absolute Neutrophil 6.8 1.6 - 8.3 10e9/L    Absolute Lymphocytes 4.4 0.8 - 5.3 10e9/L    Absolute Monocytes 1.2 0.0 - 1.3 10e9/L    Absolute Eosinophils 0.2 0.0 - 0.7 10e9/L    Absolute Basophils 0.0 0.0 - 0.2 10e9/L    Abs Immature Granulocytes 0.0 0 - 0.4 10e9/L   Basic metabolic panel   Result Value Ref Range    Sodium 134 133 - 144 mmol/L    Potassium 4.3 3.4 - 5.3 mmol/L    Chloride 102 94 - 109 mmol/L    Carbon Dioxide 25 20 - 32 mmol/L    Anion Gap 7 3 - 14 mmol/L    Glucose 134 (H) 70 - 99 mg/dL    Urea Nitrogen 16 7 - 30 mg/dL    Creatinine 1.30 (H) 0.66 - 1.25 mg/dL    GFR Estimate 52 (L) >60 mL/min/1.7m2    GFR Estimate If Black 63 >60 mL/min/1.7m2    Calcium 8.8 8.5 - 10.1 mg/dL       ASSESSMENT/PLAN:     1. Bronchiectasis with acute lower respiratory infection (H) - recent diagnosis of pneumonia, improved with antibiotics, not quite back to baseline. Cough still persistent. Otherwise appears stable to improving  - CBC with platelets and differential  -  Basic metabolic panel    2. Fibrosis of lung (H)    3. Cervicalgia - normal neck and neurologic exam. Considered meningitis or TMJ or giant cell arteritis as the cause of his constellation of headache/jaw pain/neck pain. If inflammatory markers are negative or only slightly positive, we will not treat for GCA. If inflammatory markers are significantly elevated, will start prednisone and referred for temporal biopsy.    4. Jaw pain - TMJ? Osteoarthritis of the jaw? GCA?  - ESR: Erythrocyte sedimentation rate  - CRP, inflammation    5. Severe headache - appears tension-type headache    Addendum:  After visit, his inflammatory markers returned significantly elevated. With his recent pneumonia, this could be due to pulmonary infection. However that has nearly resolved, and his inflammatory markers are higher than would be expected from a resolving relatively mild pneumonia. Given the symptoms of jaw pain, severe headache along with reports of upper shotty weakness, feel that temporal artery biopsy is warranted. I did start him on prednisone 40 mg once daily. Advised him to stop his aspirin. I arranged to have the biopsy done at the [facility] at the earliest convenience, 9/18. Patient in agreement. See me late next week for follow up on results    Patient is medically optimized to proceed with surgery      Casie Watts,   [facility]

## 2017-09-15 NOTE — TELEPHONE ENCOUNTER
Patient notified of MD's recommendations below  Patient verbalized understanding and agreed with provider's plan  Scheduled appt 9/22/17  Waiting for appt at [facility] to advise to patient    Araceli MAR Rn

## 2017-09-15 NOTE — MR AVS SNAPSHOT
After Visit Summary   9/15/2017    Colin Shell    MRN: [id number]           Patient Information     Date Of Birth          6/25/1927        Visit Information        Provider Department      9/15/2017 9:40 AM Casie Watts, DO [facility]        Today's Diagnoses     Jaw pain    -  1    Bronchiectasis without complication (H)        Fibrosis of lung (H)        Cervicalgia        Severe headache          Care Instructions          Thank you for choosing [facility].  You may be receiving a survey in the mail from Maryellen Dockery regarding your visit today.  Please take a few minutes to complete and return the survey to let us know how we are doing.      If you have questions or concerns, please contact us via [employer] or you can contact your care team at [phone].    Our Clinic hours are:  Monday 6:40 am  to 7:00 pm  Tuesday -Friday 6:40 am to 5:00 pm    The [place] outpatient lab hours are:  Monday - Friday 6:10 am to 4:45 pm  Saturdays 7:00 am to 11:00 am  Appointments are required, call [phone]    If you have clinical questions after hours or would like to schedule an appointment,  call the clinic at [phone].      [facility] [address] [place] [address] - [address]      1. Blood work today to check on kidney function, white blood cell count, inflammatory markers.   2. If inflammatory markers are negative,t hen Giant Cell Arteritis is less likely.  This condition can cause permanent vision changes and needs to be treated and diagnosed aggressively.  3. If after treatment as below, early next week the headache/neck pain are not improving, come back to see Dr. Watts for repeat Neurologic exam.  May consider another image of brain, this time MRI.  4. For headache/neck pain - do not recommend taking this since I don't know what it is or if it is still safe to take.  Try Tylenol 500-1000 mg up to 3 x day as needed for pain.  Try topicals such as Aspercream  with Lidocaine, Capsaicin, Icy Hot, Biofreeze, Salon Pas.  Try heat and/or ice to head and neck.  For heat - try rice sock.  5. Make your own rice sock - long clean sock, put uncooked rice in it, tie off end, microwave for 1-2 minutes.         Neck Problems: Relieving Your Symptoms  The first goal of treatment is to relieve your symptoms. Your healthcare provider may recommend self-care treatments. These include resting, applying ice and heat, taking medicine, and doing exercises. Your healthcare provider may also recommend that you see a physical therapist who can teach you ways to care for and strengthen your neck.     Heat relaxes sore muscles and helps relieve spasms.   Self-care treatments  Pain can end quickly or last awhile. Either way, you ll want relief as soon as possible. Your healthcare provider can tell you which treatments to do at home to help relieve your pain.    Lying down for a short time takes pressure from the head off the neck.    Ice and heat can help reduce pain. To bring down swelling, rest an ice pack wrapped in a thin towel on your neck for 10 to 15 minutes. To relax sore muscles, apply a warm, wet towel to the area. Or you can take a warm bath or shower.    Over-the-counter medicines, such as ibuprofen, naproxen, and aspirin, can help reduce pain and swelling. Acetaminophen can help relieve pain. Use these only as directed.    Exercises can relax muscles and ease stiffness. To prepare, drape a warm, wet towel around your neck and shoulders for 5 minutes. Remove the towel. Then do any exercises recommended to you by your healthcare provider.  Physical therapy  If self-care treatments aren t helping relieve neck pain, your healthcare provider may suggest physical therapy. Physical therapy is done by a specialist trained to treat injuries. Your physical therapist (PT) will teach you how to strengthen muscles, improve the spine s alignment, and help you move properly. Treatment methods used  in physical therapy may include:    Heat. A special heating pad called a neck pack may be applied to your neck.    Exercises. Your PT will teach you exercises to help strengthen your neck and improve its range of motion.    Joint mobilization. The PT gently moves your vertebrae to help restore motion in your neck joints and reduce neck pain.    Soft tissue mobilization. The PT massages and stretches the muscles in your neck and shoulders.    Electrical stimulation. Electrical impulses are sent into your neck. This helps reduce soreness and inflammation.    Education in body mechanics. The PT shows you ways to position and move your body that protect the neck.  Other treatments  If physical therapy doesn t relieve your neck pain, your healthcare provider may suggest other treatments. For example, medicines or injections can help relieve pain and swelling. In some cases, surgery may be needed to treat neck problems.  Date Last Reviewed: 8/23/2015 2000-2017 [employer]. [address] [place] [address]. All rights reserved. This information is not intended as a substitute for professional medical care. Always follow your healthcare professional's instructions.                Follow-ups after your visit        Your next 10 appointments already scheduled     Oct 12, 2017  4:30 PM CDT   Remote ICD Check with CASEY DCR2   [facility] [place] [facility]    [address] [place] [id number] [phone]           This appointment is for a remote check of your debrillator.  This is not an appointment at the office.              Who to contact     If you have questions or need follow up information about today's clinic visit or your schedule please contact [facility] directly at [phone].  Normal or non-critical lab and imaging results will be communicated to you by MyChart, letter or phone within 4 business days after the  "clinic has received the results. If you do not hear from us within 7 days, please contact the clinic through [employer] or phone. If you have a critical or abnormal lab result, we will notify you by phone as soon as possible.  Submit refill requests through [employer] or call your pharmacy and they will forward the refill request to us. Please allow 3 business days for your refill to be completed.          Additional Information About Your Visit        [facility] Information     [employer] lets you send messages to your doctor, view your test results, renew your prescriptions, schedule appointments and more. To sign up, go to [email or web address] . Click on \"Log in\" on the left side of the screen, which will take you to the Welcome page. Then click on \"Sign up Now\" on the right side of the page.     You will be asked to enter the access code listed below, as well as some personal information. Please follow the directions to create your username and password.     Your access code is: [email or web address]  Expires: 2017  9:13 AM     Your access code will  in 90 days. If you need help or a new code, please call your [place] clinic or [phone].        Care EveryWhere ID     This is your Care EveryWhere ID. This could be used by other organizations to access your [place] medical records  [id number]        Your Vitals Were     Pulse Temperature Height Pulse Oximetry BMI (Body Mass Index)       78 98.5  F (36.9  C) (Tympanic) 5' 10\" (1.778 m) 98% 29.27 kg/m2        Blood Pressure from Last 3 Encounters:   09/15/17 123/67   17 128/58   17 120/83    Weight from Last 3 Encounters:   09/15/17 204 lb (92.5 kg)   17 211 lb 10.3 oz (96 kg)   17 200 lb (90.7 kg)              We Performed the Following     Basic metabolic panel     CBC with platelets and differential     CRP, inflammation     ESR: Erythrocyte sedimentation rate        Primary Care Provider Office Phone # Fax #    Casei Sammie Watts,  " [phone]       [address] [facility] [address]        Equal Access to Services     ALEXIS PACE : Hadii aad ku hadgalcourtney Ashley, wacostada lulatisha, qadelaneyta kaalmada tim, fina meaganin hayaan carrillolinette robertson laGilaapolinar voRegina Thompson [facility] [phone].    ATENCIÓN: Si habla español, tiene a rojo disposición servicios gratuitos de asistencia lingüística. Laureano al [phone].    We comply with applicable federal civil rights laws and [place] laws. We do not discriminate on the basis of race, color, national origin, age, disability sex, sexual orientation or gender identity.            Thank you!     Thank you for choosing [facility]  for your care. Our goal is always to provide you with excellent care. Hearing back from our patients is one way we can continue to improve our services. Please take a few minutes to complete the written survey that you may receive in the mail after your visit with us. Thank you!             Your Updated Medication List - Protect others around you: Learn how to safely use, store and throw away your medicines at www.disposemymeds.org.          This list is accurate as of: 9/15/17 10:30 AM.  Always use your most recent med list.                   Brand Name Dispense Instructions for use Diagnosis    allopurinol 100 MG tablet    ZYLOPRIM    45 tablet    Take 0.5 tablets (50 mg) by mouth daily Gout    Gout of foot, unspecified cause, unspecified chronicity, unspecified laterality       amLODIPine 10 MG tablet    NORVASC    90 tablet    Take 1 tablet (10 mg) by mouth daily    Essential hypertension with goal blood pressure less than 140/90       aspirin 325 MG EC tablet     100    1 tab po QD (Once per day)    Coronary atherosclerosis of unspecified type of vessel, native or graft       atorvastatin 40 MG tablet    LIPITOR    90 tablet    Take 1 tablet (40 mg) by mouth daily    Hyperlipidemia LDL goal <100       CENTRUM SILVER per tablet      Take 1 tablet by mouth daily         finasteride 5 MG tablet    PROSCAR    90 tablet    Take 1 tablet (5 mg) by mouth daily    Benign non-nodular prostatic hyperplasia without lower urinary tract symptoms       losartan 100 MG tablet    COZAAR    90 tablet    Take 1 tablet (100 mg) by mouth daily    Essential hypertension with goal blood pressure less than 140/90       ELIJAH DENNIS

## 2017-09-15 NOTE — PROGRESS NOTES
"Patient is scheduled with Dr. Phan in the OR on 9/18 at 12:30. Called and relayed this information to patient. He states he will call his son for a ride and is aware he needs to arrive at 10:30 on Monday.      Dann Cai  Can you get Colin \"Keron\" Suleman schedule for a temporal artery biopsy, bilateral with me on Monday or Tues next week?    I'm available after 12pm on either day.    Patient's phone # is [phone]    ThanksIvana"

## 2017-09-15 NOTE — TELEPHONE ENCOUNTER
Discussed with patient.  Labs done today.  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  Patient needs bilateral temporal artery biopsy, done at general surgery clinic at the [place].    Start prednisone 40 mg daily,until seen by me.  He knows to pick this up.  Have patient see me by Friday, 9/22, to determine what to do with prednisone.  Patient advised to stop his aspirin, which he is aware of    Care team, please ensure patient has appointment with me for late next week, and advise him of appointment time at [facility], which I am arranging currently.

## 2017-09-18 ENCOUNTER — ANESTHESIA (OUTPATIENT)
Dept: SURGERY | Facility: CLINIC | Age: 82
End: 2017-09-18
Payer: COMMERCIAL

## 2017-09-18 ENCOUNTER — ANESTHESIA EVENT (OUTPATIENT)
Dept: SURGERY | Facility: CLINIC | Age: 82
End: 2017-09-18
Payer: COMMERCIAL

## 2017-09-18 ENCOUNTER — HOSPITAL ENCOUNTER (OUTPATIENT)
Facility: CLINIC | Age: 82
Discharge: HOME | End: 2017-09-18
Attending: SURGERY | Admitting: SURGERY
Payer: COMMERCIAL

## 2017-09-18 VITALS
OXYGEN SATURATION: 98 % | WEIGHT: 208.34 LBS | TEMPERATURE: 97.8 F | RESPIRATION RATE: 16 BRPM | DIASTOLIC BLOOD PRESSURE: 84 MMHG | HEIGHT: 69 IN | BODY MASS INDEX: 30.86 KG/M2 | SYSTOLIC BLOOD PRESSURE: 150 MMHG

## 2017-09-18 DIAGNOSIS — G89.18 ACUTE POST-OPERATIVE PAIN: Primary | ICD-10-CM

## 2017-09-18 LAB — POTASSIUM SERPL-SCNC: 4.6 MMOL/L (ref 3.4–5.3)

## 2017-09-18 PROCEDURE — 84132 ASSAY OF SERUM POTASSIUM: CPT | Performed by: ANESTHESIOLOGY

## 2017-09-18 PROCEDURE — 25000128 H RX IP 250 OP 636: Performed by: NURSE ANESTHETIST, CERTIFIED REGISTERED

## 2017-09-18 PROCEDURE — 37000008 ZZH ANESTHESIA TECHNICAL FEE, 1ST 30 MIN: Performed by: SURGERY

## 2017-09-18 PROCEDURE — 36000051 ZZH SURGERY LEVEL 2 1ST 30 MIN - UMMC: Performed by: SURGERY

## 2017-09-18 PROCEDURE — S0020 INJECTION, BUPIVICAINE HYDRO: HCPCS | Performed by: SURGERY

## 2017-09-18 PROCEDURE — 71000027 ZZH RECOVERY PHASE 2 EACH 15 MINS: Performed by: SURGERY

## 2017-09-18 PROCEDURE — 36000053 ZZH SURGERY LEVEL 2 EA 15 ADDTL MIN - UMMC: Performed by: SURGERY

## 2017-09-18 PROCEDURE — 37000009 ZZH ANESTHESIA TECHNICAL FEE, EACH ADDTL 15 MIN: Performed by: SURGERY

## 2017-09-18 PROCEDURE — 36415 COLL VENOUS BLD VENIPUNCTURE: CPT | Performed by: ANESTHESIOLOGY

## 2017-09-18 PROCEDURE — 25000125 ZZHC RX 250: Performed by: SURGERY

## 2017-09-18 PROCEDURE — 40000170 ZZH STATISTIC PRE-PROCEDURE ASSESSMENT II: Performed by: SURGERY

## 2017-09-18 PROCEDURE — 25000128 H RX IP 250 OP 636: Performed by: ANESTHESIOLOGY

## 2017-09-18 PROCEDURE — 27210794 ZZH OR GENERAL SUPPLY STERILE: Performed by: SURGERY

## 2017-09-18 PROCEDURE — 88313 SPECIAL STAINS GROUP 2: CPT | Performed by: SURGERY

## 2017-09-18 PROCEDURE — 88305 TISSUE EXAM BY PATHOLOGIST: CPT | Performed by: SURGERY

## 2017-09-18 RX ORDER — SODIUM CHLORIDE, SODIUM LACTATE, POTASSIUM CHLORIDE, CALCIUM CHLORIDE 600; 310; 30; 20 MG/100ML; MG/100ML; MG/100ML; MG/100ML
INJECTION, SOLUTION INTRAVENOUS CONTINUOUS
Status: DISCONTINUED | OUTPATIENT
Start: 2017-09-18 | End: 2017-09-18 | Stop reason: HOSPADM

## 2017-09-18 RX ORDER — FENTANYL CITRATE 50 UG/ML
INJECTION, SOLUTION INTRAMUSCULAR; INTRAVENOUS PRN
Status: DISCONTINUED | OUTPATIENT
Start: 2017-09-18 | End: 2017-09-18

## 2017-09-18 RX ORDER — IBUPROFEN 600 MG/1
600 TABLET, FILM COATED ORAL EVERY 6 HOURS PRN
Qty: 30 TABLET | Refills: 0 | Status: SHIPPED | OUTPATIENT
Start: 2017-09-18 | End: 2017-10-24

## 2017-09-18 RX ORDER — CEFAZOLIN SODIUM 1 G/3ML
1 INJECTION, POWDER, FOR SOLUTION INTRAMUSCULAR; INTRAVENOUS SEE ADMIN INSTRUCTIONS
Status: DISCONTINUED | OUTPATIENT
Start: 2017-09-18 | End: 2017-09-18 | Stop reason: HOSPADM

## 2017-09-18 RX ORDER — LIDOCAINE 40 MG/G
CREAM TOPICAL
Status: DISCONTINUED | OUTPATIENT
Start: 2017-09-18 | End: 2017-09-18 | Stop reason: HOSPADM

## 2017-09-18 RX ORDER — BUPIVACAINE HYDROCHLORIDE 2.5 MG/ML
INJECTION, SOLUTION INFILTRATION; PERINEURAL PRN
Status: DISCONTINUED | OUTPATIENT
Start: 2017-09-18 | End: 2017-09-18 | Stop reason: HOSPADM

## 2017-09-18 RX ORDER — CEFAZOLIN SODIUM 2 G/100ML
2 INJECTION, SOLUTION INTRAVENOUS
Status: DISCONTINUED | OUTPATIENT
Start: 2017-09-18 | End: 2017-09-18 | Stop reason: HOSPADM

## 2017-09-18 RX ORDER — IBUPROFEN 600 MG/1
600 TABLET, FILM COATED ORAL
Status: DISCONTINUED | OUTPATIENT
Start: 2017-09-18 | End: 2017-09-18 | Stop reason: HOSPADM

## 2017-09-18 RX ORDER — PROPOFOL 10 MG/ML
INJECTION, EMULSION INTRAVENOUS CONTINUOUS PRN
Status: DISCONTINUED | OUTPATIENT
Start: 2017-09-18 | End: 2017-09-18

## 2017-09-18 RX ORDER — ACETAMINOPHEN 325 MG/1
650 TABLET ORAL EVERY 4 HOURS PRN
Qty: 100 TABLET | Refills: 0 | Status: SHIPPED | OUTPATIENT
Start: 2017-09-18 | End: 2017-11-27

## 2017-09-18 RX ADMIN — FENTANYL CITRATE 25 MCG: 50 INJECTION, SOLUTION INTRAMUSCULAR; INTRAVENOUS at 14:22

## 2017-09-18 RX ADMIN — PROPOFOL 30 MCG/KG/MIN: 10 INJECTION, EMULSION INTRAVENOUS at 14:22

## 2017-09-18 RX ADMIN — SODIUM CHLORIDE, POTASSIUM CHLORIDE, SODIUM LACTATE AND CALCIUM CHLORIDE: 600; 310; 30; 20 INJECTION, SOLUTION INTRAVENOUS at 14:12

## 2017-09-18 RX ADMIN — FENTANYL CITRATE 25 MCG: 50 INJECTION, SOLUTION INTRAMUSCULAR; INTRAVENOUS at 14:33

## 2017-09-18 NOTE — TELEPHONE ENCOUNTER
Dr. Watts,    Were you able to make appt Friday for patient at [facility]?    Routing to provider.  Coretta MAR RN

## 2017-09-18 NOTE — IP AVS SNAPSHOT
MRN:[id number]                      After Visit Summary   9/18/2017    Colin Shell    MRN: [id number]           Thank you!     Thank you for choosing [facility] for your care. Our goal is always to provide you with excellent care. Hearing back from our patients is one way we can continue to improve our services. Please take a few minutes to complete the written survey that you may receive in the mail after you visit with us. Thank you!        Patient Information     Date Of Birth          6/25/1927        About your hospital stay     You were admitted on:  September 18, 2017 You last received care in the:  Same Day Surgery [facility]    You were discharged on:  September 18, 2017       Who to Call     For medical emergencies, please call 911.  For non-urgent questions about your medical care, please call your primary care provider or clinic, [phone]  For questions related to your surgery, please call your surgery clinic        Attending Provider     Provider Specialty    Ivana Phan MD Surgery       Primary Care Provider Office Phone # Fax #    Casie Watts [phone]      After Care Instructions     Ice to affected area       Ice to operative site PRN            No driving or operating machinery        until the day after procedure                  Your next 10 appointments already scheduled     Sep 22, 2017  1:20 PM CDT   SHORT with Casie Watts DO   [facility]    [address] [facility] [address] [phone]            Oct 12, 2017  4:30 PM CDT   Remote ICD Check with CAESY DCR2   [facility] [place] ([facility])    [address] [place] [address] [address]  [facility] [id number] [phone]           This appointment is for a remote check of your debrillator.  This is not an appointment at the office.              Further instructions from your care  team       [facility]  Same-Day Surgery   Adult Discharge Orders & Instructions     For 24 hours after surgery    1. Get plenty of rest.  A responsible adult must stay with you for at least 24 hours after you leave the hospital.   2. Do not drive or use heavy equipment.  If you have weakness or tingling, don't drive or use heavy equipment until this feeling goes away.  3. Do not drink alcohol.  4. Avoid strenuous or risky activities.  Ask for help when climbing stairs.   5. You may feel lightheaded.  IF so, sit for a few minutes before standing.  Have someone help you get up.   6. If you have nausea (feel sick to your stomach): Drink only clear liquids such as apple juice, ginger ale, broth or 7-Up.  Rest may also help.  Be sure to drink enough fluids.  Move to a regular diet as you feel able.  7. You may have a slight fever. Call the doctor if your fever is over 100 F (37.7 C) (taken under the tongue) or lasts longer than 24 hours.  8. You may have a dry mouth, a sore throat, muscle aches or trouble sleeping.  These should go away after 24 hours.  9. Do not make important or legal decisions.   Call your doctor for any of the followin.  Signs of infection (fever, growing tenderness at the surgery site, a large amount of drainage or bleeding, severe pain, foul-smelling drainage, redness, swelling).    2. It has been over 8 to 10 hours since surgery and you are still not able to urinate (pass water).    3.  Headache for over 24 hours.    4.  Numbness, tingling or weakness the day after surgery (if you had spinal anesthesia).  To contact a doctor, call Dr. Rosalva Phan @ [phone] [OFFICE] or [phone] [CLINIC]or:    X   [phone] and ask for the resident on call for General Surgery (answered 24 hours a day)  X   Emergency Department:    [facility]: [phone]       (TTY for hearing impaired: [phone])            Pending Results     Date  "and Time Order Name Status Description    2017 1528 Surgical pathology exam In process             Admission Information     Date & Time Provider Department Dept. Phone    2017 Ivana Phan MD Same Day Surgery [facility] [place] [phone]      Your Vitals Were     Blood Pressure Temperature Respirations Height Weight Pulse Oximetry    120/63 97.6  F (36.4  C) (Oral) 16 1.753 m (5' 9\") 94.5 kg (208 lb 5.4 oz) 98%    BMI (Body Mass Index)                   30.77 kg/m2           Drug Response Dx Information     Drug Response Dx lets you send messages to your doctor, view your test results, renew your prescriptions, schedule appointments and more. To sign up, go to [email or web address] . Click on \"Log in\" on the left side of the screen, which will take you to the Welcome page. Then click on \"Sign up Now\" on the right side of the page.     You will be asked to enter the access code listed below, as well as some personal information. Please follow the directions to create your username and password.     Your access code is: [email or web address]  Expires: 2017  9:13 AM     Your access code will  in 90 days. If you need help or a new code, please call your [facility] clinic or [phone].        Care EveryWhere ID     This is your Care EveryWhere ID. This could be used by other organizations to access your [facility] medical records  [id number]        Equal Access to Services     [facility] : Hadii lanny ku hadasho Soomaali, waaxda luqadaha, qaybta kaalmada adeegyada, waxoctaviano enrique lake . So [facility] [phone].    ATENCIÓN: Si habla español, tiene a rojo disposición servicios gratuitos de asistencia lingüística. Llame al [phone].    We comply with applicable federal civil rights laws and [place] laws. We do not discriminate on the basis of race, color, national origin, age, disability sex, sexual orientation or gender identity.               Review of your medicines      START taking     "    Dose / Directions    acetaminophen 325 MG tablet   Commonly known as:  TYLENOL   Used for:  Acute post-operative pain        Dose:  650 mg   Take 2 tablets (650 mg) by mouth every 4 hours as needed for other (mild pain)   Quantity:  100 tablet   Refills:  0       ibuprofen 600 MG tablet   Commonly known as:  ADVIL/MOTRIN   Used for:  Acute post-operative pain        Dose:  600 mg   Take 1 tablet (600 mg) by mouth every 6 hours as needed for pain (mild)   Quantity:  30 tablet   Refills:  0         CONTINUE these medicines which have NOT CHANGED        Dose / Directions    allopurinol 100 MG tablet   Commonly known as:  ZYLOPRIM   Used for:  Gout of foot, unspecified cause, unspecified chronicity, unspecified laterality        Dose:  50 mg   Take 0.5 tablets (50 mg) by mouth daily Gout   Quantity:  45 tablet   Refills:  3       amLODIPine 10 MG tablet   Commonly known as:  NORVASC   Used for:  Essential hypertension with goal blood pressure less than 140/90        Dose:  10 mg   Take 1 tablet (10 mg) by mouth daily   Quantity:  90 tablet   Refills:  3       aspirin 325 MG EC tablet   Used for:  Coronary atherosclerosis of unspecified type of vessel, native or graft        1 tab po QD (Once per day)   Quantity:  100   Refills:  3       atorvastatin 40 MG tablet   Commonly known as:  LIPITOR   Used for:  Hyperlipidemia LDL goal <100        Dose:  40 mg   Take 1 tablet (40 mg) by mouth daily   Quantity:  90 tablet   Refills:  3       CENTRUM SILVER per tablet        Dose:  1 tablet   Take 1 tablet by mouth daily   Refills:  0       finasteride 5 MG tablet   Commonly known as:  PROSCAR   Used for:  Benign non-nodular prostatic hyperplasia without lower urinary tract symptoms        Dose:  5 mg   Take 1 tablet (5 mg) by mouth daily   Quantity:  90 tablet   Refills:  3       losartan 100 MG tablet   Commonly known as:  COZAAR   Used for:  Essential hypertension with goal blood pressure less than 140/90        Dose:  100  mg   Take 1 tablet (100 mg) by mouth daily   Quantity:  90 tablet   Refills:  3       NAVA MILK OF MAGNESIA PO        Refills:  0       predniSONE 20 MG tablet   Commonly known as:  DELTASONE   Used for:  Jaw pain        Dose:  40 mg   Take 2 tablets (40 mg) by mouth daily for 10 days   Quantity:  20 tablet   Refills:  0            Where to get your medicines      These medications were sent to [facility] [place], [place] - [address]  [address] [facility] [address]     Phone:  [phone]     acetaminophen 325 MG tablet    ibuprofen 600 MG tablet                Protect others around you: Learn how to safely use, store and throw away your medicines at www.disposemymeds.org.             Medication List: This is a list of all your medications and when to take them. Check marks below indicate your daily home schedule. Keep this list as a reference.      Medications           Morning Afternoon Evening Bedtime As Needed    acetaminophen 325 MG tablet   Commonly known as:  TYLENOL   Take 2 tablets (650 mg) by mouth every 4 hours as needed for other (mild pain)                                allopurinol 100 MG tablet   Commonly known as:  ZYLOPRIM   Take 0.5 tablets (50 mg) by mouth daily Gout                                amLODIPine 10 MG tablet   Commonly known as:  NORVASC   Take 1 tablet (10 mg) by mouth daily                                aspirin 325 MG EC tablet   1 tab po QD (Once per day)                                atorvastatin 40 MG tablet   Commonly known as:  LIPITOR   Take 1 tablet (40 mg) by mouth daily                                CENTRUM SILVER per tablet   Take 1 tablet by mouth daily                                finasteride 5 MG tablet   Commonly known as:  PROSCAR   Take 1 tablet (5 mg) by mouth daily                                ibuprofen 600 MG tablet   Commonly known as:  ADVIL/MOTRIN   Take 1 tablet (600 mg) by mouth every 6 hours as needed for  pain (mild)                                losartan 100 MG tablet   Commonly known as:  COZAAR   Take 1 tablet (100 mg) by mouth daily                                NAVA MILK OF MAGNESIA PO                                predniSONE 20 MG tablet   Commonly known as:  DELTASONE   Take 2 tablets (40 mg) by mouth daily for 10 days

## 2017-09-18 NOTE — IP AVS SNAPSHOT
Same Day Surgery [address] [address]    Phone:  [phone]                                       After Visit Summary   9/18/2017    Colin Shell    MRN: [id number]           After Visit Summary Signature Page     I have received my discharge instructions, and my questions have been answered. I have discussed any challenges I see with this plan with the nurse or doctor.    ..........................................................................................................................................  Patient/Patient Representative Signature      ..........................................................................................................................................  Patient Representative Print Name and Relationship to Patient    ..................................................               ................................................  Date                                            Time    ..........................................................................................................................................  Reviewed by Signature/Title    ...................................................              ..............................................  Date                                                            Time

## 2017-09-18 NOTE — BRIEF OP NOTE
[facility] [place]    Brief Operative Note    Pre-operative diagnosis: Severe Headaches   Post-operative diagnosis Same  Procedure: Left Temporal Biopsy  - Wound Class: I-Clean  Surgeon: Surgeon(s) and Role:     * Ivana Phan MD - Primary     * Griselda Geronimo MD PGY-6 - Assisting  Anesthesia: MAC  Estimated blood loss: 10 ml  Drains: None  Specimens:   ID Type Source Tests Collected by Time Destination   A : Left Temporal Artery  Biopsy Artery, Temporal SURGICAL PATHOLOGY EXAM Ivana Phan MD 9/18/2017  3:26 PM      Findings:   None.  Complications: None.  Implants: None.

## 2017-09-18 NOTE — ANESTHESIA PREPROCEDURE EVALUATION
Anesthesia Evaluation     . Pt has had prior anesthetic. Type: General    No history of anesthetic complications          ROS/MED HX    ENT/Pulmonary:       Neurologic:       Cardiovascular:     (+) hypertension----. : . CHF . . :. .       METS/Exercise Tolerance:     Hematologic:         Musculoskeletal:         GI/Hepatic:         Renal/Genitourinary:         Endo:     (+) Obesity, .      Psychiatric:         Infectious Disease:         Malignancy:         Other:                     Physical Exam  Normal systems: cardiovascular and pulmonary    Airway   Mallampati: II  TM distance: >3 FB  Neck ROM: full    Dental     Cardiovascular       Pulmonary                     Anesthesia Plan      History & Physical Review  History and physical reviewed and following examination; no interval change.    ASA Status:  3 .    NPO Status:  > 8 hours    Plan for MAC with Intravenous induction. Maintenance will be TIVA.  Reason for MAC:  Procedure to face, neck, head or breast         Postoperative Care      Consents  Anesthetic plan, risks, benefits and alternatives discussed with:  Patient.  Use of blood products discussed: No .   .                          .

## 2017-09-18 NOTE — DISCHARGE INSTRUCTIONS
[facility]  Same-Day Surgery   Adult Discharge Orders & Instructions     For 24 hours after surgery    1. Get plenty of rest.  A responsible adult must stay with you for at least 24 hours after you leave the hospital.   2. Do not drive or use heavy equipment.  If you have weakness or tingling, don't drive or use heavy equipment until this feeling goes away.  3. Do not drink alcohol.  4. Avoid strenuous or risky activities.  Ask for help when climbing stairs.   5. You may feel lightheaded.  IF so, sit for a few minutes before standing.  Have someone help you get up.   6. If you have nausea (feel sick to your stomach): Drink only clear liquids such as apple juice, ginger ale, broth or 7-Up.  Rest may also help.  Be sure to drink enough fluids.  Move to a regular diet as you feel able.  7. You may have a slight fever. Call the doctor if your fever is over 100 F (37.7 C) (taken under the tongue) or lasts longer than 24 hours.  8. You may have a dry mouth, a sore throat, muscle aches or trouble sleeping.  These should go away after 24 hours.  9. Do not make important or legal decisions.   Call your doctor for any of the followin.  Signs of infection (fever, growing tenderness at the surgery site, a large amount of drainage or bleeding, severe pain, foul-smelling drainage, redness, swelling).    2. It has been over 8 to 10 hours since surgery and you are still not able to urinate (pass water).    3.  Headache for over 24 hours.    4.  Numbness, tingling or weakness the day after surgery (if you had spinal anesthesia).  To contact a doctor, call Dr. Rosalva Phan @ [phone] [OFFICE] or [phone] [CLINIC]or:    X   [phone] and ask for the resident on call for General Surgery (answered 24 hours a day)  X   Emergency Department:    [facility]: [phone]       (TTY for hearing impaired: [phone])

## 2017-09-18 NOTE — ANESTHESIA CARE TRANSFER NOTE
Patient: Colin Shell    Procedure(s):  Left Temporal Biopsy  - Wound Class: I-Clean    Diagnosis: Severe Headaches   Diagnosis Additional Information: No value filed.    Anesthesia Type:   MAC     Note:  Airway :Room Air  Patient transferred to:Phase II        Vitals: (Last set prior to Anesthesia Care Transfer)    CRNA VITALS  9/18/2017 1522 - 9/18/2017 1553      9/18/2017             Pulse: 76    SpO2: 96 %                Electronically Signed By: ALEKSANDER Agee CRNA  September 18, 2017  3:53 PM

## 2017-09-18 NOTE — ANESTHESIA POSTPROCEDURE EVALUATION
Patient: Colin Shell    Procedure(s):  Left Temporal Biopsy  - Wound Class: I-Clean    Diagnosis:Severe Headaches   Diagnosis Additional Information: No value filed.    Anesthesia Type:  MAC    Note:  Anesthesia Post Evaluation    Patient location during evaluation: PACU  Patient participation: Able to fully participate in evaluation  Level of consciousness: awake and alert  Pain management: adequate  Airway patency: patent  Cardiovascular status: hemodynamically stable  Respiratory status: acceptable  Hydration status: acceptable  PONV: controlled             Last vitals:  Vitals:    09/18/17 1600 09/18/17 1615 09/18/17 1630   BP: 136/71 (!) 133/102 150/84   Resp: 14 16 16   Temp:   36.6  C (97.8  F)   SpO2: 99% 99% 98%         Electronically Signed By: Trent Benson MD  September 18, 2017  5:00 PM

## 2017-09-20 ENCOUNTER — CARE COORDINATION (OUTPATIENT)
Dept: SURGERY | Facility: CLINIC | Age: 82
End: 2017-09-20

## 2017-09-20 NOTE — PROGRESS NOTES
Colin Shell is a patient of Dr. Ivana Phan that underwent a temporal artery biopsy approximately 2 days (9/18) ago.  Attempted to contact patient via telephone for a status update and review post op teaching.  LM on VM to call office.  Await return call.      Of note:  Pathology:  Pending- pt has f/u scheduled with PCP 9/22  Wound:  Dermabond?  Follow-up:  Routine  Restrictions:  Routine  New medications:  Tylenol, Ibuprofen

## 2017-09-21 LAB — COPATH REPORT: NORMAL

## 2017-09-21 NOTE — PROGRESS NOTES
RN Post-Op/Post-Discharge Care Coordination Note    Spoke with Patient.    Support  Patient able to care for self independently     Health Status  Fevers/chills: Patient denies any fever or chills.  Nausea/Vomiting: Patient denies nausea/vomiting.  Eating/drinking: Patient is able to eat and drink without any complaints.  Bowel habits: Patient reports having a normal bowel movement. Patient is using MOM prn.  Urinary: Voiding normally  Drains (TRI): N/A  Incisions: Patient denies any signs and symptoms of infection. discussed with patient that he should stop using Vaseline over the incision. He can resume using this after 7-10 days from surgery.  Pain: patient denies pain.  Patients states he is a bit woozy and feels off balance (he felt this way prior to the procedure).  New Medications:  na    Activity/Restrictions  Return to normal activites as tolerated    Equipment  None    Pathology reviewed with patient:  No: pending. Patient has f/u with his PCP to discuss results.    All of his questions were answered including reviewing restrictions, pathology, and wound care.  He will call this office if he has any further questions and/or concerns.        Whom and When to Call  Patient acknowledges understanding of how to manage any medication changes and   when to seek medical care.     Patient advised that if after hour medical concerns arise to please call [phone] and choose option 4 to speak to the physician on call.     A copy of this note was routed to the primary surgeon.

## 2017-09-22 ENCOUNTER — OFFICE VISIT (OUTPATIENT)
Dept: FAMILY MEDICINE | Facility: CLINIC | Age: 82
End: 2017-09-22
Payer: COMMERCIAL

## 2017-09-22 VITALS
SYSTOLIC BLOOD PRESSURE: 129 MMHG | HEIGHT: 69 IN | HEART RATE: 70 BPM | BODY MASS INDEX: 30.81 KG/M2 | OXYGEN SATURATION: 97 % | TEMPERATURE: 97 F | DIASTOLIC BLOOD PRESSURE: 69 MMHG | WEIGHT: 208 LBS

## 2017-09-22 DIAGNOSIS — H54.7 POOR VISION: Primary | ICD-10-CM

## 2017-09-22 DIAGNOSIS — R26.89 BALANCE PROBLEMS: ICD-10-CM

## 2017-09-22 DIAGNOSIS — M62.81 GENERALIZED MUSCLE WEAKNESS: ICD-10-CM

## 2017-09-22 DIAGNOSIS — M54.2 CERVICALGIA: ICD-10-CM

## 2017-09-22 PROCEDURE — 99213 OFFICE O/P EST LOW 20 MIN: CPT | Performed by: INTERNAL MEDICINE

## 2017-09-22 NOTE — PATIENT INSTRUCTIONS
1. OK to resume aspirin as normal  2. Stop prednisone.  3. See eye doctor for vision problems.  4. Ok to monitor dizziness for now.  If symptoms worsen, see Dr. Watts so we can figure out where to go from here

## 2017-09-22 NOTE — NURSING NOTE
"Chief Complaint   Patient presents with     Path Results     procedure 9/18/2017.  Here to today for results.        Initial LMP 02/24/2017 (Exact Date) Estimated body mass index is 23.49 kg/(m^2) as calculated from the following:    Height as of 4/4/17: 5' 7\" (1.702 m).    Weight as of 4/4/17: 150 lb (68 kg).  Medication Reconciliation: complete     Ana Moy CMA   "

## 2017-09-22 NOTE — PROGRESS NOTES
SUBJECTIVE:   Colin Shell is a 89 year old male who presents to clinic today for the following health issues:    Chief Complaint   Patient presents with     Path Results     procedure 9/18/2017.  Here to today for results.      Neck feels 80% better. headache have decreased in frequency and severity. Cough has resolved.  Vision still feels a bit 'fuzzy' and 'off'.   Small print on TV is blurry.  He has poor vision at baseline.  He reports he last saw eye doctor 10+ years ago. He is still having intermittent dizziness.  Still feeling mild generalized weakness. The right sided jaw pain has significantly improved.        Current Outpatient Prescriptions   Medication Sig Dispense Refill     acetaminophen (TYLENOL) 325 MG tablet Take 2 tablets (650 mg) by mouth every 4 hours as needed for other (mild pain) 100 tablet 0     ibuprofen (ADVIL/MOTRIN) 600 MG tablet Take 1 tablet (600 mg) by mouth every 6 hours as needed for pain (mild) 30 tablet 0     predniSONE (DELTASONE) 20 MG tablet Take 2 tablets (40 mg) by mouth daily for 10 days 20 tablet 0     amLODIPine (NORVASC) 10 MG tablet Take 1 tablet (10 mg) by mouth daily 90 tablet 3     Magnesium Hydroxide (NAVA MILK OF MAGNESIA PO)        losartan (COZAAR) 100 MG tablet Take 1 tablet (100 mg) by mouth daily 90 tablet 3     allopurinol (ZYLOPRIM) 100 MG tablet Take 0.5 tablets (50 mg) by mouth daily Gout 45 tablet 3     finasteride (PROSCAR) 5 MG tablet Take 1 tablet (5 mg) by mouth daily 90 tablet 3     atorvastatin (LIPITOR) 40 MG tablet Take 1 tablet (40 mg) by mouth daily 90 tablet 3     Multiple Vitamins-Minerals (CENTRUM SILVER) per tablet Take 1 tablet by mouth daily       ASPIRIN 325 MG OR TBEC 1 tab po QD (Once per day) (Patient not taking: No sig reported) 100 3       Reviewed and updated as needed this visit by clinical staff  Tobacco  Allergies  Med Hx  Surg Hx  Fam Hx  Soc Hx      Reviewed and updated as needed this visit by Provider      "    ROS:  Constitutional, HEENT, cardiovascular, pulmonary, gi and gu systems are negative, except as otherwise noted.      OBJECTIVE:   /69  Pulse 70  Temp 97  F (36.1  C) (Tympanic)  Ht 5' 9\" (1.753 m)  Wt 208 lb (94.3 kg)  SpO2 97%  BMI 30.72 kg/m2  Body mass index is 30.72 kg/(m^2).  GENERAL APPEARANCE: healthy, alert and no distress  RESP: lungs clear to auscultation - no rales, rhonchi or wheezes  CV: regular rates and rhythm, normal S1 S2, no S3 or S4 and systolic murmur  SKIN: well healing left temporal biopsy site  NEURO: Normal strength and tone, mentation intact, speech normal, DTR symmetrically normal in lower extremities, gait normal including heel/toe/tandem walking, cranial nerves 2-12 intact, Romberg abnormal - - mild sway and rapid alternating movements normal    Diagnostic Test Results:  Results for orders placed or performed during the hospital encounter of 09/18/17   Potassium   Result Value Ref Range    Potassium 4.6 3.4 - 5.3 mmol/L   Surgical pathology exam   Result Value Ref Range    Copath Report       Patient Name: HALI ANAYA  MR#: [id number]  Specimen #: [id number]  Collected: 9/18/2017  Received: 9/18/2017  Reported: 9/21/2017 11:24  Ordering Phy(s): UNA MILLER    For improved result formatting, select 'View Enhanced Report Format'  under Linked Documents section.    SPECIMEN(S):  Left temporal artery    FINAL DIAGNOSIS:  Temporal artery, left, biopsy:  1. No evidence of giant cell arteritis.  2. Moderate arteriosclerosis.  3. Mild medial calcific sclerosis.    I have personally reviewed all specimens and/or slides, including the  listed special stains, and used them with my medical judgement to  determine or confirm the final diagnosis.    Electronically signed out by:    Windy Rodriguez M.D., [facility]    CLINICAL HISTORY:  The patient is a 89 year old male with severe headaches. He undergoes  temporal artery biopsy on the left.  GROSS:  The specimen is " "received in formalin with proper patient identification,  labeled \"left temporal artery\".  The specimen c onsists of a 2.5 cm in  length x 0.2 cm in diameter unoriented possible artery.  An undesignated  suture is present.  The specimen is entirely submitted in cassette 1 to  be cut on end by histology. (Dictated by: Katy Ramirez 9/18/2017 04:42  PM)    MICROSCOPIC:  The tissue consists of artery with patent lumen and moderate intimal  hyperplasia. The internal elastic lamina is largely intact on elastic  stain. Occasional focal calcifications are present at the level of the  internal elastic lamina. No inflammation or scarring is identified  within the vessel.    CPT Codes:  A: 23342-EW5, 84048-VJSN    TESTING LAB LOCATION:  [facility], [address]   [address] [phone]    COLLECTION SITE:  Client: [facility]  [facility]           ASSESSMENT/PLAN:       ICD-10-CM    1. Poor vision H54.7 CANCELED: OPHTHALMOLOGY ADULT REFERRAL   2. Cervicalgia M54.2    3. Balance problems R26.89    4. Generalized muscle weakness M62.81      He should see eye doctor due to vision issues, and not seeing one in many years.  It is unclear what is causing his balance issues, but vision could be primary driving force.  Very reassured by normal biopsy.  Suspect his balance, weakness will continue to improve with time alone.    1. OK to resume aspirin as normal  2. Stop prednisone.  3. See eye doctor for vision problems.  4. Ok to monitor dizziness for now.  If symptoms worsen, see Dr. Watts so we can figure out where to go from here        Casie Watts, DO  [facility]  "

## 2017-09-22 NOTE — MR AVS SNAPSHOT
After Visit Summary   9/22/2017    Colin Shell    MRN: [id number]           Patient Information     Date Of Birth          6/25/1927        Visit Information        Provider Department      9/22/2017 10:00 AM Casie Watts, DO [facility]        Today's Diagnoses     Poor vision    -  1      Care Instructions    1. OK to resume aspirin as normal  2. Stop prednisone.  3. See eye doctor for vision problems.  4. Ok to monitor dizziness for now.  If symptoms worsen, see Dr. Watts so we can figure out where to go from here          Follow-ups after your visit        Your next 10 appointments already scheduled     Oct 12, 2017  4:30 PM CDT   Remote ICD Check with CASEY DCR2   [facility]YSIC[facility]ANS [facility] AT [place] ([facility])    [address] [place], [place] [id number]  [facility] [address] [phone]           This appointment is for a remote check of your debrillator.  This is not an appointment at the office.              Who to contact     If you have questions or need follow up information about today's clinic visit or your schedule please contact [facility] directly at [phone].  Normal or non-critical lab and imaging results will be communicated to you by Ongagehart, letter or phone within 4 business days after the clinic has received the results. If you do not hear from us within 7 days, please contact the clinic through Ongagehart or phone. If you have a critical or abnormal lab result, we will notify you by phone as soon as possible.  Submit refill requests through Flinqer or call your pharmacy and they will forward the refill request to us. Please allow 3 business days for your refill to be completed.          Additional Information About Your Visit        MyChart Information     Flinqer lets you send messages to your doctor, view your test results, renew your prescriptions, schedule appointments and more. To sign up, go to  [email or web address] . Click on \"Log in\" on the left side of the screen, which will take you to the Welcome page. Then click on \"Sign up Now\" on the right side of the page.     You will be asked to enter the access code listed below, as well as some personal information. Please follow the directions to create your username and password.     Your access code is: [email or web address]  Expires: 2017  9:13 AM     Your access code will  in 90 days. If you need help or a new code, please call your [place] clinic or [phone].        Care EveryWhere ID     This is your Care EveryWhere ID. This could be used by other organizations to access your [place] medical records  [id number]        Your Vitals Were     Pulse Temperature Height Pulse Oximetry BMI (Body Mass Index)       70 97  F (36.1  C) (Tympanic) 5' 9\" (1.753 m) 97% 30.72 kg/m2        Blood Pressure from Last 3 Encounters:   17 129/69   17 150/84   09/15/17 123/67    Weight from Last 3 Encounters:   17 208 lb (94.3 kg)   17 208 lb 5.4 oz (94.5 kg)   09/15/17 204 lb (92.5 kg)              Today, you had the following     No orders found for display       Primary Care Provider Office Phone # Fax #    Casie Sammie Watts [phone] [address] [facility] [address]        Equal Access to Services     [facility] : Hadii lanny lewis hadasho Sochiali, waaxda luqadaha, qaybta kaalmada adeegdexter, fina lake . So [facility] [phone].    ATENCIÓN: Si habla español, tiene a rojo disposición servicios gratuitos de asistencia lingüística. Llame al [phone].    We comply with applicable federal civil rights laws and [place] laws. We do not discriminate on the basis of race, color, national origin, age, disability sex, sexual orientation or gender identity.            Thank you!     Thank you for choosing [facility]  for your care. Our goal is always to provide you with " excellent care. Hearing back from our patients is one way we can continue to improve our services. Please take a few minutes to complete the written survey that you may receive in the mail after your visit with us. Thank you!             Your Updated Medication List - Protect others around you: Learn how to safely use, store and throw away your medicines at www.disposemymeds.org.          This list is accurate as of: 9/22/17 10:32 AM.  Always use your most recent med list.                   Brand Name Dispense Instructions for use Diagnosis    acetaminophen 325 MG tablet    TYLENOL    100 tablet    Take 2 tablets (650 mg) by mouth every 4 hours as needed for other (mild pain)    Acute post-operative pain       allopurinol 100 MG tablet    ZYLOPRIM    45 tablet    Take 0.5 tablets (50 mg) by mouth daily Gout    Gout of foot, unspecified cause, unspecified chronicity, unspecified laterality       amLODIPine 10 MG tablet    NORVASC    90 tablet    Take 1 tablet (10 mg) by mouth daily    Essential hypertension with goal blood pressure less than 140/90       aspirin 325 MG EC tablet     100    1 tab po QD (Once per day)    Coronary atherosclerosis of unspecified type of vessel, native or graft       atorvastatin 40 MG tablet    LIPITOR    90 tablet    Take 1 tablet (40 mg) by mouth daily    Hyperlipidemia LDL goal <100       CENTRUM SILVER per tablet      Take 1 tablet by mouth daily        finasteride 5 MG tablet    PROSCAR    90 tablet    Take 1 tablet (5 mg) by mouth daily    Benign non-nodular prostatic hyperplasia without lower urinary tract symptoms       ibuprofen 600 MG tablet    ADVIL/MOTRIN    30 tablet    Take 1 tablet (600 mg) by mouth every 6 hours as needed for pain (mild)    Acute post-operative pain       losartan 100 MG tablet    COZAAR    90 tablet    Take 1 tablet (100 mg) by mouth daily    Essential hypertension with goal blood pressure less than 140/90       ELIJAH MILK OF MAGNESIA PO

## 2017-09-26 NOTE — OP NOTE
"Operative Note    Pre-operative diagnosis:                   Severe Headaches / ? Temporal arteritis  Post-operative diagnosis                  Same  Procedure:                Left Temporal Biopsy  - Wound Class: I-Clean  Surgeon:                   Surgeon(s) and Role:     * Ivana Phan MD - Primary     * Griselda Geronimo MD PGY-6 - Assisting  Anesthesia:               MAC  Estimated blood loss:            10 ml  Drains:           None  Specimens:                 ID Type Source Tests Collected by Time Destination   A : Left Temporal Artery  Biopsy Artery, Temporal SURGICAL PATHOLOGY EXAM Ivana Phan MD 9/18/2017  3:26 PM        Findings:                                         None.  Complications:                      None.  Implants:                   None.      Indications for procedure:  Colin \"Keron\" Suleman is a 89M who presented to his PMD with complaints of jaw pain and tempral headache, initially present on the left side but then migrating to include the left as well.  His symptoms were concerning for possible temporal arteritis.  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  However, he was started on steroid treatment and referred for temporal artery biopsy as a means of definitive diagnosis.  I discussed unilateral versus bilateral biopsy with the referring physician. Given that the empiric use of bilateral biopsy confers increased morbidity and increases diagnostic yield by only 7%, we opted to proceed with unilateral left sided biopsy (as this side was where symptoms began) provided that we could find and adequate specimen.    Operative course:  The patient was brought into the operating room and placed on the table in the supine position.  After adequate analgesia was administered he was prepped and draped with betadine.  Time out was performed to ensure proper side, site, patient and procedure.    Using palpation, as " well as a sterile doppler probe, we traced the course of the left temporal artery from just superomedial to the tragus to the temporal scalp.  We selected a site in the temporal region, anteromedial to the zygomatic arch, to make out incision.  Using a #15 scalpel place, an ~ 3 cm incision was made in the dermis. This was carried through the subcutaneous tissue with a needle point bovie.  The temporal fascia overlying the artery was divided, after which it was bluntly swept away from the underlying artery with sterile cotton tip applicators.  We then dissected around the artery with a right angle clamp and [place] it with a 3-0 silk tie, which was then used to provide traction.  An ~ 3 cm lenth of artery was dissected free of surround tissue sharply with metzenbaum scissors.  Several arterial side branches were identified and ligated with silk ties prior to being divided, or divided with bipolar cautery to ensure hemostasis.      Once an adequate length of specimen, ~ 3cm in length had been isolated, we ligated proximally and distally with 3-0 silk prior to divided the specimen with scissors and passing this off to pathology.    The wound was then irrigated with saline and hemostasis obtain with electrocautery.  The deep dermis was reapproximated with interrupted 3-0 vicryl and the skin was closed with monocryl and dermabond.    At the end of the procedure all counted were corrects.  I was present for all critical portions of the procedure.    Ivana Phan

## 2017-10-12 ENCOUNTER — ALLIED HEALTH/NURSE VISIT (OUTPATIENT)
Dept: CARDIOLOGY | Facility: CLINIC | Age: 82
End: 2017-10-12
Payer: COMMERCIAL

## 2017-10-12 ENCOUNTER — TELEPHONE (OUTPATIENT)
Dept: CARDIOLOGY | Facility: CLINIC | Age: 82
End: 2017-10-12

## 2017-10-12 DIAGNOSIS — Z95.810 ICD (IMPLANTABLE CARDIOVERTER-DEFIBRILLATOR), DUAL, IN SITU: Primary | ICD-10-CM

## 2017-10-12 PROCEDURE — 93296 REM INTERROG EVL PM/IDS: CPT | Performed by: INTERNAL MEDICINE

## 2017-10-12 PROCEDURE — 93295 DEV INTERROG REMOTE 1/2/MLT: CPT | Performed by: INTERNAL MEDICINE

## 2017-10-12 NOTE — TELEPHONE ENCOUNTER
Call to pt to discuss if any symptoms with recent onset of AF. Remote ICD transmission received today documenting onset of AF in the middle of September, with current episode since 10-1-17. I did send a note to Dr Erickson, as pt is not on anticoagulation and has orders to see him in [place] in November. Pt was not home; message left for him to call tomorrow to discuss. Awaiting reply from Dr Erickson. [email or web address]

## 2017-10-12 NOTE — MR AVS SNAPSHOT
"              After Visit Summary   10/12/2017    Colin Shell    MRN: [id number]           Patient Information     Date Of Birth          6/25/1927        Visit Information        Provider Department      10/12/2017 4:30 PM CASEY DCR2 [facility]        Today's Diagnoses     ICD (implantable cardioverter-defibrillator), dual, in situ    -  1       Follow-ups after your visit        Your next 10 appointments already scheduled     Oct 12, 2017  4:30 PM CDT   Remote ICD Check with CASEY DCR2   [facility] ([facility])    [address] [place] [address] [phone]           This appointment is for a remote check of your debrillator.  This is not an appointment at the office.              Who to contact     If you have questions or need follow up information about today's clinic visit or your schedule please contact [facility] directly at [phone].  Normal or non-critical lab and imaging results will be communicated to you by HiFiKiddohart, letter or phone within 4 business days after the clinic has received the results. If you do not hear from us within 7 days, please contact the clinic through HiFiKiddohart or phone. If you have a critical or abnormal lab result, we will notify you by phone as soon as possible.  Submit refill requests through WISErg or call your pharmacy and they will forward the refill request to us. Please allow 3 business days for your refill to be completed.          Additional Information About Your Visit        HiFiKiddohart Information     WISErg lets you send messages to your doctor, view your test results, renew your prescriptions, schedule appointments and more. To sign up, go to [email or web address] . Click on \"Log in\" on the left side of the screen, which will take you to the Welcome page. Then click on \"Sign up Now\" on the right side of the " page.     You will be asked to enter the access code listed below, as well as some personal information. Please follow the directions to create your username and password.     Your access code is: [email or web address]  Expires: 2017  9:13 AM     Your access code will  in 90 days. If you need help or a new code, please call your [place] clinic or [phone].        Care EveryWhere ID     This is your Care EveryWhere ID. This could be used by other organizations to access your [place] medical records  [id number]         Blood Pressure from Last 3 Encounters:   17 129/69   17 150/84   09/15/17 123/67    Weight from Last 3 Encounters:   17 94.3 kg (208 lb)   17 94.5 kg (208 lb 5.4 oz)   09/15/17 92.5 kg (204 lb)              We Performed the Following     ICD DEVICE INTERROGAT REMOTE (07882)     INTERROGATION DEVICE EVAL REMOTE, PACER/ICD (00114)        Primary Care Provider Office Phone # Fax #    Casie WattsDO [phone] [address] [address]        Equal Access to Services     ALEXIS PACE AH: Hadii aad ku hadasho Soomaali, waaxda luqadaha, qaybta kaalmada adeegyada, waxay idiin hayephraimn juan vo. So [facility] [phone].    ATENCIÓN: Si habla español, tiene a rojo disposición servicios gratuitos de asistencia lingüística. Llame al [phone].    We comply with applicable federal civil rights laws and [place] laws. We do not discriminate on the basis of race, color, national origin, age, disability, sex, sexual orientation, or gender identity.            Thank you!     Thank you for choosing [facility] [place]  for your care. Our goal is always to provide you with excellent care. Hearing back from our patients is one way we can continue to improve our services. Please take a few minutes to complete the written survey that you may receive in the mail after your visit with us. Thank you!              Your Updated Medication List - Protect others around you: Learn how to safely use, store and throw away your medicines at www.disposemymeds.org.          This list is accurate as of: 10/12/17  2:50 PM.  Always use your most recent med list.                   Brand Name Dispense Instructions for use Diagnosis    acetaminophen 325 MG tablet    TYLENOL    100 tablet    Take 2 tablets (650 mg) by mouth every 4 hours as needed for other (mild pain)    Acute post-operative pain       allopurinol 100 MG tablet    ZYLOPRIM    45 tablet    Take 0.5 tablets (50 mg) by mouth daily Gout    Gout of foot, unspecified cause, unspecified chronicity, unspecified laterality       amLODIPine 10 MG tablet    NORVASC    90 tablet    Take 1 tablet (10 mg) by mouth daily    Essential hypertension with goal blood pressure less than 140/90       aspirin 325 MG EC tablet     100    1 tab po QD (Once per day)    Coronary atherosclerosis of unspecified type of vessel, native or graft       atorvastatin 40 MG tablet    LIPITOR    90 tablet    Take 1 tablet (40 mg) by mouth daily    Hyperlipidemia LDL goal <100       CENTRUM SILVER per tablet      Take 1 tablet by mouth daily        finasteride 5 MG tablet    PROSCAR    90 tablet    Take 1 tablet (5 mg) by mouth daily    Benign non-nodular prostatic hyperplasia without lower urinary tract symptoms       ibuprofen 600 MG tablet    ADVIL/MOTRIN    30 tablet    Take 1 tablet (600 mg) by mouth every 6 hours as needed for pain (mild)    Acute post-operative pain       losartan 100 MG tablet    COZAAR    90 tablet    Take 1 tablet (100 mg) by mouth daily    Essential hypertension with goal blood pressure less than 140/90       ELIJAH MILK OF MAGNESIA PO

## 2017-10-12 NOTE — PROGRESS NOTES
[facility] Fall MRI/ ICD Remote Device Check  AP: 7% when not in AF/ persistent since mid Sept/2017 : 66.8 %  Mode: DDD        Presenting Rhythm: AFib/ VS controlled  Heart Rate: Histogram  Sensing: Stable (auto)    Pacing Threshold: Stable V (auto)     Impedance: WNL  Battery Status: 9.1 years estimated longevity  Atrial Arrhythmia: persistent AF since mid Sept 2017; 25% since June. Current episode in progress since 10-1-17. NOT ON AC  Ventricular Arrhythmia: 3 NSVT episodes; rates 180-200 bpm, durations 5-6 beats.  ATP: NONE    Shocks: NONE    Care Plan: Pt has orders to see Dr Erickson in November; will route to him and have pt schedule when notified re: this report. He will otherwise plan to send a transmission in 3 months. mark

## 2017-10-13 NOTE — TELEPHONE ENCOUNTER
"Pt called back and denies any symptoms-no increased SOB, dizziness, activity intolerance or fatigue noted. Will route this note back to Dr. Erickson and his team regarding OAC. See original device check note below. LUIS Kinney RN.      ICD (implantable cardioverter-defibrillator), dual, in situ   Dx    ICD Check   Reason for visit    Progress Notes   Langenbrunner, Susan, RN (Registered Nurse)      Medtronic Evera MRI/DR ICD Remote Device Check  AP: 7% when not in AF/ persistent since mid Sept/2017 : 66.8 %  Mode: DDD        Presenting Rhythm: AFib/ VS controlled  Heart Rate: Histogram  Sensing: Stable (auto)    Pacing Threshold: Stable V (auto)     Impedance: WNL  Battery Status: 9.1 years estimated longevity  Atrial Arrhythmia: persistent AF since mid Sept 2017; 25% since June. Current episode in progress since 10-1-17. NOT ON AC  Ventricular Arrhythmia: 3 NSVT episodes; rates 180-200 bpm, durations 5-6 beats.  ATP: NONE    Shocks: NONE     Care Plan: Pt has orders to see Dr Erickson in November; will route to him and have pt schedule when notified re: this report. He will otherwise plan to send a transmission in 3 months. [facility]        ADDENDUM: Received this message, forwarded by Team 2 RN this afternoon. Dr. Erickson is not in the office today. Will route this to Dr. Longo to address in Dr. Sommers absence. Reviewed Dr. Sommers last OV note. From 7/10/17 OV note: \"DISCUSSION:  The patient appears to be doing well status post his AICD implantation.  He has had no further episodes of lightheadedness, dizziness or syncope.  He has had no palpitations.  The patient was felt to be a good candidate for a dual-chamber pacemaker in the first place and it was decided to do an AICD after his electrophysiologic testing.  The patient does have an elevated CHADS-VASc score and he is not anxious to go on anticoagulation beyond aspirin at this time.  The burden of his atrial dysrhythmia is not great and recent atrial fibrillation has " "been difficult to discern on both ZIO Patch and device interrogation.  He will need close followup of serum lipids, basic metabolic panel and blood pressure. The patient will have a holter monitor to document rhythm and echocardiogram to document LV function in 4 months\" I did contact this patient per phone to clarify that he is indeed taking a full strength ASA daily. He states he is. We discussed \"Atrial Fibrillation\" and risk of stroke as well as importance of anticoagulation and briefly discussed medication(s) . He states he is 89 years old and does not want to bleed. He asks if he can take 2 tablets of 325mg ASA, \"It's cheaper\" Will let MD's advise on anticoagulation. I encouraged patient to make sure he is taking his  mg daily until further instructions. Pt is also due to see MD Georgina with echo/holter monitor/labs prior (November) Scheduling will contact him. Latosha Sewell RN Cardiology at [facility] October 13, 2017, 2:03 PM      ADDENDUM: I have not heard back from Dr. Longo as of yet. I have discussed this in person with Dr. Cabrales, clinic cardiologist today at [facility]. She states patient needs an appointment next week to discuss anticoagulation risks and benefits. We have no available spots at [facility] next week. Pt absolutely refuses to go to [facility]. I will discuss this patients plan of care with Dr. Erickson on Monday when he is back in office. Latosha Sewell RN Cardiology at [facility] October 13, 2017, 4:51 PM    ADDENDUM: Rec call from Dr Erickson--he would like the patient to be seen by either himself or Oliva Abrams NP first available slot--Dr Erickson had a cancellation on his schedule here in [place] on Monday 10/23/17 at 9:30am. We will try to get holter and echo prior to that appointment if possible. LM for patient to call back to discuss moving appointment up and to schedule holter and echo. Monica Ramirez RN Cardiology October 16, 2017, 1:10 PM    ADDENDUM: Pt called back to " "discuss. He is not very happy about testing and talking about a blood thinner--states \"just what are my chances of having a stroke before I get killed by something else?\" Discussed that that was what Dr Erickson wanted to discuss with him. Pt accepted appointment and call transferred to scheduling to try to get echo and holter this week. Monica Ramirez RN Cardiology October 16, 2017, 1:17 PM    "

## 2017-10-18 ENCOUNTER — HOSPITAL ENCOUNTER (OUTPATIENT)
Dept: CARDIOLOGY | Facility: CLINIC | Age: 82
Discharge: HOME | End: 2017-10-18
Attending: INTERNAL MEDICINE | Admitting: INTERNAL MEDICINE
Payer: COMMERCIAL

## 2017-10-18 DIAGNOSIS — I10 BENIGN ESSENTIAL HYPERTENSION: ICD-10-CM

## 2017-10-18 DIAGNOSIS — I10 ESSENTIAL HYPERTENSION WITH GOAL BLOOD PRESSURE LESS THAN 140/90: ICD-10-CM

## 2017-10-18 DIAGNOSIS — Z95.1 S/P CABG (CORONARY ARTERY BYPASS GRAFT): ICD-10-CM

## 2017-10-18 DIAGNOSIS — E78.5 HYPERLIPIDEMIA LDL GOAL <70: ICD-10-CM

## 2017-10-18 DIAGNOSIS — R55 SYNCOPE, UNSPECIFIED SYNCOPE TYPE: ICD-10-CM

## 2017-10-18 DIAGNOSIS — I48.0 PAROXYSMAL ATRIAL FIBRILLATION (H): ICD-10-CM

## 2017-10-18 DIAGNOSIS — I25.10 CORONARY ARTERY DISEASE INVOLVING NATIVE CORONARY ARTERY OF NATIVE HEART WITHOUT ANGINA PECTORIS: ICD-10-CM

## 2017-10-18 LAB
ALT SERPL W P-5'-P-CCNC: 23 U/L (ref 0–70)
ANION GAP SERPL CALCULATED.3IONS-SCNC: 6 MMOL/L (ref 3–14)
BUN SERPL-MCNC: 20 MG/DL (ref 7–30)
CALCIUM SERPL-MCNC: 9.2 MG/DL (ref 8.5–10.1)
CHLORIDE SERPL-SCNC: 106 MMOL/L (ref 94–109)
CHOLEST SERPL-MCNC: 202 MG/DL
CO2 SERPL-SCNC: 27 MMOL/L (ref 20–32)
CREAT SERPL-MCNC: 1.12 MG/DL (ref 0.66–1.25)
GFR SERPL CREATININE-BSD FRML MDRD: 62 ML/MIN/1.7M2
GLUCOSE SERPL-MCNC: 104 MG/DL (ref 70–99)
HDLC SERPL-MCNC: 36 MG/DL
LDLC SERPL CALC-MCNC: 128 MG/DL
NONHDLC SERPL-MCNC: 166 MG/DL
POTASSIUM SERPL-SCNC: 4.4 MMOL/L (ref 3.4–5.3)
SODIUM SERPL-SCNC: 139 MMOL/L (ref 133–144)
TRIGL SERPL-MCNC: 191 MG/DL

## 2017-10-18 PROCEDURE — 25500064 ZZH RX 255 OP 636: Performed by: INTERNAL MEDICINE

## 2017-10-18 PROCEDURE — 80048 BASIC METABOLIC PNL TOTAL CA: CPT | Performed by: INTERNAL MEDICINE

## 2017-10-18 PROCEDURE — 93306 TTE W/DOPPLER COMPLETE: CPT | Mod: 26 | Performed by: INTERNAL MEDICINE

## 2017-10-18 PROCEDURE — 36415 COLL VENOUS BLD VENIPUNCTURE: CPT | Performed by: INTERNAL MEDICINE

## 2017-10-18 PROCEDURE — 80061 LIPID PANEL: CPT | Performed by: INTERNAL MEDICINE

## 2017-10-18 PROCEDURE — 84460 ALANINE AMINO (ALT) (SGPT): CPT | Performed by: INTERNAL MEDICINE

## 2017-10-18 PROCEDURE — 40000264 ECHO COMPLETE WITH LUMASON

## 2017-10-18 RX ADMIN — SULFUR HEXAFLUORIDE 3 ML: KIT at 10:36

## 2017-10-23 ENCOUNTER — OFFICE VISIT (OUTPATIENT)
Dept: CARDIOLOGY | Facility: CLINIC | Age: 82
End: 2017-10-23
Payer: COMMERCIAL

## 2017-10-23 VITALS
SYSTOLIC BLOOD PRESSURE: 133 MMHG | OXYGEN SATURATION: 98 % | BODY MASS INDEX: 30.27 KG/M2 | HEART RATE: 75 BPM | DIASTOLIC BLOOD PRESSURE: 76 MMHG | WEIGHT: 205 LBS

## 2017-10-23 DIAGNOSIS — R55 SYNCOPE, UNSPECIFIED SYNCOPE TYPE: ICD-10-CM

## 2017-10-23 DIAGNOSIS — I10 BENIGN ESSENTIAL HYPERTENSION: ICD-10-CM

## 2017-10-23 DIAGNOSIS — I10 ESSENTIAL HYPERTENSION WITH GOAL BLOOD PRESSURE LESS THAN 140/90: ICD-10-CM

## 2017-10-23 DIAGNOSIS — Z95.1 S/P CABG (CORONARY ARTERY BYPASS GRAFT): ICD-10-CM

## 2017-10-23 DIAGNOSIS — I25.10 CORONARY ARTERY DISEASE INVOLVING NATIVE CORONARY ARTERY OF NATIVE HEART WITHOUT ANGINA PECTORIS: ICD-10-CM

## 2017-10-23 DIAGNOSIS — I48.0 PAROXYSMAL ATRIAL FIBRILLATION (H): ICD-10-CM

## 2017-10-23 DIAGNOSIS — E78.5 HYPERLIPIDEMIA LDL GOAL <70: ICD-10-CM

## 2017-10-23 PROCEDURE — 99214 OFFICE O/P EST MOD 30 MIN: CPT | Performed by: INTERNAL MEDICINE

## 2017-10-23 NOTE — MR AVS SNAPSHOT
After Visit Summary   10/23/2017    Colin Shell    MRN: [id number]           Patient Information     Date Of Birth          6/25/1927        Visit Information        Provider Department      10/23/2017 9:30 AM Norm Erickson MD [facility] PHYSICIAN HEART AT [facility]        Today's Diagnoses     Syncope, unspecified syncope type        Paroxysmal atrial fibrillation (H)        Coronary artery disease involving native coronary artery of native heart without angina pectoris        S/P CABG (coronary artery bypass graft)        Benign essential hypertension        Hyperlipidemia LDL goal <70        Essential hypertension with goal blood pressure less than 140/90          Care Instructions    Thank you for your  Heart Care visit today. Your provider has recommended the following:  Medication Changes:  Please see below and contact our office (RN Line at [phone]) with what you found out regarding the cost of these two different options below for treatment of Atrial Fibrillation.  Recommendations:  Please contact your insurance company to see if and what they will cover for these two different anticoagulation medications: Eliquis 5 mg twice a day or Xarelto 20 mg once a day.  Follow-up:  See Dr. Erickson for cardiology follow up in 3-4 months.    We kindly ask that you call cardiology scheduling at [phone] three months prior to requested revisit date to schedule future cardiology appointments.  Reminder:  1. Please bring in your current medication list or your medication, over the counter supplements and vitamin bottles as we will review these at each office visit.               [facility]  [facility]~[address]~[place], [place]~[address]  Questions about your visit today?  Call your Cardiology Clinic RN's-Ekaterina Sewell and/or Monica Ramirez at [phone].                Follow-ups after your visit        Additional  "Services     Follow-Up with Cardiologist                 Your next 10 appointments already scheduled     Jan 31, 2018  4:30 PM CST   Remote ICD Check with CASEY DCR2   [facility] PHYSICIANS HEART AT [place] ([facility])    [address] [place] [address] [phone] [phone]           This appointment is for a remote check of your debrillator.  This is not an appointment at the office.              Future tests that were ordered for you today     Open Future Orders        Priority Expected Expires Ordered    Basic metabolic panel Routine 1/21/2018 10/23/2018 10/23/2017    Lipid Profile Routine 1/21/2018 10/23/2018 10/23/2017    ALT Routine 1/21/2018 10/23/2018 10/23/2017    Follow-Up with Cardiologist Routine 1/21/2018 10/23/2018 10/23/2017            Who to contact     If you have questions or need follow up information about today's clinic visit or your schedule please contact [facility] PHYSIC[facility]ART AT [place] [facility] directly at [phone].  Normal or non-critical lab and imaging results will be communicated to you by MyChart, letter or phone within 4 business days after the clinic has received the results. If you do not hear from us within 7 days, please contact the clinic through [employer] or phone. If you have a critical or abnormal lab result, we will notify you by phone as soon as possible.  Submit refill requests through [employer] or call your pharmacy and they will forward the refill request to us. Please allow 3 business days for your refill to be completed.          Additional Information About Your Visit        [employer] Information     [employer] lets you send messages to your doctor, view your test results, renew your prescriptions, schedule appointments and more. To sign up, go to [email or web address] . Click on \"Log in\" on the left side of the screen, which will take you to the Welcome page. Then click on \"Sign up Now\" on the right side of the page. "     You will be asked to enter the access code listed below, as well as some personal information. Please follow the directions to create your username and password.     Your access code is: [email or web address]  Expires: 2017  9:13 AM     Your access code will  in 90 days. If you need help or a new code, please call your [facility] clinic or [phone].        Care EveryWhere ID     This is your Care EveryWhere ID. This could be used by other organizations to access your [facility] medical records  [id number]        Your Vitals Were     Pulse Pulse Oximetry BMI (Body Mass Index)             75 98% 30.27 kg/m2          Blood Pressure from Last 3 Encounters:   10/23/17 133/76   17 129/69   17 150/84    Weight from Last 3 Encounters:   10/23/17 93 kg (205 lb)   17 94.3 kg (208 lb)   17 94.5 kg (208 lb 5.4 oz)              We Performed the Following     Follow-Up with Cardiologist        Primary Care Provider Office Phone # Fax #    Casie Sammie Watts [phone] [address] [address]        Equal Access to Services     ALEXIS PACE : Hadii aad ku hadasho Soomaali, waaxda luqadaha, qaybta kaalmada adeegyada, waxay idiin hayephraimn juan vo. So [facility] [phone].    ATENCIÓN: Si habla español, tiene a rojo disposición servicios gratuitos de asistencia lingüística. Llame al [phone].    We comply with applicable federal civil rights laws and [place] laws. We do not discriminate on the basis of race, color, national origin, age, disability, sex, sexual orientation, or gender identity.            Thank you!     Thank you for choosing [facility] PHYSICIAN HEART AT [facility]  for your care. Our goal is always to provide you with excellent care. Hearing back from our patients is one way we can continue to improve our services. Please take a few minutes to complete the written survey that you may receive in the mail after your  visit with us. Thank you!             Your Updated Medication List - Protect others around you: Learn how to safely use, store and throw away your medicines at www.disposemymeds.org.          This list is accurate as of: 10/23/17 10:25 AM.  Always use your most recent med list.                   Brand Name Dispense Instructions for use Diagnosis    acetaminophen 325 MG tablet    TYLENOL    100 tablet    Take 2 tablets (650 mg) by mouth every 4 hours as needed for other (mild pain)    Acute post-operative pain       allopurinol 100 MG tablet    ZYLOPRIM    45 tablet    Take 0.5 tablets (50 mg) by mouth daily Gout    Gout of foot, unspecified cause, unspecified chronicity, unspecified laterality       amLODIPine 10 MG tablet    NORVASC    90 tablet    Take 1 tablet (10 mg) by mouth daily    Essential hypertension with goal blood pressure less than 140/90       aspirin 325 MG EC tablet     100    1 tab po QD (Once per day)    Coronary atherosclerosis of unspecified type of vessel, native or graft       atorvastatin 40 MG tablet    LIPITOR    90 tablet    Take 1 tablet (40 mg) by mouth daily    Hyperlipidemia LDL goal <100       CENTRUM SILVER per tablet      Take 1 tablet by mouth daily        finasteride 5 MG tablet    PROSCAR    90 tablet    Take 1 tablet (5 mg) by mouth daily    Benign non-nodular prostatic hyperplasia without lower urinary tract symptoms       ibuprofen 600 MG tablet    ADVIL/MOTRIN    30 tablet    Take 1 tablet (600 mg) by mouth every 6 hours as needed for pain (mild)    Acute post-operative pain       losartan 100 MG tablet    COZAAR    90 tablet    Take 1 tablet (100 mg) by mouth daily    Essential hypertension with goal blood pressure less than 140/90       [facility]

## 2017-10-23 NOTE — PROGRESS NOTES
"HISTORY OF PRESENT ILLNESS:  The patient is a 89-year-old mildly overweight white male with a longstanding history of ischemic heart disease approximately 22-23 years status post coronary bypass surgery of multiple vessels who presented to Cardiology Clinic initially for an episode of complete syncope with snow blowing and removing some snow from his property.  He came into the house, had an episode where he felt \"woozy\" and collapsed for several minutes with loss of consciousness and ended up on the floor.  He had a ZIO Patch placed that showed evidence of atrial tachycardias, atrial dysrhythmia with a 4-beat run of ventricular tachycardia.        He subsequently underwent a Cardiolite stress test and history of ischemic heart disease that demonstrated a transmural scar in the inferior basilar/inferolateral wall with mild ellie-infarct ischemia in the basilar inferior septum.  Gated images demonstrated mildly reduced left ventricular systolic function with ejection fraction of 46%.        He denied symptoms of chest discomfort, shortness of breath, palpitations, nausea, vomiting, and diaphoresis.  He had episodes of lightheadedness and near-syncope a few years prior to his most recent event.  He has had some difficulty with bradycardia which had limited beta blocker therapy.        He subsequently underwent an EP study and AICD implant because of left ventricular dysfunction, history of ischemic heart disease as well as tachydysrhythmia concern.        He does continue to drink 1-2 cups of caffeinated beverage per day, rare alcoholic beverage.  His history for coronary disease risk factors are positive for cigarette smoking, discontinued in 1960, hypertension for the past 15-20 years, hyperlipidemia, but no diagnosis of diabetes mellitus or family history of premature coronary disease.        Since the placement of device, he had no episodes of palpitations, lightheadedness or syncope.  He does have symptoms of easy " fatigability, general malaise, but no limitation in his activity.  Interrogation of his device has shown increased atrial dysrhythmia with evidence of more persistent atrial fibrillation.  He in the past has not been interested in anticoagulation, even though he has an elevated CHADS2-VASc score.  He denies symptoms of divya chest discomfort, dizziness, palpitations, nausea, vomiting, diaphoresis or syncope.  He denies PND, orthopnea, fever, chills or sweats.      MEDICATIONS:   1.  Ibuprofen 600 mg as needed.   2.  Amlodipine 10 mg a day.   3.  Magnesium hydroxide as needed.   4.  Losartan 100 mg a day.   5.  Allopurinol 50 mg a day.   6.  Finasteride 5 mg a day.   7.  Atorvastatin 40 mg a day.   8.  Multivitamins 1 per day.   9.  Aspirin 325 mg a day.   10.  Acetaminophen 650 mg q.6h as needed.      LABORATORY DATA:  Demonstrated cholesterol 202, HDL 36, , triglyceride 191,000.  Sodium 139, potassium 4.4, BUN 20, creatinine 1.12.  Hemoglobin 12.1 and platelet count 216,000.  ALT 23.        The patient was ill with pneumonia earlier last month but has recovered from this illness.  Otherwise, he appears relatively functional without significant restriction.        His echocardiography demonstrated a mildly reduced left ventricular function with ejection fraction of 45% with moderate inferior wall hypokinesis and mild to moderate septal hypokinesis.  There was moderate biatrial enlargement.  There was mild mitral annular calcification with trace mitral insufficiency and trace tricuspid insufficiency and slight elevation of right ventricular systolic pressure at 29 mmHg plus right atrial pressure.      PHYSICAL EXAMINATION:   VITAL SIGNS:  Blood pressure 133/76 with a heart rate of 75 and regular, weight was 205 pounds.   NECK:  Without significant jugular venous distention, carotid bruit or palpable thyroid.   CHEST:  Essentially clear to percussion and auscultation with slight decreased breath sounds at the  bases, slightly prolonged expiratory phase.   CARDIAC:  Revealed a slightly irregular rhythm, ?S4.  There was a 1-2/6 systolic murmur at the left sternal border radiating to the apex.  No diastolic murmur, rub or S3.   EXTREMITIES:  Without significant cyanosis or erythema.  There was 1 to 2+ edema present.      CLINICAL IMPRESSION:   1.  Relatively stable cardiac condition.   2.  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   3.  Status post AICD implantation for atrial and ventricular dysrhythmia as well as bradycardia and conduction system disease.   4.  History of ischemic heart disease, status post multivessel coronary bypass surgery in 1986.   5.  History of hypertension with slight increase in systolic blood pressure.   6.  Previous episode of syncope apparently relieved by placement of the device.   7.  Distant history of cigarette smoking.   8.  History of hypercholesterolemia, not at goal, most likely due to dietary indiscretion.   9.  History of hyperglycemia.   10.  History of benign prostatic hypertrophy.   11.  History of paroxysmal atrial fibrillation, now appearing more chronic.      DISCUSSION:  The patient has done well clinically especially since the AICD implantation with no episodes of lightheadedness, dizziness or syncope.  He has had no palpitations.  He was felt to be a good candidate for a dual-chamber pacemaker in the first place and it was elected to do an AICD after electrophysiology testing his presentation with syncope.  He does have an elevated CHADS2-VASc score and is willing to consider possible anticoagulation, although he would prefer the NOAC drugs versus warfarin with the hassle of the blood testing.  He will continue taking aspirin while the information is obtained about insurance coverage for the NOAC drugs and to consider whether he is willing to take on warfarin.  His  serum lipids remain mildly elevated despite 40 mg of Lipitor, however, the patient would prefer not to take additional medications at this time such as Zetia.  He appears clinically to be relatively stable.      RECOMMENDATIONS:   1.  Continue present medications.  Strongly consider anticoagulation for stroke prevention.   2.  Device followup.   3.  Close followup of serum lipids, basic metabolic panel and blood pressure.   4.  Diet and exercise as tolerated.   5.  Routine medical followup.   6.  Cardiology followup in 3-4 months.       ADDENDUM:  The possibility of using a NOAC versus Coumadin for anticoagulation will be discussed and considered by the patient with also inquiry as to whether his insurance will pay.      cc:      [place] [address]         LESIA QUEEN MD, [facility]             D: 10/23/2017 12:44   T: 10/23/2017 14:17   MT: OMAR      Name:     HALI ANAYA   MRN:      [id number]        Account:      [id number]   :      1927           Service Date: 10/23/2017      Document: [id number]

## 2017-10-23 NOTE — LETTER
"10/23/2017    Casie Watts, DO  [address] [facility] [address]    RE: Colin LANGLEY Suleman       Dear Colleague,    I had the pleasure of seeing Colin Shell in the [facility].    The patient is a 89-year-old mildly overweight white male with a longstanding history of ischemic heart disease approximately 22-23 years status post coronary bypass surgery of multiple vessels who presented to Cardiology Clinic initially for an episode of complete syncope with snow blowing and removing some snow from his property.  He came into the house, had an episode where he felt \"woozy\" and collapsed for several minutes with loss of consciousness and ended up on the floor.  He had a ZIO Patch placed that showed evidence of atrial tachycardias, atrial dysrhythmia with a 4-beat run of ventricular tachycardia.        He subsequently underwent a Cardiolite stress test and history of ischemic heart disease that demonstrated a transmural scar in the inferior basilar/inferolateral wall with mild ellie-infarct ischemia in the basilar inferior septum.  Gated images demonstrated mildly reduced left ventricular systolic function with ejection fraction of 46%.        He denied symptoms of chest discomfort, shortness of breath, palpitations, nausea, vomiting, and diaphoresis.  He had episodes of lightheadedness and near-syncope a few years prior to his most recent event.  He has had some difficulty with bradycardia which had limited beta blocker therapy.        He subsequently underwent an EP study and AICD implant because of left ventricular dysfunction, history of ischemic heart disease as well as tachydysrhythmia concern.        He does continue to drink 1-2 cups of caffeinated beverage per day, rare alcoholic beverage.  His history for coronary disease risk factors are positive for cigarette smoking, discontinued in 1960, hypertension for the past 15-20 years, hyperlipidemia, but no diagnosis of " diabetes mellitus or family history of premature coronary disease.        Since the placement of device, he had no episodes of palpitations, lightheadedness or syncope.  He does have symptoms of easy fatigability, general malaise, but no limitation in his activity.  Interrogation of his device has shown increased atrial dysrhythmia with evidence of more persistent atrial fibrillation.  He in the past has not been interested in anticoagulation, even though he has an elevated CHADS2-VASc score.  He denies symptoms of divya chest discomfort, dizziness, palpitations, nausea, vomiting, diaphoresis or syncope.  He denies PND, orthopnea, fever, chills or sweats.      MEDICATIONS:   1.  Ibuprofen 600 mg as needed.   2.  Amlodipine 10 mg a day.   3.  Magnesium hydroxide as needed.   4.  Losartan 100 mg a day.   5.  Allopurinol 50 mg a day.   6.  Finasteride 5 mg a day.   7.  Atorvastatin 40 mg a day.   8.  Multivitamins 1 per day.   9.  Aspirin 325 mg a day.   10.  Acetaminophen 650 mg q.6h as needed.      LABORATORY DATA:  Demonstrated cholesterol 202, HDL 36, , triglyceride 191,000.  Sodium 139, potassium 4.4, BUN 20, creatinine 1.12.  Hemoglobin 12.1 and platelet count 216,000.  ALT 23.        The patient was ill with pneumonia earlier last month but has recovered from this illness.  Otherwise, he appears relatively functional without significant restriction.        His echocardiography demonstrated a mildly reduced left ventricular function with ejection fraction of 45% with moderate inferior wall hypokinesis and mild to moderate septal hypokinesis.  There was moderate biatrial enlargement.  There was mild mitral annular calcification with trace mitral insufficiency and trace tricuspid insufficiency and slight elevation of right ventricular systolic pressure at 29 mmHg plus right atrial pressure.      PHYSICAL EXAMINATION:   VITAL SIGNS:  Blood pressure 133/76 with a heart rate of 75 and regular, weight was 205  pounds.   NECK:  Without significant jugular venous distention, carotid bruit or palpable thyroid.   CHEST:  Essentially clear to percussion and auscultation with slight decreased breath sounds at the bases, slightly prolonged expiratory phase.   CARDIAC:  Revealed a slightly irregular rhythm, ?S4.  There was a 1-2/6 systolic murmur at the left sternal border radiating to the apex.  No diastolic murmur, rub or S3.   EXTREMITIES:  Without significant cyanosis or erythema.  There was 1 to 2+ edema present.      CLINICAL IMPRESSION:   1.  Relatively stable cardiac condition.   2.  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   3.  Status post AICD implantation for atrial and ventricular dysrhythmia as well as bradycardia and conduction system disease.   4.  History of ischemic heart disease, status post multivessel coronary bypass surgery in 1986.   5.  History of hypertension with slight increase in systolic blood pressure.   6.  Previous episode of syncope apparently relieved by placement of the device.   7.  Distant history of cigarette smoking.   8.  History of hypercholesterolemia, not at goal, most likely due to dietary indiscretion.   9.  History of hyperglycemia.   10.  History of benign prostatic hypertrophy.   11.  History of paroxysmal atrial fibrillation, now appearing more chronic.      DISCUSSION:  The patient has done well clinically especially since the AICD implantation with no episodes of lightheadedness, dizziness or syncope.  He has had no palpitations.  He was felt to be a good candidate for a dual-chamber pacemaker in the first place and it was elected to do an AICD after electrophysiology testing his presentation with syncope.  He does have an elevated CHADS2-VASc score and is willing to consider possible anticoagulation, although he would prefer the NOAC drugs versus warfarin with the hassle  of the blood testing.  He will continue taking aspirin while the information is obtained about insurance coverage for the NOAC drugs and to consider whether he is willing to take on warfarin.  His serum lipids remain mildly elevated despite 40 mg of Lipitor, however, the patient would prefer not to take additional medications at this time such as Zetia.  He appears clinically to be relatively stable.      RECOMMENDATIONS:   1.  Continue present medications.  Strongly consider anticoagulation for stroke prevention.   2.  Device followup.   3.  Close followup of serum lipids, basic metabolic panel and blood pressure.   4.  Diet and exercise as tolerated.   5.  Routine medical followup.   6.  Cardiology followup in 3-4 months.       ADDENDUM:  The possibility of using a NOAC versus Coumadin for anticoagulation will be discussed and considered by the patient with also inquiry as to whether his insurance will pay.      Again, thank you for allowing me to participate in the care of your patient.      Sincerely,    Norm Erickson MD     [facility] [place]

## 2017-10-23 NOTE — PATIENT INSTRUCTIONS
Thank you for your [facility] visit today. Your provider has recommended the following:  Medication Changes:  Please see below and contact our office (RN Line at [phone]) with what you found out regarding the cost of these two different options below for treatment of Atrial Fibrillation.  Recommendations:  Please contact your insurance company to see if and what they will cover for these two different anticoagulation medications: Eliquis 5 mg twice a day or Xarelto 20 mg once a day.  Follow-up:  See Dr. Erickson for cardiology follow up in 3-4 months.    We kindly ask that you call cardiology scheduling at [phone] three months prior to requested revisit date to schedule future cardiology appointments.  Reminder:  1. Please bring in your current medication list or your medication, over the counter supplements and vitamin bottles as we will review these at each office visit.               [facility]  [facility]~[address]~[place], [place]~[address]  Questions about your visit today?  Call your Cardiology Clinic RN's-Ekaterina Sewell and/or Monica Ramirez at [phone].

## 2017-10-24 ENCOUNTER — TELEPHONE (OUTPATIENT)
Dept: CARDIOLOGY | Facility: CLINIC | Age: 82
End: 2017-10-24

## 2017-10-24 DIAGNOSIS — I25.810 CORONARY ARTERY DISEASE INVOLVING CORONARY BYPASS GRAFT OF NATIVE HEART WITHOUT ANGINA PECTORIS: Chronic | ICD-10-CM

## 2017-10-24 DIAGNOSIS — I48.91 ATRIAL FIBRILLATION (H): Primary | ICD-10-CM

## 2017-10-24 RX ORDER — ASPIRIN 81 MG/1
81 TABLET, CHEWABLE ORAL DAILY
Qty: 100 TABLET | Refills: 3 | COMMUNITY
Start: 2017-10-24 | End: 2018-01-08 | Stop reason: ALTCHOICE

## 2017-10-24 NOTE — TELEPHONE ENCOUNTER
Pt calls this RN stating Xarelto will be affordable for him to take for his AF. Creatinine clearance using most recent creatinine of 1.12 calculated at ~59. Using average of creatinine data for 2017, 1.22, calculated at creatinine clearance of ~54. Both numbers are greater than 50 ml/min so appropriate dose for patient is 20 mg once daily with supper. Pharmacy preference=[facility] in [place].  Latosha Sewell RN Cardiology at [facility] October 24, 2017, 10:54 AM    ADDENDUM: Discussed with Dr. Erickson per phone. Ok to Rx Xarelto 20 mg daily with evening meal. Decrease ASA to 81 mg QD. JIMI revisit in one month to assess response to medication. Latosha Sewell RN Cardiology at [facility] October 24, 2017, 11:32 AM      ADDENDUM: Discussed above with patient per phone. Instructed not to use OTC or Rx NSAIDS, including ASA with Xarelto 2/2 increased bleeding. States understanding. JIMI revisit made 11/27/17 with GENEVA Abrams. Latosha Sewell RN Cardiology at [facility] October 24, 2017, 3:06 PM

## 2017-11-14 ENCOUNTER — ALLIED HEALTH/NURSE VISIT (OUTPATIENT)
Dept: FAMILY MEDICINE | Facility: CLINIC | Age: 82
End: 2017-11-14
Payer: COMMERCIAL

## 2017-11-14 ENCOUNTER — ALLIED HEALTH/NURSE VISIT (OUTPATIENT)
Dept: DERMATOLOGY | Facility: CLINIC | Age: 82
End: 2017-11-14
Payer: COMMERCIAL

## 2017-11-14 DIAGNOSIS — L98.9 UNKNOWN SKIN LESION: Primary | ICD-10-CM

## 2017-11-14 DIAGNOSIS — Z23 NEED FOR PROPHYLACTIC VACCINATION AND INOCULATION AGAINST INFLUENZA: Primary | ICD-10-CM

## 2017-11-14 PROCEDURE — 99207 ZZC NO CHARGE NURSE ONLY: CPT

## 2017-11-14 PROCEDURE — 90471 IMMUNIZATION ADMIN: CPT

## 2017-11-14 PROCEDURE — 90662 IIV NO PRSV INCREASED AG IM: CPT

## 2017-11-14 NOTE — MR AVS SNAPSHOT
After Visit Summary   11/14/2017    Colin Shell    MRN: [id number]           Patient Information     Date Of Birth          6/25/1927        Visit Information        Provider Department      11/14/2017 9:00 AM [facility]U [facility]OT [facility]IC [facility]        Today's Diagnoses     Need for prophylactic vaccination and inoculation against influenza    -  1       Follow-ups after your visit        Your next 10 appointments already scheduled     Nov 27, 2017 10:00 AM CST   Return Visit with ALEKSANDER Singh CNP   [facility] ([facility])    [address] [facility] [address] [address]   [phone]            Jan 04, 2018  9:30 AM CST   LAB with [place] [facility]   [facility] ([facility]    [address] [address]   [phone]           Please do not eat 10-12 hours before your appointment if you are coming in fasting for labs on lipids, cholesterol, or glucose (sugar). This does not apply to pregnant women. Water, hot tea and black coffee (with nothing added) are okay. Do not drink other fluids, diet soda or chew gum.            Jan 08, 2018  9:30 AM CST   Return Visit with Norm Erickson MD   [facility] ([facility])    [address] [facility] [address] [address]   [phone]            Jan 31, 2018  4:30 PM CST   Remote ICD Check with CASEY DCR2   [facility] ([facility])    [address] [place] [address] [address]  [facility] [id number] [phone]           This appointment is for a remote check of your debrillator.  This is not an appointment at the office.              Who to contact     If you have questions or need follow up information about today's clinic visit or your schedule please contact [facility] directly at [phone].  Normal or non-critical lab and imaging results  "will be communicated to you by MyChart, letter or phone within 4 business days after the clinic has received the results. If you do not hear from us within 7 days, please contact the clinic through [employer] or phone. If you have a critical or abnormal lab result, we will notify you by phone as soon as possible.  Submit refill requests through [employer] or call your pharmacy and they will forward the refill request to us. Please allow 3 business days for your refill to be completed.          Additional Information About Your Visit        [employer] Information     [employer] lets you send messages to your doctor, view your test results, renew your prescriptions, schedule appointments and more. To sign up, go to [email or web address] . Click on \"Log in\" on the left side of the screen, which will take you to the Welcome page. Then click on \"Sign up Now\" on the right side of the page.     You will be asked to enter the access code listed below, as well as some personal information. Please follow the directions to create your username and password.     Your access code is: [email or web address]  Expires: 2017  8:13 AM     Your access code will  in 90 days. If you need help or a new code, please call your [place] clinic or [phone].        Care EveryWhere ID     This is your Care EveryWhere ID. This could be used by other organizations to access your [place] medical records  [id number]         Blood Pressure from Last 3 Encounters:   10/23/17 133/76   17 129/69   17 150/84    Weight from Last 3 Encounters:   10/23/17 205 lb (93 kg)   17 208 lb (94.3 kg)   17 208 lb 5.4 oz (94.5 kg)              We Performed the Following     FLU VACCINE, INCREASED ANTIGEN, PRESV FREE, AGE 65+ [21623]     Vaccine Administration, Initial [21188]        Primary Care Provider Office Phone # Fax #    Casie Sammie Watts [phone] [address] [address]        Equal Access to " Services     [facility]: Hadii aad ku hadgalcourtney Donnabernadine, waaxda luqadaha, qaybta kaalmayasmin dumont, fina [place] . So [facility] [phone].    ATENCIÓN: Si ijeomala jocelyn, tiene a rojo disposición servicios gratuitos de asistencia lingüística. Llame al [phone].    We comply with applicable federal civil rights laws and [place] laws. We do not discriminate on the basis of race, color, national origin, age, disability, sex, sexual orientation, or gender identity.            Thank you!     Thank you for choosing [facility]  for your care. Our goal is always to provide you with excellent care. Hearing back from our patients is one way we can continue to improve our services. Please take a few minutes to complete the written survey that you may receive in the mail after your visit with us. Thank you!             Your Updated Medication List - Protect others around you: Learn how to safely use, store and throw away your medicines at www.disposemymeds.org.          This list is accurate as of: 11/14/17  9:04 AM.  Always use your most recent med list.                   Brand Name Dispense Instructions for use Diagnosis    acetaminophen 325 MG tablet    TYLENOL    100 tablet    Take 2 tablets (650 mg) by mouth every 4 hours as needed for other (mild pain)    Acute post-operative pain       allopurinol 100 MG tablet    ZYLOPRIM    45 tablet    Take 0.5 tablets (50 mg) by mouth daily Gout    Gout of foot, unspecified cause, unspecified chronicity, unspecified laterality       amLODIPine 10 MG tablet    NORVASC    90 tablet    Take 1 tablet (10 mg) by mouth daily    Essential hypertension with goal blood pressure less than 140/90       aspirin 81 MG chewable tablet     100 tablet    Take 1 tablet (81 mg) by mouth daily    Coronary artery disease involving coronary bypass graft of native heart without angina pectoris       atorvastatin 40 MG tablet    LIPITOR    90 tablet    Take 1  tablet (40 mg) by mouth daily    Hyperlipidemia LDL goal <100       CENTRUM SILVER per tablet      Take 1 tablet by mouth daily        finasteride 5 MG tablet    PROSCAR    90 tablet    Take 1 tablet (5 mg) by mouth daily    Benign non-nodular prostatic hyperplasia without lower urinary tract symptoms       losartan 100 MG tablet    COZAAR    90 tablet    Take 1 tablet (100 mg) by mouth daily    Essential hypertension with goal blood pressure less than 140/90       ELIJAH MILK OF MAGNESIA PO           rivaroxaban ANTICOAGULANT 20 MG Tabs tablet    XARELTO    90 tablet    Take 1 tablet (20 mg) by mouth daily (with dinner)    Atrial fibrillation (H)

## 2017-11-14 NOTE — PROGRESS NOTES

## 2017-11-14 NOTE — NURSING NOTE
"Patient walked into clinic asking if he needs an appointment. Has what appear to be normal moles on L and R jawline. Patient states: \"I bump them when I shave and then they bleed sometimes, can they be taken off?..\"     Advised he will need to be seen by a Provider as he will need to be examined/diagnosed.   I did tell him they appear to be normal moles, but a Provider would need to determine that as it is out of an RN's scope of practice to diagnosis.   If areas appear to be abnormal to Provider, may need to be biopsied. If they are normal, insurance will consider areas cosmetic and will not cover removal or excision.     Patient verbalized understanding and sent to appointment desk to schedule appointment with Provider. Marisela Wilson RN    "

## 2017-11-14 NOTE — MR AVS SNAPSHOT
After Visit Summary   11/14/2017    Colin Shell    MRN: [id number]           Patient Information     Date Of Birth          6/25/1927        Visit Information        Provider Department      11/14/2017 9:15 AM Nurse, [facility]        Today's Diagnoses     Unknown skin lesion    -  1       Follow-ups after your visit        Your next 10 appointments already scheduled     Nov 27, 2017 10:00 AM CST   Return Visit with ALEKSANDER Singh CNP   [facility] ([facility])    [address] [facility] [address]   [phone]            Jan 04, 2018  9:30 AM CST   LAB with [facility] ([facility])    [address] [address]   [phone]           Please do not eat 10-12 hours before your appointment if you are coming in fasting for labs on lipids, cholesterol, or glucose (sugar). This does not apply to pregnant women. Water, hot tea and black coffee (with nothing added) are okay. Do not drink other fluids, diet soda or chew gum.            Jan 08, 2018  9:30 AM CST   Return Visit with Norm Erickson MD   [facility] ([facility])    [address] [facility] [address]   [phone]            Jan 16, 2018 10:00 AM CST   Return Visit with Kristen Gonzalez PA-C   [facility] ([facility])    [address] [facility] [address]   [phone]            Jan 31, 2018  4:30 PM CST   Remote ICD Check with CASEY DCR2   [facility] ([facility])    [address]  [place] [id number] [phone]           This appointment is for a remote check of your debrillator.  This is not an appointment at the office.              Who to contact     If you have questions or need follow up information about  "today's clinic visit or your schedule please contact [facility] directly at [phone].  Normal or non-critical lab and imaging results will be communicated to you by MyChart, letter or phone within 4 business days after the clinic has received the results. If you do not hear from us within 7 days, please contact the clinic through [employer] or phone. If you have a critical or abnormal lab result, we will notify you by phone as soon as possible.  Submit refill requests through [employer] or call your pharmacy and they will forward the refill request to us. Please allow 3 business days for your refill to be completed.          Additional Information About Your Visit        [employer] Information     [employer] lets you send messages to your doctor, view your test results, renew your prescriptions, schedule appointments and more. To sign up, go to [email or web address] . Click on \"Log in\" on the left side of the screen, which will take you to the Welcome page. Then click on \"Sign up Now\" on the right side of the page.     You will be asked to enter the access code listed below, as well as some personal information. Please follow the directions to create your username and password.     Your access code is: [email or web address]  Expires: 2017  8:13 AM     Your access code will  in 90 days. If you need help or a new code, please call your [place] clinic or [phone].        Care EveryWhere ID     This is your Care EveryWhere ID. This could be used by other organizations to access your [place] medical records  [id number]         Blood Pressure from Last 3 Encounters:   10/23/17 133/76   17 129/69   17 150/84    Weight from Last 3 Encounters:   10/23/17 93 kg (205 lb)   17 94.3 kg (208 lb)   17 94.5 kg (208 lb 5.4 oz)              Today, you had the following     No orders found for display       Primary Care Provider Office Phone # Fax #    Casie Sammie Watts [phone] " [phone]       [address] [facility] [address]        Equal Access to Services     [facility] : Hadii aad ku hadgalcourtney Sobernadine, waaxda luqadaha, qaybta kaalmada carrilloabel, fina nunez payalwhitney vizcainolinette trammellamber vo. So [facility] [phone].    ATENCIÓN: Si habla español, tiene a rojo disposición servicios gratuitos de asistencia lingüística. Llame al [phone].    We comply with applicable federal civil rights laws and [place] laws. We do not discriminate on the basis of race, color, national origin, age, disability, sex, sexual orientation, or gender identity.            Thank you!     Thank you for choosing [facility]  for your care. Our goal is always to provide you with excellent care. Hearing back from our patients is one way we can continue to improve our services. Please take a few minutes to complete the written survey that you may receive in the mail after your visit with us. Thank you!             Your Updated Medication List - Protect others around you: Learn how to safely use, store and throw away your medicines at www.disposemymeds.org.          This list is accurate as of: 11/14/17  9:41 AM.  Always use your most recent med list.                   Brand Name Dispense Instructions for use Diagnosis    acetaminophen 325 MG tablet    TYLENOL    100 tablet    Take 2 tablets (650 mg) by mouth every 4 hours as needed for other (mild pain)    Acute post-operative pain       allopurinol 100 MG tablet    ZYLOPRIM    45 tablet    Take 0.5 tablets (50 mg) by mouth daily Gout    Gout of foot, unspecified cause, unspecified chronicity, unspecified laterality       amLODIPine 10 MG tablet    NORVASC    90 tablet    Take 1 tablet (10 mg) by mouth daily    Essential hypertension with goal blood pressure less than 140/90       aspirin 81 MG chewable tablet     100 tablet    Take 1 tablet (81 mg) by mouth daily    Coronary artery disease involving coronary bypass graft of native heart without  angina pectoris       atorvastatin 40 MG tablet    LIPITOR    90 tablet    Take 1 tablet (40 mg) by mouth daily    Hyperlipidemia LDL goal <100       CENTRUM SILVER per tablet      Take 1 tablet by mouth daily        finasteride 5 MG tablet    PROSCAR    90 tablet    Take 1 tablet (5 mg) by mouth daily    Benign non-nodular prostatic hyperplasia without lower urinary tract symptoms       losartan 100 MG tablet    COZAAR    90 tablet    Take 1 tablet (100 mg) by mouth daily    Essential hypertension with goal blood pressure less than 140/90       ELIJAH DIAZ [facility] PO           rivaroxaban ANTICOAGULANT 20 MG Tabs tablet    XARELTO    90 tablet    Take 1 tablet (20 mg) by mouth daily (with dinner)    Atrial fibrillation (H)

## 2017-11-27 ENCOUNTER — OFFICE VISIT (OUTPATIENT)
Dept: CARDIOLOGY | Facility: CLINIC | Age: 82
End: 2017-11-27
Attending: INTERNAL MEDICINE
Payer: COMMERCIAL

## 2017-11-27 ENCOUNTER — HOSPITAL ENCOUNTER (OUTPATIENT)
Dept: GENERAL RADIOLOGY | Facility: CLINIC | Age: 82
Discharge: HOME | End: 2017-11-27
Attending: NURSE PRACTITIONER | Admitting: NURSE PRACTITIONER
Payer: COMMERCIAL

## 2017-11-27 VITALS
HEART RATE: 64 BPM | WEIGHT: 204 LBS | BODY MASS INDEX: 30.13 KG/M2 | OXYGEN SATURATION: 97 % | DIASTOLIC BLOOD PRESSURE: 60 MMHG | SYSTOLIC BLOOD PRESSURE: 132 MMHG

## 2017-11-27 DIAGNOSIS — I25.810 CORONARY ARTERY DISEASE INVOLVING CORONARY BYPASS GRAFT OF NATIVE HEART WITHOUT ANGINA PECTORIS: ICD-10-CM

## 2017-11-27 DIAGNOSIS — R05.9 COUGH: ICD-10-CM

## 2017-11-27 DIAGNOSIS — I48.20 CHRONIC ATRIAL FIBRILLATION (H): Primary | ICD-10-CM

## 2017-11-27 DIAGNOSIS — Z95.810 HISTORY OF IMPLANTABLE CARDIOVERTER-DEFIBRILLATOR (ICD) PLACEMENT: ICD-10-CM

## 2017-11-27 DIAGNOSIS — R09.89 BIBASILAR CRACKLES: ICD-10-CM

## 2017-11-27 DIAGNOSIS — E78.5 HYPERLIPIDEMIA LDL GOAL <70: ICD-10-CM

## 2017-11-27 DIAGNOSIS — I10 BENIGN ESSENTIAL HYPERTENSION: ICD-10-CM

## 2017-11-27 PROCEDURE — 71020 XR CHEST 2 VW: CPT

## 2017-11-27 PROCEDURE — 99214 OFFICE O/P EST MOD 30 MIN: CPT | Performed by: NURSE PRACTITIONER

## 2017-11-27 NOTE — PATIENT INSTRUCTIONS
Thank you for your [facility] visit today. Your provider has recommended the following:  Medication Changes:  No changes   Recommendations:  Call with any questions   Follow-up:  See Dr. Erickson for cardiology follow up in January with lab work prior.  Call [phone] two months prior to request date to schedule any future appointments.  Reminder:  1. Please bring in all current medications, over the counter supplements and vitamin bottles to your next appointment.               [facility]  [facility]~[address]~[place], [place]~[address]  Questions about your visit today?   Call your Cardiology Clinic RN's-Ekaterina Sewell and/or Monica Ramirez at [phone].

## 2017-11-27 NOTE — MR AVS SNAPSHOT
After Visit Summary   11/27/2017    Colin Shell    MRN: [id number]           Patient Information     Date Of Birth          6/25/1927        Visit Information        Provider Department      11/27/2017 10:00 AM Oliva Abrams APRN CNP [facility]        Today's Diagnoses     Atrial fibrillation (H)          Care Instructions    Thank you for your [facility] visit today. Your provider has recommended the following:  Medication Changes:  No changes   Recommendations:  Call with any questions   Follow-up:  See Dr. Erickson for cardiology follow up in January with lab work prior.  Call [phone] two months prior to request date to schedule any future appointments.  Reminder:  1. Please bring in all current medications, over the counter supplements and vitamin bottles to your next appointment.               [facility]~[address]~[place], [place]~[address]  Questions about your visit today?   Call your Cardiology Clinic RN's-Ekaterina Sewell and/or Monica Ramirez at [phone].            Follow-ups after your visit        Your next 10 appointments already scheduled     Jan 04, 2018  9:30 AM CST   LAB with [facility] ([facility])    [address] [address] [phone]           Please do not eat 10-12 hours before your appointment if you are coming in fasting for labs on lipids, cholesterol, or glucose (sugar). This does not apply to pregnant women. Water, hot tea and black coffee (with nothing added) are okay. Do not drink other fluids, diet soda or chew gum.            Jan 08, 2018  9:30 AM CST   Return Visit with Norm Erickson MD   [facility] ([facility])    [address] [id number] [phone]            Jan 16, 2018 10:00 AM CST   Return  "Visit with Kristen Gonzalez PA-C   [facility] ([facility]    [address]  [facility] [address] [phone]            2018  4:30 PM CST   Remote ICD Check with CASEY DCR2   [facility] ([facility])    [address]  [place] [id number] [phone]           This appointment is for a remote check of your debrillator.  This is not an appointment at the office.              Who to contact     If you have questions or need follow up information about today's clinic visit or your schedule please contact [facility] directly at [phone].  Normal or non-critical lab and imaging results will be communicated to you by MyChart, letter or phone within 4 business days after the clinic has received the results. If you do not hear from us within 7 days, please contact the clinic through MyChart or phone. If you have a critical or abnormal lab result, we will notify you by phone as soon as possible.  Submit refill requests through [employer] or call your pharmacy and they will forward the refill request to us. Please allow 3 business days for your refill to be completed.          Additional Information About Your Visit        [employer] Information     [employer] lets you send messages to your doctor, view your test results, renew your prescriptions, schedule appointments and more. To sign up, go to [email or web address] . Click on \"Log in\" on the left side of the screen, which will take you to the Welcome page. Then click on \"Sign up Now\" on the right side of the page.     You will be asked to enter the access code listed below, as well as some personal information. Please follow the directions to create your username and password.     Your access code is: [email or web address]  Expires: 2017  8:13 AM     Your access code will  in 90 days. If you need help or a new " code, please call your [place] clinic or [phone].        Care EveryWhere ID     This is your Care EveryWhere ID. This could be used by other organizations to access your [place] medical records  [id number]        Your Vitals Were     Pulse Pulse Oximetry BMI (Body Mass Index)             64 97% 30.13 kg/m2          Blood Pressure from Last 3 Encounters:   11/27/17 132/60   10/23/17 133/76   09/22/17 129/69    Weight from Last 3 Encounters:   11/27/17 92.5 kg (204 lb)   10/23/17 93 kg (205 lb)   09/22/17 94.3 kg (208 lb)              We Performed the Following     Follow-Up with Cardiac Advanced Practice Provider          Today's Medication Changes          These changes are accurate as of: 11/27/17 10:12 AM.  If you have any questions, ask your nurse or doctor.               Stop taking these medicines if you haven't already. Please contact your care team if you have questions.     acetaminophen 325 MG tablet   Commonly known as:  TYLENOL   Stopped by:  Oliva Abrams APRN CNP                    Primary Care Provider Office Phone # Fax #    Casie Sammie Watts [phone] [address] [address]        Equal Access to Services     ALEXIS PACE AH: Hadii lanny lewis hadasho Sochiali, waaxda luqadaha, qaybta kaalmada adeegyada, fina vo. So [facility] [phone].    ATENCIÓN: Si habla español, tiene a rojo disposición servicios gratuitos de asistencia lingüística. Lladan al [phone].    We comply with applicable federal civil rights laws and [place] laws. We do not discriminate on the basis of race, color, national origin, age, disability, sex, sexual orientation, or gender identity.            Thank you!     Thank you for choosing [facility]  for your care. Our goal is always to provide you with excellent care. Hearing back from our patients is one way we can continue to improve our services.  Please take a few minutes to complete the written survey that you may receive in the mail after your visit with us. Thank you!             Your Updated Medication List - Protect others around you: Learn how to safely use, store and throw away your medicines at www.disposemymeds.org.          This list is accurate as of: 11/27/17 10:12 AM.  Always use your most recent med list.                   Brand Name Dispense Instructions for use Diagnosis    allopurinol 100 MG tablet    ZYLOPRIM    45 tablet    Take 0.5 tablets (50 mg) by mouth daily Gout    Gout of foot, unspecified cause, unspecified chronicity, unspecified laterality       amLODIPine 10 MG tablet    NORVASC    90 tablet    Take 1 tablet (10 mg) by mouth daily    Essential hypertension with goal blood pressure less than 140/90       aspirin 81 MG chewable tablet     100 tablet    Take 1 tablet (81 mg) by mouth daily    Coronary artery disease involving coronary bypass graft of native heart without angina pectoris       atorvastatin 40 MG tablet    LIPITOR    90 tablet    Take 1 tablet (40 mg) by mouth daily    Hyperlipidemia LDL goal <100       CENTRUM SILVER per tablet      Take 1 tablet by mouth daily        finasteride 5 MG tablet    PROSCAR    90 tablet    Take 1 tablet (5 mg) by mouth daily    Benign non-nodular prostatic hyperplasia without lower urinary tract symptoms       losartan 100 MG tablet    COZAAR    90 tablet    Take 1 tablet (100 mg) by mouth daily    Essential hypertension with goal blood pressure less than 140/90       ELIJAH MILK OF MAGNESIA PO           rivaroxaban ANTICOAGULANT 20 MG Tabs tablet    XARELTO    90 tablet    Take 1 tablet (20 mg) by mouth daily (with dinner)    Atrial fibrillation (H)

## 2017-11-27 NOTE — PROGRESS NOTES
Cardiology Clinic Progress Note  Colin Shell MRN# [id number]   YOB: 1927 Age: 89 year old     Reason For Visit:  one month follow-up    Primary Cardiologist:   Dr. Erickson          History of Presenting Illness:    Colin Shell is a pleasant 89 year old patient with a past cardiac history significant for abnormal EKG with bundle branch block, status post AICD for syncope, atrial and ventricular dysrhythmia and bradycardia, CAD status post CABG 1986, hypertension, chronic atrial fibrillation on xarelto, and hyperlipidemia.  Past medical history significant for hyperglycemia, previous history of cigarette smoking, lung fibrosis, and BPH.    Pt was last seen by Dr. Erickson on 10/23/2017.  The patient had been doing well after having his AICD placed.  Due to his elevated [id number] score he was started on Xarelto and his aspirin was decreased to 81 mg.  His lipids remained elevated and the patient was not interested in additional hyperlipidemia medications.    Pt presents today for one month follow-up after starting Xarelto.  Since starting Xarelto, he denies any side effects or bleeding problems.  He denies any blood in his urine or stool.  He will continue to monitor for these symptoms.  He does mention that it costs approximately $105 for a three month supply of Xarelto.  He continues to be asymptomatic while in atrial fibrillation.  His blood pressure remains well-controlled 132/60 today.  He has not had any further episodes of lightheadedness, syncope, or heart racing.  He also denies any symptoms of angina.  He continues with left lower extremity edema which is chronic and unchanged.  Overall, from a cardiac standpoint he has been doing well.  He does discuss that he recently was treated for pneumonia a couple months ago.  He is afraid that this is returning as he has continued with the cough and yellow sputum. He denies any fever and heart rate is controlled. He does have bilateral  crackles in his lungs and I offered him a chest x-ray today. If any findings for pneumonia, we will refer him to his PCP for treatment.  He does have a history of fibrosis. Patient reports no chest pain, shortness of breath, PND, orthopnea, presyncope, syncope, heart racing, or palpitations.      Current Cardiac Medications   Xarelto 20 mg daily  Aspirin 81 mg daily  Amlodipine 10 mg daily  Losartan 100 mg daily  Atorvastatin 40 mg daily                    Assessment and Plan:     Plan  1.  Chest x-ray today to reassess for any pneumonia versus his chronic fibrosis  2.  Follow-up with Dr. Erickson 1/8/2018 with lab work prior  3.  Follow-up with device clinic for remote transmission, as scheduled      1. Chronic atrial fibrillation    Asymptomatic    Continue anticoagulation with Xarelto      2. Hypertension    132/60, controlled    Continue amlodipine, losartan      3. Hyperlipidemia    Last  on 10/2017    Continue atorvastatin 40 mg daily      4. History of syncope and arrhythmia status post AICD    No further lightheadedness or syncope      5. CAD    Status post CABG 1986     No angina     Continue statin, aspirin, ARB, calcium channel blocker          Thank you for allowing me to participate in this delightful patient's care.      This note was completed in part using Dragon voice recognition software. Although reviewed after completion, some word and grammatical errors may occur.    Oliva Vasquez, ALEKSANDER, CNP           Data:   All laboratory data reviewed

## 2017-11-27 NOTE — PROGRESS NOTES
HPI and Plan:   See dictation    Orders Placed This Encounter   Procedures     X-ray Chest 2 vws*       No orders of the defined types were placed in this encounter.      Medications Discontinued During This Encounter   Medication Reason     acetaminophen (TYLENOL) 325 MG tablet          Encounter Diagnoses   Name Primary?     Cough      Bibasilar crackles      Coronary artery disease involving coronary bypass graft of native heart without angina pectoris      Chronic atrial fibrillation (H) Yes     Benign essential hypertension      Hyperlipidemia LDL goal <70      History of implantable cardioverter-defibrillator (ICD) placement        CURRENT MEDICATIONS:  Current Outpatient Prescriptions   Medication Sig Dispense Refill     rivaroxaban ANTICOAGULANT (XARELTO) 20 MG TABS tablet Take 1 tablet (20 mg) by mouth daily (with dinner) 90 tablet 3     aspirin 81 MG chewable tablet Take 1 tablet (81 mg) by mouth daily 100 tablet 3     amLODIPine (NORVASC) 10 MG tablet Take 1 tablet (10 mg) by mouth daily 90 tablet 3     Magnesium Hydroxide (NAVA MILK OF MAGNESIA PO)        losartan (COZAAR) 100 MG tablet Take 1 tablet (100 mg) by mouth daily 90 tablet 3     allopurinol (ZYLOPRIM) 100 MG tablet Take 0.5 tablets (50 mg) by mouth daily Gout 45 tablet 3     finasteride (PROSCAR) 5 MG tablet Take 1 tablet (5 mg) by mouth daily 90 tablet 3     atorvastatin (LIPITOR) 40 MG tablet Take 1 tablet (40 mg) by mouth daily 90 tablet 3     Multiple Vitamins-Minerals (CENTRUM SILVER) per tablet Take 1 tablet by mouth daily         ALLERGIES     Allergies   Allergen Reactions     Flomax [Tamsulosin Hydrochloride]      Hctz Other (See Comments)     Caused loss of balance     Lisinopril Cough     Simvastatin Nausea and Vomiting and Diarrhea     Vytorin Nausea and Diarrhea       PAST MEDICAL HISTORY:  Past Medical History:   Diagnosis Date     Arrhythmia      Congestive heart failure (H)      Dizziness and giddiness 11/24/2007     uncertain etiology- MRI/MRA head , carotid duplex normal 2007     Foreign body in unspecified site on external eye      Hypertension      Microscopic hematuria      microscopic hematuria 2001 with  IVP and cystoscopy     Renal disease      Rhabdomyolysis     2003- in setting of Ecoli UTI, gemfibrozil/lipitor       PAST SURGICAL HISTORY:  Past Surgical History:   Procedure Laterality Date     BIOPSY ARTERY TEMPORAL Left 9/18/2017    Procedure: BIOPSY ARTERY TEMPORAL;  Left Temporal Biopsy ;  Surgeon: Ivana Phan MD;  Location: [facility] OR     SURGICAL HISTORY OF -       heel spurs     SURGICAL HISTORY OF -   2005, 2006    bilateral eye cataract     SURGICAL HISTORY OF -   1996    CABG x 5 vessels     SURGICAL HISTORY OF -   2010    circumcision     TONSILLECTOMY & ADENOIDECTOMY         FAMILY HISTORY:  Family History   Problem Relation Age of Onset     DIABETES Father      DIABETES Brother      DIABETES Brother      Hypertension Son      Unknown/Adopted Maternal Grandmother      Unknown/Adopted Maternal Grandfather      Unknown/Adopted Paternal Grandmother      Unknown/Adopted Paternal Grandfather      Dementia Sister        SOCIAL HISTORY:  Social History     Social History     Marital status:      Spouse name: N/A     Number of children: N/A     Years of education: N/A     Social History Main Topics     Smoking status: Former Smoker     Smokeless tobacco: Never Used      Comment: Quit at age 32     Alcohol use Yes      Comment: 12 pack a year     Drug use: No     Sexual activity: Yes     Other Topics Concern     Parent/Sibling W/ Cabg, Mi Or Angioplasty Before 65f 55m? No     Social History Narrative       Review of Systems:  Skin:  Negative       Eyes:  Negative      ENT:  Negative      Respiratory:  Positive for dyspnea on exertion;cough shortness of breath walking up hills.   Cardiovascular:    Positive for;edema    Gastroenterology: Negative      Genitourinary:  Positive for   CKD- patient does  not see nephrology  Musculoskeletal:  Negative      Neurologic:  Positive for numbness or tingling of feet;memory problems    Psychiatric:  Negative      Heme/Lymph/Imm:  Negative      Endocrine:  Negative        Physical Exam:  Vitals: /60  Pulse 64  Wt 92.5 kg (204 lb)  SpO2 97%  BMI 30.13 kg/m2    Constitutional:  cooperative, alert and oriented, well developed, well nourished, in no acute distress appears younger than stated age      Skin:  warm and dry to the touch          Head:  normocephalic        Eyes:  sclera white        Lymph:      ENT:  no pallor or cyanosis        Neck:  carotid pulses are full and equal bilaterally        Respiratory:  normal symmetry;normal respiratory excursion basal rales       Cardiac:   irregularly irregular rhythm S4   systolic murmur;LUSB;grade 2          pulses below the femoral arteries are diminished                                      GI:  abdomen soft        Extremities and Muscular Skeletal:          LLE edema;1+      Neurological:  affect appropriate        Psych:  Alert and Oriented x 3        CC  Norm Erickson MD  [address]  [place] [id number]

## 2017-11-27 NOTE — LETTER
11/27/2017    Casie Watts, DO  [address] [facility] [address]    RE: Colin Shell       Dear Colleague,    I had the pleasure of seeing Colin Shell in the [facility].    Cardiology Clinic Progress Note  Colin Shell MRN# [id number]   YOB: 1927 Age: 89 year old     Reason For Visit:  one month follow-up    Primary Cardiologist:   Dr. Erickson          History of Presenting Illness:    Colin Shell is a pleasant 89 year old patient with a past cardiac history significant for abnormal EKG with bundle branch block, status post AICD for syncope, atrial and ventricular dysrhythmia and bradycardia, CAD status post CABG 1986, hypertension, chronic atrial fibrillation on xarelto, and hyperlipidemia.  Past medical history significant for hyperglycemia, previous history of cigarette smoking, lung fibrosis, and BPH.    Pt was last seen by Dr. Erickson on 10/23/2017.  The patient had been doing well after having his AICD placed.  Due to his elevated [id number] score he was started on Xarelto and his aspirin was decreased to 81 mg.  His lipids remained elevated and the patient was not interested in additional hyperlipidemia medications.    Pt presents today for one month follow-up after starting Xarelto.  Since starting Xarelto, he denies any side effects or bleeding problems.  He denies any blood in his urine or stool.  He will continue to monitor for these symptoms.  He does mention that it costs approximately $105 for a three month supply of Xarelto.  He continues to be asymptomatic while in atrial fibrillation.  His blood pressure remains well-controlled 132/60 today.  He has not had any further episodes of lightheadedness, syncope, or heart racing.  He also denies any symptoms of angina.  He continues with left lower extremity edema which is chronic and unchanged.  Overall, from a cardiac standpoint he has been doing well.  He does discuss  that he recently was treated for pneumonia a couple months ago.  He is afraid that this is returning as he has continued with the cough and yellow sputum. He denies any fever and heart rate is controlled. He does have bilateral crackles in his lungs and I offered him a chest x-ray today. If any findings for pneumonia, we will refer him to his PCP for treatment.  He does have a history of fibrosis. Patient reports no chest pain, shortness of breath, PND, orthopnea, presyncope, syncope, heart racing, or palpitations.      Current Cardiac Medications   Xarelto 20 mg daily  Aspirin 81 mg daily  Amlodipine 10 mg daily  Losartan 100 mg daily  Atorvastatin 40 mg daily                    Assessment and Plan:     Plan  1.  Chest x-ray today to reassess for any pneumonia versus his chronic fibrosis  2.  Follow-up with Dr. Erickson 1/8/2018 with lab work prior  3.  Follow-up with device clinic for remote transmission, as scheduled      1. Chronic atrial fibrillation    Asymptomatic    Continue anticoagulation with Xarelto      2. Hypertension    132/60, controlled    Continue amlodipine, losartan      3. Hyperlipidemia    Last  on 10/2017    Continue atorvastatin 40 mg daily      4. History of syncope and arrhythmia status post AICD    No further lightheadedness or syncope      5. CAD    Status post CABG 1986     No angina     Continue statin, aspirin, ARB, calcium channel blocker          Thank you for allowing me to participate in this delightful patient's care.      This note was completed in part using Dragon voice recognition software. Although reviewed after completion, some word and grammatical errors may occur.    Oliva Vasquez, APRN, CNP           Data:   All laboratory data reviewed        HPI and Plan:   See dictation    Orders Placed This Encounter   Procedures     X-ray Chest 2 vws*       No orders of the defined types were placed in this encounter.      Medications Discontinued During This  Encounter   Medication Reason     acetaminophen (TYLENOL) 325 MG tablet          Encounter Diagnoses   Name Primary?     Cough      Bibasilar crackles      Coronary artery disease involving coronary bypass graft of native heart without angina pectoris      Chronic atrial fibrillation (H) Yes     Benign essential hypertension      Hyperlipidemia LDL goal <70      History of implantable cardioverter-defibrillator (ICD) placement        CURRENT MEDICATIONS:  Current Outpatient Prescriptions   Medication Sig Dispense Refill     rivaroxaban ANTICOAGULANT (XARELTO) 20 MG TABS tablet Take 1 tablet (20 mg) by mouth daily (with dinner) 90 tablet 3     aspirin 81 MG chewable tablet Take 1 tablet (81 mg) by mouth daily 100 tablet 3     amLODIPine (NORVASC) 10 MG tablet Take 1 tablet (10 mg) by mouth daily 90 tablet 3     Magnesium Hydroxide (NAVA MILK OF MAGNESIA PO)        losartan (COZAAR) 100 MG tablet Take 1 tablet (100 mg) by mouth daily 90 tablet 3     allopurinol (ZYLOPRIM) 100 MG tablet Take 0.5 tablets (50 mg) by mouth daily Gout 45 tablet 3     finasteride (PROSCAR) 5 MG tablet Take 1 tablet (5 mg) by mouth daily 90 tablet 3     atorvastatin (LIPITOR) 40 MG tablet Take 1 tablet (40 mg) by mouth daily 90 tablet 3     Multiple Vitamins-Minerals (CENTRUM SILVER) per tablet Take 1 tablet by mouth daily         ALLERGIES     Allergies   Allergen Reactions     Flomax [Tamsulosin Hydrochloride]      Hctz Other (See Comments)     Caused loss of balance     Lisinopril Cough     Simvastatin Nausea and Vomiting and Diarrhea     Vytorin Nausea and Diarrhea       PAST MEDICAL HISTORY:  Past Medical History:   Diagnosis Date     Arrhythmia      Congestive heart failure (H)      Dizziness and giddiness 11/24/2007    uncertain etiology- MRI/MRA head , carotid duplex normal 2007     Foreign body in unspecified site on external eye      Hypertension      Microscopic hematuria      microscopic hematuria 2001 with  IVP and  cystoscopy     Renal disease      Rhabdomyolysis     2003- in setting of Ecoli UTI, gemfibrozil/lipitor       PAST SURGICAL HISTORY:  Past Surgical History:   Procedure Laterality Date     BIOPSY ARTERY TEMPORAL Left 9/18/2017    Procedure: BIOPSY ARTERY TEMPORAL;  Left Temporal Biopsy ;  Surgeon: Ivana Phan MD;  Location: [facility] OR     SURGICAL HISTORY OF -       heel spurs     SURGICAL HISTORY OF -   2005, 2006    bilateral eye cataract     SURGICAL HISTORY OF -   1996    CABG x 5 vessels     SURGICAL HISTORY OF -   2010    circumcision     TONSILLECTOMY & ADENOIDECTOMY         FAMILY HISTORY:  Family History   Problem Relation Age of Onset     DIABETES Father      DIABETES Brother      DIABETES Brother      Hypertension Son      Unknown/Adopted Maternal Grandmother      Unknown/Adopted Maternal Grandfather      Unknown/Adopted Paternal Grandmother      Unknown/Adopted Paternal Grandfather      Dementia Sister        SOCIAL HISTORY:  Social History     Social History     Marital status:      Spouse name: N/A     Number of children: N/A     Years of education: N/A     Social History Main Topics     Smoking status: Former Smoker     Smokeless tobacco: Never Used      Comment: Quit at age 32     Alcohol use Yes      Comment: 12 pack a year     Drug use: No     Sexual activity: Yes     Other Topics Concern     Parent/Sibling W/ Cabg, Mi Or Angioplasty Before 65f 55m? No     Social History Narrative       Review of Systems:  Skin:  Negative       Eyes:  Negative      ENT:  Negative      Respiratory:  Positive for dyspnea on exertion;cough shortness of breath walking up hills.   Cardiovascular:    Positive for;edema    Gastroenterology: Negative      Genitourinary:  Positive for   CKD- patient does not see nephrology  Musculoskeletal:  Negative      Neurologic:  Positive for numbness or tingling of feet;memory problems    Psychiatric:  Negative      Heme/Lymph/Imm:  Negative      Endocrine:  Negative         Physical Exam:  Vitals: /60  Pulse 64  Wt 92.5 kg (204 lb)  SpO2 97%  BMI 30.13 kg/m2    Constitutional:  cooperative, alert and oriented, well developed, well nourished, in no acute distress appears younger than stated age      Skin:  warm and dry to the touch          Head:  normocephalic        Eyes:  sclera white        Lymph:      ENT:  no pallor or cyanosis        Neck:  carotid pulses are full and equal bilaterally        Respiratory:  normal symmetry;normal respiratory excursion basal rales       Cardiac:   irregularly irregular rhythm S4   systolic murmur;LUSB;grade 2          pulses below the femoral arteries are diminished                                      GI:  abdomen soft        Extremities and Muscular Skeletal:          LLE edema;1+      Neurological:  affect appropriate        Psych:  Alert and Oriented x 3      Thank you for allowing me to participate in the care of your patient.    Sincerely,     ALEKSANDER Rivas [facility]

## 2018-01-01 ENCOUNTER — RADIANT APPOINTMENT (OUTPATIENT)
Dept: GENERAL RADIOLOGY | Facility: CLINIC | Age: 83
End: 2018-01-01
Attending: FAMILY MEDICINE
Payer: COMMERCIAL

## 2018-01-01 ENCOUNTER — NURSING HOME VISIT (OUTPATIENT)
Dept: GERIATRICS | Facility: CLINIC | Age: 83
End: 2018-01-01
Payer: COMMERCIAL

## 2018-01-01 ENCOUNTER — TELEPHONE (OUTPATIENT)
Dept: FAMILY MEDICINE | Facility: CLINIC | Age: 83
End: 2018-01-01

## 2018-01-01 ENCOUNTER — HOSPITAL ENCOUNTER (EMERGENCY)
Facility: CLINIC | Age: 83
Discharge: HOME | End: 2018-08-24
Attending: FAMILY MEDICINE | Admitting: FAMILY MEDICINE
Payer: COMMERCIAL

## 2018-01-01 ENCOUNTER — OFFICE VISIT (OUTPATIENT)
Dept: FAMILY MEDICINE | Facility: CLINIC | Age: 83
End: 2018-01-01
Payer: COMMERCIAL

## 2018-01-01 ENCOUNTER — OFFICE VISIT (OUTPATIENT)
Dept: PULMONOLOGY | Facility: CLINIC | Age: 83
End: 2018-01-01
Payer: COMMERCIAL

## 2018-01-01 ENCOUNTER — HOSPITAL ENCOUNTER (OUTPATIENT)
Dept: CT IMAGING | Facility: CLINIC | Age: 83
Discharge: HOME | End: 2018-06-04
Attending: FAMILY MEDICINE | Admitting: FAMILY MEDICINE
Payer: COMMERCIAL

## 2018-01-01 ENCOUNTER — HOSPITAL ENCOUNTER (EMERGENCY)
Facility: CLINIC | Age: 83
Discharge: HOME | End: 2018-07-13
Attending: EMERGENCY MEDICINE | Admitting: EMERGENCY MEDICINE
Payer: COMMERCIAL

## 2018-01-01 ENCOUNTER — APPOINTMENT (OUTPATIENT)
Dept: GENERAL RADIOLOGY | Facility: CLINIC | Age: 83
End: 2018-01-01
Attending: EMERGENCY MEDICINE
Payer: COMMERCIAL

## 2018-01-01 ENCOUNTER — ALLIED HEALTH/NURSE VISIT (OUTPATIENT)
Dept: CARDIOLOGY | Facility: CLINIC | Age: 83
End: 2018-01-01
Payer: COMMERCIAL

## 2018-01-01 ENCOUNTER — OFFICE VISIT (OUTPATIENT)
Dept: ORTHOPEDICS | Facility: CLINIC | Age: 83
End: 2018-01-01
Payer: COMMERCIAL

## 2018-01-01 ENCOUNTER — PATIENT OUTREACH (OUTPATIENT)
Dept: CARE COORDINATION | Facility: CLINIC | Age: 83
End: 2018-01-01

## 2018-01-01 ENCOUNTER — ALLIED HEALTH/NURSE VISIT (OUTPATIENT)
Dept: FAMILY MEDICINE | Facility: CLINIC | Age: 83
End: 2018-01-01
Payer: COMMERCIAL

## 2018-01-01 ENCOUNTER — ANCILLARY PROCEDURE (OUTPATIENT)
Dept: CARDIOLOGY | Facility: CLINIC | Age: 83
End: 2018-01-01
Attending: INTERNAL MEDICINE
Payer: COMMERCIAL

## 2018-01-01 ENCOUNTER — HOSPITAL ENCOUNTER (OUTPATIENT)
Dept: PHYSICAL THERAPY | Facility: CLINIC | Age: 83
Setting detail: THERAPIES SERIES
End: 2018-05-30
Attending: FAMILY MEDICINE
Payer: COMMERCIAL

## 2018-01-01 VITALS
HEART RATE: 61 BPM | BODY MASS INDEX: 25.68 KG/M2 | DIASTOLIC BLOOD PRESSURE: 41 MMHG | TEMPERATURE: 99.2 F | WEIGHT: 179 LBS | OXYGEN SATURATION: 92 % | RESPIRATION RATE: 18 BRPM | SYSTOLIC BLOOD PRESSURE: 120 MMHG

## 2018-01-01 VITALS
OXYGEN SATURATION: 98 % | RESPIRATION RATE: 16 BRPM | BODY MASS INDEX: 25.48 KG/M2 | SYSTOLIC BLOOD PRESSURE: 160 MMHG | DIASTOLIC BLOOD PRESSURE: 82 MMHG | HEART RATE: 59 BPM | TEMPERATURE: 97.1 F | HEIGHT: 70 IN | WEIGHT: 178 LBS

## 2018-01-01 VITALS
OXYGEN SATURATION: 98 % | TEMPERATURE: 97.7 F | HEART RATE: 51 BPM | SYSTOLIC BLOOD PRESSURE: 147 MMHG | DIASTOLIC BLOOD PRESSURE: 77 MMHG

## 2018-01-01 VITALS
HEIGHT: 70 IN | SYSTOLIC BLOOD PRESSURE: 148 MMHG | TEMPERATURE: 97.1 F | WEIGHT: 173.8 LBS | RESPIRATION RATE: 16 BRPM | HEART RATE: 68 BPM | OXYGEN SATURATION: 100 % | DIASTOLIC BLOOD PRESSURE: 66 MMHG | BODY MASS INDEX: 24.88 KG/M2

## 2018-01-01 VITALS
TEMPERATURE: 96.5 F | HEART RATE: 76 BPM | OXYGEN SATURATION: 96 % | DIASTOLIC BLOOD PRESSURE: 57 MMHG | SYSTOLIC BLOOD PRESSURE: 114 MMHG

## 2018-01-01 VITALS
OXYGEN SATURATION: 96 % | SYSTOLIC BLOOD PRESSURE: 171 MMHG | DIASTOLIC BLOOD PRESSURE: 88 MMHG | TEMPERATURE: 97.6 F | RESPIRATION RATE: 20 BRPM

## 2018-01-01 VITALS
OXYGEN SATURATION: 98 % | WEIGHT: 176 LBS | DIASTOLIC BLOOD PRESSURE: 71 MMHG | TEMPERATURE: 98.1 F | SYSTOLIC BLOOD PRESSURE: 133 MMHG | RESPIRATION RATE: 18 BRPM | HEART RATE: 67 BPM | BODY MASS INDEX: 25.25 KG/M2

## 2018-01-01 VITALS
SYSTOLIC BLOOD PRESSURE: 128 MMHG | WEIGHT: 176.8 LBS | TEMPERATURE: 96.8 F | HEART RATE: 58 BPM | HEIGHT: 70 IN | DIASTOLIC BLOOD PRESSURE: 66 MMHG | OXYGEN SATURATION: 98 % | BODY MASS INDEX: 25.31 KG/M2

## 2018-01-01 VITALS
TEMPERATURE: 97 F | HEIGHT: 70 IN | BODY MASS INDEX: 24.91 KG/M2 | OXYGEN SATURATION: 95 % | HEART RATE: 71 BPM | WEIGHT: 174 LBS | DIASTOLIC BLOOD PRESSURE: 68 MMHG | SYSTOLIC BLOOD PRESSURE: 120 MMHG

## 2018-01-01 VITALS
SYSTOLIC BLOOD PRESSURE: 119 MMHG | HEART RATE: 68 BPM | OXYGEN SATURATION: 95 % | TEMPERATURE: 95.7 F | DIASTOLIC BLOOD PRESSURE: 71 MMHG | WEIGHT: 177 LBS | BODY MASS INDEX: 25.4 KG/M2 | RESPIRATION RATE: 19 BRPM

## 2018-01-01 VITALS
TEMPERATURE: 97.8 F | WEIGHT: 170 LBS | RESPIRATION RATE: 16 BRPM | SYSTOLIC BLOOD PRESSURE: 159 MMHG | DIASTOLIC BLOOD PRESSURE: 70 MMHG | HEART RATE: 61 BPM | HEIGHT: 70 IN | BODY MASS INDEX: 24.34 KG/M2 | OXYGEN SATURATION: 97 %

## 2018-01-01 VITALS
TEMPERATURE: 97 F | WEIGHT: 188 LBS | HEIGHT: 70 IN | SYSTOLIC BLOOD PRESSURE: 156 MMHG | DIASTOLIC BLOOD PRESSURE: 86 MMHG | OXYGEN SATURATION: 95 % | HEART RATE: 78 BPM | BODY MASS INDEX: 26.92 KG/M2

## 2018-01-01 VITALS — HEIGHT: 70 IN | BODY MASS INDEX: 26.92 KG/M2 | WEIGHT: 188 LBS

## 2018-01-01 DIAGNOSIS — M10.9 GOUT OF FOOT, UNSPECIFIED CAUSE, UNSPECIFIED CHRONICITY, UNSPECIFIED LATERALITY: ICD-10-CM

## 2018-01-01 DIAGNOSIS — R53.81 PHYSICAL DECONDITIONING: ICD-10-CM

## 2018-01-01 DIAGNOSIS — R05.9 COUGH: Primary | ICD-10-CM

## 2018-01-01 DIAGNOSIS — J47.9 BRONCHIECTASIS WITHOUT COMPLICATION (H): ICD-10-CM

## 2018-01-01 DIAGNOSIS — M25.812 SHOULDER IMPINGEMENT, LEFT: ICD-10-CM

## 2018-01-01 DIAGNOSIS — J84.10 PULMONARY FIBROSIS (H): ICD-10-CM

## 2018-01-01 DIAGNOSIS — Z23 NEED FOR PROPHYLACTIC VACCINATION AND INOCULATION AGAINST INFLUENZA: ICD-10-CM

## 2018-01-01 DIAGNOSIS — M62.81 GENERALIZED MUSCLE WEAKNESS: ICD-10-CM

## 2018-01-01 DIAGNOSIS — I50.22 CHRONIC SYSTOLIC CONGESTIVE HEART FAILURE (H): ICD-10-CM

## 2018-01-01 DIAGNOSIS — M10.9 GOUT OF FOOT, UNSPECIFIED CAUSE, UNSPECIFIED CHRONICITY, UNSPECIFIED LATERALITY: Chronic | ICD-10-CM

## 2018-01-01 DIAGNOSIS — S69.92XA WRIST INJURY, LEFT, INITIAL ENCOUNTER: ICD-10-CM

## 2018-01-01 DIAGNOSIS — I48.0 PAROXYSMAL ATRIAL FIBRILLATION (H): ICD-10-CM

## 2018-01-01 DIAGNOSIS — K44.9 HIATAL HERNIA: ICD-10-CM

## 2018-01-01 DIAGNOSIS — I10 HYPERTENSION GOAL BP (BLOOD PRESSURE) < 140/90: ICD-10-CM

## 2018-01-01 DIAGNOSIS — M54.42 ACUTE LEFT-SIDED LOW BACK PAIN WITH LEFT-SIDED SCIATICA: Primary | ICD-10-CM

## 2018-01-01 DIAGNOSIS — M51.369 DDD (DEGENERATIVE DISC DISEASE), LUMBAR: ICD-10-CM

## 2018-01-01 DIAGNOSIS — I50.9 CONGESTIVE HEART FAILURE, UNSPECIFIED CONGESTIVE HEART FAILURE CHRONICITY, UNSPECIFIED CONGESTIVE HEART FAILURE TYPE: Primary | ICD-10-CM

## 2018-01-01 DIAGNOSIS — M54.42 ACUTE LEFT-SIDED LOW BACK PAIN WITH LEFT-SIDED SCIATICA: ICD-10-CM

## 2018-01-01 DIAGNOSIS — I50.33 ACUTE ON CHRONIC DIASTOLIC CONGESTIVE HEART FAILURE (H): ICD-10-CM

## 2018-01-01 DIAGNOSIS — I50.22 CHRONIC SYSTOLIC CHF (CONGESTIVE HEART FAILURE) (H): ICD-10-CM

## 2018-01-01 DIAGNOSIS — M53.9 MULTILEVEL DEGENERATIVE DISC DISEASE: Primary | ICD-10-CM

## 2018-01-01 DIAGNOSIS — R29.898 TRANSIENT RIGHT LEG WEAKNESS: ICD-10-CM

## 2018-01-01 DIAGNOSIS — Z00.00 HEALTHCARE MAINTENANCE: Primary | ICD-10-CM

## 2018-01-01 DIAGNOSIS — M25.812 SHOULDER IMPINGEMENT, LEFT: Primary | ICD-10-CM

## 2018-01-01 DIAGNOSIS — Z95.810 ICD (IMPLANTABLE CARDIOVERTER-DEFIBRILLATOR) IN PLACE: Primary | ICD-10-CM

## 2018-01-01 DIAGNOSIS — N18.30 CHRONIC KIDNEY DISEASE, STAGE III (MODERATE) (H): ICD-10-CM

## 2018-01-01 DIAGNOSIS — M79.671 PAIN OF BOTH HEELS: Primary | ICD-10-CM

## 2018-01-01 DIAGNOSIS — M25.552 HIP PAIN, LEFT: ICD-10-CM

## 2018-01-01 DIAGNOSIS — R05.8 ALLERGIC COUGH: ICD-10-CM

## 2018-01-01 DIAGNOSIS — R05.9 COUGH: ICD-10-CM

## 2018-01-01 DIAGNOSIS — M79.672 PAIN OF BOTH HEELS: Primary | ICD-10-CM

## 2018-01-01 DIAGNOSIS — Z95.0 CARDIAC PACEMAKER IN SITU: ICD-10-CM

## 2018-01-01 DIAGNOSIS — I25.810 CORONARY ARTERY DISEASE INVOLVING CORONARY BYPASS GRAFT OF NATIVE HEART WITHOUT ANGINA PECTORIS: ICD-10-CM

## 2018-01-01 DIAGNOSIS — I89.0 LYMPHEDEMA: ICD-10-CM

## 2018-01-01 DIAGNOSIS — I25.5 ISCHEMIC CARDIOMYOPATHY: ICD-10-CM

## 2018-01-01 DIAGNOSIS — I50.22 CHRONIC SYSTOLIC CHF (CONGESTIVE HEART FAILURE) (H): Chronic | ICD-10-CM

## 2018-01-01 DIAGNOSIS — M48.062 SPINAL STENOSIS, LUMBAR REGION, WITH NEUROGENIC CLAUDICATION: Primary | ICD-10-CM

## 2018-01-01 DIAGNOSIS — I42.9 CARDIOMYOPATHY (H): ICD-10-CM

## 2018-01-01 DIAGNOSIS — M25.472 LEFT ANKLE SWELLING: Primary | ICD-10-CM

## 2018-01-01 DIAGNOSIS — R29.898 WEAKNESS OF LEFT LEG: ICD-10-CM

## 2018-01-01 LAB
ANION GAP SERPL CALCULATED.3IONS-SCNC: 7 MMOL/L (ref 3–14)
BASOPHILS # BLD AUTO: 0.1 10E9/L (ref 0–0.2)
BASOPHILS # BLD AUTO: 0.1 10E9/L (ref 0–0.2)
BASOPHILS NFR BLD AUTO: 0.5 %
BASOPHILS NFR BLD AUTO: 0.6 %
BUN SERPL-MCNC: 23 MG/DL (ref 7–30)
CALCIUM SERPL-MCNC: 9 MG/DL (ref 8.5–10.1)
CHLORIDE SERPL-SCNC: 102 MMOL/L (ref 94–109)
CO2 SERPL-SCNC: 26 MMOL/L (ref 20–32)
CREAT SERPL-MCNC: 1.12 MG/DL (ref 0.66–1.25)
DIFFERENTIAL METHOD BLD: ABNORMAL
DIFFERENTIAL METHOD BLD: ABNORMAL
EOSINOPHIL # BLD AUTO: 0.8 10E9/L (ref 0–0.7)
EOSINOPHIL # BLD AUTO: 0.9 10E9/L (ref 0–0.7)
EOSINOPHIL NFR BLD AUTO: 6.7 %
EOSINOPHIL NFR BLD AUTO: 8.9 %
ERYTHROCYTE [DISTWIDTH] IN BLOOD BY AUTOMATED COUNT: 17 % (ref 10–15)
ERYTHROCYTE [DISTWIDTH] IN BLOOD BY AUTOMATED COUNT: 17.3 % (ref 10–15)
GFR SERPL CREATININE-BSD FRML MDRD: 61 ML/MIN/1.7M2
GLUCOSE SERPL-MCNC: 110 MG/DL (ref 70–99)
HCT VFR BLD AUTO: 40.8 % (ref 40–53)
HCT VFR BLD AUTO: 42 % (ref 40–53)
HGB BLD-MCNC: 13.1 G/DL (ref 13.3–17.7)
HGB BLD-MCNC: 13.8 G/DL (ref 13.3–17.7)
ICDO DEVICE COMMENTS: NORMAL
IMM GRANULOCYTES # BLD: 0 10E9/L (ref 0–0.4)
IMM GRANULOCYTES NFR BLD: 0.3 %
LYMPHOCYTES # BLD AUTO: 3 10E9/L (ref 0.8–5.3)
LYMPHOCYTES # BLD AUTO: 3.5 10E9/L (ref 0.8–5.3)
LYMPHOCYTES NFR BLD AUTO: 25 %
LYMPHOCYTES NFR BLD AUTO: 33.3 %
MCH RBC QN AUTO: 28 PG (ref 26.5–33)
MCH RBC QN AUTO: 28.7 PG (ref 26.5–33)
MCHC RBC AUTO-ENTMCNC: 32.1 G/DL (ref 31.5–36.5)
MCHC RBC AUTO-ENTMCNC: 32.9 G/DL (ref 31.5–36.5)
MCV RBC AUTO: 87 FL (ref 78–100)
MCV RBC AUTO: 87 FL (ref 78–100)
MDC_IDC_EPISODE_DTM: NORMAL
MDC_IDC_EPISODE_DURATION: 328 S
MDC_IDC_EPISODE_DURATION: 5050 S
MDC_IDC_EPISODE_DURATION: 67 S
MDC_IDC_EPISODE_DURATION: 83 S
MDC_IDC_EPISODE_ID: 129
MDC_IDC_EPISODE_ID: 130
MDC_IDC_EPISODE_ID: 131
MDC_IDC_EPISODE_ID: 132
MDC_IDC_EPISODE_ID: 133
MDC_IDC_EPISODE_TYPE: NORMAL
MDC_IDC_LEAD_IMPLANT_DT: NORMAL
MDC_IDC_LEAD_IMPLANT_DT: NORMAL
MDC_IDC_LEAD_LOCATION: NORMAL
MDC_IDC_LEAD_LOCATION: NORMAL
MDC_IDC_LEAD_LOCATION_DETAIL_1: NORMAL
MDC_IDC_LEAD_LOCATION_DETAIL_1: NORMAL
MDC_IDC_LEAD_MFG: NORMAL
MDC_IDC_LEAD_MFG: NORMAL
MDC_IDC_LEAD_MODEL: NORMAL
MDC_IDC_LEAD_MODEL: NORMAL
MDC_IDC_LEAD_POLARITY_TYPE: NORMAL
MDC_IDC_LEAD_POLARITY_TYPE: NORMAL
MDC_IDC_LEAD_SERIAL: NORMAL
MDC_IDC_LEAD_SERIAL: NORMAL
MDC_IDC_MSMT_BATTERY_DTM: NORMAL
MDC_IDC_MSMT_BATTERY_REMAINING_LONGEVITY: 98 MO
MDC_IDC_MSMT_BATTERY_RRT_TRIGGER: 2.73
MDC_IDC_MSMT_BATTERY_STATUS: NORMAL
MDC_IDC_MSMT_BATTERY_VOLTAGE: 2.98 V
MDC_IDC_MSMT_CAP_CHARGE_DTM: NORMAL
MDC_IDC_MSMT_CAP_CHARGE_ENERGY: 18 J
MDC_IDC_MSMT_CAP_CHARGE_TIME: 3.8
MDC_IDC_MSMT_CAP_CHARGE_TYPE: NORMAL
MDC_IDC_MSMT_LEADCHNL_RA_IMPEDANCE_VALUE: 494 OHM
MDC_IDC_MSMT_LEADCHNL_RA_SENSING_INTR_AMPL: 2.38 MV
MDC_IDC_MSMT_LEADCHNL_RA_SENSING_INTR_AMPL: 2.38 MV
MDC_IDC_MSMT_LEADCHNL_RV_IMPEDANCE_VALUE: 323 OHM
MDC_IDC_MSMT_LEADCHNL_RV_IMPEDANCE_VALUE: 437 OHM
MDC_IDC_MSMT_LEADCHNL_RV_PACING_THRESHOLD_AMPLITUDE: 0.62 V
MDC_IDC_MSMT_LEADCHNL_RV_PACING_THRESHOLD_PULSEWIDTH: 0.4 MS
MDC_IDC_MSMT_LEADCHNL_RV_SENSING_INTR_AMPL: 9.75 MV
MDC_IDC_MSMT_LEADCHNL_RV_SENSING_INTR_AMPL: 9.75 MV
MDC_IDC_PG_IMPLANT_DTM: NORMAL
MDC_IDC_PG_MFG: NORMAL
MDC_IDC_PG_MODEL: NORMAL
MDC_IDC_PG_SERIAL: NORMAL
MDC_IDC_PG_TYPE: NORMAL
MDC_IDC_SESS_CLINIC_NAME: NORMAL
MDC_IDC_SESS_DTM: NORMAL
MDC_IDC_SESS_TYPE: NORMAL
MDC_IDC_SET_BRADY_AT_MODE_SWITCH_RATE: 171 {BEATS}/MIN
MDC_IDC_SET_BRADY_HYSTRATE: NORMAL
MDC_IDC_SET_BRADY_LOWRATE: 50 {BEATS}/MIN
MDC_IDC_SET_BRADY_MAX_SENSOR_RATE: 130 {BEATS}/MIN
MDC_IDC_SET_BRADY_MAX_TRACKING_RATE: 130 {BEATS}/MIN
MDC_IDC_SET_BRADY_MODE: NORMAL
MDC_IDC_SET_BRADY_PAV_DELAY_LOW: 230 MS
MDC_IDC_SET_BRADY_SAV_DELAY_LOW: 250 MS
MDC_IDC_SET_LEADCHNL_RA_PACING_AMPLITUDE: 2 V
MDC_IDC_SET_LEADCHNL_RA_PACING_ANODE_ELECTRODE_1: NORMAL
MDC_IDC_SET_LEADCHNL_RA_PACING_ANODE_LOCATION_1: NORMAL
MDC_IDC_SET_LEADCHNL_RA_PACING_CAPTURE_MODE: NORMAL
MDC_IDC_SET_LEADCHNL_RA_PACING_CATHODE_ELECTRODE_1: NORMAL
MDC_IDC_SET_LEADCHNL_RA_PACING_CATHODE_LOCATION_1: NORMAL
MDC_IDC_SET_LEADCHNL_RA_PACING_POLARITY: NORMAL
MDC_IDC_SET_LEADCHNL_RA_PACING_PULSEWIDTH: 0.4 MS
MDC_IDC_SET_LEADCHNL_RA_SENSING_ANODE_ELECTRODE_1: NORMAL
MDC_IDC_SET_LEADCHNL_RA_SENSING_ANODE_LOCATION_1: NORMAL
MDC_IDC_SET_LEADCHNL_RA_SENSING_CATHODE_ELECTRODE_1: NORMAL
MDC_IDC_SET_LEADCHNL_RA_SENSING_CATHODE_LOCATION_1: NORMAL
MDC_IDC_SET_LEADCHNL_RA_SENSING_POLARITY: NORMAL
MDC_IDC_SET_LEADCHNL_RA_SENSING_SENSITIVITY: 0.3 MV
MDC_IDC_SET_LEADCHNL_RV_PACING_AMPLITUDE: 2 V
MDC_IDC_SET_LEADCHNL_RV_PACING_ANODE_ELECTRODE_1: NORMAL
MDC_IDC_SET_LEADCHNL_RV_PACING_ANODE_LOCATION_1: NORMAL
MDC_IDC_SET_LEADCHNL_RV_PACING_CAPTURE_MODE: NORMAL
MDC_IDC_SET_LEADCHNL_RV_PACING_CATHODE_ELECTRODE_1: NORMAL
MDC_IDC_SET_LEADCHNL_RV_PACING_CATHODE_LOCATION_1: NORMAL
MDC_IDC_SET_LEADCHNL_RV_PACING_POLARITY: NORMAL
MDC_IDC_SET_LEADCHNL_RV_PACING_PULSEWIDTH: 0.4 MS
MDC_IDC_SET_LEADCHNL_RV_SENSING_ANODE_ELECTRODE_1: NORMAL
MDC_IDC_SET_LEADCHNL_RV_SENSING_ANODE_LOCATION_1: NORMAL
MDC_IDC_SET_LEADCHNL_RV_SENSING_CATHODE_ELECTRODE_1: NORMAL
MDC_IDC_SET_LEADCHNL_RV_SENSING_CATHODE_LOCATION_1: NORMAL
MDC_IDC_SET_LEADCHNL_RV_SENSING_POLARITY: NORMAL
MDC_IDC_SET_LEADCHNL_RV_SENSING_SENSITIVITY: 0.3 MV
MDC_IDC_SET_ZONE_DETECTION_BEATS_DENOMINATOR: 40 {BEATS}
MDC_IDC_SET_ZONE_DETECTION_BEATS_NUMERATOR: 30 {BEATS}
MDC_IDC_SET_ZONE_DETECTION_INTERVAL: 250 MS
MDC_IDC_SET_ZONE_DETECTION_INTERVAL: 320 MS
MDC_IDC_SET_ZONE_DETECTION_INTERVAL: 350 MS
MDC_IDC_SET_ZONE_DETECTION_INTERVAL: 360 MS
MDC_IDC_SET_ZONE_DETECTION_INTERVAL: 360 MS
MDC_IDC_SET_ZONE_TYPE: NORMAL
MDC_IDC_STAT_AT_BURDEN_PERCENT: 96.3 %
MDC_IDC_STAT_AT_DTM_END: NORMAL
MDC_IDC_STAT_AT_DTM_START: NORMAL
MDC_IDC_STAT_BRADY_AP_VP_PERCENT: 1.42 %
MDC_IDC_STAT_BRADY_AP_VS_PERCENT: 0.02 %
MDC_IDC_STAT_BRADY_AS_VP_PERCENT: 32.66 %
MDC_IDC_STAT_BRADY_AS_VS_PERCENT: 65.9 %
MDC_IDC_STAT_BRADY_DTM_END: NORMAL
MDC_IDC_STAT_BRADY_DTM_START: NORMAL
MDC_IDC_STAT_BRADY_RA_PERCENT_PACED: 0.82 %
MDC_IDC_STAT_BRADY_RV_PERCENT_PACED: 34.7 %
MDC_IDC_STAT_EPISODE_RECENT_COUNT: 0
MDC_IDC_STAT_EPISODE_RECENT_COUNT: 5
MDC_IDC_STAT_EPISODE_RECENT_COUNT_DTM_END: NORMAL
MDC_IDC_STAT_EPISODE_RECENT_COUNT_DTM_START: NORMAL
MDC_IDC_STAT_EPISODE_TOTAL_COUNT: 0
MDC_IDC_STAT_EPISODE_TOTAL_COUNT: 129
MDC_IDC_STAT_EPISODE_TOTAL_COUNT: 4
MDC_IDC_STAT_EPISODE_TOTAL_COUNT_DTM_END: NORMAL
MDC_IDC_STAT_EPISODE_TOTAL_COUNT_DTM_START: NORMAL
MDC_IDC_STAT_EPISODE_TYPE: NORMAL
MDC_IDC_STAT_TACHYTHERAPY_ATP_DELIVERED_RECENT: 0
MDC_IDC_STAT_TACHYTHERAPY_ATP_DELIVERED_TOTAL: 0
MDC_IDC_STAT_TACHYTHERAPY_RECENT_DTM_END: NORMAL
MDC_IDC_STAT_TACHYTHERAPY_RECENT_DTM_START: NORMAL
MDC_IDC_STAT_TACHYTHERAPY_SHOCKS_ABORTED_RECENT: 0
MDC_IDC_STAT_TACHYTHERAPY_SHOCKS_ABORTED_TOTAL: 0
MDC_IDC_STAT_TACHYTHERAPY_SHOCKS_DELIVERED_RECENT: 0
MDC_IDC_STAT_TACHYTHERAPY_SHOCKS_DELIVERED_TOTAL: 0
MDC_IDC_STAT_TACHYTHERAPY_TOTAL_DTM_END: NORMAL
MDC_IDC_STAT_TACHYTHERAPY_TOTAL_DTM_START: NORMAL
MONOCYTES # BLD AUTO: 1.4 10E9/L (ref 0–1.3)
MONOCYTES # BLD AUTO: 1.4 10E9/L (ref 0–1.3)
MONOCYTES NFR BLD AUTO: 11.9 %
MONOCYTES NFR BLD AUTO: 13.2 %
NEUTROPHILS # BLD AUTO: 4.6 10E9/L (ref 1.6–8.3)
NEUTROPHILS # BLD AUTO: 6.6 10E9/L (ref 1.6–8.3)
NEUTROPHILS NFR BLD AUTO: 44.1 %
NEUTROPHILS NFR BLD AUTO: 55.5 %
NRBC # BLD AUTO: 0 10*3/UL
NRBC BLD AUTO-RTO: 0 /100
NT-PROBNP SERPL-MCNC: 3740 PG/ML (ref 0–450)
NT-PROBNP SERPL-MCNC: 5546 PG/ML (ref 0–1800)
PLATELET # BLD AUTO: 297 10E9/L (ref 150–450)
PLATELET # BLD AUTO: 317 10E9/L (ref 150–450)
POTASSIUM SERPL-SCNC: 4 MMOL/L (ref 3.4–5.3)
RBC # BLD AUTO: 4.68 10E12/L (ref 4.4–5.9)
RBC # BLD AUTO: 4.81 10E12/L (ref 4.4–5.9)
SODIUM SERPL-SCNC: 135 MMOL/L (ref 133–144)
WBC # BLD AUTO: 10.5 10E9/L (ref 4–11)
WBC # BLD AUTO: 11.8 10E9/L (ref 4–11)

## 2018-01-01 PROCEDURE — 99284 EMERGENCY DEPT VISIT MOD MDM: CPT | Mod: Z6 | Performed by: EMERGENCY MEDICINE

## 2018-01-01 PROCEDURE — 73030 X-RAY EXAM OF SHOULDER: CPT | Mod: LT

## 2018-01-01 PROCEDURE — 99204 OFFICE O/P NEW MOD 45 MIN: CPT | Performed by: PHYSICAL MEDICINE & REHABILITATION

## 2018-01-01 PROCEDURE — G8988 SELF CARE GOAL STATUS: HCPCS | Mod: GP,CI | Performed by: PHYSICAL THERAPIST

## 2018-01-01 PROCEDURE — 93296 REM INTERROG EVL PM/IDS: CPT | Performed by: INTERNAL MEDICINE

## 2018-01-01 PROCEDURE — 99284 EMERGENCY DEPT VISIT MOD MDM: CPT | Mod: 25 | Performed by: EMERGENCY MEDICINE

## 2018-01-01 PROCEDURE — 99213 OFFICE O/P EST LOW 20 MIN: CPT | Performed by: FAMILY MEDICINE

## 2018-01-01 PROCEDURE — 99214 OFFICE O/P EST MOD 30 MIN: CPT | Performed by: FAMILY MEDICINE

## 2018-01-01 PROCEDURE — 85025 COMPLETE CBC W/AUTO DIFF WBC: CPT | Performed by: FAMILY MEDICINE

## 2018-01-01 PROCEDURE — 99282 EMERGENCY DEPT VISIT SF MDM: CPT | Mod: Z6 | Performed by: FAMILY MEDICINE

## 2018-01-01 PROCEDURE — 80048 BASIC METABOLIC PNL TOTAL CA: CPT | Performed by: EMERGENCY MEDICINE

## 2018-01-01 PROCEDURE — 99213 OFFICE O/P EST LOW 20 MIN: CPT | Mod: 25 | Performed by: FAMILY MEDICINE

## 2018-01-01 PROCEDURE — 73110 X-RAY EXAM OF WRIST: CPT | Mod: LT

## 2018-01-01 PROCEDURE — G0008 ADMIN INFLUENZA VIRUS VAC: HCPCS | Performed by: FAMILY MEDICINE

## 2018-01-01 PROCEDURE — 83880 ASSAY OF NATRIURETIC PEPTIDE: CPT | Performed by: FAMILY MEDICINE

## 2018-01-01 PROCEDURE — 99305 1ST NF CARE MODERATE MDM 35: CPT | Performed by: FAMILY MEDICINE

## 2018-01-01 PROCEDURE — G8987 SELF CARE CURRENT STATUS: HCPCS | Mod: GP,CJ | Performed by: PHYSICAL THERAPIST

## 2018-01-01 PROCEDURE — 85025 COMPLETE CBC W/AUTO DIFF WBC: CPT | Performed by: EMERGENCY MEDICINE

## 2018-01-01 PROCEDURE — 36415 COLL VENOUS BLD VENIPUNCTURE: CPT | Performed by: FAMILY MEDICINE

## 2018-01-01 PROCEDURE — 90662 IIV NO PRSV INCREASED AG IM: CPT | Performed by: FAMILY MEDICINE

## 2018-01-01 PROCEDURE — 72100 X-RAY EXAM L-S SPINE 2/3 VWS: CPT | Mod: FY

## 2018-01-01 PROCEDURE — 71046 X-RAY EXAM CHEST 2 VIEWS: CPT

## 2018-01-01 PROCEDURE — 83880 ASSAY OF NATRIURETIC PEPTIDE: CPT | Performed by: EMERGENCY MEDICINE

## 2018-01-01 PROCEDURE — 40000099 ZZH STATISTIC LYMPHEDEMA VISIT: Performed by: REHABILITATION PRACTITIONER

## 2018-01-01 PROCEDURE — 99282 EMERGENCY DEPT VISIT SF MDM: CPT | Performed by: FAMILY MEDICINE

## 2018-01-01 PROCEDURE — 72131 CT LUMBAR SPINE W/O DYE: CPT

## 2018-01-01 PROCEDURE — 99316 NF DSCHRG MGMT 30 MIN+: CPT | Performed by: NURSE PRACTITIONER

## 2018-01-01 PROCEDURE — 99310 SBSQ NF CARE HIGH MDM 45: CPT | Performed by: NURSE PRACTITIONER

## 2018-01-01 PROCEDURE — 73502 X-RAY EXAM HIP UNI 2-3 VIEWS: CPT | Mod: FY

## 2018-01-01 PROCEDURE — 97140 MANUAL THERAPY 1/> REGIONS: CPT | Mod: GP | Performed by: REHABILITATION PRACTITIONER

## 2018-01-01 PROCEDURE — 99205 OFFICE O/P NEW HI 60 MIN: CPT

## 2018-01-01 PROCEDURE — 93295 DEV INTERROG REMOTE 1/2/MLT: CPT | Performed by: INTERNAL MEDICINE

## 2018-01-01 PROCEDURE — 99207 ZZC NO CHARGE NURSE ONLY: CPT

## 2018-01-01 RX ORDER — CETIRIZINE HYDROCHLORIDE 10 MG/1
10 TABLET ORAL EVERY EVENING
Qty: 30 TABLET | Refills: 1 | Status: SHIPPED | OUTPATIENT
Start: 2018-01-01 | End: 2018-01-01

## 2018-01-01 RX ORDER — CETIRIZINE HYDROCHLORIDE 10 MG/1
TABLET ORAL
Qty: 90 TABLET | Refills: 0 | Status: SHIPPED | OUTPATIENT
Start: 2018-01-01 | End: 2019-01-01

## 2018-01-01 RX ORDER — CETIRIZINE HYDROCHLORIDE 10 MG/1
TABLET ORAL
Qty: 30 TABLET | Refills: 1 | Status: SHIPPED | OUTPATIENT
Start: 2018-01-01 | End: 2018-01-01

## 2018-01-01 RX ORDER — FUROSEMIDE 20 MG
20 TABLET ORAL DAILY
Qty: 60 TABLET | Refills: 11 | Status: SHIPPED | OUTPATIENT
Start: 2018-01-01

## 2018-01-01 RX ORDER — ALLOPURINOL 100 MG/1
50 TABLET ORAL DAILY
Qty: 30 TABLET | Refills: 11 | Status: SHIPPED | OUTPATIENT
Start: 2018-01-01

## 2018-01-01 RX ORDER — CYCLOBENZAPRINE HCL 5 MG
2.5-5 TABLET ORAL 3 TIMES DAILY PRN
Qty: 30 TABLET | Refills: 1 | Status: SHIPPED | OUTPATIENT
Start: 2018-01-01 | End: 2018-01-01

## 2018-01-01 RX ORDER — METHYLPREDNISOLONE 4 MG
TABLET, DOSE PACK ORAL
Qty: 21 TABLET | Refills: 0 | Status: SHIPPED | OUTPATIENT
Start: 2018-01-01 | End: 2018-01-01

## 2018-01-01 ASSESSMENT — PAIN SCALES - GENERAL
PAINLEVEL: NO PAIN (0)
PAINLEVEL: NO PAIN (0)

## 2018-01-04 DIAGNOSIS — R55 SYNCOPE, UNSPECIFIED SYNCOPE TYPE: ICD-10-CM

## 2018-01-04 DIAGNOSIS — I10 BENIGN ESSENTIAL HYPERTENSION: ICD-10-CM

## 2018-01-04 DIAGNOSIS — I10 ESSENTIAL HYPERTENSION WITH GOAL BLOOD PRESSURE LESS THAN 140/90: ICD-10-CM

## 2018-01-04 DIAGNOSIS — E78.5 HYPERLIPIDEMIA LDL GOAL <70: ICD-10-CM

## 2018-01-04 DIAGNOSIS — Z95.1 S/P CABG (CORONARY ARTERY BYPASS GRAFT): ICD-10-CM

## 2018-01-04 DIAGNOSIS — I48.0 PAROXYSMAL ATRIAL FIBRILLATION (H): ICD-10-CM

## 2018-01-04 DIAGNOSIS — I25.10 CORONARY ARTERY DISEASE INVOLVING NATIVE CORONARY ARTERY OF NATIVE HEART WITHOUT ANGINA PECTORIS: ICD-10-CM

## 2018-01-04 LAB
ALT SERPL W P-5'-P-CCNC: 22 U/L (ref 0–70)
ANION GAP SERPL CALCULATED.3IONS-SCNC: 10 MMOL/L (ref 3–14)
BUN SERPL-MCNC: 22 MG/DL (ref 7–30)
CALCIUM SERPL-MCNC: 8.9 MG/DL (ref 8.5–10.1)
CHLORIDE SERPL-SCNC: 107 MMOL/L (ref 94–109)
CHOLEST SERPL-MCNC: 204 MG/DL
CO2 SERPL-SCNC: 24 MMOL/L (ref 20–32)
CREAT SERPL-MCNC: 1.03 MG/DL (ref 0.66–1.25)
GFR SERPL CREATININE-BSD FRML MDRD: 68 ML/MIN/1.7M2
GLUCOSE SERPL-MCNC: 114 MG/DL (ref 70–99)
HDLC SERPL-MCNC: 47 MG/DL
LDLC SERPL CALC-MCNC: 128 MG/DL
NONHDLC SERPL-MCNC: 157 MG/DL
POTASSIUM SERPL-SCNC: 4.3 MMOL/L (ref 3.4–5.3)
SODIUM SERPL-SCNC: 141 MMOL/L (ref 133–144)
TRIGL SERPL-MCNC: 147 MG/DL

## 2018-01-04 PROCEDURE — 36415 COLL VENOUS BLD VENIPUNCTURE: CPT | Performed by: INTERNAL MEDICINE

## 2018-01-04 PROCEDURE — 84460 ALANINE AMINO (ALT) (SGPT): CPT | Performed by: INTERNAL MEDICINE

## 2018-01-04 PROCEDURE — 80048 BASIC METABOLIC PNL TOTAL CA: CPT | Performed by: INTERNAL MEDICINE

## 2018-01-04 PROCEDURE — 80061 LIPID PANEL: CPT | Performed by: INTERNAL MEDICINE

## 2018-01-08 ENCOUNTER — OFFICE VISIT (OUTPATIENT)
Dept: CARDIOLOGY | Facility: CLINIC | Age: 83
End: 2018-01-08
Attending: INTERNAL MEDICINE
Payer: COMMERCIAL

## 2018-01-08 VITALS
SYSTOLIC BLOOD PRESSURE: 155 MMHG | HEART RATE: 79 BPM | OXYGEN SATURATION: 97 % | BODY MASS INDEX: 30.04 KG/M2 | DIASTOLIC BLOOD PRESSURE: 77 MMHG | WEIGHT: 203.4 LBS

## 2018-01-08 DIAGNOSIS — I48.0 PAROXYSMAL ATRIAL FIBRILLATION (H): ICD-10-CM

## 2018-01-08 DIAGNOSIS — E78.5 HYPERLIPIDEMIA LDL GOAL <70: ICD-10-CM

## 2018-01-08 DIAGNOSIS — I25.810 CORONARY ARTERY DISEASE INVOLVING CORONARY BYPASS GRAFT OF NATIVE HEART WITHOUT ANGINA PECTORIS: Chronic | ICD-10-CM

## 2018-01-08 DIAGNOSIS — N40.0 BENIGN NON-NODULAR PROSTATIC HYPERPLASIA WITHOUT LOWER URINARY TRACT SYMPTOMS: ICD-10-CM

## 2018-01-08 DIAGNOSIS — I10 ESSENTIAL HYPERTENSION WITH GOAL BLOOD PRESSURE LESS THAN 140/90: ICD-10-CM

## 2018-01-08 DIAGNOSIS — I25.10 CORONARY ARTERY DISEASE INVOLVING NATIVE CORONARY ARTERY OF NATIVE HEART WITHOUT ANGINA PECTORIS: ICD-10-CM

## 2018-01-08 DIAGNOSIS — R55 SYNCOPE, UNSPECIFIED SYNCOPE TYPE: ICD-10-CM

## 2018-01-08 DIAGNOSIS — E78.5 HYPERLIPIDEMIA LDL GOAL <100: ICD-10-CM

## 2018-01-08 DIAGNOSIS — I10 BENIGN ESSENTIAL HYPERTENSION: ICD-10-CM

## 2018-01-08 DIAGNOSIS — Z95.1 S/P CABG (CORONARY ARTERY BYPASS GRAFT): ICD-10-CM

## 2018-01-08 DIAGNOSIS — M10.9 GOUT OF FOOT, UNSPECIFIED CAUSE, UNSPECIFIED CHRONICITY, UNSPECIFIED LATERALITY: ICD-10-CM

## 2018-01-08 PROCEDURE — 99214 OFFICE O/P EST MOD 30 MIN: CPT | Performed by: INTERNAL MEDICINE

## 2018-01-08 RX ORDER — AMLODIPINE BESYLATE 10 MG/1
10 TABLET ORAL DAILY
Qty: 90 TABLET | Refills: 3 | Status: ON HOLD | OUTPATIENT
Start: 2018-01-08 | End: 2018-04-20

## 2018-01-08 RX ORDER — ALLOPURINOL 100 MG/1
50 TABLET ORAL DAILY
Qty: 45 TABLET | Refills: 3 | Status: CANCELLED | OUTPATIENT
Start: 2018-01-08

## 2018-01-08 RX ORDER — FINASTERIDE 5 MG/1
5 TABLET, FILM COATED ORAL DAILY
Qty: 90 TABLET | Refills: 3 | Status: CANCELLED | OUTPATIENT
Start: 2018-01-08

## 2018-01-08 RX ORDER — ATORVASTATIN CALCIUM 40 MG/1
40 TABLET, FILM COATED ORAL DAILY
Qty: 90 TABLET | Refills: 3 | Status: SHIPPED | OUTPATIENT
Start: 2018-01-08 | End: 2018-01-01

## 2018-01-08 RX ORDER — METOPROLOL SUCCINATE 25 MG/1
25 TABLET, EXTENDED RELEASE ORAL DAILY
Qty: 30 TABLET | Refills: 11 | Status: SHIPPED | OUTPATIENT
Start: 2018-01-08 | End: 2018-02-13

## 2018-01-08 RX ORDER — LOSARTAN POTASSIUM 100 MG/1
100 TABLET ORAL DAILY
Qty: 90 TABLET | Refills: 3 | Status: SHIPPED | OUTPATIENT
Start: 2018-01-08 | End: 2018-01-17

## 2018-01-08 NOTE — PROGRESS NOTES
"HISTORY OF PRESENT ILLNESS:  The patient is 89-year-old mildly overweight white male with longstanding history of ischemic heart disease approximately 23 years, status post coronary bypass surgery of multiple vessels.  He presents to Cardiology Clinic initially for an episode of complete syncope while snowblowing and removing some snow from his property.  He came into the house and had an episode where he felt \"woozy\" and collapsed for several minutes with loss of consciousness.  He had a ZIO patch placed that showed evidence of atrial tachycardia and atrial dysrhythmia and a 4-beat run of ventricular tachycardia.        He underwent Cardiolite stress test and history with a history of ischemic heart disease that demonstrated a transmural scar in the inferior basilar and inferolateral wall with mild ellie-infarct ischemia of the inferior septum.  Gated images demonstrated mildly reduced left ventricular systolic function with ejection fraction of 46%.        He has denied symptoms of chest discomfort, shortness of breath, palpitations, nausea, vomiting, diaphoresis.  He has had some episodes of lightheadedness in the past.  He did have issues with bradycardia which has limited beta blocker therapy before.  He continues to drink 1-2 cups of caffeinated beverages a day, does notice palpitations and some irregular heartbeat.  Device interrogation this past fall did show evidence of more chronic presence of atrial fibrillation rather than paroxysmal.      History of coronary artery disease risk factors is positive for cigarette smoking, discontinued in 1960, hypertension over the past 15-20 years, hyperlipidemia not quite at goal, but no diagnosis of diabetes mellitus or family history of premature coronary disease.        He has some symptoms of easy fatigability and general malaise, but no significant limitation in his activity.  Interrogation of the device has shown increased atrial dysrhythmia with evidence of " persistent atrial fibrillation.  He has been started on anticoagulation in the form of Xarelto.      MEDICATIONS:      1.  Losartan 100 mg a day.   2.  Atorvastatin 40 mg a day.   3.  Amlodipine 10 mg a day.   4.  Xarelto 20 mg a day.   5.  Magnesium hydroxide as needed.   6.  Allopurinol 50 mg a day.   7.  Finasteride 5 mg a day.   8.  Multivitamins 1 per day.      LABORATORY DATA:  Demonstrates a cholesterol 204, HDL 47, , triglyceride 147.  Sodium 141, potassium 4.3, BUN 22, creatinine 1.03.  ALT 22.        Most recent echocardiogram performed this fall showed an ejection fraction of 40%-45% with hypokinesis of the inferior wall and inferior septum.  Right ventricle was normal in size and function.  There was moderate biatrial enlargement.  There was slight thickening of the mitral and aortic valves.  No significant insufficiency or stenosis noted.  Aortic root was mildly dilated.  The patient denies PND, orthopnea, fever, chills or sweats.  He presents to Cardiology for followup of his ischemic heart disease.      PHYSICAL EXAMINATION:   VITAL SIGNS:  Blood pressure 155/77 with a heart rate of 70-80 and slightly irregular.  Weight was 203 pounds, which is stable.   NECK:  Without jugular venous distention, carotid bruit or palpable thyroid.   CHEST:  Essentially clear to percussion and auscultation with slight decreased breath sounds at the bases, prolonged expiratory phase and scattered rhonchi.   CARDIAC:  Revealed slightly irregular rhythm, 1-2/6 systolic murmur at the left sternal border radiating to the apex.  No diastolic murmur, rub or S3.   EXTREMITIES:  Without cyanosis or edema.  There was 1 to 2+ edema present, right leg greater than left leg.      CLINICAL IMPRESSION:   1.  Stable cardiac condition.   2.  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   3.  Status post AICD implant for  atrial and ventricular dysrhythmia as well as bradycardia and conduction system disease.   4.  History of ischemic heart disease, status post multivessel coronary disease surgery in 1996.   5.  History of hypertension with increase in systolic blood pressure.   6.  Previous episode of syncope apparently relieved by placing a device.   7.  Distant history of cigarette smoking.   8.  History of hyperlipidemia, not at goal, most likely related to dietary indiscretion.   9.  History of hyperglycemia.   10.  History of benign prostatic hypertrophy.   11.  History of apparent chronic atrial fibrillation.      DISCUSSION:  The patient has done reasonably well from a cardiac viewpoint since his last clinic visit.  He has not had significant lightheadedness, dizziness or syncope.  He denies chest pain, shortness of breath or symptoms of congestive heart failure.  He has noted some palpitations and irregular rapid heartbeat from time to time that is usually self-limiting.  Most recent device interrogation in the fall did not demonstrate any intervention.  He has tolerated the Xarelto, but had apparently increased bruising which caused him to stop the aspirin with resolution of bruising.  He will be left off aspirin at this time.  He will be continued on the NOAC, Xarelto, for stroke prevention with his atrial fibrillation.  He will attempt better dietary intervention with regards to his serum lipids and blood pressure, but because he is having evidence of recurrent atrial fibrillation and sometimes shows a rapid heartbeat, even without symptoms, lightheadedness or dizziness and with his hypertension, it would be reasonable to try a low dose of metoprolol XL 25 mg a day for better blood pressure control and treatment of his dysrhythmia as well as his left ventricular dysfunction.  He will have an echocardiogram later this year to follow left ventricular function.      RECOMMENDATIONS:   1.  Continue present medications, adding  metoprolol XL 25 mg a day.   2.  Device followup with EP as needed.   3.  Close followup of serum lipids, basic metabolic panel and blood pressure with followup with Edwin Cabrera 1 month.   4.  Diet and exercise as tolerated, avoiding caffeine and salt as able.   5.  Anticoagulation.   7.  Routine medical followup.   8.  Cardiology followup in 3-4 months.         LESIA QUEEN MD, [facility]             D: 2018 10:40   T: 2018 12:10   MT:       Name:     HALI ANAYA   MRN:      [id number]        Account:      [id number]   :      1927           Service Date: 2018      Document: [id number]

## 2018-01-08 NOTE — MR AVS SNAPSHOT
After Visit Summary   1/8/2018    Colin Shell    MRN: [id number]           Patient Information     Date Of Birth          6/25/1927        Visit Information        Provider Department      1/8/2018 9:30 AM Norm Erickson MD [facility]        Today's Diagnoses     Syncope, unspecified syncope type        Paroxysmal atrial fibrillation (H)        Coronary artery disease involving native coronary artery of native heart without angina pectoris        S/P CABG (coronary artery bypass graft)        Benign essential hypertension        Hyperlipidemia LDL goal <70        Essential hypertension with goal blood pressure less than 140/90        Coronary artery disease involving coronary bypass graft of native heart without angina pectoris        Gout of foot, unspecified cause, unspecified chronicity, unspecified laterality        Benign non-nodular prostatic hyperplasia without lower urinary tract symptoms        Hyperlipidemia LDL goal <100          Care Instructions    Thank you for your  Heart Care visit today. Your provider has recommended the following:  Medication Changes:  1. START Metoprolol XL 25 mg every day. (For your high blood pressure)   Recommendations:  As discussed at your visit today with Dr. Caro.  Follow-up:  1. Make an appointment to see Umm Cabrera PA-C for cardiology follow up in one month with non-fasting lab to be done 1-2 days prior to this revisit.  2. Make an appointment to see Dr. Erickson for cardiology follow up in 4 months with fasting labs to be done 1-2 days prior to this revisit.  We kindly ask that you call cardiology scheduling at [phone] three months prior to requested revisit date to schedule future cardiology appointments.  Reminder:  1. Please bring in your current medication list or your medication, over the counter supplements and vitamin bottles as we will review these at each office  visit.               [facility]~[address]~[place], [place]~[address]  Questions about your visit today?  Call your Cardiology Clinic RN's-Ekaterina Sewell and/or Monica Ramirez at [phone].                Follow-ups after your visit        Additional Services     Follow-Up with Cardiac Advanced Practice Provider           Follow-Up with Cardiologist                 Your next 10 appointments already scheduled     Jan 16, 2018 10:00 AM CST   [facility] Visit with Kristen Gonzalez PA-C   [facility]    [address]  [facility] [address]   [phone]            Jan 31, 2018  4:30 PM CST   Remote ICD Check with CASEY DCR2   [facility] ([facility])    [address] [place] [address] [address]  [facility] [id number] [phone]           This appointment is for a remote check of your debrillator.  This is not an appointment at the office.              Future tests that were ordered for you today     Open Future Orders        Priority Expected Expires Ordered    Basic metabolic panel Routine 5/8/2018 1/8/2019 1/8/2018    Lipid Profile Routine 5/8/2018 1/8/2019 1/8/2018    ALT Routine 5/8/2018 1/8/2019 1/8/2018    Follow-Up with Cardiologist Routine 5/8/2018 1/8/2019 1/8/2018    Basic metabolic panel Routine 2/7/2018 1/8/2019 1/8/2018    Follow-Up with Cardiac Advanced Practice Provider Routine 2/7/2018 1/8/2019 1/8/2018            Who to contact     If you have questions or need follow up information about today's clinic visit or your schedule please contact [facility] directly at [phone].  Normal or non-critical lab and imaging results will be communicated to you by MyChart, letter or phone within 4 business days after the clinic has received the results. If you do not hear from us within 7 days, please contact the clinic  "through [employer] or phone. If you have a critical or abnormal lab result, we will notify you by phone as soon as possible.  Submit refill requests through [employer] or call your pharmacy and they will forward the refill request to us. Please allow 3 business days for your refill to be completed.          Additional Information About Your Visit        [employer] Information     Medigo lets you send messages to your doctor, view your test results, renew your prescriptions, schedule appointments and more. To sign up, go to [email or web address] . Click on \"Log in\" on the left side of the screen, which will take you to the Welcome page. Then click on \"Sign up Now\" on the right side of the page.     You will be asked to enter the access code listed below, as well as some personal information. Please follow the directions to create your username and password.     Your access code is: [email or web address]  Expires: 2018 10:33 AM     Your access code will  in 90 days. If you need help or a new code, please call your [facility] clinic or [phone].        Care EveryWhere ID     This is your Care EveryWhere ID. This could be used by other organizations to access your [facility] medical records  [id number]        Your Vitals Were     Pulse Pulse Oximetry BMI (Body Mass Index)             79 97% 30.04 kg/m2          Blood Pressure from Last 3 Encounters:   18 155/77   17 132/60   10/23/17 133/76    Weight from Last 3 Encounters:   18 92.3 kg (203 lb 6.4 oz)   17 92.5 kg (204 lb)   10/23/17 93 kg (205 lb)              We Performed the Following     Follow-Up with Cardiologist          Today's Medication Changes          These changes are accurate as of: 18 10:33 AM.  If you have any questions, ask your nurse or doctor.               Start taking these medicines.        Dose/Directions    metoprolol 25 MG 24 hr tablet   Commonly known as:  TOPROL-XL   Used for:  Paroxysmal atrial fibrillation (H), " Coronary artery disease involving native coronary artery of native heart without angina pectoris   Started by:  Norm Erickson MD        Dose:  25 mg   Take 1 tablet (25 mg) by mouth daily   Quantity:  30 tablet   Refills:  11         Stop taking these medicines if you haven't already. Please contact your care team if you have questions.     aspirin 81 MG chewable tablet   Stopped by:  Norm Erickson MD                Where to get your medicines      These medications were sent to [facility] [address]   [address] [facility] [address]     Phone:  [phone]     amLODIPine 10 MG tablet    atorvastatin 40 MG tablet    losartan 100 MG tablet    metoprolol 25 MG 24 hr tablet                Primary Care Provider Office Phone # Fax #    Casie WattsDO [phone] [address] [facility] [address]        Equal Access to Services     [facility] : Hadii aad ku hadasho Soomaali, waaxda luqadaha, qaybta kaalmada adeegyada, waxay idiin hayaan adelinette kharash laneo . So [facility] [phone].    ATENCIÓN: Si habla español, tiene a rojo disposición servicios gratuitos de asistencia lingüística. [facility] [phone].    We comply with applicable federal civil rights laws and [place] laws. We do not discriminate on the basis of race, color, national origin, age, disability, sex, sexual orientation, or gender identity.            Thank you!     Thank you for choosing [facility]  for your care. Our goal is always to provide you with excellent care. Hearing back from our patients is one way we can continue to improve our services. Please take a few minutes to complete the written survey that you may receive in the mail after your visit with us. Thank you!             Your Updated Medication List - Protect others around you: Learn how to safely use, store and throw away your medicines at  www.disposemymeds.org.          This list is accurate as of: 1/8/18 10:33 AM.  Always use your most recent med list.                   Brand Name Dispense Instructions for use Diagnosis    allopurinol 100 MG tablet    ZYLOPRIM    45 tablet    Take 0.5 tablets (50 mg) by mouth daily Gout    Gout of foot, unspecified cause, unspecified chronicity, unspecified laterality       amLODIPine 10 MG tablet    NORVASC    90 tablet    Take 1 tablet (10 mg) by mouth daily    Essential hypertension with goal blood pressure less than 140/90       atorvastatin 40 MG tablet    LIPITOR    90 tablet    Take 1 tablet (40 mg) by mouth daily    Hyperlipidemia LDL goal <100       CENTRUM SILVER per tablet      Take 1 tablet by mouth daily        finasteride 5 MG tablet    PROSCAR    90 tablet    Take 1 tablet (5 mg) by mouth daily    Benign non-nodular prostatic hyperplasia without lower urinary tract symptoms       losartan 100 MG tablet    COZAAR    90 tablet    Take 1 tablet (100 mg) by mouth daily    Essential hypertension with goal blood pressure less than 140/90       metoprolol 25 MG 24 hr tablet    TOPROL-XL    30 tablet    Take 1 tablet (25 mg) by mouth daily    Paroxysmal atrial fibrillation (H), Coronary artery disease involving native coronary artery of native heart without angina pectoris       ELIJAH MILK OF MAGNESIA PO           rivaroxaban ANTICOAGULANT 20 MG Tabs tablet    XARELTO    90 tablet    Take 1 tablet (20 mg) by mouth daily (with dinner)    Atrial fibrillation (H)

## 2018-01-08 NOTE — LETTER
"1/8/2018      Casie Watts, DO  [address] [facility] [address]      RE: Colin KORIN Shell       Dear Colleague,    I had the pleasure of seeing Colin Shell in the [facility].    HISTORY OF PRESENT ILLNESS:  The patient is 89-year-old mildly overweight white male with longstanding history of ischemic heart disease approximately 23 years, status post coronary bypass surgery of multiple vessels.  He presents to Cardiology Clinic initially for an episode of complete syncope while snowblowing and removing some snow from his property.  He came into the house and had an episode where he felt \"woozy\" and collapsed for several minutes with loss of consciousness.  He had a ZIO patch placed that showed evidence of atrial tachycardia and atrial dysrhythmia and a 4-beat run of ventricular tachycardia.        He underwent Cardiolite stress test and history with a history of ischemic heart disease that demonstrated a transmural scar in the inferior basilar and inferolateral wall with mild ellie-infarct ischemia of the inferior septum.  Gated images demonstrated mildly reduced left ventricular systolic function with ejection fraction of 46%.        He has denied symptoms of chest discomfort, shortness of breath, palpitations, nausea, vomiting, diaphoresis.  He has had some episodes of lightheadedness in the past.  He did have issues with bradycardia which has limited beta blocker therapy before.  He continues to drink 1-2 cups of caffeinated beverages a day, does notice palpitations and some irregular heartbeat.  Device interrogation this past fall did show evidence of more chronic presence of atrial fibrillation rather than paroxysmal.      History of coronary artery disease risk factors is positive for cigarette smoking, discontinued in 1960, hypertension over the past 15-20 years, hyperlipidemia not quite at goal, but no diagnosis of diabetes mellitus or family history of " premature coronary disease.        He has some symptoms of easy fatigability and general malaise, but no significant limitation in his activity.  Interrogation of the device has shown increased atrial dysrhythmia with evidence of persistent atrial fibrillation.  He has been started on anticoagulation in the form of Xarelto.      MEDICATIONS:      1.  Losartan 100 mg a day.   2.  Atorvastatin 40 mg a day.   3.  Amlodipine 10 mg a day.   4.  Xarelto 20 mg a day.   5.  Magnesium hydroxide as needed.   6.  Allopurinol 50 mg a day.   7.  Finasteride 5 mg a day.   8.  Multivitamins 1 per day.      LABORATORY DATA:  Demonstrates a cholesterol 204, HDL 47, , triglyceride 147.  Sodium 141, potassium 4.3, BUN 22, creatinine 1.03.  ALT 22.        Most recent echocardiogram performed this fall showed an ejection fraction of 40%-45% with hypokinesis of the inferior wall and inferior septum.  Right ventricle was normal in size and function.  There was moderate biatrial enlargement.  There was slight thickening of the mitral and aortic valves.  No significant insufficiency or stenosis noted.  Aortic root was mildly dilated.  The patient denies PND, orthopnea, fever, chills or sweats.  He presents to Cardiology for followup of his ischemic heart disease.      PHYSICAL EXAMINATION:   VITAL SIGNS:  Blood pressure 155/77 with a heart rate of 70-80 and slightly irregular.  Weight was 203 pounds, which is stable.   NECK:  Without jugular venous distention, carotid bruit or palpable thyroid.   CHEST:  Essentially clear to percussion and auscultation with slight decreased breath sounds at the bases, prolonged expiratory phase and scattered rhonchi.   CARDIAC:  Revealed slightly irregular rhythm, 1-2/6 systolic murmur at the left sternal border radiating to the apex.  No diastolic murmur, rub or S3.   EXTREMITIES:  Without cyanosis or edema.  There was 1 to 2+ edema present, right leg greater than left leg.      CLINICAL IMPRESSION:    1.  Stable cardiac condition.   2.  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   3.  Status post AICD implant for atrial and ventricular dysrhythmia as well as bradycardia and conduction system disease.   4.  History of ischemic heart disease, status post multivessel coronary disease surgery in 1996.   5.  History of hypertension with increase in systolic blood pressure.   6.  Previous episode of syncope apparently relieved by placing a device.   7.  Distant history of cigarette smoking.   8.  History of hyperlipidemia, not at goal, most likely related to dietary indiscretion.   9.  History of hyperglycemia.   10.  History of benign prostatic hypertrophy.   11.  History of apparent chronic atrial fibrillation.      DISCUSSION:  The patient has done reasonably well from a cardiac viewpoint since his last clinic visit.  He has not had significant lightheadedness, dizziness or syncope.  He denies chest pain, shortness of breath or symptoms of congestive heart failure.  He has noted some palpitations and irregular rapid heartbeat from time to time that is usually self-limiting.  Most recent device interrogation in the fall did not demonstrate any intervention.  He has tolerated the Xarelto, but had apparently increased bruising which caused him to stop the aspirin with resolution of bruising.  He will be left off aspirin at this time.  He will be continued on the NOAC, Xarelto, for stroke prevention with his atrial fibrillation.  He will attempt better dietary intervention with regards to his serum lipids and blood pressure, but because he is having evidence of recurrent atrial fibrillation and sometimes shows a rapid heartbeat, even without symptoms, lightheadedness or dizziness and with his hypertension, it would be reasonable to try a low dose of metoprolol XL 25 mg a day for better blood pressure control  and treatment of his dysrhythmia as well as his left ventricular dysfunction.  He will have an echocardiogram later this year to follow left ventricular function.      RECOMMENDATIONS:   1.  Continue present medications, adding metoprolol XL 25 mg a day.   2.  Device followup with EP as needed.   3.  Close followup of serum lipids, basic metabolic panel and blood pressure with followup with Edwin Cabrera 1 month.   4.  Diet and exercise as tolerated, avoiding caffeine and salt as able.   5.  Anticoagulation.   7.  Routine medical followup.   8.  Cardiology followup in 3-4 months.       Outpatient Encounter Prescriptions as of 1/8/2018   Medication Sig Dispense Refill     losartan (COZAAR) 100 MG tablet Take 1 tablet (100 mg) by mouth daily 90 tablet 3     atorvastatin (LIPITOR) 40 MG tablet Take 1 tablet (40 mg) by mouth daily 90 tablet 3     amLODIPine (NORVASC) 10 MG tablet Take 1 tablet (10 mg) by mouth daily 90 tablet 3     metoprolol (TOPROL-XL) 25 MG 24 hr tablet Take 1 tablet (25 mg) by mouth daily 30 tablet 11     rivaroxaban ANTICOAGULANT (XARELTO) 20 MG TABS tablet Take 1 tablet (20 mg) by mouth daily (with dinner) 90 tablet 3     Magnesium Hydroxide (NAVA MILK OF MAGNESIA PO)        allopurinol (ZYLOPRIM) 100 MG tablet Take 0.5 tablets (50 mg) by mouth daily Gout 45 tablet 3     finasteride (PROSCAR) 5 MG tablet Take 1 tablet (5 mg) by mouth daily 90 tablet 3     Multiple Vitamins-Minerals (CENTRUM SILVER) per tablet Take 1 tablet by mouth daily       [DISCONTINUED] aspirin 81 MG chewable tablet Take 1 tablet (81 mg) by mouth daily 100 tablet 3     [DISCONTINUED] amLODIPine (NORVASC) 10 MG tablet Take 1 tablet (10 mg) by mouth daily 90 tablet 3     [DISCONTINUED] losartan (COZAAR) 100 MG tablet Take 1 tablet (100 mg) by mouth daily 90 tablet 3     [DISCONTINUED] atorvastatin (LIPITOR) 40 MG tablet Take 1 tablet (40 mg) by mouth daily 90 tablet 3     No facility-administered encounter medications  on file as of 1/8/2018.        Again, thank you for allowing me to participate in the care of your patient.      Sincerely,    Norm Erickson MD     [facility]

## 2018-01-08 NOTE — PATIENT INSTRUCTIONS
Thank you for your [facility] visit today. Your provider has recommended the following:  Medication Changes:  1. START Metoprolol XL 25 mg every day. (For your high blood pressure)   Recommendations:  As discussed at your visit today with Dr. Caro.  Follow-up:  1. Make an appointment to see Umm Cabrera PA-C for cardiology follow up in one month with non-fasting lab to be done 1-2 days prior to this revisit.  2. Make an appointment to see Dr. Erickson for cardiology follow up in 4 months with fasting labs to be done 1-2 days prior to this revisit.  We kindly ask that you call cardiology scheduling at [phone] three months prior to requested revisit date to schedule future cardiology appointments.  Reminder:  1. Please bring in your current medication list or your medication, over the counter supplements and vitamin bottles as we will review these at each office visit.               [facility]  [facility]~[address] [facility] [address]~[place], [place]~[address]  Questions about your visit today?  Call your Cardiology Clinic RN's-Ekaterina Sewell and/or Monica Ramirez at [phone].

## 2018-01-16 ENCOUNTER — OFFICE VISIT (OUTPATIENT)
Dept: DERMATOLOGY | Facility: CLINIC | Age: 83
End: 2018-01-16
Payer: COMMERCIAL

## 2018-01-16 ENCOUNTER — TELEPHONE (OUTPATIENT)
Dept: DERMATOLOGY | Facility: CLINIC | Age: 83
End: 2018-01-16

## 2018-01-16 VITALS — OXYGEN SATURATION: 97 % | SYSTOLIC BLOOD PRESSURE: 140 MMHG | DIASTOLIC BLOOD PRESSURE: 65 MMHG | HEART RATE: 51 BPM

## 2018-01-16 DIAGNOSIS — L57.0 AK (ACTINIC KERATOSIS): ICD-10-CM

## 2018-01-16 DIAGNOSIS — D48.5 NEOPLASM OF UNCERTAIN BEHAVIOR OF SKIN: ICD-10-CM

## 2018-01-16 DIAGNOSIS — L57.0 AK (ACTINIC KERATOSIS): Primary | ICD-10-CM

## 2018-01-16 DIAGNOSIS — L72.0 EPIDERMAL CYST: Primary | ICD-10-CM

## 2018-01-16 DIAGNOSIS — L82.1 SEBORRHEIC KERATOSIS: ICD-10-CM

## 2018-01-16 PROCEDURE — 88331 PATH CONSLTJ SURG 1 BLK 1SPC: CPT | Performed by: DERMATOLOGY

## 2018-01-16 PROCEDURE — 11100 HC BIOPSY SKIN/SUBQ/MUC MEM, SINGLE LESION: CPT | Performed by: PHYSICIAN ASSISTANT

## 2018-01-16 PROCEDURE — 99213 OFFICE O/P EST LOW 20 MIN: CPT | Mod: 25 | Performed by: PHYSICIAN ASSISTANT

## 2018-01-16 NOTE — LETTER
1/16/2018         RE: Colin Shell  [address]  [facility] [address]        Dear Colleague,    Thank you for referring your patient, Colin Shell, to the [facility]. Please see a copy of my visit note below.    Mid frontal scalp :There is hyperkeratosis and focal parakeratosis of the epidermis, overlying atypical keratinocytes,  by areas of orthokeratosis, there are scattered basal atypical keratinocytes: with varying degrees of overlying loss of maturation, hyperchromatism, pleomorphism, increased and abnormal mitoses, dyskeratosis.  The dermis shows a variable inflammatory infiltrate.       Mid frontal scalp actinic keratosis cryo     Again, thank you for allowing me to participate in the care of your patient.        Sincerely,        Trent Mcdonald MD

## 2018-01-16 NOTE — PROGRESS NOTES
Mid frontal scalp :There is hyperkeratosis and focal parakeratosis of the epidermis, overlying atypical keratinocytes,  by areas of orthokeratosis, there are scattered basal atypical keratinocytes: with varying degrees of overlying loss of maturation, hyperchromatism, pleomorphism, increased and abnormal mitoses, dyskeratosis.  The dermis shows a variable inflammatory infiltrate.       Mid frontal scalp actinic keratosis cryo

## 2018-01-16 NOTE — PROGRESS NOTES
Colin Shell is a 89 year old year old male patient here today for spot on scalp.  Patient states this has been present for months reports that it did not resolve after efudex treatment. He also notes a bump on right jaw that has not resolved for year. Patient has no other skin complaints today.  Remainder of the HPI, Meds, PMH, Allergies, FH, and SH was reviewed in chart.    Pertinent Hx:  Actinic Keratoses   Past Medical History:   Diagnosis Date     Arrhythmia      Congestive heart failure (H)      Dizziness and giddiness 11/24/2007    uncertain etiology- MRI/MRA head , carotid duplex normal 2007     Foreign body in unspecified site on external eye      Hypertension      Microscopic hematuria      microscopic hematuria 2001 with  IVP and cystoscopy     Renal disease      Rhabdomyolysis     2003- in setting of Ecoli UTI, gemfibrozil/lipitor       Past Surgical History:   Procedure Laterality Date     BIOPSY ARTERY TEMPORAL Left 9/18/2017    Procedure: BIOPSY ARTERY TEMPORAL;  Left Temporal Biopsy ;  Surgeon: Ivana Phan MD;  Location: [facility] OR     SURGICAL HISTORY OF -       heel spurs     SURGICAL HISTORY OF -   2005, 2006    bilateral eye cataract     SURGICAL HISTORY OF -   1996    CABG x 5 vessels     SURGICAL HISTORY OF -   2010    circumcision     TONSILLECTOMY & ADENOIDECTOMY          Family History   Problem Relation Age of Onset     DIABETES Father      DIABETES Brother      DIABETES Brother      Hypertension Son      Unknown/Adopted Maternal Grandmother      Unknown/Adopted Maternal Grandfather      Unknown/Adopted Paternal Grandmother      Unknown/Adopted Paternal Grandfather      Dementia Sister        Social History     Social History     Marital status:      Spouse name: N/A     Number of children: N/A     Years of education: N/A     Occupational History     Not on file.     Social History Main Topics     Smoking status: Former Smoker     Smokeless tobacco: Never Used       Comment: Quit at age 32     Alcohol use Yes      Comment: 12 pack a year     Drug use: No     Sexual activity: Yes     Other Topics Concern     Parent/Sibling W/ Cabg, Mi Or Angioplasty Before 65f 55m? No     Social History Narrative       Outpatient Encounter Prescriptions as of 1/16/2018   Medication Sig Dispense Refill     losartan (COZAAR) 100 MG tablet Take 1 tablet (100 mg) by mouth daily 90 tablet 3     atorvastatin (LIPITOR) 40 MG tablet Take 1 tablet (40 mg) by mouth daily 90 tablet 3     amLODIPine (NORVASC) 10 MG tablet Take 1 tablet (10 mg) by mouth daily 90 tablet 3     metoprolol (TOPROL-XL) 25 MG 24 hr tablet Take 1 tablet (25 mg) by mouth daily 30 tablet 11     rivaroxaban ANTICOAGULANT (XARELTO) 20 MG TABS tablet Take 1 tablet (20 mg) by mouth daily (with dinner) 90 tablet 3     Magnesium Hydroxide (NAVA MILK OF MAGNESIA PO)        allopurinol (ZYLOPRIM) 100 MG tablet Take 0.5 tablets (50 mg) by mouth daily Gout 45 tablet 3     finasteride (PROSCAR) 5 MG tablet Take 1 tablet (5 mg) by mouth daily 90 tablet 3     Multiple Vitamins-Minerals (CENTRUM SILVER) per tablet Take 1 tablet by mouth daily       No facility-administered encounter medications on file as of 1/16/2018.              Review Of Systems  Skin: As above  Eyes: negative  Ears/Nose/Throat: negative  Respiratory: No shortness of breath, dyspnea on exertion, cough, or hemoptysis  Cardiovascular: negative  Gastrointestinal: negative  Genitourinary: negative  Musculoskeletal: negative  Neurologic: negative  Psychiatric: negative  Hematologic/Lymphatic/Immunologic: negative  Endocrine: negative      O:   NAD, WDWN, Alert & Oriented, Mood & Affect wnl, Vitals stable   Here today alone   /65  Pulse 51  SpO2 97%   General appearance normal   Vitals stable   Alert, oriented and in no acute distress      Skin colored subcutaneous nodule on right jaw   1.9 cm pink scaly plaque on mid frontal scalp  Pink gritty papules on forehead and  frontal scalp  Light brown stuck on papules on head    Eyes: Conjunctivae/lids:Normal     ENT: Lips    MSK:Normal    Pulm: Breathing Normal    Neuro/Psych: Orientation:Normal; Mood/Affect:Normal  A/P:  1. R/O SCIS vs AK vs SK on mid frontal scalp  TANGENTIAL BIOPSY IN HOUSE:  After consent, anesthesia with LEC and prep, tangential excision performed.  No complications and routine wound care.   2. Actinic Keratoses on forehead and frontal scalp  Discussed doing second round of efudex twice daily for next two weeks.   Will get red and raw, patient still has efudex at home.  3. Epidermal cyst, seborrheic keratosis   BENIGN LESIONS DISCUSSED WITH PATIENT:  I discussed the specifics of tumor, prognosis, and genetics of benign lesions.  I explained that treatment of these lesions would be purely cosmetic and not medically neccessary.  I discussed with patient different removal options including excision, cautery and /or laser.      Nature and genetics of benign skin lesions dicussed with patient.  Signs and Symptoms of skin cancer discussed with patient.  ABCDEs of melanoma reviewed with patient.  Patient encouraged to perform monthly skin exams.  UV precautions reviewed with patient  Risks of non-melanoma skin cancer discussed with patient   Return to clinic in 6 months.

## 2018-01-16 NOTE — LETTER
1/16/2018         RE: Colin Shell  [address]  [facility] [address]        Dear Colleague,    Thank you for referring your patient, Colin Shell, to the [facility]. Please see a copy of my visit note below.    Colin Shell is a 89 year old year old male patient here today for spot on scalp.  Patient states this has been present for months reports that it did not resolve after efudex treatment. He also notes a bump on right jaw that has not resolved for year. Patient has no other skin complaints today.  Remainder of the HPI, Meds, PMH, Allergies, FH, and SH was reviewed in chart.    Pertinent Hx:  Actinic Keratoses   Past Medical History:   Diagnosis Date     Arrhythmia      Congestive heart failure (H)      Dizziness and giddiness 11/24/2007    uncertain etiology- MRI/MRA head , carotid duplex normal 2007     Foreign body in unspecified site on external eye      Hypertension      Microscopic hematuria      microscopic hematuria 2001 with  IVP and cystoscopy     Renal disease      Rhabdomyolysis     2003- in setting of Ecoli UTI, gemfibrozil/lipitor       Past Surgical History:   Procedure Laterality Date     BIOPSY ARTERY TEMPORAL Left 9/18/2017    Procedure: BIOPSY ARTERY TEMPORAL;  Left Temporal Biopsy ;  Surgeon: Ivana Phan MD;  Location: [facility] OR     SURGICAL HISTORY OF -       heel spurs     SURGICAL HISTORY OF -   2005, 2006    bilateral eye cataract     SURGICAL HISTORY OF -   1996    CABG x 5 vessels     SURGICAL HISTORY OF -   2010    circumcision     TONSILLECTOMY & ADENOIDECTOMY          Family History   Problem Relation Age of Onset     DIABETES Father      DIABETES Brother      DIABETES Brother      Hypertension Son      Unknown/Adopted Maternal Grandmother      Unknown/Adopted Maternal Grandfather      Unknown/Adopted Paternal Grandmother      Unknown/Adopted Paternal Grandfather      Dementia Sister        Social History     Social History      Marital status:      Spouse name: N/A     Number of children: N/A     Years of education: N/A     Occupational History     Not on file.     Social History Main Topics     Smoking status: Former Smoker     Smokeless tobacco: Never Used      Comment: Quit at age 32     Alcohol use Yes      Comment: 12 pack a year     Drug use: No     Sexual activity: Yes     Other Topics Concern     Parent/Sibling W/ Cabg, Mi Or Angioplasty Before 65f 55m? No     Social History Narrative       Outpatient Encounter Prescriptions as of 1/16/2018   Medication Sig Dispense Refill     losartan (COZAAR) 100 MG tablet Take 1 tablet (100 mg) by mouth daily 90 tablet 3     atorvastatin (LIPITOR) 40 MG tablet Take 1 tablet (40 mg) by mouth daily 90 tablet 3     amLODIPine (NORVASC) 10 MG tablet Take 1 tablet (10 mg) by mouth daily 90 tablet 3     metoprolol (TOPROL-XL) 25 MG 24 hr tablet Take 1 tablet (25 mg) by mouth daily 30 tablet 11     rivaroxaban ANTICOAGULANT (XARELTO) 20 MG TABS tablet Take 1 tablet (20 mg) by mouth daily (with dinner) 90 tablet 3     Magnesium Hydroxide (NAVA MILK OF MAGNESIA PO)        allopurinol (ZYLOPRIM) 100 MG tablet Take 0.5 tablets (50 mg) by mouth daily Gout 45 tablet 3     finasteride (PROSCAR) 5 MG tablet Take 1 tablet (5 mg) by mouth daily 90 tablet 3     Multiple Vitamins-Minerals (CENTRUM SILVER) per tablet Take 1 tablet by mouth daily       No facility-administered encounter medications on file as of 1/16/2018.              Review Of Systems  Skin: As above  Eyes: negative  Ears/Nose/Throat: negative  Respiratory: No shortness of breath, dyspnea on exertion, cough, or hemoptysis  Cardiovascular: negative  Gastrointestinal: negative  Genitourinary: negative  Musculoskeletal: negative  Neurologic: negative  Psychiatric: negative  Hematologic/Lymphatic/Immunologic: negative  Endocrine: negative      O:   NAD, WDWN, Alert & Oriented, Mood & Affect wnl, Vitals stable   Here today alone   /65   Pulse 51  SpO2 97%   General appearance normal   Vitals stable   Alert, oriented and in no acute distress      Skin colored subcutaneous nodule on right jaw   1.9 cm pink scaly plaque on mid frontal scalp  Pink gritty papules on forehead and frontal scalp  Light brown stuck on papules on head    Eyes: Conjunctivae/lids:Normal     ENT: Lips    MSK:Normal    Pulm: Breathing Normal    Neuro/Psych: Orientation:Normal; Mood/Affect:Normal  A/P:  1. R/O SCIS vs AK vs SK on mid frontal scalp  TANGENTIAL BIOPSY IN HOUSE:  After consent, anesthesia with LEC and prep, tangential excision performed.  No complications and routine wound care.   2. Actinic Keratoses on forehead and frontal scalp  Discussed doing second round of efudex twice daily for next two weeks.   Will get red and raw, patient still has efudex at home.  3. Epidermal cyst, seborrheic keratosis   BENIGN LESIONS DISCUSSED WITH PATIENT:  I discussed the specifics of tumor, prognosis, and genetics of benign lesions.  I explained that treatment of these lesions would be purely cosmetic and not medically neccessary.  I discussed with patient different removal options including excision, cautery and /or laser.      Nature and genetics of benign skin lesions dicussed with patient.  Signs and Symptoms of skin cancer discussed with patient.  ABCDEs of melanoma reviewed with patient.  Patient encouraged to perform monthly skin exams.  UV precautions reviewed with patient  Risks of non-melanoma skin cancer discussed with patient   Return to clinic in 6 months.       Again, thank you for allowing me to participate in the care of your patient.        Sincerely,        Kristen Tejada PA-C

## 2018-01-16 NOTE — MR AVS SNAPSHOT
After Visit Summary   1/16/2018    Colin Shell    MRN: [id number]           Patient Information     Date Of Birth          6/25/1927        Visit Information        Provider Department      1/16/2018 10:00 AM Kristen Gonzalez PA-C [facility]        Care Instructions    Efudex twice a day for 2 weeks on front part of scalp          Wound Care Instructions     FOR SUPERFICIAL WOUNDS     [facility] [phone]    [facility] [phone]      Frontal scalp                 AFTER 24 HOURS YOU SHOULD REMOVE THE BANDAGE AND BEGIN DAILY DRESSING CHANGES AS FOLLOWS:     1) Remove Dressing.     2) Clean and dry the area with tap water using a Q-tip or sterile gauze pad.     3) Apply Vaseline, Aquaphor, Polysporin ointment or Bacitracin ointment over entire wound.  Do NOT use Neosporin ointment.     4) Cover the wound with a band-aid, or a sterile non-stick gauze pad and micropore paper tape      REPEAT THESE INSTRUCTIONS AT LEAST ONCE A DAY UNTIL THE WOUND HAS COMPLETELY HEALED.    It is an old wives tale that a wound heals better when it is exposed to air and allowed to dry out. The wound will heal faster with a better cosmetic result if it is kept moist with ointment and covered with a bandage.    **Do not let the wound dry out.**      Supplies Needed:      *Cotton tipped applicators (Q-tips)    *Polysporin Ointment or Bacitracin Ointment (NOT NEOSPORIN)    *Band-aids or non-stick gauze pads and micropore paper tape.      PATIENT INFORMATION:    During the healing process you will notice a number of changes. All wounds develop a small halo of redness surrounding the wound.  This means healing is occurring. Severe itching with extensive redness usually indicates sensitivity to the ointment or bandage tape used to dress the wound.  You should call our office if this develops.      Swelling  and/or discoloration around your surgical site is common,  particularly when performed around the eye.    All wounds normally drain.  The larger the wound the more drainage there will be.  After 7-10 days, you will notice the wound beginning to shrink and new skin will begin to grow.  The wound is healed when you can see skin has formed over the entire area.  A healed wound has a healthy, shiny look to the surface and is red to dark pink in color to normalize.  Wounds may take approximately 4-6 weeks to heal.  Larger wounds may take 6-8 weeks.  After the wound is healed you may discontinue dressing changes.    You may experience a sensation of tightness as your wound heals. This is normal and will gradually subside.    Your healed wound may be sensitive to temperature changes. This sensitivity improves with time, but if you re having a lot of discomfort, try to avoid temperature extremes.    Patients frequently experience itching after their wound appears to have healed because of the continue healing under the skin.  Plain Vaseline will help relieve the itching.        POSSIBLE COMPLICATIONS    BLEEDIN. Leave the bandage in place.  2. Use tightly rolled up gauze or a cloth to apply direct pressure over the bandage for 30  minutes.  3. Reapply pressure for an additional 30 minutes if necessary  4. Use additional gauze and tape to maintain pressure once the bleeding has stopped.    We will notify you within 2 business days of your results.            Follow-ups after your visit        Your next 10 appointments already scheduled     2018 10:00 AM CST   [facility] C[facility]    [address] [facility] [address]   [phone]            2018  4:30 PM CST   Remote ICD Check with CASEY DCR2   [facility] ([facility])    [address] [place] [address] [address]  [facility] [address] [phone]           This appointment is for a  "remote check of your debrillator.  This is not an appointment at the office.            Feb 06, 2018 10:00 AM CST   LAB with [facility] ([facility])    [address] [facility] [address]   [phone]           Please do not eat 10-12 hours before your appointment if you are coming in fasting for labs on lipids, cholesterol, or glucose (sugar). This does not apply to pregnant women. Water, hot tea and black coffee (with nothing added) are okay. Do not drink other fluids, diet soda or chew gum.            Feb 08, 2018  9:30 AM CST   Return Visit with Umm Cabrera PA-C   [facility] ([facility])    [address] [facility] [address] [phone]              Who to contact     If you have questions or need follow up information about today's clinic visit or your schedule please contact [facility] directly at [phone].  Normal or non-critical lab and imaging results will be communicated to you by Jobyduhart, letter or phone within 4 business days after the clinic has received the results. If you do not hear from us within 7 days, please contact the clinic through Jobyduhart or phone. If you have a critical or abnormal lab result, we will notify you by phone as soon as possible.  Submit refill requests through Zollo or call your pharmacy and they will forward the refill request to us. Please allow 3 business days for your refill to be completed.          Additional Information About Your Visit        JobyduharChipX Information     Zollo lets you send messages to your doctor, view your test results, renew your prescriptions, schedule appointments and more. To sign up, go to [email or web address] . Click on \"Log in\" on the left side of the screen, which will take you to the Welcome page. Then click on \"Sign up Now\" on the right side of the page.     You will be asked to enter the access code " listed below, as well as some personal information. Please follow the directions to create your username and password.     Your access code is: [email or web address]  Expires: 2018 10:33 AM     Your access code will  in 90 days. If you need help or a new code, please call your [facility] clinic or [phone].        Care EveryWhere ID     This is your Care EveryWhere ID. This could be used by other organizations to access your [facility] medical records  [id number]        Your Vitals Were     Pulse Pulse Oximetry                51 97%           Blood Pressure from Last 3 Encounters:   18 140/65   18 155/77   17 132/60    Weight from Last 3 Encounters:   18 92.3 kg (203 lb 6.4 oz)   17 92.5 kg (204 lb)   10/23/17 93 kg (205 lb)              Today, you had the following     No orders found for display       Primary Care Provider Office Phone # Fax #    Casie Sammie Watts [phone] [address] [facility] [address]        Equal Access to Services     LESLIE PACE : Hadii aad ku hadasho Soomaali, waaxda luqadaha, qaybta kaalmada adeegyada, fina moen [place] . So [facility] [phone].    ATENCIÓN: Si habla español, tiene a rojo disposición servicios gratuitos de asistencia lingüística. Llame al [phone].    We comply with applicable federal civil rights laws and [place] laws. We do not discriminate on the basis of race, color, national origin, age, disability, sex, sexual orientation, or gender identity.            Thank you!     Thank you for choosing [facility]  for your care. Our goal is always to provide you with excellent care. Hearing back from our patients is one way we can continue to improve our services. Please take a few minutes to complete the written survey that you may receive in the mail after your visit with us. Thank you!             Your Updated Medication List - Protect others around you: Learn how to  safely use, store and throw away your medicines at www.disposemymeds.org.          This list is accurate as of: 1/16/18 10:30 AM.  Always use your most recent med list.                   Brand Name Dispense Instructions for use Diagnosis    allopurinol 100 MG tablet    ZYLOPRIM    45 tablet    Take 0.5 tablets (50 mg) by mouth daily Gout    Gout of foot, unspecified cause, unspecified chronicity, unspecified laterality       amLODIPine 10 MG tablet    NORVASC    90 tablet    Take 1 tablet (10 mg) by mouth daily    Essential hypertension with goal blood pressure less than 140/90       atorvastatin 40 MG tablet    LIPITOR    90 tablet    Take 1 tablet (40 mg) by mouth daily    Hyperlipidemia LDL goal <100       CENTRUM SILVER per tablet      Take 1 tablet by mouth daily        finasteride 5 MG tablet    PROSCAR    90 tablet    Take 1 tablet (5 mg) by mouth daily    Benign non-nodular prostatic hyperplasia without lower urinary tract symptoms       losartan 100 MG tablet    COZAAR    90 tablet    Take 1 tablet (100 mg) by mouth daily    Essential hypertension with goal blood pressure less than 140/90       metoprolol succinate 25 MG 24 hr tablet    TOPROL-XL    30 tablet    Take 1 tablet (25 mg) by mouth daily    Paroxysmal atrial fibrillation (H), Coronary artery disease involving native coronary artery of native heart without angina pectoris       NAVA MILK OF MAGNESIA PO           rivaroxaban ANTICOAGULANT 20 MG Tabs tablet    XARELTO    90 tablet    Take 1 tablet (20 mg) by mouth daily (with dinner)    Atrial fibrillation (H)

## 2018-01-16 NOTE — TELEPHONE ENCOUNTER
----- Message from Trent Mcdonald MD sent at 1/16/2018 11:09 AM CST -----  Mid frontal scalp actinic keratosis cryo

## 2018-01-16 NOTE — NURSING NOTE
"Chief Complaint   Patient presents with     Derm Problem     bumps to check       Initial /65  Pulse 51  SpO2 97% Estimated body mass index is 30.04 kg/(m^2) as calculated from the following:    Height as of 9/22/17: 1.753 m (5' 9\").    Weight as of 1/8/18: 92.3 kg (203 lb 6.4 oz).  BP completed using cuff size: adeola Mcallister LPN    "

## 2018-01-16 NOTE — MR AVS SNAPSHOT
After Visit Summary   1/16/2018    Colin Shell    MRN: [id number]           Patient Information     Date Of Birth          6/25/1927        Visit Information        Provider Department      1/16/2018 10:30 AM Trent Mcdonald MD [facility]        Today's Diagnoses     AK (actinic keratosis)    -  1       Follow-ups after your visit        Your next 10 appointments already scheduled     Jan 17, 2018 10:00 AM CST   SHORT with ALEKSANDER Lorenzo CNP   [facility] ([facility])    [address] [facility] [address]   [phone]            Jan 31, 2018  4:30 PM CST   Remote ICD Check with CASEY DCR2   [facility] ([facility])    [address] [place] [address] [address]  [facility] [id number] [phone]           This appointment is for a remote check of your debrillator.  This is not an appointment at the office.            Feb 06, 2018 10:00 AM CST   LAB with [place] LAB   [facility] ([facility])    [address] [facility] [address]   [phone]           Please do not eat 10-12 hours before your appointment if you are coming in fasting for labs on lipids, cholesterol, or glucose (sugar). This does not apply to pregnant women. Water, hot tea and black coffee (with nothing added) are okay. Do not drink other fluids, diet soda or chew gum.            Feb 08, 2018  9:30 AM CST   Return Visit with Umm Cabrera PA-C   [facility] ([facility])    [address] [facility] [address]   [phone]              Who to contact     If you have questions or need follow up information about today's clinic visit or your schedule please contact [facility] directly at [phone].  Normal or non-critical lab and imaging results will be communicated to you by MyChart, letter or phone within 4  "business days after the clinic has received the results. If you do not hear from us within 7 days, please contact the clinic through [employer] or phone. If you have a critical or abnormal lab result, we will notify you by phone as soon as possible.  Submit refill requests through [employer] or call your pharmacy and they will forward the refill request to us. Please allow 3 business days for your refill to be completed.          Additional Information About Your Visit        [employer] Information     [employer] lets you send messages to your doctor, view your test results, renew your prescriptions, schedule appointments and more. To sign up, go to [email or web address] . Click on \"Log in\" on the left side of the screen, which will take you to the Welcome page. Then click on \"Sign up Now\" on the right side of the page.     You will be asked to enter the access code listed below, as well as some personal information. Please follow the directions to create your username and password.     Your access code is: [email or web address]  Expires: 2018 10:33 AM     Your access code will  in 90 days. If you need help or a new code, please call your [place] clinic or [phone].        Care EveryWhere ID     This is your Care EveryWhere ID. This could be used by other organizations to access your [place] medical records  [id number]         Blood Pressure from Last 3 Encounters:   18 140/65   18 155/77   17 132/60    Weight from Last 3 Encounters:   18 92.3 kg (203 lb 6.4 oz)   17 92.5 kg (204 lb)   10/23/17 93 kg (205 lb)              We Performed the Following     CL FROZEN SECTION FIRST SPEC        Primary Care Provider Office Phone # Fax #    Casie Cochran DO [place] [phone] [address] [facility] [address]        Equal Access to Services     ALEXIS PACE AH: Hadii lanny freedmano Sochiali, waaxda luqadaha, qaybta kaalmada tim, fina lake " ah. So [facility] [phone].    ATENCIÓN: Si rubi banks, tiene a rojo disposición servicios gratuitos de asistencia lingüística. Laureano esqueda [phone].    We comply with applicable federal civil rights laws and [place] laws. We do not discriminate on the basis of race, color, national origin, age, disability, sex, sexual orientation, or gender identity.            Thank you!     Thank you for choosing [facility]  for your care. Our goal is always to provide you with excellent care. Hearing back from our patients is one way we can continue to improve our services. Please take a few minutes to complete the written survey that you may receive in the mail after your visit with us. Thank you!             Your Updated Medication List - Protect others around you: Learn how to safely use, store and throw away your medicines at www.disposemymeds.org.          This list is accurate as of: 1/16/18 11:10 AM.  Always use your most recent med list.                   Brand Name Dispense Instructions for use Diagnosis    allopurinol 100 MG tablet    ZYLOPRIM    45 tablet    Take 0.5 tablets (50 mg) by mouth daily Gout    Gout of foot, unspecified cause, unspecified chronicity, unspecified laterality       amLODIPine 10 MG tablet    NORVASC    90 tablet    Take 1 tablet (10 mg) by mouth daily    Essential hypertension with goal blood pressure less than 140/90       atorvastatin 40 MG tablet    LIPITOR    90 tablet    Take 1 tablet (40 mg) by mouth daily    Hyperlipidemia LDL goal <100       CENTRUM SILVER per tablet      Take 1 tablet by mouth daily        finasteride 5 MG tablet    PROSCAR    90 tablet    Take 1 tablet (5 mg) by mouth daily    Benign non-nodular prostatic hyperplasia without lower urinary tract symptoms       losartan 100 MG tablet    COZAAR    90 tablet    Take 1 tablet (100 mg) by mouth daily    Essential hypertension with goal blood pressure less than 140/90       metoprolol succinate 25 MG 24 hr  tablet    TOPROL-XL    30 tablet    Take 1 tablet (25 mg) by mouth daily    Paroxysmal atrial fibrillation (H), Coronary artery disease involving native coronary artery of native heart without angina pectoris       NAVA MILK OF MAGNESIA PO           rivaroxaban ANTICOAGULANT 20 MG Tabs tablet    XARELTO    90 tablet    Take 1 tablet (20 mg) by mouth daily (with dinner)    Atrial fibrillation (H)

## 2018-01-16 NOTE — PATIENT INSTRUCTIONS
Efudex twice a day for 2 weeks on front part of scalp          Wound Care Instructions     FOR SUPERFICIAL WOUNDS     [facility] [phone]    [facility] [phone]      Frontal scalp                 AFTER 24 HOURS YOU SHOULD REMOVE THE BANDAGE AND BEGIN DAILY DRESSING CHANGES AS FOLLOWS:     1) Remove Dressing.     2) Clean and dry the area with tap water using a Q-tip or sterile gauze pad.     3) Apply Vaseline, Aquaphor, Polysporin ointment or Bacitracin ointment over entire wound.  Do NOT use Neosporin ointment.     4) Cover the wound with a band-aid, or a sterile non-stick gauze pad and micropore paper tape      REPEAT THESE INSTRUCTIONS AT LEAST ONCE A DAY UNTIL THE WOUND HAS COMPLETELY HEALED.    It is an old wives tale that a wound heals better when it is exposed to air and allowed to dry out. The wound will heal faster with a better cosmetic result if it is kept moist with ointment and covered with a bandage.    **Do not let the wound dry out.**      Supplies Needed:      *Cotton tipped applicators (Q-tips)    *Polysporin Ointment or Bacitracin Ointment (NOT NEOSPORIN)    *Band-aids or non-stick gauze pads and micropore paper tape.      PATIENT INFORMATION:    During the healing process you will notice a number of changes. All wounds develop a small halo of redness surrounding the wound.  This means healing is occurring. Severe itching with extensive redness usually indicates sensitivity to the ointment or bandage tape used to dress the wound.  You should call our office if this develops.      Swelling  and/or discoloration around your surgical site is common, particularly when performed around the eye.    All wounds normally drain.  The larger the wound the more drainage there will be.  After 7-10 days, you will notice the wound beginning to shrink and new skin will begin to grow.  The wound is healed when you can see skin has formed over the entire area.  A healed wound has  a healthy, shiny look to the surface and is red to dark pink in color to normalize.  Wounds may take approximately 4-6 weeks to heal.  Larger wounds may take 6-8 weeks.  After the wound is healed you may discontinue dressing changes.    You may experience a sensation of tightness as your wound heals. This is normal and will gradually subside.    Your healed wound may be sensitive to temperature changes. This sensitivity improves with time, but if you re having a lot of discomfort, try to avoid temperature extremes.    Patients frequently experience itching after their wound appears to have healed because of the continue healing under the skin.  Plain Vaseline will help relieve the itching.        POSSIBLE COMPLICATIONS    BLEEDIN. Leave the bandage in place.  2. Use tightly rolled up gauze or a cloth to apply direct pressure over the bandage for 30  minutes.  3. Reapply pressure for an additional 30 minutes if necessary  4. Use additional gauze and tape to maintain pressure once the bleeding has stopped.    We will notify you within 2 business days of your results.

## 2018-01-17 ENCOUNTER — OFFICE VISIT (OUTPATIENT)
Dept: FAMILY MEDICINE | Facility: CLINIC | Age: 83
End: 2018-01-17
Payer: COMMERCIAL

## 2018-01-17 VITALS
DIASTOLIC BLOOD PRESSURE: 64 MMHG | SYSTOLIC BLOOD PRESSURE: 140 MMHG | HEART RATE: 50 BPM | OXYGEN SATURATION: 98 % | HEIGHT: 67 IN | BODY MASS INDEX: 31.91 KG/M2 | TEMPERATURE: 96.4 F | WEIGHT: 203.3 LBS

## 2018-01-17 DIAGNOSIS — R05.3 CHRONIC COUGH: Primary | ICD-10-CM

## 2018-01-17 DIAGNOSIS — I10 ESSENTIAL HYPERTENSION WITH GOAL BLOOD PRESSURE LESS THAN 140/90: ICD-10-CM

## 2018-01-17 DIAGNOSIS — N40.0 BENIGN NON-NODULAR PROSTATIC HYPERPLASIA WITHOUT LOWER URINARY TRACT SYMPTOMS: ICD-10-CM

## 2018-01-17 PROCEDURE — 99214 OFFICE O/P EST MOD 30 MIN: CPT | Performed by: NURSE PRACTITIONER

## 2018-01-17 RX ORDER — FINASTERIDE 5 MG/1
5 TABLET, FILM COATED ORAL DAILY
Qty: 90 TABLET | Refills: 3 | Status: SHIPPED | OUTPATIENT
Start: 2018-01-17 | End: 2019-01-01

## 2018-01-17 RX ORDER — GUAIFENESIN 600 MG/1
600 TABLET, EXTENDED RELEASE ORAL 2 TIMES DAILY
Qty: 30 TABLET | Refills: 1 | Status: SHIPPED | OUTPATIENT
Start: 2018-01-17 | End: 2018-02-26

## 2018-01-17 RX ORDER — LOSARTAN POTASSIUM 100 MG/1
100 TABLET ORAL DAILY
Qty: 90 TABLET | Refills: 3 | Status: SHIPPED | OUTPATIENT
Start: 2018-01-17 | End: 2018-02-13

## 2018-01-17 NOTE — PROGRESS NOTES
"  SUBJECTIVE:   Colin Shell is a 89 year old male who presents to clinic today for the following health issues:chronic cough for 5 years and medication management, refills     HYPERTENSION - Patient has longstanding history of mod-severe HTN , currently denies any symptoms referable to elevated blood pressure. Specifically denies chest pain, palpitations, dyspnea, orthopnea, PND or peripheral edema. Blood pressure readings have been in fair range. Current medication regimen is as listed below. Patient denies any side effects of medication.        Problem list and histories reviewed & adjusted, as indicated.  Additional history: as documented    Labs reviewed in EPIC    Reviewed and updated as needed this visit by clinical staff  Tobacco  Allergies  Med Hx  Surg Hx  Fam Hx  Soc Hx      Reviewed and updated as needed this visit by Provider         ROS:  Constitutional, HEENT, cardiovascular, pulmonary, gi and gu systems are negative, except as otherwise noted.      OBJECTIVE:   /64  Pulse 50  Temp 96.4  F (35.8  C) (Tympanic)  Ht 5' 7.25\" (1.708 m)  Wt 203 lb 4.8 oz (92.2 kg)  SpO2 98%  BMI 31.6 kg/m2  Body mass index is 31.6 kg/(m^2).  GENERAL: healthy, alert and no distress  EYES: Eyes grossly normal to inspection, PERRL and conjunctivae and sclerae normal  HENT: ear canals and TM's normal, nose and mouth without ulcers or lesions  NECK: no adenopathy, no asymmetry, masses, or scars and thyroid normal to palpation  RESP: left lower crackles, otherwise CTA  CV: regular rate and rhythm, normal S1 S2, no S3 or S4, no murmur, click or rub, no peripheral edema and peripheral pulses strong  PSYCH: mentation appears normal, affect normal/bright    Diagnostic Test Results:  Recommended chest Xray, but patient declined     ASSESSMENT/PLAN:     1. Benign non-nodular prostatic hyperplasia without lower urinary tract symptoms  -stable, well controlled, refill provided   - finasteride (PROSCAR) 5 MG " tablet; Take 1 tablet (5 mg) by mouth daily  Dispense: 90 tablet; Refill: 3    2. Essential hypertension with goal blood pressure less than 140/90  -well controlled, recent BMP done on 1/4/18 wnl, refill provided   - losartan (COZAAR) 100 MG tablet; Take 1 tablet (100 mg) by mouth daily  Dispense: 90 tablet; Refill: 3    3. Chronic cough  -recent chest Xray at the end of November was stable: Minimal interstitial densities in both lungs are  slightly less prominent than on the previous exam. No pleural  Effusions.  -explained that chronic cough is due to pulmonary fibrosis, he is at higher risk of pneumonia due to history of bronchiectasis recommended to repeat chest Xray, but patient declined, stating that his cough did not change, or became worse since November, also he is not shortness of breath. The patient has history of recurrent pneumonias, explained that bronchiectasis and fibrosis due to frequent lung infections. There is no treatment for underlying disorder, but require antibiotic treatment for exacerbations. Recommended cough expectorant medication and the patient agreed to try it.     - guaiFENesin (MUCINEX) 600 MG 12 hr tablet; Take 1 tablet (600 mg) by mouth 2 times daily  Dispense: 30 tablet; Refill: 1    See Patient Instructions    ALEKSANDER Feliciano [facility]

## 2018-01-17 NOTE — NURSING NOTE
"Chief Complaint   Patient presents with     Chronic Cough     Has had a cough with wheezing for 5 years      Refill Request     pending        Initial /64  Pulse 50  Temp 96.4  F (35.8  C) (Tympanic)  Ht 5' 7.25\" (1.708 m)  Wt 203 lb 4.8 oz (92.2 kg)  SpO2 98%  BMI 31.6 kg/m2 Estimated body mass index is 31.6 kg/(m^2) as calculated from the following:    Height as of this encounter: 5' 7.25\" (1.708 m).    Weight as of this encounter: 203 lb 4.8 oz (92.2 kg).  Medication Reconciliation: complete  "

## 2018-01-17 NOTE — MR AVS SNAPSHOT
After Visit Summary   1/17/2018    Colin Shell    MRN: [id number]           Patient Information     Date Of Birth          6/25/1927        Visit Information        Provider Department      1/17/2018 10:00 AM Eda Ayoub APRN CNP [facility]        Today's Diagnoses     Chronic cough    -  1    Benign non-nodular prostatic hyperplasia without lower urinary tract symptoms        Essential hypertension with goal blood pressure less than 140/90          Care Instructions    Still recommend chest Xray    Liquibid, or Mucinex 600 mg twice daily as needed for cough  Cough likely due to fibrosis, or scarring tissue     Refilled meds                   Follow-ups after your visit        Your next 10 appointments already scheduled     Jan 31, 2018  4:30 PM CST   Remote ICD Check with CASEY DCR2   [facility] ([facility])    [address] [place] [address] [address]  [facility] [id number] [phone]           This appointment is for a remote check of your debrillator.  This is not an appointment at the office.            Feb 06, 2018 10:00 AM CST   LAB with [facility] ([facility])    [address] [facility] [address] [phone]           Please do not eat 10-12 hours before your appointment if you are coming in fasting for labs on lipids, cholesterol, or glucose (sugar). This does not apply to pregnant women. Water, hot tea and black coffee (with nothing added) are okay. Do not drink other fluids, diet soda or chew gum.            Feb 08, 2018  9:30 AM CST   Return Visit with Umm Cabrera PA-C   [facility] ([facility])    [address] [facility] [address] [phone]              Who to contact     If you have questions or need follow up information about today's clinic visit or your schedule please contact [facility]  [facility] directly at [phone].  Normal or non-critical lab and imaging results will be communicated to you by MyChart, letter or phone within 4 business days after the clinic has received the results. If you do not hear from us within 7 days, please contact the clinic through [employer] or phone. If you have a critical or abnormal lab result, we will notify you by phone as soon as possible.  Submit refill requests through [employer] or call your pharmacy and they will forward the refill request to us. Please allow 3 business days for your refill to be completed.          Additional Information About Your Visit        [employer] Information     [employer] lets you send messages to your doctor, view your test results, renew your prescriptions, schedule appointments and more. To sign up, go to [email or web address] . Click on \"Log in\" on the left side of the screen, which will take you to the Welcome page. Then click on \"Sign up Now\" on the right side of the page.     You will be asked to enter the access code listed below, as well as some personal information. Please follow the directions to create your username and password.     Your access code is: [email or web address]  Expires: 2018 10:33 AM     Your access code will  in 90 days. If you need help or a new code, please call your [facility] clinic or [phone].        Care EveryWhere ID     This is your Care EveryWhere ID. This could be used by other organizations to access your [facility] medical records  [id number]        Your Vitals Were     Pulse Temperature Height Pulse Oximetry BMI (Body Mass Index)       50 96.4  F (35.8  C) (Tympanic) 5' 7.25\" (1.708 m) 98% 31.6 kg/m2        Blood Pressure from Last 3 Encounters:   18 140/64   18 140/65   18 155/77    Weight from Last 3 Encounters:   18 203 lb 4.8 oz (92.2 kg)   18 203 lb 6.4 oz (92.3 kg)   17 204 lb (92.5 kg)              Today, you had the following     No orders " found for display         Today's Medication Changes          These changes are accurate as of: 1/17/18 10:29 AM.  If you have any questions, ask your nurse or doctor.               Start taking these medicines.        Dose/Directions    guaiFENesin 600 MG 12 hr tablet   Commonly known as:  MUCINEX   Used for:  Chronic cough   Started by:  Eda Ayoub APRN CNP        Dose:  600 mg   Take 1 tablet (600 mg) by mouth 2 times daily   Quantity:  30 tablet   Refills:  1            Where to get your medicines      These medications were sent to [facility] - [place], [place] - [address]   [address] [facility] [address]     Phone:  [phone]     finasteride 5 MG tablet    guaiFENesin 600 MG 12 hr tablet    losartan 100 MG tablet                Primary Care Provider Office Phone # Fax #    Casie WattsDO [phone] [address] [facility] [address]        Equal Access to Services     ALEXIS PACE : Hadii lanny ku hadasho Soomaali, waaxda luqadaha, qaybta kaalmada adeegyada, waxay meaganin hayapolinar lake . So [facility] [phone].    ATENCIÓN: Si habla español, tiene a rojo disposición servicios gratuitos de asistencia lingüística. Mercedezame al [phone].    We comply with applicable federal civil rights laws and [place] laws. We do not discriminate on the basis of race, color, national origin, age, disability, sex, sexual orientation, or gender identity.            Thank you!     Thank you for choosing [facility]  for your care. Our goal is always to provide you with excellent care. Hearing back from our patients is one way we can continue to improve our services. Please take a few minutes to complete the written survey that you may receive in the mail after your visit with us. Thank you!             Your Updated Medication List - Protect others around you: Learn how to safely use, store and throw away your medicines at  www.disposemymeds.org.          This list is accurate as of: 1/17/18 10:29 AM.  Always use your most recent med list.                   Brand Name Dispense Instructions for use Diagnosis    allopurinol 100 MG tablet    ZYLOPRIM    45 tablet    Take 0.5 tablets (50 mg) by mouth daily Gout    Gout of foot, unspecified cause, unspecified chronicity, unspecified laterality       amLODIPine 10 MG tablet    NORVASC    90 tablet    Take 1 tablet (10 mg) by mouth daily    Essential hypertension with goal blood pressure less than 140/90       atorvastatin 40 MG tablet    LIPITOR    90 tablet    Take 1 tablet (40 mg) by mouth daily    Hyperlipidemia LDL goal <100       CENTRUM SILVER per tablet      Take 1 tablet by mouth daily        finasteride 5 MG tablet    PROSCAR    90 tablet    Take 1 tablet (5 mg) by mouth daily    Benign non-nodular prostatic hyperplasia without lower urinary tract symptoms       guaiFENesin 600 MG 12 hr tablet    MUCINEX    30 tablet    Take 1 tablet (600 mg) by mouth 2 times daily    Chronic cough       losartan 100 MG tablet    COZAAR    90 tablet    Take 1 tablet (100 mg) by mouth daily    Essential hypertension with goal blood pressure less than 140/90       metoprolol succinate 25 MG 24 hr tablet    TOPROL-XL    30 tablet    Take 1 tablet (25 mg) by mouth daily    Paroxysmal atrial fibrillation (H), Coronary artery disease involving native coronary artery of native heart without angina pectoris       NAVA MILK OF MAGNESIA PO           rivaroxaban ANTICOAGULANT 20 MG Tabs tablet    XARELTO    90 tablet    Take 1 tablet (20 mg) by mouth daily (with dinner)    Atrial fibrillation (H)

## 2018-01-17 NOTE — TELEPHONE ENCOUNTER
Spoke to pt and reviewed results. Pt stated he was told to use the Effudex cream on the spot at the clinic yesterday. Advised pt to follow what he was told in clinic and if anything changed the clinic would call him.  Maite BRANTLEY RN BSN PHN  Specialty Clinics

## 2018-01-17 NOTE — PATIENT INSTRUCTIONS
Still recommend chest Xray    Liquibid, or Mucinex 600 mg twice daily as needed for cough  Cough likely due to fibrosis, or scarring tissue     Refilled meds

## 2018-01-28 DIAGNOSIS — M10.9 GOUT OF FOOT, UNSPECIFIED CAUSE, UNSPECIFIED CHRONICITY, UNSPECIFIED LATERALITY: ICD-10-CM

## 2018-01-28 NOTE — LETTER
[facility]  [address] [facility] [address]  [phone]        December 8, 2018    Colin Shell  [address]  [facility] [address]              Dear Colin Shell    This is to remind you that your Uric Acid lab is due.    You may call our office at [phone] to schedule an appointment.    Please disregard this notice if you have already had your labs drawn or made an appointment.        Sincerely,        Casie Watts CNP

## 2018-01-30 NOTE — TELEPHONE ENCOUNTER
"Requested Prescriptions   Pending Prescriptions Disp Refills     allopurinol (ZYLOPRIM) 100 MG tablet [Pharmacy Med Name: ALLOPURINOL 100MG TAB]  Last Written Prescription Date:  12/30/2016  Last Fill Quantity: 45,  # refills: 3   Last Office Visit with JOSE provider:  01/17/2018   Future Office Visit:    Next 5 appointments (look out 90 days)     Feb 08, 2018  9:30 AM CST   Return Visit with Umm Cabrera PA-C   [facility] ([facility])    [address] [facility] [address]   [phone]                  45 tablet 3     Sig: TAKE ONE-HALF TABLET BY MOUTH ONCE DAILY FOR  GOUT    Gout Agents Protocol Failed    1/28/2018 10:42 AM       Failed - Uric acid greater than or equal to 6 on file in past 12 months    Recent Labs   Lab Test  12/11/14   1030   URIC  6.0     If level is 6mg/dL or greater, ok to refill one time and refer to provider.          Passed - CBC on file in past 12 months    Recent Labs   Lab Test  09/15/17   1034   WBC  12.7*   RBC  3.99*   HGB  12.1*   HCT  36.7*   PLT  368            Passed - ALT on file in past 12 months    Recent Labs   Lab Test  01/04/18   1454   ALT  22            Passed - Recent or future visit with authorizing provider's specialty    Patient had office visit in the last year or has a visit in the next 30 days with authorizing provider.  See \"Patient Info\" tab in inbasket, or \"Choose Columns\" in Meds & Orders section of the refill encounter.            Passed - Patient is age 18 or older       Passed - Normal serum creatinine on file in the past 12 months    Recent Labs   Lab Test  01/04/18   1454   CR  1.03             Antonio Charles RT (R)    "

## 2018-01-31 ENCOUNTER — ALLIED HEALTH/NURSE VISIT (OUTPATIENT)
Dept: CARDIOLOGY | Facility: CLINIC | Age: 83
End: 2018-01-31
Payer: COMMERCIAL

## 2018-01-31 DIAGNOSIS — I47.29 PAROXYSMAL VENTRICULAR TACHYCARDIA (H): ICD-10-CM

## 2018-01-31 DIAGNOSIS — Z95.810 ICD (IMPLANTABLE CARDIOVERTER-DEFIBRILLATOR) IN PLACE: Primary | ICD-10-CM

## 2018-01-31 PROCEDURE — 93295 DEV INTERROG REMOTE 1/2/MLT: CPT | Performed by: INTERNAL MEDICINE

## 2018-01-31 PROCEDURE — 93296 REM INTERROG EVL PM/IDS: CPT | Performed by: INTERNAL MEDICINE

## 2018-01-31 NOTE — MR AVS SNAPSHOT
After Visit Summary   1/31/2018    Colin Shell    MRN: [id number]           Patient Information     Date Of Birth          6/25/1927        Visit Information        Provider Department      1/31/2018 4:30 PM CASEY DCR2 [facility]        Today's Diagnoses     ICD (implantable cardioverter-defibrillator) in place    -  1    Paroxysmal ventricular tachycardia (H)           Follow-ups after your visit        Your next 10 appointments already scheduled     Feb 06, 2018 10:00 AM CST   LAB with [place] [facility]   [facility]    [address] [address]   [phone]           Please do not eat 10-12 hours before your appointment if you are coming in fasting for labs on lipids, cholesterol, or glucose (sugar). This does not apply to pregnant women. Water, hot tea and black coffee (with nothing added) are okay. Do not drink other fluids, diet soda or chew gum.            Feb 08, 2018  9:30 AM CST   Return Visit with Umm Cabrera PA-C   [facility] ([facility])    [address] [address]   [phone]            May 01, 2018 10:00 AM CDT   ICD Check with Wy Device Rn   [facility] ([facility])    [address] [address]   [phone]              Who to contact     If you have questions or need follow up information about today's clinic visit or your schedule please contact [facility]   [facility] directly at [phone].  Normal or non-critical lab and imaging results will be communicated to you by MyChart, letter or phone within 4 business days after the clinic has received the results. If you do not hear from us within 7 days, please contact the clinic through MyChart or phone. If you have a critical or abnormal lab result, we will notify you by phone as  "soon as possible.  Submit refill requests through Olapic or call your pharmacy and they will forward the refill request to us. Please allow 3 business days for your refill to be completed.          Additional Information About Your Visit        [employer] Information     Olapic lets you send messages to your doctor, view your test results, renew your prescriptions, schedule appointments and more. To sign up, go to [email or web address] . Click on \"Log in\" on the left side of the screen, which will take you to the Welcome page. Then click on \"Sign up Now\" on the right side of the page.     You will be asked to enter the access code listed below, as well as some personal information. Please follow the directions to create your username and password.     Your access code is: [email or web address]  Expires: 2018 10:33 AM     Your access code will  in 90 days. If you need help or a new code, please call your [place] clinic or [phone].        Care EveryWhere ID     This is your Care EveryWhere ID. This could be used by other organizations to access your [place] medical records  [id number]         Blood Pressure from Last 3 Encounters:   18 140/64   18 140/65   18 155/77    Weight from Last 3 Encounters:   18 92.2 kg (203 lb 4.8 oz)   18 92.3 kg (203 lb 6.4 oz)   17 92.5 kg (204 lb)              We Performed the Following     ICD DEVICE INTERROGAT REMOTE (50627)     INTERROGATION DEVICE EVAL REMOTE, PACER/ICD (93514)        Primary Care Provider Office Phone # Fax #    Casie Sammie Watts [phone] [address] [facility] [address]        Equal Access to Services     ALEXIS PACE : Priya Ramirez, waaxda luqadaha, qaybta kaalmayasmin dumont, fina vo. So [facility] [phone].    ATENCIÓN: Si habla español, tiene a rojo disposición servicios gratuitos de asistencia lingüística. Llame al [phone].    We " comply with applicable federal civil rights laws and [place] laws. We do not discriminate on the basis of race, color, national origin, age, disability, sex, sexual orientation, or gender identity.            Thank you!     Thank you for choosing [facility]  for your care. Our goal is always to provide you with excellent care. Hearing back from our patients is one way we can continue to improve our services. Please take a few minutes to complete the written survey that you may receive in the mail after your visit with us. Thank you!             Your Updated Medication List - Protect others around you: Learn how to safely use, store and throw away your medicines at www.disposemymeds.org.          This list is accurate as of 1/31/18 11:59 PM.  Always use your most recent med list.                   Brand Name Dispense Instructions for use Diagnosis    allopurinol 100 MG tablet    ZYLOPRIM    45 tablet    Take 0.5 tablets (50 mg) by mouth daily Gout    Gout of foot, unspecified cause, unspecified chronicity, unspecified laterality       amLODIPine 10 MG tablet    NORVASC    90 tablet    Take 1 tablet (10 mg) by mouth daily    Essential hypertension with goal blood pressure less than 140/90       atorvastatin 40 MG tablet    LIPITOR    90 tablet    Take 1 tablet (40 mg) by mouth daily    Hyperlipidemia LDL goal <100       CENTRUM SILVER per tablet      Take 1 tablet by mouth daily        finasteride 5 MG tablet    PROSCAR    90 tablet    Take 1 tablet (5 mg) by mouth daily    Benign non-nodular prostatic hyperplasia without lower urinary tract symptoms       guaiFENesin 600 MG 12 hr tablet    MUCINEX    30 tablet    Take 1 tablet (600 mg) by mouth 2 times daily    Chronic cough       losartan 100 MG tablet    COZAAR    90 tablet    Take 1 tablet (100 mg) by mouth daily    Essential hypertension with goal blood pressure less than 140/90       metoprolol succinate 25 MG 24 hr  tablet    TOPROL-XL    30 tablet    Take 1 tablet (25 mg) by mouth daily    Paroxysmal atrial fibrillation (H), Coronary artery disease involving native coronary artery of native heart without angina pectoris       NAVA MILK OF MAGNESIA PO           rivaroxaban ANTICOAGULANT 20 MG Tabs tablet    XARELTO    90 tablet    Take 1 tablet (20 mg) by mouth daily (with dinner)    Atrial fibrillation (H)

## 2018-02-01 ENCOUNTER — TELEPHONE (OUTPATIENT)
Dept: CARDIOLOGY | Facility: CLINIC | Age: 83
End: 2018-02-01

## 2018-02-01 ENCOUNTER — DOCUMENTATION ONLY (OUTPATIENT)
Dept: CARDIOLOGY | Facility: CLINIC | Age: 83
End: 2018-02-01

## 2018-02-01 NOTE — PROGRESS NOTES
Medtronic Evera (D) ICD Remote Device Check  AP: 7 % : 64 %  Mode: DDD         Presenting Rhythm: AS/  Heart Rate: good variability  Sensing: WNL    Pacing Threshold: WNL    Impedance: WNL  Battery Status: 8.7 yrs estimated longevity  Atrial Arrhythmia: 57 episodes of AT/AF since last remote, 12 EGMs available for review, all showing AF/aflutter. Longest episode 36 hours in duration. Average A rate ranges 197-330 bpm, average V rate ranges  bpm. AF burden 24%. Pt takes Xarelto.   Ventricular Arrhythmia: none  ATP: none    Shocks: none    Care Plan: annual threshold due in 3 months at [facility], scheduled pt for 5/1/2018. Pt has OV scheduled with Umm YEPEZ on 2/8/2018. Called pt with results and plan.   EC RN    Pt states his Xarelto ix too expensive. He said if it would only be $120, he would take it, but last time it was >$300. Writer spoke with AF RNs, they will work on this for patient.

## 2018-02-01 NOTE — TELEPHONE ENCOUNTER
TIER EXCEPTION DENIED    Medication: rivaroxaban ANTICOAGULANT (XARELTO) 20 MG TABS tablet 90 tablet- TIER EXCEPTION DENIED    Denial Date: 2/1/2018    Denial Rational: Tier exception request has been denied because the requested drug is on your formulary list of covered drugs at the preferred tier. Your drug is listed at a tier 3, the lowest tier allowed by plan for your drug.  Medicare Part D plan is unable to offer you a lower cost share for this drug than what is currently being charged.      Appeal Information:     [employer]  FAX# [phone]

## 2018-02-01 NOTE — PROGRESS NOTES
Please submit an expedited PA to [facility] for Xarelto-Patient states that his co pay for Xarelto is now ~ 300.00 for one month whereas last year it was 120.00 for 3 months.I told him that the higher co pay is because he has not met his deductible for 2018. He told me in no uncertain terms that he will NOT pay for Xarelto and will go back to taking aspirin. ( unfortunately, there are no samples in [place]) knowing full well that his stroke risk will be higher on aspirin. This did not seem to concern him, stating,' why are you guys so concerned about my hearth when the world is full of so many problems.' I told him that we are trying to look out for him and that we will call him with the outcome of the PA submission. He stated appreciation for our efforts. Roseann

## 2018-02-01 NOTE — TELEPHONE ENCOUNTER
Left message with patient at 4:25 that we have samples of Xarelto in [place] if some family member or friend could possibly . I asked that he call back. Roseann

## 2018-02-02 RX ORDER — ALLOPURINOL 100 MG/1
TABLET ORAL
Qty: 15 TABLET | Refills: 0 | Status: SHIPPED | OUTPATIENT
Start: 2018-02-02 | End: 2018-02-13

## 2018-02-02 NOTE — TELEPHONE ENCOUNTER
Medication is being filled for 1 time refill only due to:  Patient needs labs uric. Future labs ordered uric. message left for the patient. Toshia BROWNLEE RN

## 2018-02-06 NOTE — TELEPHONE ENCOUNTER
Reviewed call regarding Xarelto with Dr. Erickson. Anticoagulation To be reviewed at [facility] 2-8-18.

## 2018-02-07 DIAGNOSIS — I25.10 CORONARY ARTERY DISEASE INVOLVING NATIVE CORONARY ARTERY OF NATIVE HEART WITHOUT ANGINA PECTORIS: ICD-10-CM

## 2018-02-07 DIAGNOSIS — Z95.1 S/P CABG (CORONARY ARTERY BYPASS GRAFT): ICD-10-CM

## 2018-02-07 DIAGNOSIS — R55 SYNCOPE, UNSPECIFIED SYNCOPE TYPE: ICD-10-CM

## 2018-02-07 DIAGNOSIS — I10 BENIGN ESSENTIAL HYPERTENSION: ICD-10-CM

## 2018-02-07 DIAGNOSIS — I48.0 PAROXYSMAL ATRIAL FIBRILLATION (H): ICD-10-CM

## 2018-02-07 DIAGNOSIS — M10.9 GOUT OF FOOT, UNSPECIFIED CAUSE, UNSPECIFIED CHRONICITY, UNSPECIFIED LATERALITY: ICD-10-CM

## 2018-02-07 DIAGNOSIS — I10 ESSENTIAL HYPERTENSION WITH GOAL BLOOD PRESSURE LESS THAN 140/90: ICD-10-CM

## 2018-02-07 DIAGNOSIS — I25.810 CORONARY ARTERY DISEASE INVOLVING CORONARY BYPASS GRAFT OF NATIVE HEART WITHOUT ANGINA PECTORIS: Chronic | ICD-10-CM

## 2018-02-07 DIAGNOSIS — N40.0 BENIGN NON-NODULAR PROSTATIC HYPERPLASIA WITHOUT LOWER URINARY TRACT SYMPTOMS: ICD-10-CM

## 2018-02-07 DIAGNOSIS — E78.5 HYPERLIPIDEMIA LDL GOAL <100: ICD-10-CM

## 2018-02-07 DIAGNOSIS — E78.5 HYPERLIPIDEMIA LDL GOAL <70: ICD-10-CM

## 2018-02-07 LAB
ANION GAP SERPL CALCULATED.3IONS-SCNC: 5 MMOL/L (ref 3–14)
BUN SERPL-MCNC: 21 MG/DL (ref 7–30)
CALCIUM SERPL-MCNC: 8.3 MG/DL (ref 8.5–10.1)
CHLORIDE SERPL-SCNC: 110 MMOL/L (ref 94–109)
CO2 SERPL-SCNC: 27 MMOL/L (ref 20–32)
CREAT SERPL-MCNC: 1.11 MG/DL (ref 0.66–1.25)
GFR SERPL CREATININE-BSD FRML MDRD: 62 ML/MIN/1.7M2
GLUCOSE SERPL-MCNC: 132 MG/DL (ref 70–99)
POTASSIUM SERPL-SCNC: 4.3 MMOL/L (ref 3.4–5.3)
SODIUM SERPL-SCNC: 142 MMOL/L (ref 133–144)

## 2018-02-07 PROCEDURE — 80048 BASIC METABOLIC PNL TOTAL CA: CPT | Performed by: INTERNAL MEDICINE

## 2018-02-07 PROCEDURE — 36415 COLL VENOUS BLD VENIPUNCTURE: CPT | Performed by: INTERNAL MEDICINE

## 2018-02-13 ENCOUNTER — RADIANT APPOINTMENT (OUTPATIENT)
Dept: GENERAL RADIOLOGY | Facility: CLINIC | Age: 83
End: 2018-02-13
Attending: NURSE PRACTITIONER
Payer: COMMERCIAL

## 2018-02-13 ENCOUNTER — OFFICE VISIT (OUTPATIENT)
Dept: FAMILY MEDICINE | Facility: CLINIC | Age: 83
End: 2018-02-13
Payer: COMMERCIAL

## 2018-02-13 VITALS
HEART RATE: 63 BPM | OXYGEN SATURATION: 97 % | SYSTOLIC BLOOD PRESSURE: 127 MMHG | WEIGHT: 213 LBS | TEMPERATURE: 98.7 F | BODY MASS INDEX: 33.11 KG/M2 | DIASTOLIC BLOOD PRESSURE: 65 MMHG

## 2018-02-13 DIAGNOSIS — I25.10 CORONARY ARTERY DISEASE INVOLVING NATIVE CORONARY ARTERY OF NATIVE HEART WITHOUT ANGINA PECTORIS: ICD-10-CM

## 2018-02-13 DIAGNOSIS — I10 ESSENTIAL HYPERTENSION WITH GOAL BLOOD PRESSURE LESS THAN 140/90: ICD-10-CM

## 2018-02-13 DIAGNOSIS — R05.3 CHRONIC COUGH: ICD-10-CM

## 2018-02-13 DIAGNOSIS — I48.0 PAROXYSMAL ATRIAL FIBRILLATION (H): ICD-10-CM

## 2018-02-13 DIAGNOSIS — I50.22 CHRONIC SYSTOLIC CHF (CONGESTIVE HEART FAILURE) (H): Primary | Chronic | ICD-10-CM

## 2018-02-13 DIAGNOSIS — R60.0 LOCALIZED EDEMA: ICD-10-CM

## 2018-02-13 DIAGNOSIS — J84.10 FIBROSIS OF LUNG (H): Chronic | ICD-10-CM

## 2018-02-13 DIAGNOSIS — M10.9 GOUT OF FOOT, UNSPECIFIED CAUSE, UNSPECIFIED CHRONICITY, UNSPECIFIED LATERALITY: ICD-10-CM

## 2018-02-13 DIAGNOSIS — I25.810 CORONARY ARTERY DISEASE INVOLVING CORONARY BYPASS GRAFT OF NATIVE HEART WITHOUT ANGINA PECTORIS: Chronic | ICD-10-CM

## 2018-02-13 PROCEDURE — 99215 OFFICE O/P EST HI 40 MIN: CPT | Performed by: NURSE PRACTITIONER

## 2018-02-13 PROCEDURE — 71046 X-RAY EXAM CHEST 2 VIEWS: CPT | Mod: FY

## 2018-02-13 RX ORDER — AMLODIPINE BESYLATE 10 MG/1
10 TABLET ORAL DAILY
Qty: 90 TABLET | Refills: 3 | Status: CANCELLED | OUTPATIENT
Start: 2018-02-13

## 2018-02-13 RX ORDER — METOPROLOL SUCCINATE 25 MG/1
50 TABLET, EXTENDED RELEASE ORAL DAILY
Qty: 180 TABLET | Refills: 3 | Status: SHIPPED | OUTPATIENT
Start: 2018-02-13 | End: 2019-01-01

## 2018-02-13 RX ORDER — ALLOPURINOL 100 MG/1
TABLET ORAL
Qty: 90 TABLET | Refills: 3 | Status: ON HOLD | OUTPATIENT
Start: 2018-02-13 | End: 2018-04-20

## 2018-02-13 RX ORDER — LOSARTAN POTASSIUM 100 MG/1
100 TABLET ORAL DAILY
Qty: 90 TABLET | Refills: 3 | Status: ON HOLD | OUTPATIENT
Start: 2018-02-13 | End: 2018-04-20

## 2018-02-13 NOTE — MR AVS SNAPSHOT
After Visit Summary   2/13/2018    Colin Shell    MRN: [id number]           Patient Information     Date Of Birth          6/25/1927        Visit Information        Provider Department      2/13/2018 9:20 AM Casie Portillo APRN [facility]        Today's Diagnoses     Chronic systolic CHF (congestive heart failure) (H)    -  1    Gout of foot, unspecified cause, unspecified chronicity, unspecified laterality        Paroxysmal atrial fibrillation (H)        Coronary artery disease involving native coronary artery of native heart without angina pectoris        Essential hypertension with goal blood pressure less than 140/90        Fibrosis of lung (H)        Coronary artery disease involving coronary bypass graft of native heart without angina pectoris        Chronic cough          Care Instructions    1. Chest x-ray today  2. Stop taking Amlodipine  3. Take 2 tablets daily of Metoprolol   4. Follow up in 1-2 weeks for blood pressure and swelling recheck          Thank you for choosing [facility].  You may be receiving a survey in the mail from [facility] [facility] regarding your visit today.  Please take a few minutes to complete and return the survey to let us know how we are doing.      If you have questions or concerns, please contact us via [employer] or you can contact your care team at [phone].    Our Clinic hours are:  Monday 6:40 am  to 7:00 pm  Tuesday -Friday 6:40 am to 5:00 pm    The [place] outpatient lab hours are:  Monday - Friday 6:10 am to 4:45 pm  Saturdays 7:00 am to 11:00 am  Appointments are required, call [phone]    If you have clinical questions after hours or would like to schedule an appointment,  call the clinic at [phone].          Follow-ups after your visit        Your next 10 appointments already scheduled     Feb 26, 2018 11:00 AM CST   Return Visit with Umm Cabrera PA-C   [facility]  "([facility])    [address] [place] [address]   [phone]            May 01, 2018 10:00 AM CDT   ICD Check with Wy Device Rn   [facility] ([facility])    [address] [address]   [phone]              Future tests that were ordered for you today     Open Future Orders        Priority Expected Expires Ordered    XR Chest 2 Views Routine 2018            Who to contact     If you have questions or need follow up information about today's clinic visit or your schedule please contact [facility] directly at [phone].  Normal or non-critical lab and imaging results will be communicated to you by Braingazehart, letter or phone within 4 business days after the clinic has received the results. If you do not hear from us within 7 days, please contact the clinic through Braingazehart or phone. If you have a critical or abnormal lab result, we will notify you by phone as soon as possible.  Submit refill requests through [employer] or call your pharmacy and they will forward the refill request to us. Please allow 3 business days for your refill to be completed.          Additional Information About Your Visit        [employer] Information     [employer] lets you send messages to your doctor, view your test results, renew your prescriptions, schedule appointments and more. To sign up, go to [email or web address] . Click on \"Log in\" on the left side of the screen, which will take you to the Welcome page. Then click on \"Sign up Now\" on the right side of the page.     You will be asked to enter the access code listed below, as well as some personal information. Please follow the directions to create your username and password.     Your access code is: [email or web address]  Expires: 2018 10:33 AM     Your access code will  in 90 days. If you need help or a new code, please call your [facility] or [phone].        Care " EveryWhere ID     This is your Care EveryWhere ID. This could be used by other organizations to access your [place] medical records  [id number]        Your Vitals Were     Pulse Temperature Pulse Oximetry BMI (Body Mass Index)          63 98.7  F (37.1  C) (Tympanic) 97% 33.11 kg/m2         Blood Pressure from Last 3 Encounters:   02/13/18 145/70   01/17/18 140/64   01/16/18 140/65    Weight from Last 3 Encounters:   02/13/18 213 lb (96.6 kg)   01/17/18 203 lb 4.8 oz (92.2 kg)   01/08/18 203 lb 6.4 oz (92.3 kg)                 Today's Medication Changes          These changes are accurate as of 2/13/18  9:38 AM.  If you have any questions, ask your nurse or doctor.               These medicines have changed or have updated prescriptions.        Dose/Directions    allopurinol 100 MG tablet   Commonly known as:  ZYLOPRIM   This may have changed:  See the new instructions.   Used for:  Gout of foot, unspecified cause, unspecified chronicity, unspecified laterality        TAKE ONE-HALF TABLET BY MOUTH ONCE DAILY FOR  GOUT   Quantity:  90 tablet   Refills:  3       metoprolol succinate 25 MG 24 hr tablet   Commonly known as:  TOPROL-XL   This may have changed:  how much to take   Used for:  Paroxysmal atrial fibrillation (H), Coronary artery disease involving native coronary artery of native heart without angina pectoris        Dose:  50 mg   Take 2 tablets (50 mg) by mouth daily   Quantity:  180 tablet   Refills:  3            Where to get your medicines      These medications were sent to [facility] [address] [place] [address] [address]   [address] [facility] [address]     Phone:  [phone]     allopurinol 100 MG tablet    losartan 100 MG tablet    metoprolol succinate 25 MG 24 hr tablet                Primary Care Provider Office Phone # Fax #    Casietracy Watts [phone] [address] [facility] [address] [facility] [address]        Equal Access to Services     ALEXIS PACE AH: Priya  lanny Ramirez, wacostada luqadaha, qaybta kaalmada carrilloabel, waxoctaviano enrique smileyadiliaamber henriquezGilaapolinar gilda. So [facility] [phone].    ATENCIÓN: Si habla jocelyn, tiene a rojo disposición servicios gratuitos de asistencia lingüística. Laureano al [phone].    We comply with applicable federal civil rights laws and [place] laws. We do not discriminate on the basis of race, color, national origin, age, disability, sex, sexual orientation, or gender identity.            Thank you!     Thank you for choosing [facility]  for your care. Our goal is always to provide you with excellent care. Hearing back from our patients is one way we can continue to improve our services. Please take a few minutes to complete the written survey that you may receive in the mail after your visit with us. Thank you!             Your Updated Medication List - Protect others around you: Learn how to safely use, store and throw away your medicines at www.disposemymeds.org.          This list is accurate as of 2/13/18  9:38 AM.  Always use your most recent med list.                   Brand Name Dispense Instructions for use Diagnosis    allopurinol 100 MG tablet    ZYLOPRIM    90 tablet    TAKE ONE-HALF TABLET BY MOUTH ONCE DAILY FOR  GOUT    Gout of foot, unspecified cause, unspecified chronicity, unspecified laterality       amLODIPine 10 MG tablet    NORVASC    90 tablet    Take 1 tablet (10 mg) by mouth daily    Essential hypertension with goal blood pressure less than 140/90       aspirin  MG EC tablet     90 tablet    Take 1 tablet (325 mg) by mouth daily        atorvastatin 40 MG tablet    LIPITOR    90 tablet    Take 1 tablet (40 mg) by mouth daily    Hyperlipidemia LDL goal <100       CENTRUM SILVER per tablet      Take 1 tablet by mouth daily        finasteride 5 MG tablet    PROSCAR    90 tablet    Take 1 tablet (5 mg) by mouth daily    Benign non-nodular prostatic hyperplasia without lower urinary tract  symptoms       guaiFENesin 600 MG 12 hr tablet    MUCINEX    30 tablet    Take 1 tablet (600 mg) by mouth 2 times daily    Chronic cough       losartan 100 MG tablet    COZAAR    90 tablet    Take 1 tablet (100 mg) by mouth daily    Essential hypertension with goal blood pressure less than 140/90       metoprolol succinate 25 MG 24 hr tablet    TOPROL-XL    180 tablet    Take 2 tablets (50 mg) by mouth daily    Paroxysmal atrial fibrillation (H), Coronary artery disease involving native coronary artery of native heart without angina pectoris       ELIJ[facility] PO           rivaroxaban ANTICOAGULANT 20 MG Tabs tablet    XARELTO    90 tablet    Take 1 tablet (20 mg) by mouth daily (with dinner)    Atrial fibrillation (H)

## 2018-02-13 NOTE — NURSING NOTE
"Initial /70 (BP Location: Left arm, Patient Position: Chair, Cuff Size: Adult Regular)  Pulse 63  Temp 98.7  F (37.1  C) (Tympanic)  Wt 213 lb (96.6 kg)  SpO2 97%  BMI 33.11 kg/m2 Estimated body mass index is 33.11 kg/(m^2) as calculated from the following:    Height as of 1/17/18: 5' 7.25\" (1.708 m).    Weight as of this encounter: 213 lb (96.6 kg). .    Rosanne Devlin    "

## 2018-02-13 NOTE — PROGRESS NOTES
SUBJECTIVE:   Colin Shell is a 89 year old male who presents to clinic today for the following health issues:      Medication Followup/problem of Xarelto patient stopped medication for one month and changed to ASA    Taking Medication as prescribed: NO    Side Effects:  Too expensive    Medication Helping Symptoms:  yes     GOUT: Needs refill of Allopurinol- no problems with taking medication    Hyperlipidemia Follow-Up      Rate your low fat/cholesterol diet?: good    Taking statin?  Yes, no muscle aches from statin    Other lipid medications/supplements?:  none    Hypertension Follow-up      Outpatient blood pressures are not being checked.  Low Salt Diet: no added salt  BP Readings from Last 3 Encounters:   02/13/18 127/65   01/17/18 140/64   01/16/18 140/65       Patient tells me that he has a chronic cough- started 3 yrs ago- was treated for pneumonia 9/2017- he tells me that he continues to cough- has productive phlegm. Patient quit smoking in 1960. He does not have a fever. No history of asthma. History of fibrosis of lungs. He states that he wheezes. Shortness of breath is chronic and unchanged.   He does have albuterol inhaler at home but does not use.   Left leg chronic swelling- slightly worse.        recent chest x-ray was 11/2017-   Cardiac pacing device in the heart. Mild cardiomegaly and  sternal wires. Minimal interstitial densities in both lungs are  slightly less prominent than on the previous exam. No pleural  effusions.    Problem list and histories reviewed & adjusted, as indicated.  Additional history: as documented    Patient Active Problem List   Diagnosis     Hypertension goal BP (blood pressure) < 140/90     Gout     Coronary artery disease involving coronary bypass graft of native heart without angina pectoris     Chronic kidney disease, stage III (moderate)     Obesity     Benign prostatic hyperplasia     Hyperlipidemia LDL goal <100     Health Care Home     Bronchiectasis (H)      Cholelithiasis     Fibrosis of lung (H)     Pulmonary nodule, right     Advance Care Planning     Chronic systolic CHF (congestive heart failure) (H)     Past Surgical History:   Procedure Laterality Date     BIOPSY ARTERY TEMPORAL Left 9/18/2017    Procedure: BIOPSY ARTERY TEMPORAL;  Left Temporal Biopsy ;  Surgeon: Ivana Phan MD;  Location: [facility] OR     SURGICAL HISTORY OF -       heel spurs     SURGICAL HISTORY OF -   2005, 2006    bilateral eye cataract     SURGICAL HISTORY OF -   1996    CABG x 5 vessels     SURGICAL HISTORY OF -   2010    circumcision     TONSILLECTOMY & ADENOIDECTOMY         Social History   Substance Use Topics     Smoking status: Former Smoker     Smokeless tobacco: Never Used      Comment: Quit at age 32     Alcohol use Yes      Comment: 12 pack a year     Family History   Problem Relation Age of Onset     DIABETES Father      DIABETES Brother      DIABETES Brother      Hypertension Son      Unknown/Adopted Maternal Grandmother      Unknown/Adopted Maternal Grandfather      Unknown/Adopted Paternal Grandmother      Unknown/Adopted Paternal Grandfather      Dementia Sister            Reviewed and updated as needed this visit by clinical staff       Reviewed and updated as needed this visit by Provider         ROS:  Constitutional, HEENT, cardiovascular, pulmonary, GI, , musculoskeletal, neuro, skin, endocrine and psych systems are negative, except as otherwise noted.    OBJECTIVE:     /65  Pulse 63  Temp 98.7  F (37.1  C) (Tympanic)  Wt 213 lb (96.6 kg)  SpO2 97%  BMI 33.11 kg/m2  Body mass index is 33.11 kg/(m^2).  GENERAL: alert, no distress and elderly  NECK: no adenopathy, no asymmetry, masses, or scars and thyroid normal to palpation  RESP: lungs clear to auscultation - no rales, rhonchi or wheezes  CV: regular rates and rhythm, normal S1 S2, no S3 or S4, no murmur, click or rub, peripheral pulses strong and Bilateral lower extremity pitting edema to Left>  "right  MS: no gross musculoskeletal defects noted, no edema    Diagnostic Test Results:  none     ASSESSMENT/PLAN:       1. Paroxysmal atrial fibrillation (H)  - patient stopped Xarelto due to cost- he is taking 325 mg ASA daily- patient to follow up with cardiology  - metoprolol succinate (TOPROL-XL) 25 MG 24 hr tablet; Take 2 tablets (50 mg) by mouth daily  Dispense: 180 tablet; Refill: 3  - patient has cardiology appointment in 2 weeks- he will need to discuss anticoagulation at that time    2. Gout of foot, unspecified cause, unspecified chronicity, unspecified laterality  stable  - allopurinol (ZYLOPRIM) 100 MG tablet; TAKE ONE-HALF TABLET BY MOUTH ONCE DAILY FOR  GOUT  Dispense: 90 tablet; Refill: 3    3. Coronary artery disease involving native coronary artery of native heart without angina pectoris  stable  - metoprolol succinate (TOPROL-XL) 25 MG 24 hr tablet; Take 2 tablets (50 mg) by mouth daily  Dispense: 180 tablet; Refill: 3    4. Essential hypertension with goal blood pressure less than 140/90  controlled  - losartan (COZAAR) 100 MG tablet; Take 1 tablet (100 mg) by mouth daily  Dispense: 90 tablet; Refill: 3    5. Chronic systolic CHF (congestive heart failure) (H)  Stable- followed by Cardiology- having increase in left leg edema-     6. Fibrosis of lung (H)  Likely causing his chronic cough- consider CT lungs  - consider PFT\"s- patient does not want to do at this time    7. Coronary artery disease involving coronary bypass graft of native heart without angina pectoris  - stable- followed by Cardiology     8. Chronic cough  Likely due to fibrosis of lungs   - XR Chest 2 Views; Future  - consider CT lungs  - consider Albuterol inhaler to be used prn    9. Edema  History of chronic left LE edema- worsening over past week- patient with history of CHF-   Discussed decreasing/discontinuing Amlodipine- as this can cause swelling vs starting diuretic which patient declined doing at this time  - patient " wants to watch and wait- discussed low sodium diet, elevate leg and wear compression socks.       45 min spent in direct face to face time with this patient, greater than 50% in counseling and coordination of care  Discussing chronic cough, edema in leg, atrial fibrillation and hypertension .      ALEKSANDER Olivo [facility]

## 2018-02-13 NOTE — NURSING NOTE
"Initial /65  Pulse 63  Temp 98.7  F (37.1  C) (Tympanic)  Wt 213 lb (96.6 kg)  SpO2 97%  BMI 33.11 kg/m2 Estimated body mass index is 33.11 kg/(m^2) as calculated from the following:    Height as of 1/17/18: 5' 7.25\" (1.708 m).    Weight as of this encounter: 213 lb (96.6 kg). .    Rosanne Devlin    "

## 2018-02-13 NOTE — PATIENT INSTRUCTIONS
1. Chest x-ray today  2. Stop taking Amlodipine  3. Take 2 tablets daily of Metoprolol   4. Follow up in 1-2 weeks for blood pressure and swelling recheck          Thank you for choosing [facility].  You may be receiving a survey in the mail from Maryellen Dockery regarding your visit today.  Please take a few minutes to complete and return the survey to let us know how we are doing.      If you have questions or concerns, please contact us via Fighters or you can contact your care team at [phone].    Our Clinic hours are:  Monday 6:40 am  to 7:00 pm  Tuesday -Friday 6:40 am to 5:00 pm    The [place] outpatient lab hours are:  Monday - Friday 6:10 am to 4:45 pm  Saturdays 7:00 am to 11:00 am  Appointments are required, call [phone]    If you have clinical questions after hours or would like to schedule an appointment,  call the clinic at [phone].

## 2018-02-20 PROBLEM — I48.0 PAROXYSMAL ATRIAL FIBRILLATION (H): Status: ACTIVE | Noted: 2018-02-20

## 2018-02-26 ENCOUNTER — OFFICE VISIT (OUTPATIENT)
Dept: CARDIOLOGY | Facility: CLINIC | Age: 83
End: 2018-02-26
Attending: INTERNAL MEDICINE
Payer: COMMERCIAL

## 2018-02-26 VITALS
SYSTOLIC BLOOD PRESSURE: 133 MMHG | OXYGEN SATURATION: 97 % | HEART RATE: 50 BPM | BODY MASS INDEX: 32.21 KG/M2 | DIASTOLIC BLOOD PRESSURE: 64 MMHG | WEIGHT: 207.2 LBS

## 2018-02-26 DIAGNOSIS — E78.5 HYPERLIPIDEMIA LDL GOAL <70: ICD-10-CM

## 2018-02-26 DIAGNOSIS — I48.0 PAROXYSMAL ATRIAL FIBRILLATION (H): ICD-10-CM

## 2018-02-26 DIAGNOSIS — I10 BENIGN ESSENTIAL HYPERTENSION: Primary | ICD-10-CM

## 2018-02-26 DIAGNOSIS — Z95.1 S/P CABG (CORONARY ARTERY BYPASS GRAFT): ICD-10-CM

## 2018-02-26 PROCEDURE — 99214 OFFICE O/P EST MOD 30 MIN: CPT | Performed by: PHYSICIAN ASSISTANT

## 2018-02-26 NOTE — PROGRESS NOTES
Please see separate dictation for HPI, PHYSICAL EXAM AND IMPRESSION/PLAN.    CURRENT MEDICATIONS:  Current Outpatient Prescriptions   Medication Sig Dispense Refill     allopurinol (ZYLOPRIM) 100 MG tablet TAKE ONE-HALF TABLET BY MOUTH ONCE DAILY FOR  GOUT 90 tablet 3     metoprolol succinate (TOPROL-XL) 25 MG 24 hr tablet Take 2 tablets (50 mg) by mouth daily 180 tablet 3     losartan (COZAAR) 100 MG tablet Take 1 tablet (100 mg) by mouth daily 90 tablet 3     aspirin  MG EC tablet Take 1 tablet (325 mg) by mouth daily 90 tablet 3     finasteride (PROSCAR) 5 MG tablet Take 1 tablet (5 mg) by mouth daily 90 tablet 3     atorvastatin (LIPITOR) 40 MG tablet Take 1 tablet (40 mg) by mouth daily 90 tablet 3     amLODIPine (NORVASC) 10 MG tablet Take 1 tablet (10 mg) by mouth daily 90 tablet 3     Magnesium Hydroxide (NAVA MILK OF MAGNESIA PO)        Multiple Vitamins-Minerals (CENTRUM SILVER) per tablet Take 1 tablet by mouth daily         ALLERGIES:     Allergies   Allergen Reactions     Flomax [Tamsulosin Hydrochloride]      Hctz Other (See Comments)     Caused loss of balance     Lisinopril Cough     Simvastatin Nausea and Vomiting and Diarrhea     Vytorin Nausea and Diarrhea       PAST MEDICAL HISTORY:  Past Medical History:   Diagnosis Date     Arrhythmia      Congestive heart failure (H)      Dizziness and giddiness 11/24/2007    uncertain etiology- MRI/MRA head , carotid duplex normal 2007     Foreign body in unspecified site on external eye      Hypertension      Microscopic hematuria      microscopic hematuria 2001 with  IVP and cystoscopy     Renal disease      Rhabdomyolysis     2003- in setting of Ecoli UTI, gemfibrozil/lipitor       PAST SURGICAL HISTORY:  Past Surgical History:   Procedure Laterality Date     BIOPSY ARTERY TEMPORAL Left 9/18/2017    Procedure: BIOPSY ARTERY TEMPORAL;  Left Temporal Biopsy ;  Surgeon: Ivana Phan MD;  Location: [facility] OR     SURGICAL HISTORY OF -        heel spurs     SURGICAL HISTORY OF -   2005, 2006    bilateral eye cataract     SURGICAL HISTORY OF -   1996    CABG x 5 vessels     SURGICAL HISTORY OF -   2010    circumcision     TONSILLECTOMY & ADENOIDECTOMY         SOCIAL HISTORY:  Social History     Social History     Marital status:      Spouse name: N/A     Number of children: N/A     Years of education: N/A     Social History Main Topics     Smoking status: Former Smoker     Smokeless tobacco: Never Used      Comment: Quit at age 32     Alcohol use Yes      Comment: 12 pack a year     Drug use: No     Sexual activity: Yes     Other Topics Concern     Parent/Sibling W/ Cabg, Mi Or Angioplasty Before 65f 55m? No     Social History Narrative       FAMILY HISTORY:  Family History   Problem Relation Age of Onset     DIABETES Father      DIABETES Brother      DIABETES Brother      Hypertension Son      Unknown/Adopted Maternal Grandmother      Unknown/Adopted Maternal Grandfather      Unknown/Adopted Paternal Grandmother      Unknown/Adopted Paternal Grandfather      Dementia Sister        Review of Systems:  Skin:  Negative       Eyes:  Negative      ENT:  Negative      Respiratory:  Negative       Cardiovascular:    Positive for;palpitations;lower extremity symptoms;edema;lightheadedness    Gastroenterology: Negative      Genitourinary:  Negative      Musculoskeletal:  Negative      Neurologic:  Positive for numbness or tingling of feet;memory problems    Psychiatric:  Negative      Heme/Lymph/Imm:  Negative      Endocrine:  Negative         Reviewed. Remainder of the note dictated.    Umm Cabrera PA-C

## 2018-02-26 NOTE — PATIENT INSTRUCTIONS
[facility]  [facility]~[address]~[place], [place]~[address]  Thank you for your [facility] visit today. If you have questions regarding your visit, please contact your cardiology RN's, Ekaterina Sewell or Monica Ramirez, at [phone]. Your provider has recommended the following:  Medication Changes:  No changes today  Recommendations:  Labs before you see Dr Erickson next  Follow-up:  See Dr Erickson  for cardiology follow up in June/July    To schedule a future appointment, we kindly ask that you call cardiology scheduling at [phone] three months prior to requested revisit date.               Reminder:  For your safety, we ask that you bring in your current medication(s) or an updated list of your medications with you to EACH office visit. Include the medication name, dose of pill on bottle and how you are taking it. Include over-the-counter medications or supplements. Your provider will review this at each visit and plan your care based on your current information.

## 2018-02-26 NOTE — MR AVS SNAPSHOT
After Visit Summary   2/26/2018    Colin Shell    MRN: [id number]           Patient Information     Date Of Birth          6/25/1927        Visit Information        Provider Department      2/26/2018 11:00 AM Umm Cabrera PA-C [facility]        Today's Diagnoses     Syncope, unspecified syncope type        Paroxysmal atrial fibrillation (H)        Coronary artery disease involving native coronary artery of native heart without angina pectoris        S/P CABG (coronary artery bypass graft)        Benign essential hypertension        Hyperlipidemia LDL goal <70        Essential hypertension with goal blood pressure less than 140/90        Coronary artery disease involving coronary bypass graft of native heart without angina pectoris        Gout of foot, unspecified cause, unspecified chronicity, unspecified laterality        Benign non-nodular prostatic hyperplasia without lower urinary tract symptoms        Hyperlipidemia LDL goal <100          Care Instructions    [facility]~[address]~[place], [place]~[address]  Thank you for your  Heart Care visit today. If you have questions regarding your visit, please contact your cardiology RN's, Ekaterina Sewell or Monica Ramirez, at [phone]. Your provider has recommended the following:  Medication Changes:  No changes today  Recommendations:  Labs before you see Dr Erickson next  Follow-up:  See Dr Erickson  for cardiology follow up in June/July    To schedule a future appointment, we kindly ask that you call cardiology scheduling at [phone] three months prior to requested revisit date.               Reminder:  For your safety, we ask that you bring in your current medication(s) or an updated list of your medications with you to EACH office visit. Include the medication name, dose of pill on bottle and how you are taking it. Include  "over-the-counter medications or supplements. Your provider will review this at each visit and plan your care based on your current information.               Follow-ups after your visit        Your next 10 appointments already scheduled     May 01, 2018 10:00 AM CDT   ICD Check with Wy Device Rn   [facility] ([facility])    [address] [address]   [phone]              Who to contact     If you have questions or need follow up information about today's clinic visit or your schedule please contact [facility] directly at [phone].  Normal or non-critical lab and imaging results will be communicated to you by GeoPollhart, letter or phone within 4 business days after the clinic has received the results. If you do not hear from us within 7 days, please contact the clinic through GeoPollhart or phone. If you have a critical or abnormal lab result, we will notify you by phone as soon as possible.  Submit refill requests through [employer]ceana or call your pharmacy and they will forward the refill request to us. Please allow 3 business days for your refill to be completed.          Additional Information About Your Visit        MyChart Information     [employer] lets you send messages to your doctor, view your test results, renew your prescriptions, schedule appointments and more. To sign up, go to [email or web address] . Click on \"Log in\" on the left side of the screen, which will take you to the Welcome page. Then click on \"Sign up Now\" on the right side of the page.     You will be asked to enter the access code listed below, as well as some personal information. Please follow the directions to create your username and password.     Your access code is: [email or web address]  Expires: 2018 10:33 AM     Your access code will  in 90 days. If you need help or a new code, please call your [facility] clinic or [phone].        Care " EveryWhere ID     This is your Care EveryWhere ID. This could be used by other organizations to access your [facility] medical records  [id number]        Your Vitals Were     Pulse Pulse Oximetry BMI (Body Mass Index)             50 97% 32.21 kg/m2          Blood Pressure from Last 3 Encounters:   02/26/18 133/64   02/13/18 127/65   01/17/18 140/64    Weight from Last 3 Encounters:   02/26/18 94 kg (207 lb 3.2 oz)   02/13/18 96.6 kg (213 lb)   01/17/18 92.2 kg (203 lb 4.8 oz)              We Performed the Following     Follow-Up with Cardiac Advanced Practice Provider        Primary Care Provider Office Phone # Fax #    Casie Watts,  [phone] [address] [address]        Equal Access to Services     ALEXIS PACE : Hadii lanny lewis hadasho Soomaali, waaxda luqadaha, qaybta kaalmada adeegyada, fina nunez hayapolinar [place] . So [facility] [phone].    ATENCIÓN: Si habla español, tiene a rojo disposición servicios gratuitos de asistencia lingüística. Llame al [phone].    We comply with applicable federal civil rights laws and [place] laws. We do not discriminate on the basis of race, color, national origin, age, disability, sex, sexual orientation, or gender identity.            Thank you!     Thank you for choosing [facility]  for your care. Our goal is always to provide you with excellent care. Hearing back from our patients is one way we can continue to improve our services. Please take a few minutes to complete the written survey that you may receive in the mail after your visit with us. Thank you!             Your Updated Medication List - Protect others around you: Learn how to safely use, store and throw away your medicines at www.disposemymeds.org.          This list is accurate as of 2/26/18 11:16 AM.  Always use your most recent med list.                   Brand Name Dispense Instructions for use Diagnosis     allopurinol 100 MG tablet    ZYLOPRIM    90 tablet    TAKE ONE-HALF TABLET BY MOUTH ONCE DAILY FOR  GOUT    Gout of foot, unspecified cause, unspecified chronicity, unspecified laterality       amLODIPine 10 MG tablet    NORVASC    90 tablet    Take 1 tablet (10 mg) by mouth daily    Essential hypertension with goal blood pressure less than 140/90       aspirin  MG EC tablet     90 tablet    Take 1 tablet (325 mg) by mouth daily        atorvastatin 40 MG tablet    LIPITOR    90 tablet    Take 1 tablet (40 mg) by mouth daily    Hyperlipidemia LDL goal <100       CENTRUM SILVER per tablet      Take 1 tablet by mouth daily        finasteride 5 MG tablet    PROSCAR    90 tablet    Take 1 tablet (5 mg) by mouth daily    Benign non-nodular prostatic hyperplasia without lower urinary tract symptoms       losartan 100 MG tablet    COZAAR    90 tablet    Take 1 tablet (100 mg) by mouth daily    Essential hypertension with goal blood pressure less than 140/90       metoprolol succinate 25 MG 24 hr tablet    TOPROL-XL    180 tablet    Take 2 tablets (50 mg) by mouth daily    Paroxysmal atrial fibrillation (H), Coronary artery disease involving native coronary artery of native heart without angina pectoris       ELIJAH DENNIS

## 2018-02-26 NOTE — LETTER
2/26/2018      Casie Watts, DO  [address] [facility] [address]      RE: Colin LANGLEY Suleman       Dear Colleague,    I had the pleasure of seeing Colin Shell in the [facility].    Service Date: 02/26/2018      HISTORY OF PRESENT ILLNESS:  Mr. Shell is a pleasant 89-year-old gentleman who presents to Cardiology office today for a 1-month followup after a change in his medical therapy.      Cardiovascular history includes coronary artery disease status post coronary artery bypass grafting in 1986, a history of syncope, atrial dysrhythmia and ventricular dysrhythmia for which he underwent ICD implantation in early 2017, hypertension and hyperlipidemia.  He was last seen by Dr. Erickson in January.  At that time Toprol-XL at 25 mg daily was added to his regimen.  Subsequently, he was evaluated by his primary care provider and this dose was increased to 50 mg daily.  Previously, he had been on Xarelto due to the history of atrial fibrillation and an elevated CHADS-VASc score; however, with the new year the cost of the medication increased significantly.  The patient states that he has been not taking it due to the financial burden.      At this point, he states that he is doing well from a cardiac standpoint.  He denies any chest discomfort, dyspnea on exertion, orthopnea or PND.  He has chronic lower extremity edema, worse in the left leg (where his bypass veins were harvested from).  This has been stable.  He really does not have any new questions today.  He does state that he is not interested in any other anticoagulation besides aspirin.      CURRENT CARDIAC MEDICATIONS:   1.  Toprol-XL 25 mg 2 tablets daily.   2.  Losartan 100 mg daily.   3.  Aspirin 325 mg daily.   4.  Atorvastatin 40 mg daily.   5.  Amlodipine 10 mg daily.      The remainder of his medications, allergies and review of systems were reviewed and as are documented separately.      PHYSICAL  EXAMINATION:   GENERAL:  The patient is a pleasant 89-year-old gentleman who appears younger than his stated age.  He is in no apparent distress.   VITAL SIGNS:  His blood pressure is 133/64, pulse is 50, weight is 207 pounds.  This is overall stable.   LUNGS:  His breathing is nonlabored.  He does have some crackles in the bases bilaterally.   CARDIAC:  Reveals a regular rate and rhythm.  He does have a 2/6 systolic ejection murmur heard best at the upper sternal borders.   ABDOMEN:  Soft, nontender, nondistended.   EXTREMITIES:  Lower extremities show trace edema on the right, 1+ edema on the left.   NEUROLOGIC:  Alert and oriented.      DIAGNOSTIC STUDIES:  His basic metabolic panel from 02/07 shows a sodium of 142, potassium of 4.3, BUN of 21 and creatinine of 1.1.      His last remote ICD check was on 01/31.  The presenting rhythm was A sensed, V paced.  He is about 7% atrial paced and 64% V paced.  He did have 57 episodes of atrial tachycardia/atrial fibrillation since his last check.  The longest episode was about 36 hours.  Ventricular response rate was well controlled between 70 and 100.  The total atrial fibrillation/flutter burden was about 24%.      ASSESSMENT AND PLAN:  The patient is a 89-year-old gentleman with a history of coronary artery disease and prior coronary artery bypass surgery as well as a history of both atrial and ventricular dysrhythmia for which he previously underwent permanent pacemaker with ICD implantation.  He continues to have paroxysmal atrial fibrillation.  He previously was on Xarelto, but is unable to afford this due to its financial burden.  We discussed other options today including warfarin versus seeing if another NOAC is more affordable for him.  However, at this point he is not interested and insists that he will just continue with aspirin 325 mg daily.  The risks were discussed with the patient and he is willing to accept these risks.  He also is not wishing to pursue  any other changes to his medication.  His blood pressure had been running a bit elevated previously, but has improved at his last couple of office visits.  For the time being, he will continue his current cardiac medication regimen unchanged.      He will follow up in the [facility] in early May as previously arranged.      The patient was asked to follow up with Dr. Erickson in May; however, he felt that this visit would be too early.  He was agreeable to following up in  or July.  Repeat lab work will be performed prior to that time.      Thank you for allowing me to participate in the care of this pleasant patient.         TOMMIE GONZALEZ PA-C             D: 2018   T: 2018   MT: SIMBA      Name:     HALI ANAYA   MRN:      [id number]        Account:      [id number]   :      1927           Service Date: 2018      Document: [id number]         Outpatient Encounter Prescriptions as of 2018   Medication Sig Dispense Refill     allopurinol (ZYLOPRIM) 100 MG tablet TAKE ONE-HALF TABLET BY MOUTH ONCE DAILY FOR  GOUT 90 tablet 3     metoprolol succinate (TOPROL-XL) 25 MG 24 hr tablet Take 2 tablets (50 mg) by mouth daily 180 tablet 3     losartan (COZAAR) 100 MG tablet Take 1 tablet (100 mg) by mouth daily 90 tablet 3     aspirin  MG EC tablet Take 1 tablet (325 mg) by mouth daily 90 tablet 3     finasteride (PROSCAR) 5 MG tablet Take 1 tablet (5 mg) by mouth daily 90 tablet 3     atorvastatin (LIPITOR) 40 MG tablet Take 1 tablet (40 mg) by mouth daily 90 tablet 3     amLODIPine (NORVASC) 10 MG tablet Take 1 tablet (10 mg) by mouth daily 90 tablet 3     Magnesium Hydroxide (NAVA MILK OF MAGNESIA PO)        Multiple Vitamins-Minerals (CENTRUM SILVER) per tablet Take 1 tablet by mouth daily       [DISCONTINUED] guaiFENesin (MUCINEX) 600 MG 12 hr tablet Take 1 tablet (600 mg) by mouth 2 times daily 30 tablet 1     No facility-administered encounter medications on  file as of 2/26/2018.        Again, thank you for allowing me to participate in the care of your patient.      Sincerely,    Umm Cabrera PA-C     [facility]

## 2018-02-26 NOTE — LETTER
2/26/2018    Casie Watts, DO  [address] [facility] [address]    RE: Colin Shell       Dear Colleague,    I had the pleasure of seeing Colin Shell in the [facility].    Please see separate dictation for HPI, PHYSICAL EXAM AND IMPRESSION/PLAN.    CURRENT MEDICATIONS:  Current Outpatient Prescriptions   Medication Sig Dispense Refill     allopurinol (ZYLOPRIM) 100 MG tablet TAKE ONE-HALF TABLET BY MOUTH ONCE DAILY FOR  GOUT 90 tablet 3     metoprolol succinate (TOPROL-XL) 25 MG 24 hr tablet Take 2 tablets (50 mg) by mouth daily 180 tablet 3     losartan (COZAAR) 100 MG tablet Take 1 tablet (100 mg) by mouth daily 90 tablet 3     aspirin  MG EC tablet Take 1 tablet (325 mg) by mouth daily 90 tablet 3     finasteride (PROSCAR) 5 MG tablet Take 1 tablet (5 mg) by mouth daily 90 tablet 3     atorvastatin (LIPITOR) 40 MG tablet Take 1 tablet (40 mg) by mouth daily 90 tablet 3     amLODIPine (NORVASC) 10 MG tablet Take 1 tablet (10 mg) by mouth daily 90 tablet 3     Magnesium Hydroxide (NAVA MILK OF MAGNESIA PO)        Multiple Vitamins-Minerals (CENTRUM SILVER) per tablet Take 1 tablet by mouth daily         ALLERGIES:     Allergies   Allergen Reactions     Flomax [Tamsulosin Hydrochloride]      Hctz Other (See Comments)     Caused loss of balance     Lisinopril Cough     Simvastatin Nausea and Vomiting and Diarrhea     Vytorin Nausea and Diarrhea       PAST MEDICAL HISTORY:  Past Medical History:   Diagnosis Date     Arrhythmia      Congestive heart failure (H)      Dizziness and giddiness 11/24/2007    uncertain etiology- MRI/MRA head , carotid duplex normal 2007     Foreign body in unspecified site on external eye      Hypertension      Microscopic hematuria      microscopic hematuria 2001 with  IVP and cystoscopy     Renal disease      Rhabdomyolysis     2003- in setting of Ecoli UTI, gemfibrozil/lipitor       PAST SURGICAL HISTORY:  Past Surgical  History:   Procedure Laterality Date     BIOPSY ARTERY TEMPORAL Left 9/18/2017    Procedure: BIOPSY ARTERY TEMPORAL;  Left Temporal Biopsy ;  Surgeon: Ivana Phan MD;  Location: [facility] OR     SURGICAL HISTORY OF -       heel spurs     SURGICAL HISTORY OF -   2005, 2006    bilateral eye cataract     SURGICAL HISTORY OF -   1996    CABG x 5 vessels     SURGICAL HISTORY OF -   2010    circumcision     TONSILLECTOMY & ADENOIDECTOMY         SOCIAL HISTORY:  Social History     Social History     Marital status:      Spouse name: N/A     Number of children: N/A     Years of education: N/A     Social History Main Topics     Smoking status: Former Smoker     Smokeless tobacco: Never Used      Comment: Quit at age 32     Alcohol use Yes      Comment: 12 pack a year     Drug use: No     Sexual activity: Yes     Other Topics Concern     Parent/Sibling W/ Cabg, Mi Or Angioplasty Before 65f 55m? No     Social History Narrative       FAMILY HISTORY:  Family History   Problem Relation Age of Onset     DIABETES Father      DIABETES Brother      DIABETES Brother      Hypertension Son      Unknown/Adopted Maternal Grandmother      Unknown/Adopted Maternal Grandfather      Unknown/Adopted Paternal Grandmother      Unknown/Adopted Paternal Grandfather      Dementia Sister        Review of Systems:  Skin:  Negative       Eyes:  Negative      ENT:  Negative      Respiratory:  Negative       Cardiovascular:    Positive for;palpitations;lower extremity symptoms;edema;lightheadedness    Gastroenterology: Negative      Genitourinary:  Negative      Musculoskeletal:  Negative      Neurologic:  Positive for numbness or tingling of feet;memory problems    Psychiatric:  Negative      Heme/Lymph/Imm:  Negative      Endocrine:  Negative         Reviewed. Remainder of the note dictated.    Umm Cabrera PA-C        Service Date: 02/26/2018      HISTORY OF PRESENT ILLNESS:  Mr. Shell is a pleasant 89-year-old gentleman  who presents to Cardiology office today for a 1-month followup after a change in his medical therapy.      Cardiovascular history includes coronary artery disease status post coronary artery bypass grafting in 1986, a history of syncope, atrial dysrhythmia and ventricular dysrhythmia for which he underwent ICD implantation in early 2017, hypertension and hyperlipidemia.  He was last seen by Dr. Erickson in January.  At that time Toprol-XL at 25 mg daily was added to his regimen.  Subsequently, he was evaluated by his primary care provider and this dose was increased to 50 mg daily.  Previously, he had been on Xarelto due to the history of atrial fibrillation and an elevated CHADS-VASc score; however, with the new year the cost of the medication increased significantly.  The patient states that he has been not taking it due to the financial burden.      At this point, he states that he is doing well from a cardiac standpoint.  He denies any chest discomfort, dyspnea on exertion, orthopnea or PND.  He has chronic lower extremity edema, worse in the left leg (where his bypass veins were harvested from).  This has been stable.  He really does not have any new questions today.  He does state that he is not interested in any other anticoagulation besides aspirin.      CURRENT CARDIAC MEDICATIONS:   1.  Toprol-XL 25 mg 2 tablets daily.   2.  Losartan 100 mg daily.   3.  Aspirin 325 mg daily.   4.  Atorvastatin 40 mg daily.   5.  Amlodipine 10 mg daily.      The remainder of his medications, allergies and review of systems were reviewed and as are documented separately.      PHYSICAL EXAMINATION:   GENERAL:  The patient is a pleasant 89-year-old gentleman who appears younger than his stated age.  He is in no apparent distress.   VITAL SIGNS:  His blood pressure is 133/64, pulse is 50, weight is 207 pounds.  This is overall stable.   LUNGS:  His breathing is nonlabored.  He does have some crackles in the bases bilaterally.    CARDIAC:  Reveals a regular rate and rhythm.  He does have a 2/6 systolic ejection murmur heard best at the upper sternal borders.   ABDOMEN:  Soft, nontender, nondistended.   EXTREMITIES:  Lower extremities show trace edema on the right, 1+ edema on the left.   NEUROLOGIC:  Alert and oriented.      DIAGNOSTIC STUDIES:  His basic metabolic panel from 02/07 shows a sodium of 142, potassium of 4.3, BUN of 21 and creatinine of 1.1.      His last remote ICD check was on 01/31.  The presenting rhythm was A sensed, V paced.  He is about 7% atrial paced and 64% V paced.  He did have 57 episodes of atrial tachycardia/atrial fibrillation since his last check.  The longest episode was about 36 hours.  Ventricular response rate was well controlled between 70 and 100.  The total atrial fibrillation/flutter burden was about 24%.      ASSESSMENT AND PLAN:  The patient is a 89-year-old gentleman with a history of coronary artery disease and prior coronary artery bypass surgery as well as a history of both atrial and ventricular dysrhythmia for which he previously underwent permanent pacemaker with ICD implantation.  He continues to have paroxysmal atrial fibrillation.  He previously was on Xarelto, but is unable to afford this due to its financial burden.  We discussed other options today including warfarin versus seeing if another NOAC is more affordable for him.  However, at this point he is not interested and insists that he will just continue with aspirin 325 mg daily.  The risks were discussed with the patient and he is willing to accept these risks.  He also is not wishing to pursue any other changes to his medication.  His blood pressure had been running a bit elevated previously, but has improved at his last couple of office visits.  For the time being, he will continue his current cardiac medication regimen unchanged.      He will follow up in the Device Clinic in early May as previously arranged.      The patient was  asked to follow up with Dr. Erickson in May; however, he felt that this visit would be too early.  He was agreeable to following up in  or July.  Repeat lab work will be performed prior to that time.      Thank you for allowing me to participate in the care of this pleasant patient.         UMM GONZALEZ PA-C             D: 2018   T: 2018   MT: SIMBA      Name:     HALI ANAYA   MRN:      [id number]        Account:      [id number]   :      1927           Service Date: 2018      Document: [id number]       Thank you for allowing me to participate in the care of your patient.      Sincerely,     Umm Gonzalez PA-C     [facility]    cc:   Norm Erickson MD  [address] [place] [id number]

## 2018-03-14 ENCOUNTER — OFFICE VISIT (OUTPATIENT)
Dept: FAMILY MEDICINE | Facility: CLINIC | Age: 83
End: 2018-03-14
Payer: COMMERCIAL

## 2018-03-14 VITALS
OXYGEN SATURATION: 97 % | TEMPERATURE: 96.9 F | SYSTOLIC BLOOD PRESSURE: 145 MMHG | HEART RATE: 63 BPM | WEIGHT: 215 LBS | DIASTOLIC BLOOD PRESSURE: 68 MMHG | BODY MASS INDEX: 33.74 KG/M2 | HEIGHT: 67 IN

## 2018-03-14 DIAGNOSIS — R21 RASH AND NONSPECIFIC SKIN ERUPTION: Primary | ICD-10-CM

## 2018-03-14 PROCEDURE — 99213 OFFICE O/P EST LOW 20 MIN: CPT | Performed by: NURSE PRACTITIONER

## 2018-03-14 RX ORDER — TRIAMCINOLONE ACETONIDE 1 MG/G
OINTMENT TOPICAL 2 TIMES DAILY
Qty: 30 G | Refills: 1 | Status: SHIPPED | OUTPATIENT
Start: 2018-03-14 | End: 2018-01-01

## 2018-03-14 RX ORDER — NYSTATIN 100000 U/G
CREAM TOPICAL 2 TIMES DAILY PRN
Qty: 30 G | Refills: 1 | Status: SHIPPED | OUTPATIENT
Start: 2018-03-14 | End: 2018-03-28

## 2018-03-14 NOTE — NURSING NOTE
"Initial /75  Pulse 63  Temp 96.9  F (36.1  C) (Tympanic)  Ht 5' 7.25\" (1.708 m)  Wt 215 lb (97.5 kg)  SpO2 97%  BMI 33.42 kg/m2 Estimated body mass index is 33.42 kg/(m^2) as calculated from the following:    Height as of this encounter: 5' 7.25\" (1.708 m).    Weight as of this encounter: 215 lb (97.5 kg). .    Daxa Salomon, STEVE ([facility])  "

## 2018-03-14 NOTE — MR AVS SNAPSHOT
"              After Visit Summary   3/14/2018    Colin Shell    MRN: [id number]           Patient Information     Date Of Birth          6/25/1927        Visit Information        Provider Department      3/14/2018 10:00 AM Cindi Dalton NP [facility]        Today's Diagnoses     Rash and nonspecific skin eruption    -  1      Care Instructions    Apply both creams/ointments to rash area twice daily (morning and bedtime) until rash resolves.  Avoid scents, creams, powders in this area until rash clears.    Call me if rash becomes painful, or does not improve despite above.    Follow up as problems arise.    ALVAREZ Vieyra    Skin Dermatitis (Rash) Adult Description  The word \"rash\" means an outbreak of red bumps on the body. The way people use this term, \"a rash\" can refer to many different skin conditions.   A rash is an abnormal change in skin color or texture. Rashes usually result from skin irritation, which can have many causes.  Symptoms  Rashes can have different appearances. A general description is raised, red, itchy, and sometimes scaly areas that may vary in size and shape.   Causes  There are many possible causes for a rash. They include:  viruses   chickenpox   measles (Rubeola)   Azeri measles   Fifth disease   roseola   bacterial infections like impetigo and scarlet fever   Lyme disease, [place] spotted fever   heat rash (prickly heat)   contact with something you are allergic to, such as medicines like penicillin   soaps and detergents   scabies   ringworm   germs in hot tubs   swimmer s itch   What You Should Do At Home (Follow-up Care)   Keep your skin moisturized with lotion. Apply it several times a day if needed.   You can try taking a cool bath or shower (not hot because it could make you itch more) or apply calamine lotion.   You may also try a commercial product, such as Aveeno  Colloidal Oatmeal bath.   Do not scrub your skin. Do not rub vigorously " when drying. Pat your skin dry.   Use as little soap as possible. Use a gentle cleanser such as Basis  or Dove .   Apply a cool damp washcloth to skin areas that are itchy.   Avoid heat or rubbing, which can make you itch more.   Avoid strenuous exercise or activity. This often makes itching worse.   Over-the-counter hydrocortisone cream can be applied to small areas.   Over-the-counter antihistamines such as diphenhydramine taken by mouth may help decrease itching.   If the itching is severe or not responding to the above treatments, your provider may prescribe an oral steroid medicine (for example, prednisone).   Keep your fingernails cut short so that you do not scratch yourself in your sleep.   Wear loose cotton clothing or other natural fibers.   Don t put cosmetics (makeup) on a rash.   Please keep all medicines out of the reach of children.   What You Can Do To Stay Healthy   Try not to change soaps or detergents that you know you have no reaction to. If you do need to change soaps or detergents, check your skin for any reaction.   Always monitor yourself closely after starting any new medicine or new food.   Care Alerts  Call 911 if:   You start to have trouble breathing, trouble swallowing, or feel tightness in your throat or chest.   You have trouble breathing, lightheadedness, nausea, or a cold sweat.   Call your healthcare provider right away or return to the emergency department if:   Your rash is getting worse.   You feel weak, dizzy, or lightheaded.   Your skin is becoming redder or more painful.   You have open sores from scratching that have red streaks from the wound going toward your heart.   The area feels very warm when you touch it.   You start to have pus or other fluid coming from the area.   You have a fever higher than 101.5  F (38.6  C) orally.   You start to have chills, nausea, vomiting, or muscle aches.   You have a question about whether your rash needs to be treated.   You have any  "symptoms that worry you.             Follow-ups after your visit        Your next 10 appointments already scheduled     May 01, 2018 10:00 AM CDT   ICD Check with Wy Device Rn   [facility] ([facility])    [address] [address] [phone]              Who to contact     If you have questions or need follow up information about today's clinic visit or your schedule please contact [facility] directly at [phone].  Normal or non-critical lab and imaging results will be communicated to you by MyChart, letter or phone within 4 business days after the clinic has received the results. If you do not hear from us within 7 days, please contact the clinic through [employer] or phone. If you have a critical or abnormal lab result, we will notify you by phone as soon as possible.  Submit refill requests through [employer] or call your pharmacy and they will forward the refill request to us. Please allow 3 business days for your refill to be completed.          Additional Information About Your Visit        [employer] Information     [employer] lets you send messages to your doctor, view your test results, renew your prescriptions, schedule appointments and more. To sign up, go to [email or web address] . Click on \"Log in\" on the left side of the screen, which will take you to the Welcome page. Then click on \"Sign up Now\" on the right side of the page.     You will be asked to enter the access code listed below, as well as some personal information. Please follow the directions to create your username and password.     Your access code is: [email or web address]  Expires: 2018 11:33 AM     Your access code will  in 90 days. If you need help or a new code, please call your [place] clinic or [phone].        Care EveryWhere ID     This is your Care EveryWhere ID. This could be used by other organizations to access your [place] medical records  [id number]      " "  Your Vitals Were     Pulse Temperature Height Pulse Oximetry BMI (Body Mass Index)       63 96.9  F (36.1  C) (Tympanic) 5' 7.25\" (1.708 m) 97% 33.42 kg/m2        Blood Pressure from Last 3 Encounters:   03/14/18 147/75   02/26/18 133/64   02/13/18 127/65    Weight from Last 3 Encounters:   03/14/18 215 lb (97.5 kg)   02/26/18 207 lb 3.2 oz (94 kg)   02/13/18 213 lb (96.6 kg)              Today, you had the following     No orders found for display         Today's Medication Changes          These changes are accurate as of 3/14/18 10:38 AM.  If you have any questions, ask your nurse or doctor.               Start taking these medicines.        Dose/Directions    nystatin cream   Commonly known as:  MYCOSTATIN   Used for:  Rash and nonspecific skin eruption   Started by:  Cindi Dalton NP        Apply topically 2 times daily as needed for dry skin   Quantity:  30 g   Refills:  1       triamcinolone 0.1 % ointment   Commonly known as:  KENALOG   Used for:  Rash and nonspecific skin eruption   Started by:  Cindi Dalton NP        Apply topically 2 times daily Apply sparingly to affected area twice times daily for the next 7-10 days.   Quantity:  30 g   Refills:  1            Where to get your medicines      These medications were sent to [place] [address] [place] [id number]     Phone:  [phone]     nystatin cream    triamcinolone 0.1 % ointment                Primary Care Provider Office Phone # Fax #    Casie Sammie Watts [phone] [address] [address]        Equal Access to Services     LESLIE PACE AH: Priya Ramirez, wacostada luqadaha, qadelaneyta kaalmada adelinetteyayasmin, fina vo. So [facility] [phone].    ATENCIÓN: Si habla español, tiene a rojo disposición servicios gratuitos de asistencia lingüística. Llame al [phone].    We comply with " applicable federal civil rights laws and [place] laws. We do not discriminate on the basis of race, color, national origin, age, disability, sex, sexual orientation, or gender identity.            Thank you!     Thank you for choosing [facility]  for your care. Our goal is always to provide you with excellent care. Hearing back from our patients is one way we can continue to improve our services. Please take a few minutes to complete the written survey that you may receive in the mail after your visit with us. Thank you!             Your Updated Medication List - Protect others around you: Learn how to safely use, store and throw away your medicines at www.disposemymeds.org.          This list is accurate as of 3/14/18 10:38 AM.  Always use your most recent med list.                   Brand Name Dispense Instructions for use Diagnosis    allopurinol 100 MG tablet    ZYLOPRIM    90 tablet    TAKE ONE-HALF TABLET BY MOUTH ONCE DAILY FOR  GOUT    Gout of foot, unspecified cause, unspecified chronicity, unspecified laterality       amLODIPine 10 MG tablet    NORVASC    90 tablet    Take 1 tablet (10 mg) by mouth daily    Essential hypertension with goal blood pressure less than 140/90       aspirin  MG EC tablet     90 tablet    Take 1 tablet (325 mg) by mouth daily        atorvastatin 40 MG tablet    LIPITOR    90 tablet    Take 1 tablet (40 mg) by mouth daily    Hyperlipidemia LDL goal <100       CENTRUM SILVER per tablet      Take 1 tablet by mouth daily        finasteride 5 MG tablet    PROSCAR    90 tablet    Take 1 tablet (5 mg) by mouth daily    Benign non-nodular prostatic hyperplasia without lower urinary tract symptoms       losartan 100 MG tablet    COZAAR    90 tablet    Take 1 tablet (100 mg) by mouth daily    Essential hypertension with goal blood pressure less than 140/90       metoprolol succinate 25 MG 24 hr tablet    TOPROL-XL    180 tablet    Take 2 tablets (50 mg) by mouth daily     Paroxysmal atrial fibrillation (H), Coronary artery disease involving native coronary artery of native heart without angina pectoris       nystatin cream    MYCOSTATIN    30 g    Apply topically 2 times daily as needed for dry skin    Rash and nonspecific skin eruption       NAVA MILK OF MAGNESIA PO           triamcinolone 0.1 % ointment    KENALOG    30 g    Apply topically 2 times daily Apply sparingly to affected area twice times daily for the next 7-10 days.    Rash and nonspecific skin eruption

## 2018-03-14 NOTE — PROGRESS NOTES
SUBJECTIVE:   Colin Shell is a 89 year old male who presents to clinic today for the following health issues:    Rash      Duration: 3 days     Description  Location: on his bottom   Itching: moderate    Intensity:  moderate    Accompanying signs and symptoms: feels dry.     History (similar episodes/previous evaluation): None    Precipitating or alleviating factors:  New exposures:  None  Recent travel: no      Therapies tried and outcome: none    Rash hurts when scratching otherwise just itches. Not all the time.  No new laundry detergents, brings clothes to dry . Reports use of a new scented spray but only uses this on his lower legs. Denies any other new exposures to potential skin irritants.   Denies incontinence of bowel and bladder except occasional dribbling of urine and sometimes leaks stool if he takes milk of magnesia for constipation. When this happens he wears a Depends otherwise is not needed.      Reports history of shingles on his hand. States the current rash feels different because he is without burning or pain.    Problem list and histories reviewed & adjusted, as indicated.  Additional history: as documented    Patient Active Problem List   Diagnosis     Hypertension goal BP (blood pressure) < 140/90     Gout     Coronary artery disease involving coronary bypass graft of native heart without angina pectoris     Chronic kidney disease, stage III (moderate)     Obesity     Benign prostatic hyperplasia     Hyperlipidemia LDL goal <100     Health Care Home     Bronchiectasis (H)     Cholelithiasis     Fibrosis of lung (H)     Pulmonary nodule, right     Advance Care Planning     Chronic systolic CHF (congestive heart failure) (H)     Paroxysmal atrial fibrillation (H)     Past Surgical History:   Procedure Laterality Date     BIOPSY ARTERY TEMPORAL Left 9/18/2017    Procedure: BIOPSY ARTERY TEMPORAL;  Left Temporal Biopsy ;  Surgeon: Ivana Phan MD;  Location:  OR      SURGICAL HISTORY OF -       heel spurs     SURGICAL HISTORY OF -   2005, 2006    bilateral eye cataract     SURGICAL HISTORY OF -   1996    CABG x 5 vessels     SURGICAL HISTORY OF -   2010    circumcision     TONSILLECTOMY & ADENOIDECTOMY         Social History   Substance Use Topics     Smoking status: Former Smoker     Smokeless tobacco: Never Used      Comment: Quit at age 32     Alcohol use Yes      Comment: 12 pack a year     Family History   Problem Relation Age of Onset     DIABETES Father      DIABETES Brother      DIABETES Brother      Hypertension Son      Unknown/Adopted Maternal Grandmother      Unknown/Adopted Maternal Grandfather      Unknown/Adopted Paternal Grandmother      Unknown/Adopted Paternal Grandfather      Dementia Sister          Current Outpatient Prescriptions   Medication Sig Dispense Refill     allopurinol (ZYLOPRIM) 100 MG tablet TAKE ONE-HALF TABLET BY MOUTH ONCE DAILY FOR  GOUT 90 tablet 3     metoprolol succinate (TOPROL-XL) 25 MG 24 hr tablet Take 2 tablets (50 mg) by mouth daily 180 tablet 3     losartan (COZAAR) 100 MG tablet Take 1 tablet (100 mg) by mouth daily 90 tablet 3     aspirin  MG EC tablet Take 1 tablet (325 mg) by mouth daily 90 tablet 3     finasteride (PROSCAR) 5 MG tablet Take 1 tablet (5 mg) by mouth daily 90 tablet 3     atorvastatin (LIPITOR) 40 MG tablet Take 1 tablet (40 mg) by mouth daily 90 tablet 3     amLODIPine (NORVASC) 10 MG tablet Take 1 tablet (10 mg) by mouth daily 90 tablet 3     Magnesium Hydroxide (NAVA MILK OF MAGNESIA PO)        Multiple Vitamins-Minerals (CENTRUM SILVER) per tablet Take 1 tablet by mouth daily       Allergies   Allergen Reactions     Flomax [Tamsulosin Hydrochloride]      Hctz Other (See Comments)     Caused loss of balance     Lisinopril Cough     Simvastatin Nausea and Vomiting and Diarrhea     Vytorin Nausea and Diarrhea     Recent Labs   Lab Test  02/07/18   0921  01/04/18   1454  10/18/17   0908   07/08/17    "0901   01/13/17   0735   09/25/15   1317   LDL   --   128*  128*   --   101*   < >   --    < >   --    HDL   --   47  36*   --   36*   < >   --    < >   --    TRIG   --   147  191*   --   210*   < >   --    < >   --    ALT   --   22  23   --   24   < >  23   --    --    CR  1.11  1.03  1.12   < >  1.14   < >  1.18   < >  0.92   GFRESTIMATED  62  68  62   < >  60*   < >  58*   < >  77   GFRESTBLACK  75  82  74   < >  73   < >  70   < >  >90   GFR Calc     POTASSIUM  4.3  4.3  4.4   < >  4.2   < >  4.2   < >  4.1   TSH   --    --    --    --    --    --   4.12*   --   3.63    < > = values in this interval not displayed.      BP Readings from Last 3 Encounters:   03/14/18 147/75   02/26/18 133/64   02/13/18 127/65    Wt Readings from Last 3 Encounters:   03/14/18 215 lb (97.5 kg)   02/26/18 207 lb 3.2 oz (94 kg)   02/13/18 213 lb (96.6 kg)              Labs reviewed in EPIC    Reviewed and updated as needed this visit by clinical staff       Reviewed and updated as needed this visit by Provider         ROS:  Constitutional, HEENT, cardiovascular, pulmonary, gi and gu systems are negative, except as otherwise noted.    OBJECTIVE:     /75  Pulse 63  Temp 96.9  F (36.1  C) (Tympanic)  Ht 5' 7.25\" (1.708 m)  Wt 215 lb (97.5 kg)  SpO2 97%  BMI 33.42 kg/m2  Body mass index is 33.42 kg/(m^2).  GENERAL: healthy, alert and no distress  MS: no gross musculoskeletal defects noted, no edema  SKIN: Right buttocks with rash consisting of several scattered scabbed areas with surrounding erythema; no pustules, blisters, or crusting. No abscesses or cysts on palpation. Rash is superficial.    Diagnostic Test Results:  none     ASSESSMENT/PLAN:     1. Rash and nonspecific skin eruption  Today patient denies both pain and burning on rash but due to history of shingles advised patient contact clinic right away if he develops these symptoms.   Provided patient with clinic contact information and advised he call " "if rash not improving as would be expected (within one week).   - triamcinolone (KENALOG) 0.1 % ointment; Apply topically 2 times daily Apply sparingly to affected area twice times daily for the next 7-10 days.  Dispense: 30 g; Refill: 1  - nystatin (MYCOSTATIN) cream; Apply topically 2 times daily as needed for dry skin  Dispense: 30 g; Refill: 1    Follow up if not improving as discussed.     Cindi Dalton NP  [facility]    Patient Instructions   Apply both creams/ointments to rash area twice daily (morning and bedtime) until rash resolves.  Avoid scents, creams, powders in this area until rash clears.    Call me if rash becomes painful, or does not improve despite above.    Follow up as problems arise.    ALVAREZ Vieyra    Skin Dermatitis (Rash) Adult Description  The word \"rash\" means an outbreak of red bumps on the body. The way people use this term, \"a rash\" can refer to many different skin conditions.   A rash is an abnormal change in skin color or texture. Rashes usually result from skin irritation, which can have many causes.  Symptoms  Rashes can have different appearances. A general description is raised, red, itchy, and sometimes scaly areas that may vary in size and shape.   Causes  There are many possible causes for a rash. They include:  viruses   chickenpox   measles (Rubeola)   Prydeinig measles   Fifth disease   roseola   bacterial infections like impetigo and scarlet fever   Lyme disease, [place] spotted fever   heat rash (prickly heat)   contact with something you are allergic to, such as medicines like penicillin   soaps and detergents   scabies   ringworm   germs in hot tubs   swimmer s itch   What You Should Do At Home (Follow-up Care)   Keep your skin moisturized with lotion. Apply it several times a day if needed.   You can try taking a cool bath or shower (not hot because it could make you itch more) or apply calamine lotion.   You may also try a commercial " product, such as Aveeno  Colloidal Oatmeal bath.   Do not scrub your skin. Do not rub vigorously when drying. Pat your skin dry.   Use as little soap as possible. Use a gentle cleanser such as Basis  or Dove .   Apply a cool damp washcloth to skin areas that are itchy.   Avoid heat or rubbing, which can make you itch more.   Avoid strenuous exercise or activity. This often makes itching worse.   Over-the-counter hydrocortisone cream can be applied to small areas.   Over-the-counter antihistamines such as diphenhydramine taken by mouth may help decrease itching.   If the itching is severe or not responding to the above treatments, your provider may prescribe an oral steroid medicine (for example, prednisone).   Keep your fingernails cut short so that you do not scratch yourself in your sleep.   Wear loose cotton clothing or other natural fibers.   Don t put cosmetics (makeup) on a rash.   Please keep all medicines out of the reach of children.   What You Can Do To Stay Healthy   Try not to change soaps or detergents that you know you have no reaction to. If you do need to change soaps or detergents, check your skin for any reaction.   Always monitor yourself closely after starting any new medicine or new food.   Care Alerts  Call 911 if:   You start to have trouble breathing, trouble swallowing, or feel tightness in your throat or chest.   You have trouble breathing, lightheadedness, nausea, or a cold sweat.   Call your healthcare provider right away or return to the emergency department if:   Your rash is getting worse.   You feel weak, dizzy, or lightheaded.   Your skin is becoming redder or more painful.   You have open sores from scratching that have red streaks from the wound going toward your heart.   The area feels very warm when you touch it.   You start to have pus or other fluid coming from the area.   You have a fever higher than 101.5  F (38.6  C) orally.   You start to have chills, nausea, vomiting, or  muscle aches.   You have a question about whether your rash needs to be treated.   You have any symptoms that worry you.

## 2018-03-14 NOTE — PATIENT INSTRUCTIONS
"Apply both creams/ointments to rash area twice daily (morning and bedtime) until rash resolves.  Avoid scents, creams, powders in this area until rash clears.    Call me if rash becomes painful, or does not improve despite above.    Follow up as problems arise.    Cindi Sha, FNP    Skin Dermatitis (Rash) Adult Description  The word \"rash\" means an outbreak of red bumps on the body. The way people use this term, \"a rash\" can refer to many different skin conditions.   A rash is an abnormal change in skin color or texture. Rashes usually result from skin irritation, which can have many causes.  Symptoms  Rashes can have different appearances. A general description is raised, red, itchy, and sometimes scaly areas that may vary in size and shape.   Causes  There are many possible causes for a rash. They include:  viruses   chickenpox   measles (Rubeola)   Slovenian measles   Fifth disease   roseola   bacterial infections like impetigo and scarlet fever   Lyme disease, [place] spotted fever   heat rash (prickly heat)   contact with something you are allergic to, such as medicines like penicillin   soaps and detergents   scabies   ringworm   germs in hot tubs   swimmer s itch   What You Should Do At Home (Follow-up Care)   Keep your skin moisturized with lotion. Apply it several times a day if needed.   You can try taking a cool bath or shower (not hot because it could make you itch more) or apply calamine lotion.   You may also try a commercial product, such as Aveeno  Colloidal Oatmeal bath.   Do not scrub your skin. Do not rub vigorously when drying. Pat your skin dry.   Use as little soap as possible. Use a gentle cleanser such as Basis  or Dove .   Apply a cool damp washcloth to skin areas that are itchy.   Avoid heat or rubbing, which can make you itch more.   Avoid strenuous exercise or activity. This often makes itching worse.   Over-the-counter hydrocortisone cream can be applied to small areas. "   Over-the-counter antihistamines such as diphenhydramine taken by mouth may help decrease itching.   If the itching is severe or not responding to the above treatments, your provider may prescribe an oral steroid medicine (for example, prednisone).   Keep your fingernails cut short so that you do not scratch yourself in your sleep.   Wear loose cotton clothing or other natural fibers.   Don t put cosmetics (makeup) on a rash.   Please keep all medicines out of the reach of children.   What You Can Do To Stay Healthy   Try not to change soaps or detergents that you know you have no reaction to. If you do need to change soaps or detergents, check your skin for any reaction.   Always monitor yourself closely after starting any new medicine or new food.   Care Alerts  Call 911 if:   You start to have trouble breathing, trouble swallowing, or feel tightness in your throat or chest.   You have trouble breathing, lightheadedness, nausea, or a cold sweat.   Call your healthcare provider right away or return to the emergency department if:   Your rash is getting worse.   You feel weak, dizzy, or lightheaded.   Your skin is becoming redder or more painful.   You have open sores from scratching that have red streaks from the wound going toward your heart.   The area feels very warm when you touch it.   You start to have pus or other fluid coming from the area.   You have a fever higher than 101.5  F (38.6  C) orally.   You start to have chills, nausea, vomiting, or muscle aches.   You have a question about whether your rash needs to be treated.   You have any symptoms that worry you.

## 2018-03-25 ENCOUNTER — HOSPITAL ENCOUNTER (EMERGENCY)
Facility: CLINIC | Age: 83
Discharge: HOME | End: 2018-03-25
Attending: FAMILY MEDICINE | Admitting: FAMILY MEDICINE
Payer: COMMERCIAL

## 2018-03-25 ENCOUNTER — APPOINTMENT (OUTPATIENT)
Dept: CT IMAGING | Facility: CLINIC | Age: 83
End: 2018-03-25
Attending: FAMILY MEDICINE
Payer: COMMERCIAL

## 2018-03-25 VITALS
BODY MASS INDEX: 30.06 KG/M2 | WEIGHT: 210 LBS | HEIGHT: 70 IN | DIASTOLIC BLOOD PRESSURE: 65 MMHG | RESPIRATION RATE: 28 BRPM | TEMPERATURE: 97.8 F | SYSTOLIC BLOOD PRESSURE: 112 MMHG | OXYGEN SATURATION: 94 %

## 2018-03-25 DIAGNOSIS — J18.9 COMMUNITY ACQUIRED PNEUMONIA OF LEFT LUNG, UNSPECIFIED PART OF LUNG: ICD-10-CM

## 2018-03-25 DIAGNOSIS — R73.9 HYPERGLYCEMIA: ICD-10-CM

## 2018-03-25 LAB
ANION GAP SERPL CALCULATED.3IONS-SCNC: 9 MMOL/L (ref 3–14)
BASOPHILS # BLD AUTO: 0 10E9/L (ref 0–0.2)
BASOPHILS NFR BLD AUTO: 0.3 %
BUN SERPL-MCNC: 28 MG/DL (ref 7–30)
CALCIUM SERPL-MCNC: 8.2 MG/DL (ref 8.5–10.1)
CHLORIDE SERPL-SCNC: 107 MMOL/L (ref 94–109)
CO2 SERPL-SCNC: 21 MMOL/L (ref 20–32)
CREAT SERPL-MCNC: 1.28 MG/DL (ref 0.66–1.25)
DIFFERENTIAL METHOD BLD: ABNORMAL
EOSINOPHIL # BLD AUTO: 0.6 10E9/L (ref 0–0.7)
EOSINOPHIL NFR BLD AUTO: 5.5 %
ERYTHROCYTE [DISTWIDTH] IN BLOOD BY AUTOMATED COUNT: 14.5 % (ref 10–15)
GFR SERPL CREATININE-BSD FRML MDRD: 53 ML/MIN/1.7M2
GLUCOSE SERPL-MCNC: 241 MG/DL (ref 70–99)
HCT VFR BLD AUTO: 33.1 % (ref 40–53)
HGB BLD-MCNC: 10.8 G/DL (ref 13.3–17.7)
IMM GRANULOCYTES # BLD: 0 10E9/L (ref 0–0.4)
IMM GRANULOCYTES NFR BLD: 0.1 %
LYMPHOCYTES # BLD AUTO: 2.6 10E9/L (ref 0.8–5.3)
LYMPHOCYTES NFR BLD AUTO: 23 %
MCH RBC QN AUTO: 30.1 PG (ref 26.5–33)
MCHC RBC AUTO-ENTMCNC: 32.6 G/DL (ref 31.5–36.5)
MCV RBC AUTO: 92 FL (ref 78–100)
MONOCYTES # BLD AUTO: 1.3 10E9/L (ref 0–1.3)
MONOCYTES NFR BLD AUTO: 11.9 %
NEUTROPHILS # BLD AUTO: 6.6 10E9/L (ref 1.6–8.3)
NEUTROPHILS NFR BLD AUTO: 59.2 %
PLATELET # BLD AUTO: 223 10E9/L (ref 150–450)
POTASSIUM SERPL-SCNC: 4 MMOL/L (ref 3.4–5.3)
RBC # BLD AUTO: 3.59 10E12/L (ref 4.4–5.9)
SODIUM SERPL-SCNC: 137 MMOL/L (ref 133–144)
WBC # BLD AUTO: 11.2 10E9/L (ref 4–11)

## 2018-03-25 PROCEDURE — 93005 ELECTROCARDIOGRAM TRACING: CPT | Performed by: FAMILY MEDICINE

## 2018-03-25 PROCEDURE — 71250 CT THORAX DX C-: CPT

## 2018-03-25 PROCEDURE — 85025 COMPLETE CBC W/AUTO DIFF WBC: CPT | Performed by: FAMILY MEDICINE

## 2018-03-25 PROCEDURE — 99284 EMERGENCY DEPT VISIT MOD MDM: CPT | Mod: 25 | Performed by: FAMILY MEDICINE

## 2018-03-25 PROCEDURE — 99285 EMERGENCY DEPT VISIT HI MDM: CPT | Mod: 25 | Performed by: FAMILY MEDICINE

## 2018-03-25 PROCEDURE — 93010 ELECTROCARDIOGRAM REPORT: CPT | Mod: Z6 | Performed by: FAMILY MEDICINE

## 2018-03-25 PROCEDURE — 25000125 ZZHC RX 250: Performed by: FAMILY MEDICINE

## 2018-03-25 PROCEDURE — 80048 BASIC METABOLIC PNL TOTAL CA: CPT | Performed by: FAMILY MEDICINE

## 2018-03-25 RX ORDER — IPRATROPIUM BROMIDE AND ALBUTEROL SULFATE 2.5; .5 MG/3ML; MG/3ML
3 SOLUTION RESPIRATORY (INHALATION) ONCE
Status: COMPLETED | OUTPATIENT
Start: 2018-03-25 | End: 2018-03-25

## 2018-03-25 RX ORDER — LEVOFLOXACIN 500 MG/1
500 TABLET, FILM COATED ORAL DAILY
Qty: 7 TABLET | Refills: 0 | Status: SHIPPED | OUTPATIENT
Start: 2018-03-25 | End: 2018-04-01

## 2018-03-25 RX ORDER — ALBUTEROL SULFATE 90 UG/1
2 AEROSOL, METERED RESPIRATORY (INHALATION) EVERY 4 HOURS PRN
Qty: 1 INHALER | Refills: 0 | Status: SHIPPED | OUTPATIENT
Start: 2018-03-25 | End: 2018-01-01

## 2018-03-25 RX ADMIN — IPRATROPIUM BROMIDE AND ALBUTEROL SULFATE 3 ML: .5; 3 SOLUTION RESPIRATORY (INHALATION) at 08:04

## 2018-03-25 NOTE — ED NOTES
Coughing x 2 years-worse past 3 weeks with soa and wheeze - productive cough -yellow - states that he has passed out 1 x a week for past 3 weeks

## 2018-03-25 NOTE — DISCHARGE INSTRUCTIONS
Return to the Emergency Room if the following occurs:     Worsened breathing, dehydration, or for any concern at anytime.    Or, follow-up with the following provider as we discussed:     Return to your primary doctor this week to talk about the high blood sugar.    Medications discussed:    Levaquin.  Albuterol with spacer for breathing/coughing, as needed.    If you received pain-relieving or sedating medication during your time in the ER, avoid alcohol, driving automobiles, or working with machinery.  Also, a responsible adult must stay with you.        Call the Nurse Advice Line at [phone] or [phone] for any concern at anytime.

## 2018-03-25 NOTE — ED PROVIDER NOTES
"HPI  Patient is a 89-year-old male presenting with cough and shortness of breath.  He was seen by his primary doctor in February, but 1 month ago.  He was seen for similar symptoms and there was discussion about his pulmonary fibrosis being the likely cause.  CT scan of the chest and pulmonary function testing was recommended.  He refused at the time.  Known history of atrial fibrillation.  He takes aspirin for anticoagulation.  Known history of CABG in 1996.  Known history of bronchiectasis.  Known history of systolic heart failure.  He quit smoking when he was in his 30s.  Rare alcohol.  No drugs.    The patient has a chronic cough that is productive of yellow sputum.  It is worse when he lies flat.  He has trouble sleeping as a result.  He tells me he has not slept for the past 3 days during the night but occasionally get some rest during the day.  He denies a fever.  No new chest pain.  He does have chronic lower extremity edema, left greater than right.  No calf pain.  No hemoptysis.  He has had 2 episodes of fainting over the past 3 weeks.  These were both when he stood up quickly from a seated position.  There is been no back or abdominal pain.  No palpitations.  He does have an inhaler at home but tells me he does not use it \"because it does not work.\"    ROS: All other review of systems are negative other than that noted above.     Past Medical History:   Diagnosis Date     Arrhythmia      Congestive heart failure (H)      Dizziness and giddiness 11/24/2007    uncertain etiology- MRI/MRA head , carotid duplex normal 2007     Foreign body in unspecified site on external eye      Hypertension      Microscopic hematuria      microscopic hematuria 2001 with  IVP and cystoscopy     Renal disease      Rhabdomyolysis     2003- in setting of Ecoli UTI, gemfibrozil/lipitor     Past Surgical History:   Procedure Laterality Date     BIOPSY ARTERY TEMPORAL Left 9/18/2017    Procedure: BIOPSY ARTERY TEMPORAL;  Left " "Temporal Biopsy ;  Surgeon: Ivana Phan MD;  Location: [facility] OR     SURGICAL HISTORY OF -       heel spurs     SURGICAL HISTORY OF -   2005, 2006    bilateral eye cataract     SURGICAL HISTORY OF -   1996    CABG x 5 vessels     SURGICAL HISTORY OF -   2010    circumcision     TONSILLECTOMY & ADENOIDECTOMY       Family History   Problem Relation Age of Onset     DIABETES Father      DIABETES Brother      DIABETES Brother      Hypertension Son      Unknown/Adopted Maternal Grandmother      Unknown/Adopted Maternal Grandfather      Unknown/Adopted Paternal Grandmother      Unknown/Adopted Paternal Grandfather      Dementia Sister      Social History   Substance Use Topics     Smoking status: Former Smoker     Smokeless tobacco: Never Used      Comment: Quit at age 32     Alcohol use Yes      Comment: 12 pack a year         PHYSICAL  /66  Temp 97.8  F (36.6  C) (Oral)  Resp 28  Ht 1.778 m (5' 10\")  Wt 95.3 kg (210 lb)  SpO2 94%  BMI 30.13 kg/m2  General: Patient is alert and in moderate distress.  Coughing intermittently.  Conversational.  Neurological: Alert.  Moving upper and lower extremities equally, bilaterally.  Head / Neck: Atraumatic.  Ears: Not done.  Eyes: Pupils are equal, round, and reactive.  Normal conjunctiva.  Nose: Midline.  No epistaxis.  Mouth / Throat: No ulcerations or lesions.  Upper pharynx is not erythematous.  Moist.  Respiratory: No respiratory distress.  Patient has some mild wheeze.  There is a rub versus rhonchi present in the left lower/mid lung.  Cardiovascular: Regular rhythm.  Peripheral extremities are warm.  Trace edema.  No calf tenderness.  Abdomen / Pelvis: Not tender.  No distention.  Soft throughout.  Genitalia: Not done.  Musculoskeletal: No tenderness over major muscles and joints.  Skin: No evidence of rash or trauma.        ED COURSE  0753.  Patient has shortness of breath when coughing.  He has a chronic cough that is productive.  CT scan without " contrast can be obtained here in the ED.  EKG performed and documented below.  Lab values pending.    Labs Ordered and Resulted from Time of ED Arrival Up to the Time of Departure from the ED   CBC WITH PLATELETS DIFFERENTIAL - Abnormal; Notable for the following:        Result Value    WBC 11.2 (*)     RBC Count 3.59 (*)     Hemoglobin 10.8 (*)     Hematocrit 33.1 (*)     All other components within normal limits   BASIC METABOLIC PANEL - Abnormal; Notable for the following:     Glucose 241 (*)     Creatinine 1.28 (*)     GFR Estimate 53 (*)     Calcium 8.2 (*)     All other components within normal limits     IMAGING  Images reviewed by me.  Radiology report also reviewed.  CT Chest w/o Contrast   Final Result   IMPRESSION:   1.  Multiple patchy groundglass opacities throughout the left lung,   suggestive of multifocal infection or inflammation.   2. Bilateral effusions, right greater than left.   3. Mild mediastinal lymphadenopathy.   4. Mild cardiomegaly.   5. Moderate hiatal hernia.   6. Cholelithiasis.      MARKEL MANZO MD        EKG  (2388)   Interpretation performed by me.  Rate: 79     Rhythm: atrial fib     Axis: nl  Intervals: SC (12-2) -, QRS (<12) 138, QTc (>5) 451  P wave: -     QRS complex: RBBB  ST segment / T-wave: T-wave inversion in I, AVL  Conclusion: Atrial fibrillation, right bundle branch block, T-wave inversion seen as on previous EKGs.    0842.  CT scan shows evidence for possible infection on the left side, consistent with examination.  Levaquin will be given for pneumonia.  Albuterol inhaler with spacer will be provided.  Follow up in 7-10 days for reevaluation.  Return here for worsening.  Of note, the patient does have hyperglycemia.  No known diagnoses of diabetes.  I did recommend he follow up with his primary doctor this week for reevaluation.  This could be diabetes but it is difficult to provide the diagnosis without further testing in the setting of acute pneumonia.    Medications    ipratropium - albuterol 0.5 mg/2.5 mg/3 mL (DUONEB) neb solution 3 mL (3 mLs Nebulization Given 3/25/18 0804)       IMPRESSION    ICD-10-CM    1. Community acquired pneumonia of left lung, unspecified part of lung J18.9            Critical Care time:  none                       Abrahan Ramirez MD  03/25/18 0847       Abrahan Ramirez MD  03/25/18 0851

## 2018-03-25 NOTE — ED AVS SNAPSHOT
[facility] Emergency Department    [address] [address]    Phone:  [phone]    Fax:  [phone]                                       Colin Shell   MRN: [id number]    Department:  [facility] Emergency Department   Date of Visit:  3/25/2018           Patient Information     Date Of Birth          6/25/1927        Your diagnoses for this visit were:     Community acquired pneumonia of left lung, unspecified part of lung     Hyperglycemia        You were seen by Abrahan Ramirez MD.        Discharge Instructions       Return to the Emergency Room if the following occurs:     Worsened breathing, dehydration, or for any concern at anytime.    Or, follow-up with the following provider as we discussed:     Return to your primary doctor this week to talk about the high blood sugar.    Medications discussed:    Levaquin.  Albuterol with spacer for breathing/coughing, as needed.    If you received pain-relieving or sedating medication during your time in the ER, avoid alcohol, driving automobiles, or working with machinery.  Also, a responsible adult must stay with you.        Call the Nurse Advice Line at [phone] or [phone] for any concern at anytime.      Your next 10 appointments already scheduled     May 01, 2018 10:00 AM CDT   ICD Check with Wy Device Rn   [facility] ([facility])    [address] [address] [phone]              24 Hour Appointment Hotline       To make an appointment at any [place] clinic, call [phone] ([phone]). If you don't have a family doctor or clinic, we will help you find one. [place] clinics are conveniently located to serve the needs of you and your family.             Review of your medicines      START taking        Dose / Directions Last dose taken    albuterol 108 (90 BASE) MCG/ACT Inhaler   Commonly known as:  PROAIR HFA/PROVENTIL HFA/VENTOLIN HFA   Dose:   2 puff   Quantity:  1 Inhaler        Inhale 2 puffs into the lungs every 4 hours as needed for shortness of breath / dyspnea or wheezing WITH SPACER   Refills:  0        levofloxacin 500 MG tablet   Commonly known as:  LEVAQUIN   Dose:  500 mg   Quantity:  7 tablet        Take 1 tablet (500 mg) by mouth daily for 7 days   Refills:  0          Our records show that you are taking the medicines listed below. If these are incorrect, please call your family doctor or clinic.        Dose / Directions Last dose taken    allopurinol 100 MG tablet   Commonly known as:  ZYLOPRIM   Quantity:  90 tablet        TAKE ONE-HALF TABLET BY MOUTH ONCE DAILY FOR  GOUT   Refills:  3        amLODIPine 10 MG tablet   Commonly known as:  NORVASC   Dose:  10 mg   Quantity:  90 tablet        Take 1 tablet (10 mg) by mouth daily   Refills:  3        aspirin  MG EC tablet   Dose:  325 mg   Quantity:  90 tablet        Take 1 tablet (325 mg) by mouth daily   Refills:  3        atorvastatin 40 MG tablet   Commonly known as:  LIPITOR   Dose:  40 mg   Quantity:  90 tablet        Take 1 tablet (40 mg) by mouth daily   Refills:  3        CENTRUM SILVER per tablet   Dose:  1 tablet        Take 1 tablet by mouth daily   Refills:  0        finasteride 5 MG tablet   Commonly known as:  PROSCAR   Dose:  5 mg   Quantity:  90 tablet        Take 1 tablet (5 mg) by mouth daily   Refills:  3        losartan 100 MG tablet   Commonly known as:  COZAAR   Dose:  100 mg   Quantity:  90 tablet        Take 1 tablet (100 mg) by mouth daily   Refills:  3        metoprolol succinate 25 MG 24 hr tablet   Commonly known as:  TOPROL-XL   Dose:  50 mg   Quantity:  180 tablet        Take 2 tablets (50 mg) by mouth daily   Refills:  3        nystatin cream   Commonly known as:  MYCOSTATIN   Quantity:  30 g        Apply topically 2 times daily as needed for dry skin   Refills:  1        NAVA MILK OF MAGNESIA PO        Refills:  0        triamcinolone 0.1 % ointment    Commonly known as:  KENALOG   Quantity:  30 g        Apply topically 2 times daily Apply sparingly to affected area twice times daily for the next 7-10 days.   Refills:  1                Prescriptions were sent or printed at these locations (2 Prescriptions)                   [facility] [address] [facility] [employer] - [address], [facility] [address]    Telephone:  [phone]   Fax:  [phone]   Hours:                  E-Prescribed (1 of 2)         levofloxacin (LEVAQUIN) 500 MG tablet                 Printed at Department/Unit printer (1 of 2)         albuterol (PROAIR HFA/PROVENTIL HFA/VENTOLIN HFA) 108 (90 BASE) MCG/ACT Inhaler                Procedures and tests performed during your visit     Basic metabolic panel    CBC with platelets, differential    CT Chest w/o Contrast    EKG 12 lead      Orders Needing Specimen Collection     None      Pending Results     No orders found from 3/23/2018 to 3/26/2018.            Pending Culture Results     No orders found from 3/23/2018 to 3/26/2018.            Pending Results Instructions     If you had any lab results that were not finalized at the time of your Discharge, you can call the ED Lab Result RN at [phone]. You will be contacted by this team for any positive Lab results or changes in treatment. The nurses are available 7 days a week from 10A to 6:30P.  You can leave a message 24 hours per day and they will return your call.        Test Results From Your Hospital Stay        3/25/2018  7:53 AM      Component Results     Component Value Ref Range & Units Status    WBC 11.2 (H) 4.0 - 11.0 10e9/L Final    RBC Count 3.59 (L) 4.4 - 5.9 10e12/L Final    Hemoglobin 10.8 (L) 13.3 - 17.7 g/dL Final    Hematocrit 33.1 (L) 40.0 - 53.0 % Final    MCV 92 78 - 100 fl Final    MCH 30.1 26.5 - 33.0 pg Final    MCHC 32.6 31.5 - 36.5 g/dL Final    RDW 14.5 10.0 - 15.0 % Final    Platelet Count 223 150 - 450 10e9/L Final    Diff Method  Automated Method  Final    % Neutrophils 59.2 % Final    % Lymphocytes 23.0 % Final    % Monocytes 11.9 % Final    % Eosinophils 5.5 % Final    % Basophils 0.3 % Final    % Immature Granulocytes 0.1 % Final    Absolute Neutrophil 6.6 1.6 - 8.3 10e9/L Final    Absolute Lymphocytes 2.6 0.8 - 5.3 10e9/L Final    Absolute Monocytes 1.3 0.0 - 1.3 10e9/L Final    Absolute Eosinophils 0.6 0.0 - 0.7 10e9/L Final    Absolute Basophils 0.0 0.0 - 0.2 10e9/L Final    Abs Immature Granulocytes 0.0 0 - 0.4 10e9/L Final         3/25/2018  8:06 AM      Component Results     Component Value Ref Range & Units Status    Sodium 137 133 - 144 mmol/L Final    Potassium 4.0 3.4 - 5.3 mmol/L Final    Chloride 107 94 - 109 mmol/L Final    Carbon Dioxide 21 20 - 32 mmol/L Final    Anion Gap 9 3 - 14 mmol/L Final    Glucose 241 (H) 70 - 99 mg/dL Final    Urea Nitrogen 28 7 - 30 mg/dL Final    Creatinine 1.28 (H) 0.66 - 1.25 mg/dL Final    GFR Estimate 53 (L) >60 mL/min/1.7m2 Final    Non  GFR Calc    GFR Estimate If Black 64 >60 mL/min/1.7m2 Final    African American GFR Calc    Calcium 8.2 (L) 8.5 - 10.1 mg/dL Final         3/25/2018  8:37 AM      Narrative     CT CHEST W/O CONTRAST 3/25/2018 8:31 AM    HISTORY: Shortness of breath.    TECHNIQUE: CT of the chest is performed without IV contrast.   Radiation dose for this scan was reduced using automated exposure  control, adjustment of the mA and/or kV according to patient size, or  iterative reconstruction technique.    Assessed structures to the limits no IV contrast administration  include the lungs, mediastinum, pleura, and chest wall.     COMPARISON: January 13, 2017..    FINDINGS: There are patchy pulmonary groundglass opacities,  predominantly throughout the left lung. Mild bronchiectasis of the  bilateral lung bases. There are small bilateral effusions, right  greater than left. No pneumothorax.    No axillary lymphadenopathy. Multiple scattered mildly  "prominent  mediastinal lymph nodes, the largest in the precarinal region measures  1.1 cm in maximal diameter.    Mild cardiomegaly.    Visualized portions of the upper abdomen demonstrate a moderate hiatal  hernia and gallstones.        Impression     IMPRESSION:  1.  Multiple patchy groundglass opacities throughout the left lung,  suggestive of multifocal infection or inflammation.  2. Bilateral effusions, right greater than left.  3. Mild mediastinal lymphadenopathy.  4. Mild cardiomegaly.  5. Moderate hiatal hernia.  6. Cholelithiasis.    MARKEL MANZO MD                Thank you for choosing [place]       Thank you for choosing [place] for your care. Our goal is always to provide you with excellent care. Hearing back from our patients is one way we can continue to improve our services. Please take a few minutes to complete the written survey that you may receive in the mail after you visit with us. Thank you!        [employer] Information     [employer] lets you send messages to your doctor, view your test results, renew your prescriptions, schedule appointments and more. To sign up, go to [email or web address] . Click on \"Log in\" on the left side of the screen, which will take you to the Welcome page. Then click on \"Sign up Now\" on the right side of the page.     You will be asked to enter the access code listed below, as well as some personal information. Please follow the directions to create your username and password.     Your access code is: [email or web address]  Expires: 2018 11:33 AM     Your access code will  in 90 days. If you need help or a new code, please call your [place] clinic or [phone].        Care EveryWhere ID     This is your Care EveryWhere ID. This could be used by other organizations to access your [place] medical records  [id number]        Equal Access to Services     ALEXIS PACE AH: Priya Ramirez, kwan law, fina cedeno " [place] [facility]. So [facility] [phone].    ATENCIÓN: Si habla español, tiene a rojo disposición servicios gratuitos de asistencia lingüística. Llame al [phone].    We comply with applicable federal civil rights laws and [place] laws. We do not discriminate on the basis of race, color, national origin, age, disability, sex, sexual orientation, or gender identity.            After Visit Summary       This is your record. Keep this with you and show to your community pharmacist(s) and doctor(s) at your next visit.

## 2018-03-25 NOTE — ED AVS SNAPSHOT
[facility] Emergency Department    [address] [address]    Phone:  [phone]    Fax:  [phone]                                       Colin Shell   MRN: [id number]    Department:  [facility] Emergency Department   Date of Visit:  3/25/2018           After Visit Summary Signature Page     I have received my discharge instructions, and my questions have been answered. I have discussed any challenges I see with this plan with the nurse or doctor.    ..........................................................................................................................................  Patient/Patient Representative Signature      ..........................................................................................................................................  Patient Representative Print Name and Relationship to Patient    ..................................................               ................................................  Date                                            Time    ..........................................................................................................................................  Reviewed by Signature/Title    ...................................................              ..............................................  Date                                                            Time

## 2018-04-01 ENCOUNTER — APPOINTMENT (OUTPATIENT)
Dept: GENERAL RADIOLOGY | Facility: CLINIC | Age: 83
End: 2018-04-01
Attending: EMERGENCY MEDICINE
Payer: COMMERCIAL

## 2018-04-01 ENCOUNTER — HOSPITAL ENCOUNTER (EMERGENCY)
Facility: CLINIC | Age: 83
Discharge: HOME | End: 2018-04-01
Attending: EMERGENCY MEDICINE | Admitting: EMERGENCY MEDICINE
Payer: COMMERCIAL

## 2018-04-01 VITALS
SYSTOLIC BLOOD PRESSURE: 112 MMHG | TEMPERATURE: 97.4 F | WEIGHT: 200 LBS | DIASTOLIC BLOOD PRESSURE: 98 MMHG | HEIGHT: 70 IN | BODY MASS INDEX: 28.63 KG/M2 | OXYGEN SATURATION: 93 % | RESPIRATION RATE: 25 BRPM

## 2018-04-01 DIAGNOSIS — R05.9 COUGH: ICD-10-CM

## 2018-04-01 DIAGNOSIS — R79.89 ELEVATED BRAIN NATRIURETIC PEPTIDE (BNP) LEVEL: ICD-10-CM

## 2018-04-01 LAB
ALBUMIN SERPL-MCNC: 2.8 G/DL (ref 3.4–5)
ALP SERPL-CCNC: 89 U/L (ref 40–150)
ALT SERPL W P-5'-P-CCNC: 72 U/L (ref 0–70)
ANION GAP SERPL CALCULATED.3IONS-SCNC: 9 MMOL/L (ref 3–14)
AST SERPL W P-5'-P-CCNC: 62 U/L (ref 0–45)
BASOPHILS # BLD AUTO: 0 10E9/L (ref 0–0.2)
BASOPHILS NFR BLD AUTO: 0.4 %
BILIRUB SERPL-MCNC: 0.4 MG/DL (ref 0.2–1.3)
BUN SERPL-MCNC: 41 MG/DL (ref 7–30)
CALCIUM SERPL-MCNC: 8.3 MG/DL (ref 8.5–10.1)
CHLORIDE SERPL-SCNC: 108 MMOL/L (ref 94–109)
CO2 SERPL-SCNC: 23 MMOL/L (ref 20–32)
CREAT SERPL-MCNC: 2.08 MG/DL (ref 0.66–1.25)
DIFFERENTIAL METHOD BLD: ABNORMAL
EOSINOPHIL # BLD AUTO: 0.7 10E9/L (ref 0–0.7)
EOSINOPHIL NFR BLD AUTO: 8.2 %
ERYTHROCYTE [DISTWIDTH] IN BLOOD BY AUTOMATED COUNT: 13.9 % (ref 10–15)
GFR SERPL CREATININE-BSD FRML MDRD: 30 ML/MIN/1.7M2
GLUCOSE SERPL-MCNC: 164 MG/DL (ref 70–99)
HCT VFR BLD AUTO: 33 % (ref 40–53)
HGB BLD-MCNC: 10.9 G/DL (ref 13.3–17.7)
IMM GRANULOCYTES # BLD: 0 10E9/L (ref 0–0.4)
IMM GRANULOCYTES NFR BLD: 0.2 %
LYMPHOCYTES # BLD AUTO: 2.3 10E9/L (ref 0.8–5.3)
LYMPHOCYTES NFR BLD AUTO: 25.2 %
MCH RBC QN AUTO: 30 PG (ref 26.5–33)
MCHC RBC AUTO-ENTMCNC: 33 G/DL (ref 31.5–36.5)
MCV RBC AUTO: 91 FL (ref 78–100)
MONOCYTES # BLD AUTO: 0.9 10E9/L (ref 0–1.3)
MONOCYTES NFR BLD AUTO: 10.1 %
NEUTROPHILS # BLD AUTO: 5 10E9/L (ref 1.6–8.3)
NEUTROPHILS NFR BLD AUTO: 55.9 %
NT-PROBNP SERPL-MCNC: 2057 PG/ML (ref 0–1800)
PLATELET # BLD AUTO: 330 10E9/L (ref 150–450)
POTASSIUM SERPL-SCNC: 4.8 MMOL/L (ref 3.4–5.3)
PROT SERPL-MCNC: 7.3 G/DL (ref 6.8–8.8)
RBC # BLD AUTO: 3.63 10E12/L (ref 4.4–5.9)
SODIUM SERPL-SCNC: 140 MMOL/L (ref 133–144)
WBC # BLD AUTO: 9 10E9/L (ref 4–11)

## 2018-04-01 PROCEDURE — 99285 EMERGENCY DEPT VISIT HI MDM: CPT | Mod: Z6 | Performed by: EMERGENCY MEDICINE

## 2018-04-01 PROCEDURE — 85025 COMPLETE CBC W/AUTO DIFF WBC: CPT | Performed by: EMERGENCY MEDICINE

## 2018-04-01 PROCEDURE — 25000132 ZZH RX MED GY IP 250 OP 250 PS 637: Performed by: EMERGENCY MEDICINE

## 2018-04-01 PROCEDURE — 80053 COMPREHEN METABOLIC PANEL: CPT | Performed by: EMERGENCY MEDICINE

## 2018-04-01 PROCEDURE — 94640 AIRWAY INHALATION TREATMENT: CPT

## 2018-04-01 PROCEDURE — 83880 ASSAY OF NATRIURETIC PEPTIDE: CPT | Performed by: EMERGENCY MEDICINE

## 2018-04-01 PROCEDURE — 25000125 ZZHC RX 250: Performed by: EMERGENCY MEDICINE

## 2018-04-01 PROCEDURE — 99285 EMERGENCY DEPT VISIT HI MDM: CPT | Mod: 25

## 2018-04-01 PROCEDURE — 71046 X-RAY EXAM CHEST 2 VIEWS: CPT

## 2018-04-01 RX ORDER — GUAIFENESIN/DEXTROMETHORPHAN 100-10MG/5
5 SYRUP ORAL EVERY 4 HOURS PRN
Status: DISCONTINUED | OUTPATIENT
Start: 2018-04-01 | End: 2018-04-02 | Stop reason: HOSPADM

## 2018-04-01 RX ORDER — IPRATROPIUM BROMIDE AND ALBUTEROL SULFATE 2.5; .5 MG/3ML; MG/3ML
3 SOLUTION RESPIRATORY (INHALATION) ONCE
Status: COMPLETED | OUTPATIENT
Start: 2018-04-01 | End: 2018-04-01

## 2018-04-01 RX ADMIN — IPRATROPIUM BROMIDE AND ALBUTEROL SULFATE 3 ML: .5; 3 SOLUTION RESPIRATORY (INHALATION) at 18:10

## 2018-04-01 RX ADMIN — GUAIFENESIN AND DEXTROMETHORPHAN 5 ML: 100; 10 SYRUP ORAL at 18:23

## 2018-04-01 ASSESSMENT — ENCOUNTER SYMPTOMS
CONSTITUTIONAL NEGATIVE: 1
NEUROLOGICAL NEGATIVE: 1
SHORTNESS OF BREATH: 1
HEMATOLOGIC/LYMPHATIC NEGATIVE: 1
MUSCULOSKELETAL NEGATIVE: 1
ALLERGIC/IMMUNOLOGIC NEGATIVE: 1
EYES NEGATIVE: 1
CARDIOVASCULAR NEGATIVE: 1
COUGH: 1
ENDOCRINE NEGATIVE: 1
GASTROINTESTINAL NEGATIVE: 1
PSYCHIATRIC NEGATIVE: 1

## 2018-04-01 NOTE — ED AVS SNAPSHOT
[facility] Emergency Department    [address] [address]    Phone:  [phone]    Fax:  [phone]                                       Colin Shell   MRN: [id number]    Department:  [facility] Emergency Department   Date of Visit:  4/1/2018           After Visit Summary Signature Page     I have received my discharge instructions, and my questions have been answered. I have discussed any challenges I see with this plan with the nurse or doctor.    ..........................................................................................................................................  Patient/Patient Representative Signature      ..........................................................................................................................................  Patient Representative Print Name and Relationship to Patient    ..................................................               ................................................  Date                                            Time    ..........................................................................................................................................  Reviewed by Signature/Title    ...................................................              ..............................................  Date                                                            Time

## 2018-04-01 NOTE — ED PROVIDER NOTES
"  History     Chief Complaint   Patient presents with     Cough     pt reports seen 7 days ago and diagnosed with penumonia, pt reports not getting better.      HPI  Colin Shell is a 89 year old male with a history of paroxysmal atrial fibrillation on full aspirin, known history of CHF, history of pulmonary fibrosis with bronchiectasis who was treated for community acquired pneumonia on March 25 after an ED visit with Joey on March 25, 2018 who presents for concern that he continues to have cough and he does not feel that he is getting better.  Patient has also history of obesity and hypertension.  There has been recommendations that he would benefit from pulmonary function studies and testing and follow-up in pulmonary medicine the patient has refused this in the past.  He arrived tonight reporting frustration that he is continuing to cough.  He has not tried any over-the-counter cough medication.  He has an inhaler which she is used sparingly but does not have any neb machines at home.  He does live alone.  He denies any fever or hemoptysis or weight loss.  He does admit that his cough has been productive of yellow phlegm.  He also expressed frustration about his primary care provider.  He is expressed frustration that he is transition from seen physicians in clinic to advanced practice providers.  Because he continues to cough despite his ED visit where he received antibiotics with imaging he is here in the ED for care.  He denies seeing pulmonary medicine for follow-up.    Problem List:    Patient Active Problem List    Diagnosis Date Noted     Coronary artery disease involving coronary bypass graft of native heart without angina pectoris      Priority: High     Hx of CABG 1996    ECHO 01/2017: \"Left ventricular systolic function is mildly reduced.The visual ejection  fraction is estimated at 45%.Moderate basal to mid inferior wall hypokinesia\"    NM MPI 01/2017: \"Myocardial perfusion imaging using " "single isotope technique  demonstrated transmural scar of the inferior basal inferolateral wall  with mild ellie-infarct ischemia in the basal inferior septal wall.   2. Gated images demonstrated normal LV cavity size with mildly reduced  LV systolic function.  The left ventricular systolic function is 46%.  3. Compared to the prior study from no prior study .\"         Paroxysmal atrial fibrillation (H) 02/20/2018     Priority: Medium     Chronic systolic CHF (congestive heart failure) (H) 06/13/2016     Priority: Medium     Advance Care Planning 10/16/2012     Priority: Medium     Advance Care Planning: Receipt of ACP document:  Received: Health Care Directive which was witnessed or notarized on 8/26/09.  Document not previously scanned.  Validation form completed and scanned.  Code Status reflects choices in most recent ACP document. Confirmed/documented designated decision maker(s). See permanent comments section of demographics in clinical tab. View document(s) and details by clicking on code status. Added by Victorino Aguilera on 4/9/2015.  Patient is DNR/DNI             Cholelithiasis 05/09/2012     Priority: Medium     2012 assymptomatic   IMO update changed this record. Please review for accuracy       Fibrosis of lung (H) 05/09/2012     Priority: Medium     Suggested pulmonology in 2012 to eval--pt deferred this.  (Problem list name updated by automated process. Provider to review and confirm.)       Pulmonary nodule, right 05/09/2012     Priority: Medium     May 2012 ct showed right sided probable benign nodule.  No further follow-up needed. After stability noted on CT 2017.       Bronchiectasis (H) 04/30/2012     Priority: Medium     Noted on 2012 ct       Health Care Home 03/26/2012     Priority: Medium     Ratna Brooks RN-PHN  FPA / [facility]   [phone]   DX V65.8 REPLACED WITH [id number] HEALTH CARE HOME (04/08/2013)       Hyperlipidemia LDL goal <100 10/31/2010     Priority: " Medium     Chronic kidney disease, stage III (moderate) 07/13/2009     Priority: Medium     Hypertension goal BP (blood pressure) < 140/90 05/20/2005     Priority: Medium     Benign prostatic hyperplasia      Priority: Low     history of elevated PSAs       Obesity 07/13/2009     Priority: Low     Gout 05/20/2005     Priority: Low        Past Medical History:    Past Medical History:   Diagnosis Date     Arrhythmia      Congestive heart failure (H)      Dizziness and giddiness 11/24/2007     Foreign body in unspecified site on external eye      Hypertension      Microscopic hematuria      Renal disease      Rhabdomyolysis        Past Surgical History:    Past Surgical History:   Procedure Laterality Date     BIOPSY ARTERY TEMPORAL Left 9/18/2017    Procedure: BIOPSY ARTERY TEMPORAL;  Left Temporal Biopsy ;  Surgeon: Ivana Phan MD;  Location: [facility] OR     SURGICAL HISTORY OF -       heel spurs     SURGICAL HISTORY OF -   2005, 2006    bilateral eye cataract     SURGICAL HISTORY OF -   1996    CABG x 5 vessels     SURGICAL HISTORY OF -   2010    circumcision     TONSILLECTOMY & ADENOIDECTOMY         Family History:    Family History   Problem Relation Age of Onset     DIABETES Father      DIABETES Brother      DIABETES Brother      Hypertension Son      Unknown/Adopted Maternal Grandmother      Unknown/Adopted Maternal Grandfather      Unknown/Adopted Paternal Grandmother      Unknown/Adopted Paternal Grandfather      Dementia Sister        Social History:  Marital Status:   [2]  Social History   Substance Use Topics     Smoking status: Former Smoker     Smokeless tobacco: Never Used      Comment: Quit at age 32     Alcohol use Yes      Comment: 12 pack a year        Medications:      levofloxacin (LEVAQUIN) 500 MG tablet   albuterol (PROAIR HFA/PROVENTIL HFA/VENTOLIN HFA) 108 (90 BASE) MCG/ACT Inhaler   triamcinolone (KENALOG) 0.1 % ointment   allopurinol (ZYLOPRIM) 100 MG tablet   metoprolol  "succinate (TOPROL-XL) 25 MG 24 hr tablet   losartan (COZAAR) 100 MG tablet   aspirin  MG EC tablet   finasteride (PROSCAR) 5 MG tablet   atorvastatin (LIPITOR) 40 MG tablet   amLODIPine (NORVASC) 10 MG tablet   Magnesium Hydroxide (NAVA MILK OF MAGNESIA PO)   Multiple Vitamins-Minerals (CENTRUM SILVER) per tablet         Review of Systems   Constitutional: Negative.    HENT: Negative.    Eyes: Negative.    Respiratory: Positive for cough and shortness of breath.    Cardiovascular: Negative.    Gastrointestinal: Negative.    Endocrine: Negative.    Genitourinary: Negative.    Musculoskeletal: Negative.    Skin: Negative.    Allergic/Immunologic: Negative.    Neurological: Negative.    Hematological: Negative.    Psychiatric/Behavioral: Negative.    All other systems reviewed and are negative.      Physical Exam   BP: 145/70  Heart Rate: 68  Temp: 97.4  F (36.3  C)  Resp: 22  Height: 177.8 cm (5' 10\")  Weight: 90.7 kg (200 lb)  SpO2: 93 %      Physical Exam   Constitutional: He is oriented to person, place, and time. He appears well-developed and well-nourished. No distress.   HENT:   Head: Normocephalic and atraumatic.   Eyes: Conjunctivae and EOM are normal. Pupils are equal, round, and reactive to light. Right eye exhibits no discharge. Left eye exhibits no discharge. No scleral icterus.   Neck: Normal range of motion.   Cardiovascular: Exam reveals no gallop and no friction rub.    Pulmonary/Chest: Effort normal. He has rales in the right lower field and the left lower field.           Musculoskeletal: He exhibits no tenderness or deformity.   Neurological: He is alert and oriented to person, place, and time.   Skin: No rash noted. He is not diaphoretic. No erythema. No pallor.   Psychiatric: He has a normal mood and affect. His behavior is normal. Judgment and thought content normal.       ED Course     ED Course     Procedures               Critical Care time:  none               Results for orders " placed or performed during the hospital encounter of 04/01/18 (from the past 24 hour(s))   Chest XR,  PA & LAT    Narrative    XR CHEST 2 VW   4/1/2018 6:48 PM     HISTORY: Cough, shortness of breath.  History of pulmonary fibrosis  and bronchiectasis with small pleural effusions on CT from March 25, 2018.. Compare for interval change;     COMPARISON: Film dated 2/13/2018.    FINDINGS: The heart is enlarged. Midline sternotomy. Left cardiac  device is in place. There are extensive interstitial infiltrates in  both lungs consistent with interstitial edema. Small pleural effusions  are also present..  The lungs are clear. The pulmonary vasculature is  normal.  The bones and soft tissues are unremarkable.      Impression    IMPRESSION: Cardiomegaly and probable congestive heart failure with  interstitial infiltrates and small pleural effusions.    These  findings have not changed significantly since the previous film.    STEPH JEROME MD   CBC with platelets differential   Result Value Ref Range    WBC 9.0 4.0 - 11.0 10e9/L    RBC Count 3.63 (L) 4.4 - 5.9 10e12/L    Hemoglobin 10.9 (L) 13.3 - 17.7 g/dL    Hematocrit 33.0 (L) 40.0 - 53.0 %    MCV 91 78 - 100 fl    MCH 30.0 26.5 - 33.0 pg    MCHC 33.0 31.5 - 36.5 g/dL    RDW 13.9 10.0 - 15.0 %    Platelet Count 330 150 - 450 10e9/L    Diff Method Automated Method     % Neutrophils 55.9 %    % Lymphocytes 25.2 %    % Monocytes 10.1 %    % Eosinophils 8.2 %    % Basophils 0.4 %    % Immature Granulocytes 0.2 %    Absolute Neutrophil 5.0 1.6 - 8.3 10e9/L    Absolute Lymphocytes 2.3 0.8 - 5.3 10e9/L    Absolute Monocytes 0.9 0.0 - 1.3 10e9/L    Absolute Eosinophils 0.7 0.0 - 0.7 10e9/L    Absolute Basophils 0.0 0.0 - 0.2 10e9/L    Abs Immature Granulocytes 0.0 0 - 0.4 10e9/L   Comprehensive metabolic panel   Result Value Ref Range    Sodium 140 133 - 144 mmol/L    Potassium 4.8 3.4 - 5.3 mmol/L    Chloride 108 94 - 109 mmol/L    Carbon Dioxide 23 20 - 32 mmol/L    Anion Gap 9  3 - 14 mmol/L    Glucose 164 (H) 70 - 99 mg/dL    Urea Nitrogen 41 (H) 7 - 30 mg/dL    Creatinine 2.08 (H) 0.66 - 1.25 mg/dL    GFR Estimate 30 (L) >60 mL/min/1.7m2    GFR Estimate If Black 36 (L) >60 mL/min/1.7m2    Calcium 8.3 (L) 8.5 - 10.1 mg/dL    Bilirubin Total 0.4 0.2 - 1.3 mg/dL    Albumin 2.8 (L) 3.4 - 5.0 g/dL    Protein Total 7.3 6.8 - 8.8 g/dL    Alkaline Phosphatase 89 40 - 150 U/L    ALT 72 (H) 0 - 70 U/L    AST 62 (H) 0 - 45 U/L   Nt probnp inpatient (BNP)   Result Value Ref Range    N-Terminal Pro BNP Inpatient 2057 (H) 0 - 1800 pg/mL       Medications   guaiFENesin-dextromethorphan (ROBITUSSIN DM) 100-10 MG/5ML syrup 5 mL (5 mLs Oral Given 4/1/18 1823)   ipratropium - albuterol 0.5 mg/2.5 mg/3 mL (DUONEB) neb solution 3 mL (3 mLs Nebulization Given 4/1/18 1810)     ED medications:  Medications   guaiFENesin-dextromethorphan (ROBITUSSIN DM) 100-10 MG/5ML syrup 5 mL (5 mLs Oral Given 4/1/18 1823)   ipratropium - albuterol 0.5 mg/2.5 mg/3 mL (DUONEB) neb solution 3 mL (3 mLs Nebulization Given 4/1/18 1810)         ED labs and imaging:  Results for orders placed or performed during the hospital encounter of 04/01/18   Chest XR,  PA & LAT    Narrative    XR CHEST 2 VW   4/1/2018 6:48 PM     HISTORY: Cough, shortness of breath.  History of pulmonary fibrosis  and bronchiectasis with small pleural effusions on CT from March 25, 2018.. Compare for interval change;     COMPARISON: Film dated 2/13/2018.    FINDINGS: The heart is enlarged. Midline sternotomy. Left cardiac  device is in place. There are extensive interstitial infiltrates in  both lungs consistent with interstitial edema. Small pleural effusions  are also present..  The lungs are clear. The pulmonary vasculature is  normal.  The bones and soft tissues are unremarkable.      Impression    IMPRESSION: Cardiomegaly and probable congestive heart failure with  interstitial infiltrates and small pleural effusions.    These  findings have not changed  significantly since the previous film.    STEPH JEROME MD   CBC with platelets differential   Result Value Ref Range    WBC 9.0 4.0 - 11.0 10e9/L    RBC Count 3.63 (L) 4.4 - 5.9 10e12/L    Hemoglobin 10.9 (L) 13.3 - 17.7 g/dL    Hematocrit 33.0 (L) 40.0 - 53.0 %    MCV 91 78 - 100 fl    MCH 30.0 26.5 - 33.0 pg    MCHC 33.0 31.5 - 36.5 g/dL    RDW 13.9 10.0 - 15.0 %    Platelet Count 330 150 - 450 10e9/L    Diff Method Automated Method     % Neutrophils 55.9 %    % Lymphocytes 25.2 %    % Monocytes 10.1 %    % Eosinophils 8.2 %    % Basophils 0.4 %    % Immature Granulocytes 0.2 %    Absolute Neutrophil 5.0 1.6 - 8.3 10e9/L    Absolute Lymphocytes 2.3 0.8 - 5.3 10e9/L    Absolute Monocytes 0.9 0.0 - 1.3 10e9/L    Absolute Eosinophils 0.7 0.0 - 0.7 10e9/L    Absolute Basophils 0.0 0.0 - 0.2 10e9/L    Abs Immature Granulocytes 0.0 0 - 0.4 10e9/L   Comprehensive metabolic panel   Result Value Ref Range    Sodium 140 133 - 144 mmol/L    Potassium 4.8 3.4 - 5.3 mmol/L    Chloride 108 94 - 109 mmol/L    Carbon Dioxide 23 20 - 32 mmol/L    Anion Gap 9 3 - 14 mmol/L    Glucose 164 (H) 70 - 99 mg/dL    Urea Nitrogen 41 (H) 7 - 30 mg/dL    Creatinine 2.08 (H) 0.66 - 1.25 mg/dL    GFR Estimate 30 (L) >60 mL/min/1.7m2    GFR Estimate If Black 36 (L) >60 mL/min/1.7m2    Calcium 8.3 (L) 8.5 - 10.1 mg/dL    Bilirubin Total 0.4 0.2 - 1.3 mg/dL    Albumin 2.8 (L) 3.4 - 5.0 g/dL    Protein Total 7.3 6.8 - 8.8 g/dL    Alkaline Phosphatase 89 40 - 150 U/L    ALT 72 (H) 0 - 70 U/L    AST 62 (H) 0 - 45 U/L   Nt probnp inpatient (BNP)   Result Value Ref Range    N-Terminal Pro BNP Inpatient 2057 (H) 0 - 1800 pg/mL         ED Vitals:  Vitals:    04/01/18 2018 04/01/18 2019 04/01/18 2030 04/01/18 2045   BP: (!) 112/98      Resp:       Temp:       TempSrc:       SpO2:  94% 94% 93%   Weight:       Height:         Prior or recent diagnostic imaging and work-up:  CT CHEST W/O CONTRAST 3/25/2018 8:31 AM     HISTORY: Shortness of  breath.     TECHNIQUE: CT of the chest is performed without IV contrast.   Radiation dose for this scan was reduced using automated exposure  control, adjustment of the mA and/or kV according to patient size, or  iterative reconstruction technique.     Assessed structures to the limits no IV contrast administration  include the lungs, mediastinum, pleura, and chest wall.      COMPARISON: January 13, 2017..     FINDINGS: There are patchy pulmonary groundglass opacities,  predominantly throughout the left lung. Mild bronchiectasis of the  bilateral lung bases. There are small bilateral effusions, right  greater than left. No pneumothorax.     No axillary lymphadenopathy. Multiple scattered mildly prominent  mediastinal lymph nodes, the largest in the precarinal region measures  1.1 cm in maximal diameter.     Mild cardiomegaly.     Visualized portions of the upper abdomen demonstrate a moderate hiatal  hernia and gallstones.         IMPRESSION:  1.  Multiple patchy groundglass opacities throughout the left lung,  suggestive of multifocal infection or inflammation.  2. Bilateral effusions, right greater than left.  3. Mild mediastinal lymphadenopathy.  4. Mild cardiomegaly.  5. Moderate hiatal hernia.  6. Cholelithiasis.     MARKEL MANZO MD    Echocardiogram 10/18/17  Interpretation Summary     The visual ejection fraction is estimated at 45%.  There are regional wall motion abnormalities as specified.  The right ventricle is normal in size and function.  There is moderate biatrial enlargement.  Mild valvular aortic stenosis.  Mild aortic root dilatation.  Compared to prior study, changes are noted.    Left Ventricle  The left ventricle is normal in size. Proximal septal thickening is noted. E  by E prime ratio is greater than 15, that likely suggests increased left  ventricular filling pressures. The visual ejection fraction is estimated at  45%. There is moderate inferior wall hypokinesis. There is mild to  moderate  septal hypokinesis. There are regional wall motion abnormalities as specified.     Right Ventricle  The right ventricle is normal in size and function. There is a pacemaker lead  in the right ventricle.     Atria  There is moderate biatrial enlargement. There is no atrial shunt seen.     Mitral Valve  There is mild mitral annular calcification. The mitral valve leaflets appear  thickened, but open well. There is trace mitral regurgitation.        Tricuspid Valve  The tricuspid valve is normal in structure and function. There is trace  tricuspid regurgitation. The right ventricular systolic pressure is  approximated at 28.7 mmHg plus the right atrial pressure.     Aortic Valve  There is trace aortic regurgitation. The mean AoV pressure gradient is 11.7  mmHg. Mild valvular aortic stenosis.     Pulmonic Valve  The pulmonic valve is not well seen, but is grossly normal.     Vessels  Mild aortic root dilatation. The ascending aorta is Mildly dilated.     Pericardium  There is no pericardial effusion.        Rhythm  The rhythm was undetermined.  Assessments & Plan (with Medical Decision Making)   Clinical impression: 89-year-old male who presents for concern that he is continuing to have cough and shortness of breath.  His symptoms are likely multifactorial.   He has multiple medical problems including a history of pulmonary fibrosis with bronchiectasis.  He is also on a full aspirin for history of atrial fibrillation and had a CABG in 1996.  He was seen on March 25, 2018 for cough and shortness of breath.  He had a CT of his chest without contrast which showed multiple patchy groundglass opacities throughout the left lung suggestive of multifocal infection or inflammation.  There is also small bilateral effusions which were noted the right was greater than left and mild cardiomegaly with mediastinal lymphadenopathy.  Patient arrived tonight reporting he is frustrated that he continues to have a cough.  He  "tells me he has not tried any over-the-counter cough medications.  He does have an albuterol inhaler that he is used at home.  He does not understand that he has bronchiectasis with pulmonary fibrosis and that he has groundglass opacities in lung that he had a small effusion on CT imaging during his last visit.  He tells me he was compliant with antibiotic he was provided.  He expressed his fat satisfaction unhappiness about the primary care set up \"I have had to see physicians and then transition to nurse practitioners\".  My exam he is pleasant in no obvious distress he is 93-94% on room air he has bilateral crackles he has coughing spells with deep inspiration.  He tells me he has had productive cough with yellow sputum.  He has not seen pulmonary medicine.  GCS is 15 he denies any hemoptysis and no fever      ED Course and Plan;  I reviewed his ED visit from March 25, 2018.  Please see his medical records for additional details of the care provided.  CT of the chest without contrast please see the detailed radiologist interpretation report above.  We agreed to try Medication in the ED and a neb treatment.  Rather than obtain another CT chest I elected to obtain an x-ray.  We discussed that his cough is likely chronic and multifactorial.  He will benefit from follow-up in pulmonary medicine.  Patient's x-ray today shows stable cardiomegaly with interstitial infiltrate and small pleural effusions consistent with CHF.  Patient's last echocardiogram was in October 2017.  Patient did have regional wall motion abnormalities with moderate biatrial enlargement and valvular aortic stenosis.  EF was estimated 45%.  With chest x-ray showing CHF features I elected to obtain blood work.  BNP today is 2051 it was 1841 in September 2017.  And prior to that he has had normal BNP's.  Patient seemed to have some improvement after cough medicine and a DuoNeb in the ED.  We discussed his mild elevation in his BNP from baseline and " his CT chest findings from his last visit on March 25 as well as x-ray today.  At this time I think it is safe to send the patient home with plan for an outpatient echocardiogram for follow-up particular because he does not report any orthopnea lower extremity swelling chest pressure or exertional dyspnea.  He seemed to sleep comfortably during his course of care in the ED. I suggested trial of cough medication over-the-counter we reviewed reasons to return to the ED for care he expressed understanding         Disclaimer: This note consists of symbols derived from keyboarding, dictation and/or voice recognition software. As a result, there may be errors in the script that have gone undetected. Please consider this when interpreting information found in this chart.  I have reviewed the nursing notes.    I have reviewed the findings, diagnosis, plan and need for follow up with the patient.       New Prescriptions    No medications on file       Final diagnoses:   Cough - chronic- likely multifactorial.   Elevated brain natriuretic peptide (BNP) level       4/1/2018   [facility] EMERGENCY DEPARTMENT     Wilberto Vasquez MD  04/01/18 [id number]

## 2018-04-01 NOTE — ED NOTES
Cough for over a week, is on antibiotics and inhaler, not getting better. Cough productive, yellow sputum. Pt PUENTES with little exertion in ED. Last used inhaler this AM, doesn't seem to help with cough. BLE edema also, left>right which is normal for pt. Pt's SaO2 89% upon arrival to room, improved to 93% with rest. Pt denies using O2 at home.

## 2018-04-01 NOTE — ED AVS SNAPSHOT
[facility] Emergency Department    [address] [facility] [address]    Phone:  [phone]    Fax:  [phone]                                       Colin Shell   MRN: [id number]    Department:  [facility] Emergency Department   Date of Visit:  4/1/2018           Patient Information     Date Of Birth          6/25/1927        Your diagnoses for this visit were:     Cough chronic- likely multifactorial.    Elevated brain natriuretic peptide (BNP) level        You were seen by Wilberto Vasquez MD.      Follow-up Information     Follow up with Casie Watts DO.    Specialty:  Internal Medicine    Why:  You may need a follow-up ultrasound of your heart to ensure your heart is not failing    Contact information:    [address] [place] [address]  [phone]          Follow up with [facility].    Specialty:  EMERGENCY MEDICINE    Why:  Try to buy over the counter cough medicine to help with your cough. It is ok to use your inhaler if needed.     Contact information:    [address] [place] [address] [phone]    Additional information:    The medical center is located at   [address] [place] [address] (between [facility] and   [address], [place] miles north   of [place]).        Follow up with [facility].    Specialty:  EMERGENCY MEDICINE    Why:  If you continue to cough or feel shortness of breath you may need to return to the ED for care.    Contact information:    [address] [place] [address] [phone]    Additional information:    The medical center is located at   [address] [place] [address] (between [facility] and   [facility] [address] in [place], four miles north   [facility]).      Discharge References/Attachments     COUGH, CHRONIC, UNCERTAIN CAUSE, (ADULT) (ENGLISH)    BNP (BLOOD) (ENGLISH)    HEART FAILURE, CONGESTIVE (CHF) (ENGLISH)      Your next 10 appointments already scheduled      May 01, 2018 10:00 AM CDT   ICD Check with Wy Device Rn   [facility] [place] [facility] ([facility])    [address] [facility] [address] [phone]              24 Hour Appointment Hotline       To make an appointment at any [facility] clinic, call [phone] ([phone]). If you don't have a family doctor or clinic, we will help you find one. [facility] clinics are conveniently located to serve the needs of you and your family.             Review of your medicines      Our records show that you are taking the medicines listed below. If these are incorrect, please call your family doctor or clinic.        Dose / Directions Last dose taken    albuterol 108 (90 BASE) MCG/ACT Inhaler   Commonly known as:  PROAIR HFA/PROVENTIL HFA/VENTOLIN HFA   Dose:  2 puff   Quantity:  1 Inhaler        Inhale 2 puffs into the lungs every 4 hours as needed for shortness of breath / dyspnea or wheezing WITH SPACER   Refills:  0        allopurinol 100 MG tablet   Commonly known as:  ZYLOPRIM   Quantity:  90 tablet        TAKE ONE-HALF TABLET BY MOUTH ONCE DAILY FOR  GOUT   Refills:  3        amLODIPine 10 MG tablet   Commonly known as:  NORVASC   Dose:  10 mg   Quantity:  90 tablet        Take 1 tablet (10 mg) by mouth daily   Refills:  3        aspirin  MG EC tablet   Dose:  325 mg   Quantity:  90 tablet        Take 1 tablet (325 mg) by mouth daily   Refills:  3        atorvastatin 40 MG tablet   Commonly known as:  LIPITOR   Dose:  40 mg   Quantity:  90 tablet        Take 1 tablet (40 mg) by mouth daily   Refills:  3        CENTRUM SILVER per tablet   Dose:  1 tablet        Take 1 tablet by mouth daily   Refills:  0        finasteride 5 MG tablet   Commonly known as:  PROSCAR   Dose:  5 mg   Quantity:  90 tablet        Take 1 tablet (5 mg) by mouth daily   Refills:  3        levofloxacin 500 MG tablet   Commonly known as:  LEVAQUIN   Dose:  500 mg   Quantity:  7 tablet        Take 1 tablet  (500 mg) by mouth daily for 7 days   Refills:  0        losartan 100 MG tablet   Commonly known as:  COZAAR   Dose:  100 mg   Quantity:  90 tablet        Take 1 tablet (100 mg) by mouth daily   Refills:  3        metoprolol succinate 25 MG 24 hr tablet   Commonly known as:  TOPROL-XL   Dose:  50 mg   Quantity:  180 tablet        Take 2 tablets (50 mg) by mouth daily   Refills:  3        NAVA MILK OF MAGNESIA PO        Refills:  0        triamcinolone 0.1 % ointment   Commonly known as:  KENALOG   Quantity:  30 g        Apply topically 2 times daily Apply sparingly to affected area twice times daily for the next 7-10 days.   Refills:  1                Procedures and tests performed during your visit     CBC with platelets differential    Chest XR,  PA & LAT    Comprehensive metabolic panel    Nt probnp inpatient (BNP)      Orders Needing Specimen Collection     None      Pending Results     No orders found from 3/30/2018 to 4/2/2018.            Pending Culture Results     No orders found from 3/30/2018 to 4/2/2018.            Pending Results Instructions     If you had any lab results that were not finalized at the time of your Discharge, you can call the ED Lab Result RN at [phone]. You will be contacted by this team for any positive Lab results or changes in treatment. The nurses are available 7 days a week from 10A to 6:30P.  You can leave a message 24 hours per day and they will return your call.        Test Results From Your Hospital Stay        4/1/2018  7:21 PM      Narrative     XR CHEST 2 VW   4/1/2018 6:48 PM     HISTORY: Cough, shortness of breath.  History of pulmonary fibrosis  and bronchiectasis with small pleural effusions on CT from March 25, 2018.. Compare for interval change;     COMPARISON: Film dated 2/13/2018.    FINDINGS: The heart is enlarged. Midline sternotomy. Left cardiac  device is in place. There are extensive interstitial infiltrates in  both lungs consistent with interstitial  edema. Small pleural effusions  are also present..  The lungs are clear. The pulmonary vasculature is  normal.  The bones and soft tissues are unremarkable.        Impression     IMPRESSION: Cardiomegaly and probable congestive heart failure with  interstitial infiltrates and small pleural effusions.    These  findings have not changed significantly since the previous film.    STEPH JEROME MD         4/1/2018  8:10 PM      Component Results     Component Value Ref Range & Units Status    WBC 9.0 4.0 - 11.0 10e9/L Final    RBC Count 3.63 (L) 4.4 - 5.9 10e12/L Final    Hemoglobin 10.9 (L) 13.3 - 17.7 g/dL Final    Hematocrit 33.0 (L) 40.0 - 53.0 % Final    MCV 91 78 - 100 fl Final    MCH 30.0 26.5 - 33.0 pg Final    MCHC 33.0 31.5 - 36.5 g/dL Final    RDW 13.9 10.0 - 15.0 % Final    Platelet Count 330 150 - 450 10e9/L Final    Diff Method Automated Method  Final    % Neutrophils 55.9 % Final    % Lymphocytes 25.2 % Final    % Monocytes 10.1 % Final    % Eosinophils 8.2 % Final    % Basophils 0.4 % Final    % Immature Granulocytes 0.2 % Final    Absolute Neutrophil 5.0 1.6 - 8.3 10e9/L Final    Absolute Lymphocytes 2.3 0.8 - 5.3 10e9/L Final    Absolute Monocytes 0.9 0.0 - 1.3 10e9/L Final    Absolute Eosinophils 0.7 0.0 - 0.7 10e9/L Final    Absolute Basophils 0.0 0.0 - 0.2 10e9/L Final    Abs Immature Granulocytes 0.0 0 - 0.4 10e9/L Final         4/1/2018  8:22 PM      Component Results     Component Value Ref Range & Units Status    Sodium 140 133 - 144 mmol/L Final    Potassium 4.8 3.4 - 5.3 mmol/L Final    Chloride 108 94 - 109 mmol/L Final    Carbon Dioxide 23 20 - 32 mmol/L Final    Anion Gap 9 3 - 14 mmol/L Final    Glucose 164 (H) 70 - 99 mg/dL Final    Urea Nitrogen 41 (H) 7 - 30 mg/dL Final    Creatinine 2.08 (H) 0.66 - 1.25 mg/dL Final    GFR Estimate 30 (L) >60 mL/min/1.7m2 Final    Non  GFR Calc    GFR Estimate If Black 36 (L) >60 mL/min/1.7m2 Final    African American GFR Calc    Calcium  "8.3 (L) 8.5 - 10.1 mg/dL Final    Bilirubin Total 0.4 0.2 - 1.3 mg/dL Final    Albumin 2.8 (L) 3.4 - 5.0 g/dL Final    Protein Total 7.3 6.8 - 8.8 g/dL Final    Alkaline Phosphatase 89 40 - 150 U/L Final    ALT 72 (H) 0 - 70 U/L Final    AST 62 (H) 0 - 45 U/L Final         2018  8:22 PM      Component Results     Component Value Ref Range & Units Status    N-Terminal Pro BNP Inpatient  (H) 0 - 1800 pg/mL Final       Reference range shown and results flagged as abnormal are suggested inpatient   cut points for confirming diagnosis if CHF in an acute setting. Establishing a   baseline value for each individual patient is useful for follow-up. An   inpatient or emergency department NT-proPBNP <300 pg/mL effectively rules out   acute CHF, with 99% negative predictive value.  The outpatient non-acute reference range for ruling out CHF is:   0-125 pg/mL (age 18 to less than 75)   0-450 pg/mL (age 75 yrs and older)                  Thank you for choosing [facility]       Thank you for choosing [facility] for your care. Our goal is always to provide you with excellent care. Hearing back from our patients is one way we can continue to improve our services. Please take a few minutes to complete the written survey that you may receive in the mail after you visit with us. Thank you!        [employer] Information     [employer] lets you send messages to your doctor, view your test results, renew your prescriptions, schedule appointments and more. To sign up, go to [email or web address] . Click on \"Log in\" on the left side of the screen, which will take you to the Welcome page. Then click on \"Sign up Now\" on the right side of the page.     You will be asked to enter the access code listed below, as well as some personal information. Please follow the directions to create your username and password.     Your access code is: [email or web address]  Expires: 2018 11:33 AM     Your access code will  in 90 days. If you need help or a " new code, please call your [facility] clinic or [phone].        Care EveryWhere ID     This is your Care EveryWhere ID. This could be used by other organizations to access your [facility] medical records  [id number]        Equal Access to Services     ALEXIS PACE : Priya bailey Sobernadine, waaxda luqadaha, qaybta kaalmada tim, fina vo. So [facility] [phone].    ATENCIÓN: Si habla español, tiene a rojo disposición servicios gratuitos de asistencia lingüística. Llame al [phone].    We comply with applicable federal civil rights laws and [place] laws. We do not discriminate on the basis of race, color, national origin, age, disability, sex, sexual orientation, or gender identity.            After Visit Summary       This is your record. Keep this with you and show to your community pharmacist(s) and doctor(s) at your next visit.

## 2018-04-01 NOTE — ED AVS SNAPSHOT
[facility] Emergency Department    [address] [facility] [address]    Phone:  [phone]    Fax:  [phone]                                       Colin Shell   MRN: [id number]    Department:  [facility] Emergency Department   Date of Visit:  4/1/2018           Patient Information     Date Of Birth          6/25/1927        Your diagnoses for this visit were:     Cough chronic- likely multifactorial.    Elevated brain natriuretic peptide (BNP) level        You were seen by Wilberto Vasquez MD.      Follow-up Information     Follow up with Casie Watts DO.    Specialty:  Internal Medicine    Why:  You may need a follow-up ultrasound of your heart to ensure your heart is not failing    Contact information:    [address] [place] [address]  [phone]          Follow up with [facility].    Specialty:  EMERGENCY MEDICINE    Why:  Try to buy over the counter cough medicine to help with your cough. It is ok to use your inhaler if needed.     Contact information:    [address] [place] [address] [phone]    Additional information:    The medical center is located at   [address] [place] [address] (between [facility] and   [address], four miles north   of [place]).        Follow up with [facility].    Specialty:  EMERGENCY MEDICINE    Why:  If you continue to cough or feel shortness of breath you may need to return to the ED for care.    Contact information:    [address] [place] [address] [phone]    Additional information:    The medical center is located at   [address] [place] [address] (between [facility] and   [facility] in [place], four miles north   [facility]).      Discharge References/Attachments     COUGH, CHRONIC, UNCERTAIN CAUSE, (ADULT) (ENGLISH)    BNP (BLOOD) (ENGLISH)    HEART FAILURE, CONGESTIVE (CHF) (ENGLISH)      Your next 10 appointments already scheduled      May 01, 2018 10:00 AM CDT   ICD Check with Wy Device Rn   [facility] [place] [facility] ([facility])    [address] [facility] [address] [phone]              24 Hour Appointment Hotline       To make an appointment at any [facility] clinic, call [phone] ([phone]). If you don't have a family doctor or clinic, we will help you find one. [facility] clinics are conveniently located to serve the needs of you and your family.             Review of your medicines      Our records show that you are taking the medicines listed below. If these are incorrect, please call your family doctor or clinic.        Dose / Directions Last dose taken    albuterol 108 (90 BASE) MCG/ACT Inhaler   Commonly known as:  PROAIR HFA/PROVENTIL HFA/VENTOLIN HFA   Dose:  2 puff   Quantity:  1 Inhaler        Inhale 2 puffs into the lungs every 4 hours as needed for shortness of breath / dyspnea or wheezing WITH SPACER   Refills:  0        allopurinol 100 MG tablet   Commonly known as:  ZYLOPRIM   Quantity:  90 tablet        TAKE ONE-HALF TABLET BY MOUTH ONCE DAILY FOR  GOUT   Refills:  3        amLODIPine 10 MG tablet   Commonly known as:  NORVASC   Dose:  10 mg   Quantity:  90 tablet        Take 1 tablet (10 mg) by mouth daily   Refills:  3        aspirin  MG EC tablet   Dose:  325 mg   Quantity:  90 tablet        Take 1 tablet (325 mg) by mouth daily   Refills:  3        atorvastatin 40 MG tablet   Commonly known as:  LIPITOR   Dose:  40 mg   Quantity:  90 tablet        Take 1 tablet (40 mg) by mouth daily   Refills:  3        CENTRUM SILVER per tablet   Dose:  1 tablet        Take 1 tablet by mouth daily   Refills:  0        finasteride 5 MG tablet   Commonly known as:  PROSCAR   Dose:  5 mg   Quantity:  90 tablet        Take 1 tablet (5 mg) by mouth daily   Refills:  3        levofloxacin 500 MG tablet   Commonly known as:  LEVAQUIN   Dose:  500 mg   Quantity:  7 tablet        Take 1 tablet  (500 mg) by mouth daily for 7 days   Refills:  0        losartan 100 MG tablet   Commonly known as:  COZAAR   Dose:  100 mg   Quantity:  90 tablet        Take 1 tablet (100 mg) by mouth daily   Refills:  3        metoprolol succinate 25 MG 24 hr tablet   Commonly known as:  TOPROL-XL   Dose:  50 mg   Quantity:  180 tablet        Take 2 tablets (50 mg) by mouth daily   Refills:  3        NAVA MILK OF MAGNESIA PO        Refills:  0        triamcinolone 0.1 % ointment   Commonly known as:  KENALOG   Quantity:  30 g        Apply topically 2 times daily Apply sparingly to affected area twice times daily for the next 7-10 days.   Refills:  1                Procedures and tests performed during your visit     CBC with platelets differential    Chest XR,  PA & LAT    Comprehensive metabolic panel    Nt probnp inpatient (BNP)      Orders Needing Specimen Collection     None      Pending Results     No orders found from 3/30/2018 to 4/2/2018.            Pending Culture Results     No orders found from 3/30/2018 to 4/2/2018.            Pending Results Instructions     If you had any lab results that were not finalized at the time of your Discharge, you can call the ED Lab Result RN at [phone]. You will be contacted by this team for any positive Lab results or changes in treatment. The nurses are available 7 days a week from 10A to 6:30P.  You can leave a message 24 hours per day and they will return your call.        Test Results From Your Hospital Stay        4/1/2018  7:21 PM      Narrative     XR CHEST 2 VW   4/1/2018 6:48 PM     HISTORY: Cough, shortness of breath.  History of pulmonary fibrosis  and bronchiectasis with small pleural effusions on CT from March 25, 2018.. Compare for interval change;     COMPARISON: Film dated 2/13/2018.    FINDINGS: The heart is enlarged. Midline sternotomy. Left cardiac  device is in place. There are extensive interstitial infiltrates in  both lungs consistent with interstitial  edema. Small pleural effusions  are also present..  The lungs are clear. The pulmonary vasculature is  normal.  The bones and soft tissues are unremarkable.        Impression     IMPRESSION: Cardiomegaly and probable congestive heart failure with  interstitial infiltrates and small pleural effusions.    These  findings have not changed significantly since the previous film.    STEPH JEROME MD         4/1/2018  8:10 PM      Component Results     Component Value Ref Range & Units Status    WBC 9.0 4.0 - 11.0 10e9/L Final    RBC Count 3.63 (L) 4.4 - 5.9 10e12/L Final    Hemoglobin 10.9 (L) 13.3 - 17.7 g/dL Final    Hematocrit 33.0 (L) 40.0 - 53.0 % Final    MCV 91 78 - 100 fl Final    MCH 30.0 26.5 - 33.0 pg Final    MCHC 33.0 31.5 - 36.5 g/dL Final    RDW 13.9 10.0 - 15.0 % Final    Platelet Count 330 150 - 450 10e9/L Final    Diff Method Automated Method  Final    % Neutrophils 55.9 % Final    % Lymphocytes 25.2 % Final    % Monocytes 10.1 % Final    % Eosinophils 8.2 % Final    % Basophils 0.4 % Final    % Immature Granulocytes 0.2 % Final    Absolute Neutrophil 5.0 1.6 - 8.3 10e9/L Final    Absolute Lymphocytes 2.3 0.8 - 5.3 10e9/L Final    Absolute Monocytes 0.9 0.0 - 1.3 10e9/L Final    Absolute Eosinophils 0.7 0.0 - 0.7 10e9/L Final    Absolute Basophils 0.0 0.0 - 0.2 10e9/L Final    Abs Immature Granulocytes 0.0 0 - 0.4 10e9/L Final         4/1/2018  8:22 PM      Component Results     Component Value Ref Range & Units Status    Sodium 140 133 - 144 mmol/L Final    Potassium 4.8 3.4 - 5.3 mmol/L Final    Chloride 108 94 - 109 mmol/L Final    Carbon Dioxide 23 20 - 32 mmol/L Final    Anion Gap 9 3 - 14 mmol/L Final    Glucose 164 (H) 70 - 99 mg/dL Final    Urea Nitrogen 41 (H) 7 - 30 mg/dL Final    Creatinine 2.08 (H) 0.66 - 1.25 mg/dL Final    GFR Estimate 30 (L) >60 mL/min/1.7m2 Final    Non  GFR Calc    GFR Estimate If Black 36 (L) >60 mL/min/1.7m2 Final    African American GFR Calc    Calcium  "8.3 (L) 8.5 - 10.1 mg/dL Final    Bilirubin Total 0.4 0.2 - 1.3 mg/dL Final    Albumin 2.8 (L) 3.4 - 5.0 g/dL Final    Protein Total 7.3 6.8 - 8.8 g/dL Final    Alkaline Phosphatase 89 40 - 150 U/L Final    ALT 72 (H) 0 - 70 U/L Final    AST 62 (H) 0 - 45 U/L Final         2018  8:22 PM      Component Results     Component Value Ref Range & Units Status    N-Terminal Pro BNP Inpatient  (H) 0 - 1800 pg/mL Final       Reference range shown and results flagged as abnormal are suggested inpatient   cut points for confirming diagnosis if CHF in an acute setting. Establishing a   baseline value for each individual patient is useful for follow-up. An   inpatient or emergency department NT-proPBNP <300 pg/mL effectively rules out   acute CHF, with 99% negative predictive value.  The outpatient non-acute reference range for ruling out CHF is:   0-125 pg/mL (age 18 to less than 75)   0-450 pg/mL (age 75 yrs and older)                  Thank you for choosing [facility]       Thank you for choosing [facility] for your care. Our goal is always to provide you with excellent care. Hearing back from our patients is one way we can continue to improve our services. Please take a few minutes to complete the written survey that you may receive in the mail after you visit with us. Thank you!        [employer] Information     [employer] lets you send messages to your doctor, view your test results, renew your prescriptions, schedule appointments and more. To sign up, go to [email or web address] . Click on \"Log in\" on the left side of the screen, which will take you to the Welcome page. Then click on \"Sign up Now\" on the right side of the page.     You will be asked to enter the access code listed below, as well as some personal information. Please follow the directions to create your username and password.     Your access code is: [email or web address]  Expires: 2018 11:33 AM     Your access code will  in 90 days. If you need help or a " new code, please call your [facility] clinic or [phone].        Care EveryWhere ID     This is your Care EveryWhere ID. This could be used by other organizations to access your [facility] medical records  [id number]        Equal Access to Services     ALEXIS PACE : Priya Ramirez, wacostada luqadaha, qaybta kaalmada tim, fina vo. So [facility] [phone].    ATENCIÓN: Si habla español, tiene a rojo disposición servicios gratuitos de asistencia lingüística. Llame al [phone].    We comply with applicable federal civil rights and [place] laws. We do not discriminate on the basis of race, color, national origin, age, disability, sex, sexual orientation or gender identity.                          After Visit Summary       This is your record. Keep this with you and show to your community pharmacist(s) and doctor(s) at your next visit.

## 2018-04-02 NOTE — ED NOTES
DC instructions reviewed including medications, states understanding, questions answered. Pt assisted out to parking lot via w/c.

## 2018-04-09 ENCOUNTER — OFFICE VISIT (OUTPATIENT)
Dept: FAMILY MEDICINE | Facility: CLINIC | Age: 83
End: 2018-04-09
Payer: COMMERCIAL

## 2018-04-09 VITALS
TEMPERATURE: 96.3 F | SYSTOLIC BLOOD PRESSURE: 129 MMHG | HEART RATE: 55 BPM | DIASTOLIC BLOOD PRESSURE: 63 MMHG | BODY MASS INDEX: 30.78 KG/M2 | OXYGEN SATURATION: 97 % | HEIGHT: 70 IN | WEIGHT: 215 LBS

## 2018-04-09 DIAGNOSIS — I10 HYPERTENSION GOAL BP (BLOOD PRESSURE) < 140/90: Primary | Chronic | ICD-10-CM

## 2018-04-09 DIAGNOSIS — I50.22 CHRONIC SYSTOLIC CONGESTIVE HEART FAILURE (H): ICD-10-CM

## 2018-04-09 PROCEDURE — 99214 OFFICE O/P EST MOD 30 MIN: CPT | Performed by: FAMILY MEDICINE

## 2018-04-09 NOTE — MR AVS SNAPSHOT
After Visit Summary   4/9/2018    Colin Shell    MRN: [id number]           Patient Information     Date Of Birth          6/25/1927        Visit Information        Provider Department      4/9/2018 10:40 AM Jono Drake MD [facility]        Today's Diagnoses     Hypertension goal BP (blood pressure) < 140/90    -  1    Chronic systolic congestive heart failure (H)          Care Instructions          Thank you for choosing [facility].  You may be receiving a survey in the mail from Maryellen Dockery regarding your visit today.  Please take a few minutes to complete and return the survey to let us know how we are doing.      If you have questions or concerns, please contact us via LookUP or you can contact your care team at [phone].    Our Clinic hours are:  Monday 6:40 am  to 7:00 pm  Tuesday -Friday 6:40 am to 5:00 pm    The [place] outpatient lab hours are:  Monday - Friday 6:10 am to 4:45 pm  Saturdays 7:00 am to 11:00 am  Appointments are required, call [phone]    If you have clinical questions after hours or would like to schedule an appointment,  call the clinic at [phone].      (I10) Hypertension goal BP (blood pressure) < 140/90  (primary encounter diagnosis)  Comment:   Plan: We discussed getting up slowly and staying well hydrated. The urine should be clear. Move the legs before standing.   Consider the home BP machine. The range at rest is between 100-130/60-80. The pulse needs to stay over 50 per minute.   If the pulse is too low then the Metoprolol would be lowered from 50 mg to 25 mg daily. Call my Rn at [phone] to do this.     (I50.22) Chronic systolic congestive heart failure (H)  Comment:   Plan: For the heart failure, use the lower sodium diet and stay on 1500 cc daily of fluid or about 50 ounces.   Walk daily for exercise. Weigh yourself daily and if the weight goes up by 5-6 lbs call the clinic Rn and a medication adjustment   May be  "needed: a diuretic.           Follow-ups after your visit        Your next 10 appointments already scheduled     May 01, 2018 10:00 AM CDT   ICD Check with [place] Device Rn   [facility] ([facility])    [address] [address] [phone]              Who to contact     If you have questions or need follow up information about today's clinic visit or your schedule please contact [facility] directly at [phone].  Normal or non-critical lab and imaging results will be communicated to you by MyChart, letter or phone within 4 business days after the clinic has received the results. If you do not hear from us within 7 days, please contact the clinic through [facility] or phone. If you have a critical or abnormal lab result, we will notify you by phone as soon as possible.  Submit refill requests through [employer] or call your pharmacy and they will forward the refill request to us. Please allow 3 business days for your refill to be completed.          Additional Information About Your Visit        Clinic[facility] Information     [employer] lets you send messages to your doctor, view your test results, renew your prescriptions, schedule appointments and more. To sign up, go to [email or web address] . Click on \"Log in\" on the left side of the screen, which will take you to the Welcome page. Then click on \"Sign up Now\" on the right side of the page.     You will be asked to enter the access code listed below, as well as some personal information. Please follow the directions to create your username and password.     Your access code is: [email or web address]  Expires: 2018 11:54 AM     Your access code will  in 90 days. If you need help or a new code, please call your [place] clinic or [phone].        Care EveryWhere ID     This is your Care EveryWhere ID. This could be used by other organizations to access your [place] medical records  [id number]        Your " "Vitals Were     Pulse Temperature Height Pulse Oximetry BMI (Body Mass Index)       55 96.3  F (35.7  C) (Tympanic) 5' 10\" (1.778 m) 97% 30.85 kg/m2        Blood Pressure from Last 3 Encounters:   04/09/18 129/63   04/01/18 (!) 112/98   03/25/18 112/65    Weight from Last 3 Encounters:   04/09/18 215 lb (97.5 kg)   04/01/18 200 lb (90.7 kg)   03/25/18 210 lb (95.3 kg)              Today, you had the following     No orders found for display       Primary Care Provider Office Phone # Fax #    Casie WattsDO [phone] [address] [address]        Equal Access to Services     ALEXIS PACE : Priya bailey Sobernadine, waaxda luqadaha, qaybta kaalmada tim, fina lake . So [facility] [phone].    ATENCIÓN: Si habla español, tiene a rojo disposición servicios gratuitos de asistencia lingüística. Lladan al [phone].    We comply with applicable federal civil rights laws and [place] laws. We do not discriminate on the basis of race, color, national origin, age, disability, sex, sexual orientation, or gender identity.            Thank you!     Thank you for choosing [facility]  for your care. Our goal is always to provide you with excellent care. Hearing back from our patients is one way we can continue to improve our services. Please take a few minutes to complete the written survey that you may receive in the mail after your visit with us. Thank you!             Your Updated Medication List - Protect others around you: Learn how to safely use, store and throw away your medicines at www.disposemymeds.org.          This list is accurate as of 4/9/18 11:54 AM.  Always use your most recent med list.                   Brand Name Dispense Instructions for use Diagnosis    albuterol 108 (90 BASE) MCG/ACT Inhaler    PROAIR HFA/PROVENTIL HFA/VENTOLIN HFA    1 Inhaler    Inhale 2 puffs into the lungs every 4 hours as needed for " shortness of breath / dyspnea or wheezing WITH SPACER        allopurinol 100 MG tablet    ZYLOPRIM    90 tablet    TAKE ONE-HALF TABLET BY MOUTH ONCE DAILY FOR  GOUT    Gout of foot, unspecified cause, unspecified chronicity, unspecified laterality       amLODIPine 10 MG tablet    NORVASC    90 tablet    Take 1 tablet (10 mg) by mouth daily    Essential hypertension with goal blood pressure less than 140/90       aspirin  MG EC tablet     90 tablet    Take 1 tablet (325 mg) by mouth daily        atorvastatin 40 MG tablet    LIPITOR    90 tablet    Take 1 tablet (40 mg) by mouth daily    Hyperlipidemia LDL goal <100       CENTRUM SILVER per tablet      Take 1 tablet by mouth daily        finasteride 5 MG tablet    PROSCAR    90 tablet    Take 1 tablet (5 mg) by mouth daily    Benign non-nodular prostatic hyperplasia without lower urinary tract symptoms       losartan 100 MG tablet    COZAAR    90 tablet    Take 1 tablet (100 mg) by mouth daily    Essential hypertension with goal blood pressure less than 140/90       metoprolol succinate 25 MG 24 hr tablet    TOPROL-XL    180 tablet    Take 2 tablets (50 mg) by mouth daily    Paroxysmal atrial fibrillation (H), Coronary artery disease involving native coronary artery of native heart without angina pectoris       ELIJAH DENNIS           triamcinolone 0.1 % ointment    KENALOG    30 g    Apply topically 2 times daily Apply sparingly to affected area twice times daily for the next 7-10 days.    Rash and nonspecific skin eruption

## 2018-04-09 NOTE — PROGRESS NOTES
SUBJECTIVE:   Colin Shell is a 89 year old male who presents to clinic today for the following health issues:      ED/UC Followup:    Facility:  [facility]  Date of visit: 4-1-18  Reason for visit: ER NOTE IS COPIED BELOW:  Cough, elevated BNP level.  Cough    pt reports seen 7 days ago and diagnosed with penumonia, pt reports not getting better.       ALSO ER VISIT ON 3-25-18 FOR COMMUNITY ACQUIRED PNEUMONIA OF LEFT LUNG.    HPI  Colin Shell is a 89 year old male with a history of paroxysmal atrial fibrillation on full aspirin, known history of CHF, history of pulmonary fibrosis with bronchiectasis who was treated for community acquired pneumonia on March 25 after an ED visit with Joey on March 25, 2018 who presents for concern that he continues to have cough and he does not feel that he is getting better.  Patient has also history of obesity and hypertension.  There has been recommendations that he would benefit from pulmonary function studies and testing and follow-up in pulmonary medicine the patient has refused this in the past.  He arrived tonight reporting frustration that he is continuing to cough.  He has not tried any over-the-counter cough medication.  He has an inhaler which she is used sparingly but does not have any neb machines at home.  He does live alone.  He denies any fever or hemoptysis or weight loss.  He does admit that his cough has been productive of yellow phlegm.  He also expressed frustration about his primary care provider.  He is expressed frustration that he is transition from seen physicians in clinic to advanced practice providers.  Because he continues to cough despite his ED visit where he received antibiotics with imaging he is here in the ED for care.  He denies seeing pulmonary medicine for follow-up.    Component      Latest Ref Rng & Units 4/1/2018   N-Terminal Pro BNP Inpatient      0 - 1800 pg/mL 2057 (H)       Current Status: He is still coughing out yellow  phlegm.  Feeling short of breath at times with wheezing.  He has been using the Albuterol Inhaler every 4 hours.  In the morning between 6-9 am, the cough is the worst.  When laughing, that can cause a coughing episode.   He also Pulmonary Fibrosis.  Not sure if that has something to do with his symptoms.  Wanting to know if she should be taking a daily, steroid inhaler.  Has been having strange dreams, not sure if related to the inhaler or other medication.       PULSE:  Pulse today is 55.  His son is concerned about his reading.  Patient states he does feel dizzy-lightheaded often.  Wanting to know if the lower pulse rate could cause that symptom.    WEIGHT:  Weight is up and down in the clinic.  Vital Signs 1/17/2018 2/13/2018 2/13/2018 2/26/2018 3/14/2018   Weight (LB) 203 lb 4.8 oz 213 lb  207 lb 3.2 oz 215 lb     Vital Signs 3/14/2018 3/25/2018 3/25/2018 3/25/2018 3/25/2018   Weight (LB)  210 lb        Vital Signs 3/25/2018 3/25/2018 3/25/2018 3/25/2018 3/25/2018   Weight (LB)          Vital Signs 4/1/2018 4/1/2018 4/1/2018 4/1/2018 4/1/2018 4/1/2018   Weight (LB) 200 lb          Vital Signs 4/1/2018 4/1/2018 4/1/2018 4/1/2018 4/1/2018 4/1/2018   Weight (LB)           Vital Signs 4/9/2018   Weight (LB) 215 lb         Current Outpatient Prescriptions:      albuterol (PROAIR HFA/PROVENTIL HFA/VENTOLIN HFA) 108 (90 BASE) MCG/ACT Inhaler, Inhale 2 puffs into the lungs every 4 hours as needed for shortness of breath / dyspnea or wheezing WITH SPACER, Disp: 1 Inhaler, Rfl: 0     triamcinolone (KENALOG) 0.1 % ointment, Apply topically 2 times daily Apply sparingly to affected area twice times daily for the next 7-10 days., Disp: 30 g, Rfl: 1     allopurinol (ZYLOPRIM) 100 MG tablet, TAKE ONE-HALF TABLET BY MOUTH ONCE DAILY FOR  GOUT, Disp: 90 tablet, Rfl: 3     metoprolol succinate (TOPROL-XL) 25 MG 24 hr tablet, Take 2 tablets (50 mg) by mouth daily, Disp: 180 tablet, Rfl: 3     losartan (COZAAR) 100 MG tablet, Take  "1 tablet (100 mg) by mouth daily, Disp: 90 tablet, Rfl: 3     aspirin  MG EC tablet, Take 1 tablet (325 mg) by mouth daily, Disp: 90 tablet, Rfl: 3     finasteride (PROSCAR) 5 MG tablet, Take 1 tablet (5 mg) by mouth daily, Disp: 90 tablet, Rfl: 3     atorvastatin (LIPITOR) 40 MG tablet, Take 1 tablet (40 mg) by mouth daily, Disp: 90 tablet, Rfl: 3     amLODIPine (NORVASC) 10 MG tablet, Take 1 tablet (10 mg) by mouth daily, Disp: 90 tablet, Rfl: 3     Magnesium Hydroxide (NAVA MILK OF MAGNESIA PO), , Disp: , Rfl:      Multiple Vitamins-Minerals (CENTRUM SILVER) per tablet, Take 1 tablet by mouth daily, Disp: , Rfl:     Patient Active Problem List   Diagnosis     Hypertension goal BP (blood pressure) < 140/90     Gout     Coronary artery disease involving coronary bypass graft of native heart without angina pectoris     Chronic kidney disease, stage III (moderate)     Obesity     Benign prostatic hyperplasia     Hyperlipidemia LDL goal <100     Health Care Home     Bronchiectasis (H)     Cholelithiasis     Fibrosis of lung (H)     Pulmonary nodule, right     Advance Care Planning     Chronic systolic CHF (congestive heart failure) (H)     Paroxysmal atrial fibrillation (H)       Blood pressure 129/63, pulse 55, temperature 96.3  F (35.7  C), temperature source Tympanic, height 5' 10\" (1.778 m), weight 215 lb (97.5 kg), SpO2 97 %.    Exam:  GENERAL APPEARANCE: healthy, alert and no distress  EYES: EOMI,  PERRL  NECK: no adenopathy, no asymmetry, masses, or scars and thyroid normal to palpation  RESP: rhonchi bilateral and decreased breath sounds and wheezes bilaterally  CV: regular rates and rhythm, normal S1 S2, no S3 or S4 and no murmur, click or rub -  MS: mederate edema to the knees bilaterally  SKIN: no suspicious lesions or rashes  NEURO: Normal strength and tone, sensory exam grossly normal, mentation intact and speech normal  PSYCH: mentation appears normal and affect normal/bright  LYMPHATICS: No " axillary, cervical, inguinal, or supraclavicular nodes        (I10) Hypertension goal BP (blood pressure) < 140/90  (primary encounter diagnosis)  Comment:   Plan: We discussed getting up slowly and staying well hydrated. The urine should be clear. Move the legs before standing.   Consider the home BP machine. The range at rest is between 100-130/60-80. The pulse needs to stay over 50 per minute.   If the pulse is too low then the Metoprolol would be lowered from 50 mg to 25 mg daily. Call my Rn at [phone] to do this.     (I50.22) Chronic systolic congestive heart failure (H)  Comment:   Plan: For the heart failure, use the lower sodium diet and stay on 1500 cc daily of fluid or about 50 ounces.   Walk daily for exercise. Weigh yourself daily and if the weight goes up by 5-6 lbs call the clinic Rn and a medication adjustment   May be needed: a diuretic.       Jono Drake

## 2018-04-09 NOTE — NURSING NOTE
"Chief Complaint   Patient presents with     ER F/U     Follow up from the ER on 4-1-18.       Initial /63  Pulse 55  Temp 96.3  F (35.7  C) (Tympanic)  Ht 5' 10\" (1.778 m)  Wt 215 lb (97.5 kg)  SpO2 97%  BMI 30.85 kg/m2 Estimated body mass index is 30.85 kg/(m^2) as calculated from the following:    Height as of this encounter: 5' 10\" (1.778 m).    Weight as of this encounter: 215 lb (97.5 kg).  Medication Reconciliation: complete  "

## 2018-04-09 NOTE — PATIENT INSTRUCTIONS
Thank you for choosing [facility].  You may be receiving a survey in the mail from Maryellen Dockery regarding your visit today.  Please take a few minutes to complete and return the survey to let us know how we are doing.      If you have questions or concerns, please contact us via [email or web address] or you can contact your care team at [phone].    Our Clinic hours are:  Monday 6:40 am  to 7:00 pm  Tuesday -Friday 6:40 am to 5:00 pm    The [place] outpatient lab hours are:  Monday - Friday 6:10 am to 4:45 pm  Saturdays 7:00 am to 11:00 am  Appointments are required, call [phone]    If you have clinical questions after hours or would like to schedule an appointment,  call the clinic at [phone].      (I10) Hypertension goal BP (blood pressure) < 140/90  (primary encounter diagnosis)  Comment:   Plan: We discussed getting up slowly and staying well hydrated. The urine should be clear. Move the legs before standing.   Consider the home BP machine. The range at rest is between 100-130/60-80. The pulse needs to stay over 50 per minute.   If the pulse is too low then the Metoprolol would be lowered from 50 mg to 25 mg daily. Call my Rn at [phone] to do this.     (I50.22) Chronic systolic congestive heart failure (H)  Comment:   Plan: For the heart failure, use the lower sodium diet and stay on 1500 cc daily of fluid or about 50 ounces.   Walk daily for exercise. Weigh yourself daily and if the weight goes up by 5-6 lbs call the clinic Rn and a medication adjustment   May be needed: a diuretic.

## 2018-04-18 ENCOUNTER — APPOINTMENT (OUTPATIENT)
Dept: CT IMAGING | Facility: CLINIC | Age: 83
DRG: 191 | End: 2018-04-18
Attending: EMERGENCY MEDICINE
Payer: COMMERCIAL

## 2018-04-18 ENCOUNTER — HOSPITAL ENCOUNTER (INPATIENT)
Facility: CLINIC | Age: 83
LOS: 1 days | Discharge: HOME | DRG: 191 | End: 2018-04-20
Attending: EMERGENCY MEDICINE | Admitting: INTERNAL MEDICINE
Payer: COMMERCIAL

## 2018-04-18 ENCOUNTER — APPOINTMENT (OUTPATIENT)
Dept: GENERAL RADIOLOGY | Facility: CLINIC | Age: 83
DRG: 191 | End: 2018-04-18
Attending: EMERGENCY MEDICINE
Payer: COMMERCIAL

## 2018-04-18 ENCOUNTER — TELEPHONE (OUTPATIENT)
Dept: FAMILY MEDICINE | Facility: CLINIC | Age: 83
End: 2018-04-18

## 2018-04-18 DIAGNOSIS — J44.1 COPD EXACERBATION (H): ICD-10-CM

## 2018-04-18 DIAGNOSIS — R05.9 COUGH: ICD-10-CM

## 2018-04-18 DIAGNOSIS — Z87.891 HISTORY OF SMOKING: ICD-10-CM

## 2018-04-18 DIAGNOSIS — K21.9 GASTROESOPHAGEAL REFLUX DISEASE WITHOUT ESOPHAGITIS: ICD-10-CM

## 2018-04-18 DIAGNOSIS — J47.0 BRONCHIECTASIS WITH ACUTE LOWER RESPIRATORY INFECTION (H): Primary | Chronic | ICD-10-CM

## 2018-04-18 DIAGNOSIS — R06.09 DYSPNEA ON EXERTION: ICD-10-CM

## 2018-04-18 DIAGNOSIS — R06.00 DYSPNEA: ICD-10-CM

## 2018-04-18 DIAGNOSIS — M10.9 GOUT OF FOOT, UNSPECIFIED CAUSE, UNSPECIFIED CHRONICITY, UNSPECIFIED LATERALITY: Chronic | ICD-10-CM

## 2018-04-18 LAB
ALBUMIN UR-MCNC: 10 MG/DL
ANION GAP SERPL CALCULATED.3IONS-SCNC: 6 MMOL/L (ref 3–14)
APPEARANCE UR: CLEAR
BASE DEFICIT BLDV-SCNC: 2.9 MMOL/L
BASOPHILS # BLD AUTO: 0 10E9/L (ref 0–0.2)
BASOPHILS NFR BLD AUTO: 0.4 %
BILIRUB UR QL STRIP: NEGATIVE
BUN SERPL-MCNC: 37 MG/DL (ref 7–30)
CALCIUM SERPL-MCNC: 8.8 MG/DL (ref 8.5–10.1)
CHLORIDE SERPL-SCNC: 111 MMOL/L (ref 94–109)
CO2 SERPL-SCNC: 23 MMOL/L (ref 20–32)
COLOR UR AUTO: YELLOW
CREAT SERPL-MCNC: 1.74 MG/DL (ref 0.66–1.25)
DIFFERENTIAL METHOD BLD: ABNORMAL
EOSINOPHIL # BLD AUTO: 1.2 10E9/L (ref 0–0.7)
EOSINOPHIL NFR BLD AUTO: 11 %
ERYTHROCYTE [DISTWIDTH] IN BLOOD BY AUTOMATED COUNT: 15.4 % (ref 10–15)
GFR SERPL CREATININE-BSD FRML MDRD: 37 ML/MIN/1.7M2
GLUCOSE SERPL-MCNC: 122 MG/DL (ref 70–99)
GLUCOSE UR STRIP-MCNC: NEGATIVE MG/DL
HCO3 BLDV-SCNC: 22 MMOL/L (ref 21–28)
HCT VFR BLD AUTO: 36.4 % (ref 40–53)
HGB BLD-MCNC: 12 G/DL (ref 13.3–17.7)
HGB UR QL STRIP: NEGATIVE
IMM GRANULOCYTES # BLD: 0 10E9/L (ref 0–0.4)
IMM GRANULOCYTES NFR BLD: 0.1 %
KETONES UR STRIP-MCNC: NEGATIVE MG/DL
LACTATE BLD-SCNC: 1.5 MMOL/L (ref 0.7–2)
LEUKOCYTE ESTERASE UR QL STRIP: NEGATIVE
LYMPHOCYTES # BLD AUTO: 3.2 10E9/L (ref 0.8–5.3)
LYMPHOCYTES NFR BLD AUTO: 29.2 %
MCH RBC QN AUTO: 29.7 PG (ref 26.5–33)
MCHC RBC AUTO-ENTMCNC: 33 G/DL (ref 31.5–36.5)
MCV RBC AUTO: 90 FL (ref 78–100)
MONOCYTES # BLD AUTO: 1 10E9/L (ref 0–1.3)
MONOCYTES NFR BLD AUTO: 9.1 %
NEUTROPHILS # BLD AUTO: 5.6 10E9/L (ref 1.6–8.3)
NEUTROPHILS NFR BLD AUTO: 50.2 %
NITRATE UR QL: NEGATIVE
NT-PROBNP SERPL-MCNC: 2023 PG/ML (ref 0–1800)
PCO2 BLDV: 39 MM HG (ref 40–50)
PH BLDV: 7.37 PH (ref 7.32–7.43)
PH UR STRIP: 5 PH (ref 5–7)
PLATELET # BLD AUTO: 237 10E9/L (ref 150–450)
PO2 BLDV: 42 MM HG (ref 25–47)
POTASSIUM SERPL-SCNC: 5.4 MMOL/L (ref 3.4–5.3)
RBC # BLD AUTO: 4.04 10E12/L (ref 4.4–5.9)
RBC #/AREA URNS AUTO: 0 /HPF (ref 0–2)
SODIUM SERPL-SCNC: 140 MMOL/L (ref 133–144)
SOURCE: ABNORMAL
SP GR UR STRIP: 1.01 (ref 1–1.03)
SQUAMOUS #/AREA URNS AUTO: <1 /HPF (ref 0–1)
TROPONIN I SERPL-MCNC: <0.015 UG/L (ref 0–0.04)
UROBILINOGEN UR STRIP-MCNC: NORMAL MG/DL (ref 0–2)
WBC # BLD AUTO: 11.1 10E9/L (ref 4–11)
WBC #/AREA URNS AUTO: 0 /HPF (ref 0–5)

## 2018-04-18 PROCEDURE — 85025 COMPLETE CBC W/AUTO DIFF WBC: CPT | Performed by: EMERGENCY MEDICINE

## 2018-04-18 PROCEDURE — 93005 ELECTROCARDIOGRAM TRACING: CPT | Performed by: EMERGENCY MEDICINE

## 2018-04-18 PROCEDURE — G0378 HOSPITAL OBSERVATION PER HR: HCPCS

## 2018-04-18 PROCEDURE — 25000128 H RX IP 250 OP 636: Performed by: PHYSICIAN ASSISTANT

## 2018-04-18 PROCEDURE — 83880 ASSAY OF NATRIURETIC PEPTIDE: CPT | Performed by: EMERGENCY MEDICINE

## 2018-04-18 PROCEDURE — 71250 CT THORAX DX C-: CPT

## 2018-04-18 PROCEDURE — 83605 ASSAY OF LACTIC ACID: CPT | Performed by: EMERGENCY MEDICINE

## 2018-04-18 PROCEDURE — 99285 EMERGENCY DEPT VISIT HI MDM: CPT | Mod: 25 | Performed by: EMERGENCY MEDICINE

## 2018-04-18 PROCEDURE — 25000125 ZZHC RX 250: Performed by: EMERGENCY MEDICINE

## 2018-04-18 PROCEDURE — 99220 ZZC INITIAL OBSERVATION CARE,LEVL III: CPT | Performed by: PHYSICIAN ASSISTANT

## 2018-04-18 PROCEDURE — 71046 X-RAY EXAM CHEST 2 VIEWS: CPT

## 2018-04-18 PROCEDURE — 94640 AIRWAY INHALATION TREATMENT: CPT

## 2018-04-18 PROCEDURE — 40000275 ZZH STATISTIC RCP TIME EA 10 MIN

## 2018-04-18 PROCEDURE — 94640 AIRWAY INHALATION TREATMENT: CPT | Performed by: EMERGENCY MEDICINE

## 2018-04-18 PROCEDURE — 81003 URINALYSIS AUTO W/O SCOPE: CPT | Performed by: EMERGENCY MEDICINE

## 2018-04-18 PROCEDURE — 82803 BLOOD GASES ANY COMBINATION: CPT | Performed by: EMERGENCY MEDICINE

## 2018-04-18 PROCEDURE — 94640 AIRWAY INHALATION TREATMENT: CPT | Mod: 76

## 2018-04-18 PROCEDURE — 93010 ELECTROCARDIOGRAM REPORT: CPT | Mod: Z6 | Performed by: EMERGENCY MEDICINE

## 2018-04-18 PROCEDURE — 84484 ASSAY OF TROPONIN QUANT: CPT | Performed by: EMERGENCY MEDICINE

## 2018-04-18 PROCEDURE — 96374 THER/PROPH/DIAG INJ IV PUSH: CPT

## 2018-04-18 PROCEDURE — 80048 BASIC METABOLIC PNL TOTAL CA: CPT | Performed by: EMERGENCY MEDICINE

## 2018-04-18 PROCEDURE — 25000132 ZZH RX MED GY IP 250 OP 250 PS 637: Performed by: PHYSICIAN ASSISTANT

## 2018-04-18 RX ORDER — ONDANSETRON 4 MG/1
4 TABLET, ORALLY DISINTEGRATING ORAL EVERY 6 HOURS PRN
Status: DISCONTINUED | OUTPATIENT
Start: 2018-04-18 | End: 2018-04-20 | Stop reason: HOSPADM

## 2018-04-18 RX ORDER — FINASTERIDE 5 MG/1
5 TABLET, FILM COATED ORAL DAILY
Status: DISCONTINUED | OUTPATIENT
Start: 2018-04-18 | End: 2018-04-20 | Stop reason: HOSPADM

## 2018-04-18 RX ORDER — NALOXONE HYDROCHLORIDE 0.4 MG/ML
.1-.4 INJECTION, SOLUTION INTRAMUSCULAR; INTRAVENOUS; SUBCUTANEOUS
Status: DISCONTINUED | OUTPATIENT
Start: 2018-04-18 | End: 2018-04-18

## 2018-04-18 RX ORDER — ONDANSETRON 2 MG/ML
4 INJECTION INTRAMUSCULAR; INTRAVENOUS EVERY 6 HOURS PRN
Status: DISCONTINUED | OUTPATIENT
Start: 2018-04-18 | End: 2018-04-20 | Stop reason: HOSPADM

## 2018-04-18 RX ORDER — FUROSEMIDE 10 MG/ML
20 INJECTION INTRAMUSCULAR; INTRAVENOUS EVERY 8 HOURS
Status: DISCONTINUED | OUTPATIENT
Start: 2018-04-18 | End: 2018-04-18

## 2018-04-18 RX ORDER — IPRATROPIUM BROMIDE AND ALBUTEROL SULFATE 2.5; .5 MG/3ML; MG/3ML
3 SOLUTION RESPIRATORY (INHALATION) ONCE
Status: COMPLETED | OUTPATIENT
Start: 2018-04-18 | End: 2018-04-18

## 2018-04-18 RX ORDER — HYDROMORPHONE HCL/0.9% NACL/PF 0.2MG/0.2
0.2 SYRINGE (ML) INTRAVENOUS
Status: DISCONTINUED | OUTPATIENT
Start: 2018-04-18 | End: 2018-04-20 | Stop reason: HOSPADM

## 2018-04-18 RX ORDER — OXYCODONE AND ACETAMINOPHEN 5; 325 MG/1; MG/1
1 TABLET ORAL EVERY 4 HOURS PRN
Status: DISCONTINUED | OUTPATIENT
Start: 2018-04-18 | End: 2018-04-20 | Stop reason: HOSPADM

## 2018-04-18 RX ORDER — NALOXONE HYDROCHLORIDE 0.4 MG/ML
.1-.4 INJECTION, SOLUTION INTRAMUSCULAR; INTRAVENOUS; SUBCUTANEOUS
Status: DISCONTINUED | OUTPATIENT
Start: 2018-04-18 | End: 2018-04-20 | Stop reason: HOSPADM

## 2018-04-18 RX ORDER — METOPROLOL SUCCINATE 25 MG/1
25 TABLET, EXTENDED RELEASE ORAL DAILY
Status: DISCONTINUED | OUTPATIENT
Start: 2018-04-18 | End: 2018-04-18

## 2018-04-18 RX ORDER — ACETAMINOPHEN 325 MG/1
650 TABLET ORAL EVERY 4 HOURS PRN
Status: DISCONTINUED | OUTPATIENT
Start: 2018-04-18 | End: 2018-04-20 | Stop reason: HOSPADM

## 2018-04-18 RX ORDER — PREDNISONE 20 MG/1
60 TABLET ORAL ONCE
Status: COMPLETED | OUTPATIENT
Start: 2018-04-18 | End: 2018-04-18

## 2018-04-18 RX ORDER — METOPROLOL SUCCINATE 50 MG/1
50 TABLET, EXTENDED RELEASE ORAL DAILY
Status: DISCONTINUED | OUTPATIENT
Start: 2018-04-18 | End: 2018-04-20 | Stop reason: HOSPADM

## 2018-04-18 RX ORDER — ALLOPURINOL 100 MG/1
50 TABLET ORAL DAILY
Status: DISCONTINUED | OUTPATIENT
Start: 2018-04-18 | End: 2018-04-20 | Stop reason: HOSPADM

## 2018-04-18 RX ORDER — ATORVASTATIN CALCIUM 20 MG/1
40 TABLET, FILM COATED ORAL DAILY
Status: DISCONTINUED | OUTPATIENT
Start: 2018-04-18 | End: 2018-04-20 | Stop reason: HOSPADM

## 2018-04-18 RX ORDER — LIDOCAINE 40 MG/G
CREAM TOPICAL
Status: DISCONTINUED | OUTPATIENT
Start: 2018-04-18 | End: 2018-04-20 | Stop reason: HOSPADM

## 2018-04-18 RX ORDER — FUROSEMIDE 10 MG/ML
20 INJECTION INTRAMUSCULAR; INTRAVENOUS EVERY 8 HOURS
Status: COMPLETED | OUTPATIENT
Start: 2018-04-19 | End: 2018-04-19

## 2018-04-18 RX ORDER — LOSARTAN POTASSIUM 50 MG/1
100 TABLET ORAL DAILY
Status: DISCONTINUED | OUTPATIENT
Start: 2018-04-18 | End: 2018-04-19

## 2018-04-18 RX ADMIN — LOSARTAN POTASSIUM 100 MG: 50 TABLET ORAL at 21:03

## 2018-04-18 RX ADMIN — PREDNISONE 60 MG: 20 TABLET ORAL at 15:49

## 2018-04-18 RX ADMIN — FINASTERIDE 5 MG: 5 TABLET, FILM COATED ORAL at 21:03

## 2018-04-18 RX ADMIN — IPRATROPIUM BROMIDE AND ALBUTEROL SULFATE 3 ML: .5; 3 SOLUTION RESPIRATORY (INHALATION) at 11:55

## 2018-04-18 RX ADMIN — ASPIRIN 325 MG: 325 TABLET, DELAYED RELEASE ORAL at 19:44

## 2018-04-18 RX ADMIN — IPRATROPIUM BROMIDE AND ALBUTEROL SULFATE 3 ML: .5; 3 SOLUTION RESPIRATORY (INHALATION) at 16:06

## 2018-04-18 RX ADMIN — FUROSEMIDE 20 MG: 10 INJECTION, SOLUTION INTRAVENOUS at 19:45

## 2018-04-18 RX ADMIN — IPRATROPIUM BROMIDE AND ALBUTEROL SULFATE 3 ML: .5; 3 SOLUTION RESPIRATORY (INHALATION) at 16:21

## 2018-04-18 RX ADMIN — ATORVASTATIN CALCIUM 40 MG: 20 TABLET, FILM COATED ORAL at 19:45

## 2018-04-18 ASSESSMENT — ACTIVITIES OF DAILY LIVING (ADL)
TOILETING: 0-->INDEPENDENT
COGNITION: 0 - NO COGNITION ISSUES REPORTED
SWALLOWING: 0-->SWALLOWS FOODS/LIQUIDS WITHOUT DIFFICULTY
DRESS: 0-->INDEPENDENT
RETIRED_EATING: 0-->INDEPENDENT
RETIRED_COMMUNICATION: 0-->UNDERSTANDS/COMMUNICATES WITHOUT DIFFICULTY
FALL_HISTORY_WITHIN_LAST_SIX_MONTHS: NO
TRANSFERRING: 0-->INDEPENDENT
BATHING: 0-->INDEPENDENT
AMBULATION: 0-->INDEPENDENT

## 2018-04-18 ASSESSMENT — ENCOUNTER SYMPTOMS
CHEST TIGHTNESS: 1
SHORTNESS OF BREATH: 1
COUGH: 1
CHILLS: 1

## 2018-04-18 NOTE — TELEPHONE ENCOUNTER
"He was transferred to me, is short of breath, \"I feel horseshit. \"  \"can't get ahold of no one, don't know what to do. \"  Advised ED immed, and he agreed, \"yup, I am going to sign myself in. (to the emergency room)  \"  Says he will call his son to bring him in.     Jen Francisco RNC    "

## 2018-04-18 NOTE — IP AVS SNAPSHOT
MRN:[id number]                      After Visit Summary   4/18/2018    Colin Shell    MRN: [id number]           Thank you!     Thank you for choosing [facility] for your care. Our goal is always to provide you with excellent care. Hearing back from our patients is one way we can continue to improve our services. Please take a few minutes to complete the written survey that you may receive in the mail after you visit with us. Thank you!        Patient Information     Date Of Birth          6/25/1927        Designated Caregiver       Most Recent Value    Caregiver    Will someone help with your care after discharge? no      About your hospital stay     You were admitted on:  April 18, 2018 You last received care in the:  [facility]    You were discharged on:  April 20, 2018        Reason for your hospital stay       Bronchiectasis exacerbation                  Who to Call     For medical emergencies, please call 911.  For non-urgent questions about your medical care, please call your primary care provider or clinic, [phone]          Attending Provider     Provider Specialty    Jono Matamoros MD Emergency Medicine    Echo Turner MD Internal Medicine    Ivana Younger MD Internal Medicine       Primary Care Provider Office Phone # Fax #    Jono Drake [phone]      Follow-up Appointments     Follow Up ([facility])       Do not take magnesium or vitamins while on antibiotic            Follow-up and recommended labs and tests        Primary md 1 week. Needs bmp at that visit to follow hyperkalemia and BRIEN                  Your next 10 appointments already scheduled     Apr 27, 2018 10:00 AM CDT   [facility]   [facility]    [address] [address]   [phone]            May 01, 2018 10:00 AM CDT   ICD Check with Wy Device Milton   [facility] ([facility])    [address] [address]   [phone]            May 31, 2018 11:00 AM CDT   New Visit with Angel Luis Gudino [facility]   [facility]    [address] [id number] [phone]              Additional Services     PULMONARY MEDICINE REFERRAL       Your provider has referred you to: [facility]: [facility] [phone]   [email or web address]    Please be aware that coverage of these services is subject to the terms and limitations of your health insurance plan.  Call member services at your health plan with any benefit or coverage questions.      Please bring the following with you to your appointment:    (1) Any X-Rays, CTs or MRIs which have been performed.  Contact the facility where they were done to arrange for  prior to your scheduled appointment.    (2) List of current medications   (3) This referral request   (4) Any documents/labs given to you for this referral                  Pending Results     Date and Time Order Name Status Description    4/19/2018 0752 EKG 12-LEAD, TRACING ONLY In process             Statement of Approval     Ordered          04/20/18 1146  I have reviewed and agree with all the recommendations and orders detailed in this document.  EFFECTIVE NOW     Approved and electronically signed by:  Ivana Younger MD             Admission Information     Date & Time Provider Department Dept. Phone    4/18/2018 Ivana Younger MD [facility] [phone]      Your Vitals Were     Blood Pressure Pulse Temperature Respirations Weight Pulse Oximetry    117/55 54 97.3  F (36.3  C) (Oral) 20 100.2 kg (220 lb 14.4 oz) 93%    BMI (Body Mass Index)                   31.7 kg/m2           MyChart Information     [employer] lets you send messages to your doctor, view your test results, renew your prescriptions,  "schedule appointments and more. To sign up, go to [email or web address] . Click on \"Log in\" on the left side of the screen, which will take you to the Welcome page. Then click on \"Sign up Now\" on the right side of the page.     You will be asked to enter the access code listed below, as well as some personal information. Please follow the directions to create your username and password.     Your access code is: [email or web address]  Expires: 2018 11:54 AM     Your access code will  in 90 days. If you need help or a new code, please call your [facility] clinic or [phone].        Care EveryWhere ID     This is your Care EveryWhere ID. This could be used by other organizations to access your [facility] medical records  [id number]        Equal Access to Services     ALEXIS PACE : Priya Ramirez, kwan law, westley dumont, [place] . So [facility] [phone].    ATENCIÓN: Si habla español, tiene a rojo disposición servicios gratuitos de asistencia lingüística. Llame al [phone].    We comply with applicable federal civil rights laws and [place] laws. We do not discriminate on the basis of race, color, national origin, age, disability, sex, sexual orientation, or gender identity.               Review of your medicines      START taking        Dose / Directions    levofloxacin 500 MG tablet   Commonly known as:  LEVAQUIN   Indication:  bronchiectasis        Dose:  500 mg   Start taking on:  2018   Take 1 tablet (500 mg) by mouth daily for 6 days   Quantity:  6 tablet   Refills:  0       pantoprazole 40 MG EC tablet   Commonly known as:  PROTONIX   Used for:  Gastroesophageal reflux disease without esophagitis        Dose:  40 mg   Start taking on:  2018   Take 1 tablet (40 mg) by mouth every morning   Quantity:  30 tablet   Refills:  0         CONTINUE these medicines which may have CHANGED, or have new prescriptions. If we are uncertain of " the size of tablets/capsules you have at home, strength may be listed as something that might have changed.        Dose / Directions    allopurinol 100 MG tablet   Commonly known as:  ZYLOPRIM   This may have changed:    - how much to take  - how to take this  - when to take this  - additional instructions        Dose:  50 mg   Start taking on:  4/21/2018   Take 0.5 tablets (50 mg) by mouth daily   Quantity:  30 tablet   Refills:  0         CONTINUE these medicines which have NOT CHANGED        Dose / Directions    albuterol 108 (90 Base) MCG/ACT Inhaler   Commonly known as:  PROAIR HFA/PROVENTIL HFA/VENTOLIN HFA        Dose:  2 puff   Inhale 2 puffs into the lungs every 4 hours as needed for shortness of breath / dyspnea or wheezing WITH SPACER   Quantity:  1 Inhaler   Refills:  0       aspirin 325 MG EC tablet        Dose:  325 mg   Take 1 tablet (325 mg) by mouth daily   Quantity:  90 tablet   Refills:  3       atorvastatin 40 MG tablet   Commonly known as:  LIPITOR   Used for:  Hyperlipidemia LDL goal <100        Dose:  40 mg   Take 1 tablet (40 mg) by mouth daily   Quantity:  90 tablet   Refills:  3       CENTRUM SILVER per tablet        Dose:  1 tablet   Take 1 tablet by mouth daily   Refills:  0       finasteride 5 MG tablet   Commonly known as:  PROSCAR   Used for:  Benign non-nodular prostatic hyperplasia without lower urinary tract symptoms        Dose:  5 mg   Take 1 tablet (5 mg) by mouth daily   Quantity:  90 tablet   Refills:  3       metoprolol succinate 25 MG 24 hr tablet   Commonly known as:  TOPROL-XL   Used for:  Paroxysmal atrial fibrillation (H), Coronary artery disease involving native coronary artery of native heart without angina pectoris        Dose:  50 mg   Take 2 tablets (50 mg) by mouth daily   Quantity:  180 tablet   Refills:  3       NAVA MILK OF MAGNESIA PO        Refills:  0       triamcinolone 0.1 % ointment   Commonly known as:  KENALOG   Used for:  Rash and nonspecific skin  eruption        Apply topically 2 times daily Apply sparingly to affected area twice times daily for the next 7-10 days.   Quantity:  30 g   Refills:  1         STOP taking     amLODIPine 10 MG tablet   Commonly known as:  NORVASC           losartan 100 MG tablet   Commonly known as:  COZAAR                Where to get your medicines      These medications were sent to [facility] [address] [facility] - [address] [address]     Phone:  [phone]     levofloxacin 500 MG tablet    pantoprazole 40 MG EC tablet         Some of these will need a paper prescription and others can be bought over the counter. Ask your nurse if you have questions.     You don't need a prescription for these medications     allopurinol 100 MG tablet                Protect others around you: Learn how to safely use, store and throw away your medicines at www.disposemymeds.org.        ANTIBIOTIC INSTRUCTION     You've Been Prescribed an Antibiotic - Now What?  Your healthcare team thinks that you or your loved one might have an infection. Some infections can be treated with antibiotics, which are powerful, life-saving drugs. Like all medications, antibiotics have side effects and should only be used when necessary. There are some important things you should know about your antibiotic treatment.      Your healthcare team may run tests before you start taking an antibiotic.    Your team may take samples (e.g., from your blood, urine or other areas) to run tests to look for bacteria. These test can be important to determine if you need an antibiotic at all and, if you do, which antibiotic will work best.      Within a few days, your healthcare team might change or even stop your antibiotic.    Your team may start you on an antibiotic while they are working to find out what is making you sick.    Your team might change your antibiotic because test results show that a different antibiotic would be  better to treat your infection.    In some cases, once your team has more information, they learn that you do not need an antibiotic at all. They may find out that you don't have an infection, or that the antibiotic you're taking won't work against your infection. For example, an infection caused by a virus can't be treated with antibiotics. Staying on an antibiotic when you don't need it is more likely to be harmful than helpful.      You may experience side effects from your antibiotic.    Like all medications, antibiotics have side effects. Some of these can be serious.    Let you healthcare team know if you have any known allergies when you are admitted to the hospital.    One significant side effect of nearly all antibiotics is the risk of severe and sometimes deadly diarrhea caused by Clostridium difficile (C. Difficile). This occurs when a person takes antibiotics because some good germs are destroyed. Antibiotic use allows C. diificile to take over, putting patients at high risk for this serious infection.    As a patient or caregiver, it is important to understand your or your loved one's antibiotic treatment. It is especially important for caregivers to speak up when patients can't speak for themselves. Here are some important questions to ask your healthcare team.    What infection is this antibiotic treating and how do you know I have that infection?    What side effects might occur from this antibiotic?    How long will I need to take this antibiotic?    Is it safe to take this antibiotic with other medications or supplements (e.g., vitamins) that I am taking?     Are there any special directions I need to know about taking this antibiotic? For example, should I take it with food?    How will I be monitored to know whether my infection is responding to the antibiotic?    What tests may help to make sure the right antibiotic is prescribed for me?      Information provided by:  www.cdc.gov/getsmart  U.S.  Department of Health and Human Services  Centers for disease Control and Prevention  National Center for Emerging and Zoonotic Infectious Diseases  Division of Healthcare Quality Promotion             Medication List: This is a list of all your medications and when to take them. Check marks below indicate your daily home schedule. Keep this list as a reference.      Medications           Morning Afternoon Evening Bedtime As Needed    albuterol 108 (90 Base) MCG/ACT Inhaler   Commonly known as:  PROAIR HFA/PROVENTIL HFA/VENTOLIN HFA   Inhale 2 puffs into the lungs every 4 hours as needed for shortness of breath / dyspnea or wheezing WITH SPACER                                allopurinol 100 MG tablet   Commonly known as:  ZYLOPRIM   Take 0.5 tablets (50 mg) by mouth daily   Start taking on:  4/21/2018   Last time this was given:  50 mg on 4/20/2018  7:59 AM                                aspirin 325 MG EC tablet   Take 1 tablet (325 mg) by mouth daily   Last time this was given:  325 mg on 4/20/2018  7:59 AM                                atorvastatin 40 MG tablet   Commonly known as:  LIPITOR   Take 1 tablet (40 mg) by mouth daily   Last time this was given:  40 mg on 4/20/2018  8:00 AM                                CENTRUM SILVER per tablet   Take 1 tablet by mouth daily                                finasteride 5 MG tablet   Commonly known as:  PROSCAR   Take 1 tablet (5 mg) by mouth daily   Last time this was given:  5 mg on 4/20/2018  8:00 AM                                levofloxacin 500 MG tablet   Commonly known as:  LEVAQUIN   Take 1 tablet (500 mg) by mouth daily for 6 days   Start taking on:  4/21/2018   Last time this was given:  250 mg on 4/20/2018  8:00 AM                                metoprolol succinate 25 MG 24 hr tablet   Commonly known as:  TOPROL-XL   Take 2 tablets (50 mg) by mouth daily   Last time this was given:  50 mg on 4/20/2018  8:01 AM                                pantoprazole 40  MG EC tablet   Commonly known as:  PROTONIX   Take 1 tablet (40 mg) by mouth every morning   Start taking on:  4/21/2018   Last time this was given:  40 mg on 4/20/2018  8:01 AM                                NAVA MILK OF LATOSHA PO                                triamcinolone 0.1 % ointment   Commonly known as:  KENALOG   Apply topically 2 times daily Apply sparingly to affected area twice times daily for the next 7-10 days.                                          More Information        Patient Education    Levofloxacin Ophthalmic drops, solution    Levofloxacin Oral solution    Levofloxacin Oral tablet    Levofloxacin Solution for injection    Levofloxacin, Dextrose Solution for injection  Levofloxacin Oral tablet  What is this medicine?  LEVOFLOXACIN (brooks voe FLOX a sin) is a quinolone antibiotic. It is used to treat certain kinds of bacterial infections. It will not work for colds, flu, or other viral infections.  This medicine may be used for other purposes; ask your health care provider or pharmacist if you have questions.  What should I tell my health care provider before I take this medicine?  They need to know if you have any of these conditions:    cerebral disease    irregular heartbeat    kidney disease    seizure disorder    an unusual or allergic reaction to levofloxacin, other antibiotics or medicines, foods, dyes, or preservatives    pregnant or trying to get pregnant    breast-feeding  How should I use this medicine?  Take this medicine by mouth with a full glass of water. Follow the directions on the prescription label. This medicine can be taken with or without food. Take your medicine at regular intervals. Do not take your medicine more often than directed. Do not skip doses or stop your medicine early even if you feel better. Do not stop taking except on your doctor's advice.  A special MedGuide will be given to you by the pharmacist with each prescription and refill. Be sure to read this  information carefully each time.  Talk to your pediatrician regarding the use of this medicine in children. While this drug may be prescribed for children as young as 6 months for selected conditions, precautions do apply.  Overdosage: If you think you have taken too much of this medicine contact a poison control center or emergency room at once.  NOTE: This medicine is only for you. Do not share this medicine with others.  What if I miss a dose?  If you miss a dose, take it as soon as you remember. If it is almost time for your next dose, take only that dose. Do not take double or extra doses.  What may interact with this medicine?  Do not take this medicine with any of the following medications:    arsenic trioxide    chloroquine    droperidol    medicines for irregular heart rhythm like amiodarone, disopyramide, dofetilide, flecainide, quinidine, procainamide, sotalol    some medicines for depression or mental problems like phenothiazines, pimozide, and ziprasidone  This medicine may also interact with the following medications:    amoxapine    antacids    cisapride    dairy products    didanosine (ddI) buffered tablets or powder    haloperidol    multivitamins    NSAIDS, medicines for pain and inflammation, like ibuprofen or naproxen    retinoid products like tretinoin or isotretinoin    risperidone    some other antibiotics like clarithromycin or erythromycin    sucralfate    theophylline    warfarin  This list may not describe all possible interactions. Give your health care provider a list of all the medicines, herbs, non-prescription drugs, or dietary supplements you use. Also tell them if you smoke, drink alcohol, or use illegal drugs. Some items may interact with your medicine.  What should I watch for while using this medicine?  Tell your doctor or health care professional if your symptoms do not improve or if they get worse. Drink several glasses of water a day and cut down on drinks that contain  caffeine. You must not get dehydrated while taking this medicine.  You may get drowsy or dizzy. Do not drive, use machinery, or do anything that needs mental alertness until you know how this medicine affects you. Do not sit or stand up quickly, especially if you are an older patient. This reduces the risk of dizzy or fainting spells.  This medicine can make you more sensitive to the sun. Keep out of the sun. If you cannot avoid being in the sun, wear protective clothing and use a sunscreen. Do not use sun lamps or tanning beds/booths. Contact your doctor if you get a sunburn.  If you are a diabetic monitor your blood glucose carefully. If you get an unusual reading stop taking this medicine and call your doctor right away.  Do not treat diarrhea with over-the-counter products. Contact your doctor if you have diarrhea that lasts more than 2 days or if the diarrhea is severe and watery.  Avoid antacids, calcium, iron, and zinc products for 2 hours before and 2 hours after taking a dose of this medicine.  What side effects may I notice from receiving this medicine?  Side effects that you should report to your doctor or health care professional as soon as possible:    allergic reactions like skin rash or hives, swelling of the face, lips, or tongue    changes in vision    confusion, nightmares or hallucinations    difficulty breathing    irregular heartbeat, chest pain    joint, muscle or tendon pain    pain or difficulty passing urine    persistent headache with or without blurred vision    redness, blistering, peeling or loosening of the skin, including inside the mouth    seizures    unusual pain, numbness, tingling, or weakness    vaginal irritation, discharge  Side effects that usually do not require medical attention (report to your doctor or health care professional if they continue or are bothersome):    diarrhea    dry mouth    headache    stomach upset, nausea    trouble sleeping  This list may not describe  all possible side effects. Call your doctor for medical advice about side effects. You may report side effects to FDA at 4-847-UNO-6431.  Where should I keep my medicine?  Keep out of the reach of children.  Store at room temperature between 15 and 30 degrees C (59 and 86 degrees F). Keep in a tightly closed container. Throw away any unused medicine after the expiration date.  NOTE:This sheet is a summary. It may not cover all possible information. If you have questions about this medicine, talk to your doctor, pharmacist, or health care provider. Copyright  2016 [employer]                Pantoprazole tablets  Brand Name: Protonix  What is this medicine?  PANTOPRAZOLE (pan TOE pra zole) prevents the production of acid in the stomach. It is used to treat gastroesophageal reflux disease (GERD), inflammation of the esophagus, and Zollinger-Mccain syndrome.  How should I use this medicine?  Take this medicine by mouth. Swallow the tablets whole with a drink of water. Follow the directions on the prescription label. Do not crush, break, or chew. Take your medicine at regular intervals. Do not take your medicine more often than directed.  Talk to your pediatrician regarding the use of this medicine in children. While this drug may be prescribed for children as young as 5 years for selected conditions, precautions do apply.  What side effects may I notice from receiving this medicine?  Side effects that you should report to your doctor or health care professional as soon as possible:    allergic reactions like skin rash, itching or hives, swelling of the face, lips, or tongue    bone, muscle or joint pain    breathing problems    chest pain or chest tightness    dark yellow or brown urine    dizziness    fast, irregular heartbeat    feeling faint or lightheaded    fever or sore throat    muscle spasm    palpitations    rash on cheeks or arms that gets worse in the sun    redness, blistering, peeling or loosening of the  skin, including inside the mouth    seizures    tremors    unusual bleeding or bruising    unusually weak or tired    yellowing of the eyes or skin  Side effects that usually do not require medical attention (report to your doctor or health care professional if they continue or are bothersome):    constipation    diarrhea    dry mouth    headache    nausea  What may interact with this medicine?  Do not take this medicine with any of the following medications:    atazanavir    nelfinavir  This medicine may also interact with the following medications:    ampicillin    delavirdine    erlotinib    iron salts    medicines for fungal infections like ketoconazole, itraconazole and voriconazole    methotrexate    mycophenolate mofetil    warfarin  What if I miss a dose?  If you miss a dose, take it as soon as you can. If it is almost time for your next dose, take only that dose. Do not take double or extra doses.  Where should I keep my medicine?  Keep out of the reach of children.  Store at room temperature between 15 and 30 degrees C (59 and 86 degrees F). Protect from light and moisture. Throw away any unused medicine after the expiration date.  What should I tell my health care provider before I take this medicine?  They need to know if you have any of these conditions:    liver disease    low levels of magnesium in the blood    lupus    an unusual or allergic reaction to omeprazole, lansoprazole, pantoprazole, rabeprazole, other medicines, foods, dyes, or preservatives    pregnant or trying to get pregnant    breast-feeding  What should I watch for while using this medicine?  It can take several days before your stomach pain gets better. Check with your doctor or health care professional if your condition does not start to get better, or if it gets worse.  You may need blood work done while you are taking this medicine.  NOTE:This sheet is a summary. It may not cover all possible information. If you have questions  about this medicine, talk to your doctor, pharmacist, or health care provider. Copyright  2018 [employer]                Coping with Heart Failure    It s normal to feel sad or down at times when you re living with heart failure. Some medicines can also affect your mood. Following your treatment plan may seem difficult at times. If you feel overwhelmed, just focus on one day at a time. Don t be afraid to ask others for help when you need it.  Ways to feel better  Try not to withdraw from family and friends, even if you are finding it hard to talk to them. They can still be a good source of support. To feel better, you can also:    Spend time doing things you enjoy. This may include participating in a favorite hobby, meditating, praying, or spending time with people you care about. Find activities that make you happy and make those a priority.    Share what you learn about heart failure with the people in your life. Invite family members along when you visit your healthcare provider. This will help you feel supported as well as help you discuss the care plan you've agreed upon with your doctor.    Think about joining a support group for people with heart failure. It may be easier to talk to people who know firsthand what you re going through. They can offer advice and share stories. You may want to ask loved ones to join you for a meeting.  Asking for help  Having heart failure doesn t mean that you have to feel bad all the time. Consider talking to your healthcare provider or a therapist if:    You feel worthless or helpless, or are thinking about suicide. These are warning signs of depression. Treatment can help you feel better. When depression is under control, your overall health may also improve.    You feel anxious about what will happen to your loved ones if your health gets worse. Taking care of legal arrangements, such as a living will and durable power of , can help you feel more secure about the  future.    Social support helps alleviate stress and helps your stick with your healthy lifestyle changes. Without social support, you may end up back in the hospital.  Date Last Reviewed: 3/20/2016    4384-8319 [employer]. [address] [place] [address], [place] [address]. All rights reserved. This information is not intended as a substitute for professional medical care. Always follow your healthcare professional's instructions.                Discharge Instructions for Heart Failure  The heart is a muscle that pumps oxygen-rich blood to all parts of the body. When you have heart failure, the heart is not able to pump as well as it should. Blood and fluid may back up into the lungs (congestive heart failure), and some parts of the body don t get enough oxygen-rich blood to work normally. These problems lead to the symptoms of heart failure. Heart failure can occur due to an injury to the heart or from natural processes.  You can control symptoms of heart failure with some lifestyle changes and by following your doctor's advice.  Activity  Ask your healthcare provider about an exercise program. You can benefit from simple activities such as walking or gardening. Exercising most days of the week can make you feel better. Don't be discouraged if your progress is slow at first. Rest as needed. Stop activity if you develop symptoms such as chest pain, lightheadedness, or significant shortness of breath. Find activities that you enjoy, such as brisk walking, dancing, swimming, or gardening. These will help you stay active and strengthen your heart.  Diet  Follow a heart healthy diet. And make sure to limit the salt (sodium) in your diet. Salt causes your body to hold water. This makes your heart work harder as there is more fluid for the heart to pump. Limit your salt by doing the following:    Limit canned, dried, packaged, and fast foods.    Don't add salt to your food.    Season foods with herbs instead of  salt.    Watch how much liquids you drink. Drinking too much can make heart failure worse. Talk with your health care provider about how much you should drink each day.    Limit the amount of alcohol you drink. It may harm your heart. Women should have no more than 1 drink a day and men should have no more than 2.    When you eat out, request that your meals have no added salt.  Tobacco  If you smoke, it's very important to quit. Smoking increases your chances of having a heart attack by harming the blood vessels that provide oxygen to your heart. This makes heart failure worse. Quitting smoking is the number one thing you can do to improve your health. Enroll in a stop-smoking program to improve your chances of success. Talk with your healthcare provider about medicines or nicotine replacement therapy to help you quit smoking. Ask your healthcare provider about smoking cessation support groups.  Medicine  Take your medicines exactly as prescribed. Learn the names and purpose of each of your medicines. Keep an accurate medicine list and current dosages with you at all times. Don't skip doses. If you miss a dose of your medicine, take it as soon as you remember. If you miss a dose and it's almost time for your next dose, just wait and take your next dose at the normal time. Don't take a double dose. If you are unsure, call your doctor's office. Make sure not to mix up your medicines or forget what you've taken the same day.  Weight monitoring  Weigh yourself every day. A sudden weight gain can mean your heart failure is getting worse. Weigh yourself at the same time of day and in the same kind of clothes. Ideally, weigh yourself first thing in the morning after you empty your bladder, but before you eat breakfast. Your healthcare provider will show you how to track your weight. He or she will also discuss with you when you should call if you have a sudden, unexpected increase in your weight.  In general, your  healthcare provider may ask you to report if your weight goes up by more than 2 pounds in 1 day,  5 pounds in 1 week, or whatever weight gain you were told by your doctor. This is a sign that you are retaining more fluid than you should be. Clues to weight gain include checking your ankles for swelling, or noticing you are short of breath when you lie down.  Follow-up care  Make a follow-up appointment as directed. Depending on the type and severity of heart failure you have, you may need follow-up as early as 7 days from hospital discharge. Keep appointments for checkups and lab tests that are needed to check your medicines and condition.  Recognize that your health and even survival depend on your following medical recommendations.  Symptoms  Heart failure can cause a variety of symptoms, including:    Shortness of breath    Trouble breathing at night, especially when you lie down    Swelling in the legs and feet or in the belly (abdomen)    Becoming easily fatigued    Irregular or rapid heartbeat    Weakness or lightheadedness    Swelling of the neck veins  It is important to know what to do if symptoms get worse or if you develop signs of worsening heart failure.     When to call your healthcare provider  Call your healthcare provider right away if you have any of these signs of worsening heart failure:    Sudden weight gain (more than 2 pounds in 1 day or 5 pounds in 1 week, or whatever weight gain you were told to report by your doctor)    Trouble breathing not related to being active    New or increased swelling of your legs or ankles    Swelling or pain in your abdomen    Breathing trouble at night (waking up short of breath, needing more pillows to breathe)    Frequent coughing that doesn't go away    Feeling much more tired than usual  Call 911  Call 911 right away if you have:    Severe shortness of breath, such that you can't catch your breath even while resting    Severe chest pain that does not resolve  with rest or nitroglycerin    Pink, foamy mucus with cough and shortness of breath    A continuous rapid or irregular heartbeat    Passing out or fainting    Stroke symptoms such as sudden numbness or weakness on one side of your face, arm, or leg or sudden confusion, trouble speaking or vision changes   Date Last Reviewed: 3/21/2016    2846-4900 [employer]. [address] [place] [address]. All rights reserved. This information is not intended as a substitute for professional medical care. Always follow your healthcare professional's instructions.                What is Heart Failure?  The heart is a muscle that pumps oxygen-rich blood to all parts of the body. When you have heart failure, the heart is not able to pump as well as it should. Blood and fluid may back up into the lungs, and some parts of the body don t get enough oxygen-rich blood to work normally. These problems lead to the symptoms you feel.  When you have heart failure  With heart failure, not enough oxygen-rich blood leaves the heart with each beat. There are 2 types of heart failure. Both affect the ventricles  ability to pump blood. You may have 1 or both types.       Systolic heart failure. The heart muscle becomes weak and enlarged. It can t pump enough oxygen-rich blood forward to the rest of the body when the ventricles contract. The measurement of how much blood your heart pushes out when it beats is called ejection fraction. In systolic heart failure, the ejection fraction is lower than normal. This can cause blood to back up into the lungs and cause shortness of breath and eventually ankle swelling (edema).  This is also called heart failure with reduced ejection fraction, or  HFrEF.    Diastolic heart failure. The heart muscle becomes stiff. It doesn t relax normally between contractions, which keeps the ventricles from filling with blood. Ejection fraction is often in the normal range. This can still lead to the  backup of blood into the body and affect the organs such as the liver. This is also called heart failure with preserved ejection fraction or HFpEF.     Recognizing heart failure symptoms  When you have heart failure, you need to pay close attention to your body and how you feel, every single day. That way, if a problem occurs, you can get help before it becomes too severe. You'll need to watch for changes in your symptoms. As long as symptoms stay about the same from one day to the next, your heart failure is stable. But if symptoms start to get worse, it's time to take action.  Signs and symptoms of worsening heart failure include:    Rapid weight gain    Shortness of breath    Swelling (edema)    Fatigue  How heart failure affects your body  When the heart doesn't pump enough blood, hormones (body chemicals) are sent to increase the amount of work the heart does. Some hormones make the heart grow larger. Others tell the heart to pump faster. As a result, the heart may pump more blood at first, but it can't keep up with the ongoing demands. So, the heart muscle becomes even more weak. Over time, even less blood is pumped through the heart. This leads to problems throughout the body as organs began to feel the effects of a long-term lack of oxygen. Eventually, if untreated, this can cause problems with your lungs, liver, kidneys, and loss of elasticity in the skin, and skin changes in the lower legs. A weak heart itself can eventually cause a severe decline in health and possible death if left untreated.  What is ejection fraction?  Ejection fraction (EF) measures how much blood the heart pumps out (ejects). This is measured to help diagnose heart failure. A healthy heart pumps at least half of the blood from the ventricles with each beat. This means a normal ejection fraction is around 50% to 70%. Your doctor will calculate ejection fraction from an echocardiogram.     My ejection fraction     Date:  ______________________  Ejection fraction: _____________  Test used: __________________  Date Last Reviewed: 3/15/2016    1643-6769 [employer]. [address] [place] [address]. All rights reserved. This information is not intended as a substitute for professional medical care. Always follow your healthcare professional's instructions.

## 2018-04-18 NOTE — LETTER
Transition Communication Hand-off for Care Transitions to Next Level of Care Provider    Name: Colin LANGLEY Suleman  : 1927  MRN #: [id number]  Primary Care Provider: Jono Drake  Primary Care MD Name: Jono Drake  Primary Clinic: [address] [facility] [id number]  Primary Care Clinic Name: (P) [facility]  Reason for Hospitalization:  Cough [R05]  Dyspnea [R06.00]  COPD exacerbation (H) [J44.1]  Admit Date/Time: 2018 10:39 AM  Discharge Date: 1-2 days  Payor Source: Payor: [employer]ARE / Plan: [employer] / Product Type: HMO /         Reason for Communication Hand-off Referral: Admission diagnoses: CHF  Admission diagnoses: COPD    Discharge Plan: Home   Concern for non-adherence with plan of care:   Y/N no    Already enrolled in Tele-monitoring program and name of program:  no  Follow-up plan:  Future Appointments  Date Time Provider Department Center   2018 10[facility]       [place]e Recommendations:  Follow up with PCP, Patient lives independently in a house in the community and he drove himself to the ED, his car is in the parking lot. Patients' goal is to return to home, he stated that if he needs it he has two sons that can help, he refused home care and the need for TCU. Son confirmed that his is available to assist when needed.     Leann Carmona    AVS/Discharge Summary is the source of truth; this is a helpful guide for improved communication of patient story

## 2018-04-18 NOTE — H&P
[facility]    History and Physical  Hospital Medicine       Date of Admission:  4/18/2018  Date of Service: 4/18/2018     Assessment & Plan   Colin Shell is a 89 year old male with past medical history of bronchiectasis, pulmonary fibrosis, systolic CHF, paroxysmal atrial fibrillation, ventricular dysrhythmia, hypertension, hyperlipidemia and BPN who presents with progressive SOB and cough over past month.     Progressive Dyspnea  Cough  Sitting up comofrtably on presentation. Requiring 2 LPM O2 by nasal cannula. Lungs with diffuse crackles and wheeze bilaterally in bases. Labs significant for BNP 2023, elevated creatinine, WBC 11.1. VBG largely normal. CT chest shows improvement of opacities in left lung and small bilateral pleural effusions. Multifactorial. Patient has history of lung fibrosis and bronchiectasis noted on prior CT scans. He has not had any pulmonary follow up or testing. He also has history of systolic CHF with EF of 45%, BNP elevated weights have been fluctuating over past month, reports orthopnea and increased lower extremity edema. Recently treated for pneumonia, cough has persisted though appears to be resolving on CT scan today. Suspect much of his symptoms and findings (wheeze, crackles, edema) are due to CHF though pneumonia and underlying chronic lung disease likely contributing.  - AM CBC/BMP  - order telemetry  - supplemental O2, titrate to 90-93%  - duonebulizers Q4 hours while awake  - albuterol nebulizers Q2 hours as needed  - consider starting prednisone 40 mg daily x 5 days  - diuresis as below    Generalized Weakness  Patient with generalized weakness over past several years. Feels he is able to do less at home.  - PT/OT consult  - care transitions consult  - palliative care consult    Acute on Chronic Systolic Congestive Heart Failure   Lungs with bilateral crackles and wheeze. Lower extremity edema to the mid-thigh. CT chest with pleural  effusion. Current weight of 219 lb, weight has been fluctuant over past several visits, though appears to  be up 9 lbs from baseline. Last echocardiogram on 10/2017 demonstrated an EF of 45% and regional wall motion abnormalities, biatrial enlargement, mild aortic stenosis, mild aortic rote dilation. Current outpatient medications include metoprolol, losartan and aspirin.  - Follow daily weights, I/Os, UOP  - No maintenance IVF  - O2 prn to maintain sats>92%  - initiate 20 mg IV lasix Q8 hours x 2 doses  - consider repeat ECHO    Leukocytosis  WBC slightly elevated at 11.1. This could be related to illness or stress related. UA negative for signs of infection. CT chest shows improvement in infiltrates.  - AM CBC  - continue to monitor for localizing signs of infection    Bronchiectasis  Fibrosis of lung  Bronchiectasis and lung fibrosis noted on CT chest. Upon chart review, patient has been recommended to follow up with pulmonology many times in the past though has not done this.   - duonebulizers as above  - consider follow up with pulmonology    Acute on Chronic kidney disease, stage III  Creatinine 1.74. BUN 37. Improved from most recent. Baseline creatinine ~1.3. Consistent with pre-renal etiology. Dehydration possible given recent illness, though also concern for CHF as patient has orthopnea and progressive SOB x 1 month with elevated BNP.   - Monitor I/O's, daily weights  - hold NSAIDS  - AM BMP    Hyperkalemia  Potassium elevated at 5.4. Possibly related to worsening kidney disease. Patient is on ARB which is not a new medication.  - AM BMP    Paroxysmal atrial fibrillation  History of Ventricular dysrhythmia s/p ICD implantation  Previously managed on Xarelto, though patient was apparently non-compliant due to cost then declined further anticoagulation aside from ASA.  - continue home metoprolol hold for SBP < 100 or HR < 50  - continue home ASA     Coronary artery disease s/p CABG  CABG in 1996. Follows  "with cardiology, Cardiolite stress test 2017 showed transmural scar with mild ellie-infarct ischemia.   - continue home ASA, statin, beta blocker, ARB    Hypertension  Chronic and stable. Blood pressures reviewed, stable, though somewhat low.   - continue PTA losartan (hold for SBP < 105) and metoprolol (hold for SBP < 100 or HR < 50)  - hold home amlodipine given somewhat hypotensive  - diuresis with furosemide as above    Hyperlipidemia  Chronic and stable.  - continue PTA atorvastatin    Gout  - continue home allopurinol     Benign prostatic hyperplasia  - continue home finasteride    FEN:  - Not indicated  -Will monitor electrolytes and replace as needed  - Low sodium diet    DVT Prophylaxis: Pneumatic Compression Devices  Code Status: DNR / DNI    Disposition: Anticipate discharge in 1-2 days once respiratory status improving. Appropriate for observation level care    I have discussed patient and formulated plan with Dr. Echo Turner.    Casie Ashford, PA-C  [facility]        Primary Care Physician   Jono Drake [phone]    History is obtained from the patient, ED notes and review of the EMR.    Past Medical History    Past Medical History:   Diagnosis Date     Arrhythmia      Congestive heart failure (H)      Dizziness and giddiness 11/24/2007    uncertain etiology- MRI/MRA head , carotid duplex normal 2007     Foreign body in unspecified site on external eye      Hypertension      Microscopic hematuria      microscopic hematuria 2001 with  IVP and cystoscopy     Renal disease      Rhabdomyolysis     2003- in setting of Ecoli UTI, gemfibrozil/lipitor     Patient Active Problem List    Diagnosis Date Noted     Coronary artery disease involving coronary bypass graft of native heart without angina pectoris      Priority: High     Hx of CABG 1996    ECHO 01/2017: \"Left ventricular systolic function is mildly reduced.The visual ejection  fraction is estimated at 45%.Moderate basal to mid inferior " "wall hypokinesia\"    NM MPI 01/2017: \"Myocardial perfusion imaging using single isotope technique  demonstrated transmural scar of the inferior basal inferolateral wall  with mild ellie-infarct ischemia in the basal inferior septal wall.   2. Gated images demonstrated normal LV cavity size with mildly reduced  LV systolic function.  The left ventricular systolic function is 46%.  3. Compared to the prior study from no prior study .\"         Chronic systolic congestive heart failure (H) 04/09/2018     Priority: Medium     Paroxysmal atrial fibrillation (H) 02/20/2018     Priority: Medium     Chronic systolic CHF (congestive heart failure) (H) 06/13/2016     Priority: Medium     Advance Care Planning 10/16/2012     Priority: Medium     Advance Care Planning: Receipt of ACP document:  Received: Health Care Directive which was witnessed or notarized on 8/26/09.  Document not previously scanned.  Validation form completed and scanned.  Code Status reflects choices in most recent ACP document. Confirmed/documented designated decision maker(s). See permanent comments section of demographics in clinical tab. View document(s) and details by clicking on code status. Added by Victorino Aguilera on 4/9/2015.  Patient is DNR/DNI             Cholelithiasis 05/09/2012     Priority: Medium     2012 assymptomatic   IMO update changed this record. Please review for accuracy       Fibrosis of lung (H) 05/09/2012     Priority: Medium     Suggested pulmonology in 2012 to eval--pt deferred this.  (Problem list name updated by automated process. Provider to review and confirm.)       Pulmonary nodule, right 05/09/2012     Priority: Medium     May 2012 ct showed right sided probable benign nodule.  No further follow-up needed. After stability noted on CT 2017.       Bronchiectasis (H) 04/30/2012     Priority: Medium     Noted on 2012 ct       Health Care Home 03/26/2012     Priority: Medium     Ratna Brooks RN-PHN  [facility] / [facility]   [phone]   DX V65.8 REPLACED WITH [id number]ARE [facility] (04/08/2013)       Hyperlipidemia LDL goal <100 10/31/2010     Priority: Medium     Chronic kidney disease, stage III (moderate) 07/13/2009     Priority: Medium     Hypertension goal BP (blood pressure) < 140/90 05/20/2005     Priority: Medium     Benign prostatic hyperplasia      Priority: Low     history of elevated PSAs       Obesity 07/13/2009     Priority: Low     Gout 05/20/2005     Priority: Low      Past Surgical History   Past Surgical History:   Procedure Laterality Date     BIOPSY ARTERY TEMPORAL Left 9/18/2017    Procedure: BIOPSY ARTERY TEMPORAL;  Left Temporal Biopsy ;  Surgeon: Ivana Phan MD;  Location: [facility] OR     SURGICAL HISTORY OF -       heel spurs     SURGICAL HISTORY OF -   2005, 2006    bilateral eye cataract     SURGICAL HISTORY OF -   1996    CABG x 5 vessels     SURGICAL HISTORY OF -   2010    circumcision     TONSILLECTOMY & ADENOIDECTOMY        History of Present Illness   Colin Shell is a 89 year old male who presents with progressive shortness of breath and worsening productive cough over the past month.    States he will good for a couple of days, then gets worse. Feels worse when lying down. Has been sleeping in a recliner off and on for a couple of months due to coughing and breathing difficulties. Less ability to do activities.     Productive cough of yellow sputum. He does have a chronic cough though it has been worse recently. No fever or chills.     Generalized weakness, able to do less over past couple years.    History of syncope. Last episode about 1 month ago. Lightheadedness and dizziness at times, usually when first standing up.     Denies headaches, chest pain, palpitations, abdominal pain, nausea, vomiting, diarrhea, urinary changes or wounds.    Chronic constipation x 20 years. Rash on bottom resolving with ointment.    Seen in ED 3/25/2018 for cough and shortness of  breath, treated for community acquired pneumonia 3/25/2018 with Levaquin.     Seen in ED 4/1/2018 for cough. Improved with cough medicines and duonebs.    Follow up with PCP 4/9/2018: recommended low salt diet. Would consider a diuretic if weight increasing. Also appeared patient may have been having some orthostatic hypotension, instructed to stand up slowly.    Wt Readings from Last 3 Encounters:   04/09/18 97.5 kg (215 lb)   04/01/18 90.7 kg (200 lb)   03/25/18 95.3 kg (210 lb)     Prior to Admission Medications   Prior to Admission Medications   Prescriptions Last Dose Informant Patient Reported? Taking?   Magnesium Hydroxide (NAVA MILK OF MAGNESIA PO)  Self Yes No   Multiple Vitamins-Minerals (CENTRUM SILVER) per tablet 4/17/2018 at Unknown time Self Yes Yes   Sig: Take 1 tablet by mouth daily   albuterol (PROAIR HFA/PROVENTIL HFA/VENTOLIN HFA) 108 (90 BASE) MCG/ACT Inhaler 4/17/2018 at Unknown time  No Yes   Sig: Inhale 2 puffs into the lungs every 4 hours as needed for shortness of breath / dyspnea or wheezing WITH SPACER   allopurinol (ZYLOPRIM) 100 MG tablet 4/17/2018 at Unknown time  No Yes   Sig: TAKE ONE-HALF TABLET BY MOUTH ONCE DAILY FOR  GOUT   amLODIPine (NORVASC) 10 MG tablet 4/17/2018 at Unknown time  No Yes   Sig: Take 1 tablet (10 mg) by mouth daily   aspirin  MG EC tablet 4/17/2018 at Unknown time  Yes Yes   Sig: Take 1 tablet (325 mg) by mouth daily   atorvastatin (LIPITOR) 40 MG tablet 4/17/2018 at Unknown time  No Yes   Sig: Take 1 tablet (40 mg) by mouth daily   finasteride (PROSCAR) 5 MG tablet 4/17/2018 at Unknown time  No Yes   Sig: Take 1 tablet (5 mg) by mouth daily   losartan (COZAAR) 100 MG tablet 4/17/2018 at Unknown time  No Yes   Sig: Take 1 tablet (100 mg) by mouth daily   metoprolol succinate (TOPROL-XL) 25 MG 24 hr tablet 4/17/2018 at Unknown time  No Yes   Sig: Take 2 tablets (50 mg) by mouth daily   triamcinolone (KENALOG) 0.1 % ointment   No No   Sig: Apply  topically 2 times daily Apply sparingly to affected area twice times daily for the next 7-10 days.      Facility-Administered Medications: None     Allergies   Allergies   Allergen Reactions     Flomax [Tamsulosin Hydrochloride]      Hctz Other (See Comments)     Caused loss of balance     Lisinopril Cough     Simvastatin Nausea and Vomiting and Diarrhea     Vytorin Nausea and Diarrhea     Family History    Family History   Problem Relation Age of Onset     DIABETES Father      DIABETES Brother      DIABETES Brother      Hypertension Son      Unknown/Adopted Maternal Grandmother      Unknown/Adopted Maternal Grandfather      Unknown/Adopted Paternal Grandmother      Unknown/Adopted Paternal Grandfather      Dementia Sister        Social History   Social History     Social History     Marital status:      Spouse name: N/A     Number of children: N/A     Years of education: N/A     Occupational History     Not on file.     Social History Main Topics     Smoking status: Former Smoker     Smokeless tobacco: Never Used      Comment: Quit at age 32     Alcohol use Yes      Comment: 12 pack a year     Drug use: No     Sexual activity: Yes     Other Topics Concern     Parent/Sibling W/ Cabg, Mi Or Angioplasty Before 65f 55m? No     Social History Narrative   Lives in [place] alone.     Review of Systems   The 10 point Review of Systems is negative other than noted in the HPI or here.     Physical Exam   /64  Pulse 63  Temp 97.2  F (36.2  C) (Temporal)  Resp 13  SpO2 95%     Weight: 0 lbs 0 oz There is no height or weight on file to calculate BMI.     Constitutional: Patient is sitting up on exam, Alert, oriented, cooperative, no apparent distress, appears nontoxic, Appears stated age.  Eyes: Sclera are anicteric, EOMI  HENT: Normocephalic. Atraumatic. Oropharynx is clear and moist.   Lymph/Hematologic: No epitrochlear, axillary, preauricular, postauricular, occipital, sub-mandibular, tonsillar,  sub-mental, anterior or posterior cervical, or supraclavicular lymphadenopathy is appreciated.  Cardiovascular: Regular rate and normal rhythm. Systolic murmur. Elevated JVP. Radial pulses are 2+ bilaterally. Distal pulses are intact. lower extremity edema to the mid-thigh.  Respiratory: No accessory muscle usage. Speaking in full sentences. Crackles and wheeze bilaterally in the bases and mid-lung fields. No rhonchi.   GI: Normal bowel sounds present, soft, non-tender, non-distended. No rebound or guarding.   Genitourinary: Deferred  Musculoskeletal: Normal muscle bulk and tone. Moves all extremities appropriately.  Skin: Warm and dry, no rashes.   Neurologic: Neck supple.  is symmetric.     Data   Data reviewed today:     Recent Labs  Lab 04/18/18  1052   WBC 11.1*   HGB 12.0*   MCV 90         POTASSIUM 5.4*   CHLORIDE 111*   CO2 23   BUN 37*   CR 1.74*   ANIONGAP 6   MARTA 8.8   *   TROPI <0.015       Recent Results (from the past 24 hour(s))   Chest XR,  PA & LAT    Narrative    XR CHEST 2 VW 4/18/2018 12:24 PM    HISTORY: Short of breath.    COMPARISON: Several prior studies, the most recent one being dated  4/1/2018.      Impression    IMPRESSION: 2 views of the chest show no convincing acute  cardiopulmonary disease. Chronic interstitial abnormality in the lungs  is stable and can relate to interstitial scarring/fibrosis.  Cardiomegaly and transvenous pacer are unchanged.    MONICA SALAS MD   Chest CT w/o contrast    Narrative    CT CHEST WITHOUT CONTRAST  4/18/2018 2:57 PM    HISTORY:  Shortness of breath. Cough.      COMPARISON: A CT on 3/25/2018.    TECHNIQUE: Routine transverse CT imaging of the chest was performed  without contrast. Radiation dose for this scan was reduced using  automated exposure control, adjustment of the mA and/or kV according  to patient size, or iterative reconstruction technique.    FINDINGS: The heart size is mildly enlarged. There is a  moderate-sized  hiatal hernia.. There are numerous mildly enlarged mediastinal lymph  nodes. These are unchanged. No other abnormal mediastinal mass is  seen. There is prominent calcification within the thoracic aorta and  coronary arteries. The vascular structures are otherwise unremarkable,  allowing for the absence of contrast. There are surgical changes of  presumed coronary bypass surgery. There are emphysematous changes in  the lungs. There has been improvement of what are now only mild patchy  groundglass opacities throughout the left lung. The lungs are  otherwise clear. No pneumothorax is demonstrated. Again seen are small  bilateral pleural effusions, right greater than left. This has  progressed on the right but decreased on the left. There are  degenerative changes in the spine and surgical changes of a median  sternotomy. There is a left chest wall intracardiac stimulator device.  No other chest wall abnormality is seen. Within the visualized upper  abdomen, again seen are gallstones.      Impression    IMPRESSION:   1. Improvement of what are now only mild patchy opacities of the left  lung representing mild infiltrate or inflammation. No pulmonary  consolidation is demonstrated.  2. No change in mild mediastinal lymphadenopathy.  3. Mild cardiomegaly and small bilateral pleural effusions.    RUBIO PARADA MD     I personally reviewed the EKG tracing showing ventricularly paced rhythm.    I have discussed patient and formulated plan with Dr. Echo Turner.    Chart documentation with keystrokes and/or Dragon voice recognition software. Although reviewed after completion, some word and grammatical error may remain.  Casie Ashford PA-C  [facility]

## 2018-04-18 NOTE — ED PROVIDER NOTES
"  History     Chief Complaint   Patient presents with     Shortness of Breath     dizziness. history of pulmonary fibrosis and COPD. worse when lying down.      HPI  Colin Shell is a 89 year old male who has a history of CAD, hypertension, chronic kidney disease, bronchiectasis, fibrosis of lung, right pulmonary nodule, CHF, and paroxysmal atrial fibulation who presents to the ED via private car for evaluation of shortness of breath.  Patient was seen in ED on 3/25/2018 for similar symptoms and was diagnosed with community acquired pneumonia of the left lung.  He feels this is the same thing.  When he lays down in the evening, his symptoms get worse causing him to sleep in a reclining chair to find comfort.  He feels he can not catch his breath.  He is using an albuterol inhaler at home with no alleviation.  He has a cough that is producing yellow mucus.  He states he is having hallucinations at home where he feels someone is in the house when no one is there.  He lives alone.  He is taking a diuretic but does not feel that it is helping.  He feels both of his legs have more edema then baseline.  He denies chest pain.    Problem List:    Patient Active Problem List    Diagnosis Date Noted     Coronary artery disease involving coronary bypass graft of native heart without angina pectoris      Priority: High     Hx of CABG 1996    ECHO 01/2017: \"Left ventricular systolic function is mildly reduced.The visual ejection  fraction is estimated at 45%.Moderate basal to mid inferior wall hypokinesia\"    NM MPI 01/2017: \"Myocardial perfusion imaging using single isotope technique  demonstrated transmural scar of the inferior basal inferolateral wall  with mild ellie-infarct ischemia in the basal inferior septal wall.   2. Gated images demonstrated normal LV cavity size with mildly reduced  LV systolic function.  The left ventricular systolic function is 46%.  3. Compared to the prior study from no prior study .\"     "     Chronic systolic congestive heart failure (H) 04/09/2018     Priority: Medium     Paroxysmal atrial fibrillation (H) 02/20/2018     Priority: Medium     Chronic systolic CHF (congestive heart failure) (H) 06/13/2016     Priority: Medium     Advance Care Planning 10/16/2012     Priority: Medium     Advance Care Planning: Receipt of ACP document:  Received: Health Care Directive which was witnessed or notarized on 8/26/09.  Document not previously scanned.  Validation form completed and scanned.  Code Status reflects choices in most recent ACP document. Confirmed/documented designated decision maker(s). See permanent comments section of demographics in clinical tab. View document(s) and details by clicking on code status. Added by Victorino Aguilera on 4/9/2015.  Patient is DNR/DNI             Cholelithiasis 05/09/2012     Priority: Medium     2012 assymptomatic   IMO update changed this record. Please review for accuracy       Fibrosis of lung (H) 05/09/2012     Priority: Medium     Suggested pulmonology in 2012 to eval--pt deferred this.  (Problem list name updated by automated process. Provider to review and confirm.)       Pulmonary nodule, right 05/09/2012     Priority: Medium     May 2012 ct showed right sided probable benign nodule.  No further follow-up needed. After stability noted on CT 2017.       Bronchiectasis (H) 04/30/2012     Priority: Medium     Noted on 2012 ct       Health Care Home 03/26/2012     Priority: Medium     Ratna Brooks RN-PHN  FPA / [facility]G [facility]   [phone]   DX V65.8 REPLACED WITH [id number] HEALTH CARE HOME (04/08/2013)       Hyperlipidemia LDL goal <100 10/31/2010     Priority: Medium     Chronic kidney disease, stage III (moderate) 07/13/2009     Priority: Medium     Hypertension goal BP (blood pressure) < 140/90 05/20/2005     Priority: Medium     Benign prostatic hyperplasia      Priority: Low     history of elevated PSAs       Obesity 07/13/2009      Priority: Low     Gout 05/20/2005     Priority: Low        Past Medical History:    Past Medical History:   Diagnosis Date     Arrhythmia      Congestive heart failure (H)      Dizziness and giddiness 11/24/2007     Foreign body in unspecified site on external eye      Hypertension      Microscopic hematuria      Renal disease      Rhabdomyolysis        Past Surgical History:    Past Surgical History:   Procedure Laterality Date     BIOPSY ARTERY TEMPORAL Left 9/18/2017    Procedure: BIOPSY ARTERY TEMPORAL;  Left Temporal Biopsy ;  Surgeon: Ivana Phan MD;  Location: [facility] OR     SURGICAL HISTORY OF -       heel spurs     SURGICAL HISTORY OF -   2005, 2006    bilateral eye cataract     SURGICAL HISTORY OF -   1996    CABG x 5 vessels     SURGICAL HISTORY OF -   2010    circumcision     TONSILLECTOMY & ADENOIDECTOMY         Family History:    Family History   Problem Relation Age of Onset     DIABETES Father      DIABETES Brother      DIABETES Brother      Hypertension Son      Unknown/Adopted Maternal Grandmother      Unknown/Adopted Maternal Grandfather      Unknown/Adopted Paternal Grandmother      Unknown/Adopted Paternal Grandfather      Dementia Sister        Social History:  Marital Status:   [2]  Social History   Substance Use Topics     Smoking status: Former Smoker     Smokeless tobacco: Never Used      Comment: Quit at age 32     Alcohol use Yes      Comment: 12 pack a year        Medications:      albuterol (PROAIR HFA/PROVENTIL HFA/VENTOLIN HFA) 108 (90 BASE) MCG/ACT Inhaler   allopurinol (ZYLOPRIM) 100 MG tablet   amLODIPine (NORVASC) 10 MG tablet   aspirin  MG EC tablet   atorvastatin (LIPITOR) 40 MG tablet   finasteride (PROSCAR) 5 MG tablet   losartan (COZAAR) 100 MG tablet   Magnesium Hydroxide (NAVA MILK OF MAGNESIA PO)   metoprolol succinate (TOPROL-XL) 25 MG 24 hr tablet   Multiple Vitamins-Minerals (CENTRUM SILVER) per tablet   triamcinolone (KENALOG) 0.1 % ointment          Review of Systems   Constitutional: Positive for chills. Negative for activity change and fever.   HENT: Negative for congestion and trouble swallowing.    Respiratory: Positive for cough, chest tightness and shortness of breath.    Cardiovascular: Positive for leg swelling. Negative for chest pain.   Gastrointestinal: Negative for abdominal pain, nausea and vomiting.   Genitourinary: Negative for decreased urine volume and dysuria.   Musculoskeletal: Negative for back pain and neck pain.   Skin: Negative for rash.   Neurological: Negative for dizziness, weakness, numbness and headaches.   Psychiatric/Behavioral: Negative for confusion.       Physical Exam   BP: 139/58  Pulse: 63  Heart Rate: 64  Temp: 97.2  F (36.2  C)  Resp: 28  SpO2: 93 %      Physical Exam   Constitutional: He appears well-developed and well-nourished. No distress.   Eyes: Conjunctivae are normal.   Psychiatric: He has a normal mood and affect.   Nursing note and vitals reviewed.    HENT: Oral mucosa moist. No lesions.  Neck: Supple  Pulmonary/Chest: Lungs are clear to auscultation bilaterally. Crackles in right base greater than left base with wheezing in upper lung fields   Cardiovascular: Heart is regular rate and rhythm. No murmur.  Abdomen: Soft, non-distended, non-tender.   Musculoskeletal: Moving all extremities well. Mild to moderate peripheral edema.  Neurological: Alert. No focal neurologic deficit.   Skin: No rash.   ED Course     ED Course     Procedures               EKG Interpretation:      Interpreted by Jono Matamoros  Rhythm: atrial fibrillation - controlled: with paced rhythm  Rate: Normal  Axis: Left Axis Deviation  Ectopy: none  Conduction: nonspecific interventricular conduction block  ST Segments/ T Waves: Non-specific ST-T wave changes  Q Waves: none  Comparison to prior: 3/25/18 ; no significant change.    Clinical Impression: Partially paced rhythm with baseline afib with Interventricular conduction  abnormality                Critical Care time:  none               Results for orders placed or performed during the hospital encounter of 04/18/18 (from the past 24 hour(s))   CBC with platelets, differential   Result Value Ref Range    WBC 11.1 (H) 4.0 - 11.0 10e9/L    RBC Count 4.04 (L) 4.4 - 5.9 10e12/L    Hemoglobin 12.0 (L) 13.3 - 17.7 g/dL    Hematocrit 36.4 (L) 40.0 - 53.0 %    MCV 90 78 - 100 fl    MCH 29.7 26.5 - 33.0 pg    MCHC 33.0 31.5 - 36.5 g/dL    RDW 15.4 (H) 10.0 - 15.0 %    Platelet Count 237 150 - 450 10e9/L    Diff Method Automated Method     % Neutrophils 50.2 %    % Lymphocytes 29.2 %    % Monocytes 9.1 %    % Eosinophils 11.0 %    % Basophils 0.4 %    % Immature Granulocytes 0.1 %    Absolute Neutrophil 5.6 1.6 - 8.3 10e9/L    Absolute Lymphocytes 3.2 0.8 - 5.3 10e9/L    Absolute Monocytes 1.0 0.0 - 1.3 10e9/L    Absolute Eosinophils 1.2 (H) 0.0 - 0.7 10e9/L    Absolute Basophils 0.0 0.0 - 0.2 10e9/L    Abs Immature Granulocytes 0.0 0 - 0.4 10e9/L   Basic metabolic panel   Result Value Ref Range    Sodium 140 133 - 144 mmol/L    Potassium 5.4 (H) 3.4 - 5.3 mmol/L    Chloride 111 (H) 94 - 109 mmol/L    Carbon Dioxide 23 20 - 32 mmol/L    Anion Gap 6 3 - 14 mmol/L    Glucose 122 (H) 70 - 99 mg/dL    Urea Nitrogen 37 (H) 7 - 30 mg/dL    Creatinine 1.74 (H) 0.66 - 1.25 mg/dL    GFR Estimate 37 (L) >60 mL/min/1.7m2    GFR Estimate If Black 45 (L) >60 mL/min/1.7m2    Calcium 8.8 8.5 - 10.1 mg/dL   Blood gas venous   Result Value Ref Range    Ph Venous 7.37 7.32 - 7.43 pH    PCO2 Venous 39 (L) 40 - 50 mm Hg    PO2 Venous 42 25 - 47 mm Hg    Bicarbonate Venous 22 21 - 28 mmol/L    Base Deficit Venous 2.9 mmol/L   Lactic acid whole blood   Result Value Ref Range    Lactic Acid 1.5 0.7 - 2.0 mmol/L   NT pro BNP   Result Value Ref Range    N-Terminal Pro BNP Inpatient 2023 (H) 0 - 1800 pg/mL   Troponin I   Result Value Ref Range    Troponin I ES <0.015 0.000 - 0.045 ug/L   Chest XR,  PA & LAT     Narrative    XR CHEST 2 VW 4/18/2018 12:24 PM    HISTORY: Short of breath.    COMPARISON: Several prior studies, the most recent one being dated  4/1/2018.      Impression    IMPRESSION: 2 views of the chest show no convincing acute  cardiopulmonary disease. Chronic interstitial abnormality in the lungs  is stable and can relate to interstitial scarring/fibrosis.  Cardiomegaly and transvenous pacer are unchanged.    MONICA SALAS MD   Chest CT w/o contrast    Narrative    CT CHEST WITHOUT CONTRAST  4/18/2018 2:57 PM    HISTORY:  Shortness of breath. Cough.      COMPARISON: A CT on 3/25/2018.    TECHNIQUE: Routine transverse CT imaging of the chest was performed  without contrast. Radiation dose for this scan was reduced using  automated exposure control, adjustment of the mA and/or kV according  to patient size, or iterative reconstruction technique.    FINDINGS: The heart size is mildly enlarged. There is a moderate-sized  hiatal hernia.. There are numerous mildly enlarged mediastinal lymph  nodes. These are unchanged. No other abnormal mediastinal mass is  seen. There is prominent calcification within the thoracic aorta and  coronary arteries. The vascular structures are otherwise unremarkable,  allowing for the absence of contrast. There are surgical changes of  presumed coronary bypass surgery. There are emphysematous changes in  the lungs. There has been improvement of what are now only mild patchy  groundglass opacities throughout the left lung. The lungs are  otherwise clear. No pneumothorax is demonstrated. Again seen are small  bilateral pleural effusions, right greater than left. This has  progressed on the right but decreased on the left. There are  degenerative changes in the spine and surgical changes of a median  sternotomy. There is a left chest wall intracardiac stimulator device.  No other chest wall abnormality is seen. Within the visualized upper  abdomen, again seen are gallstones.      Impression     IMPRESSION:   1. Improvement of what are now only mild patchy opacities of the left  lung representing mild infiltrate or inflammation. No pulmonary  consolidation is demonstrated.  2. No change in mild mediastinal lymphadenopathy.  3. Mild cardiomegaly and small bilateral pleural effusions.       Medications - No data to display  11:06 AM Patient Assessed  Assessments & Plan (with Medical Decision Making) records were reviewed from recent visits.  Labs EKG and chest x-ray were obtained.  Patient was given a DuoNeb.  EKG with atrial fibrillation with left axis possible anterior fascicular block.  No significant change from previous.  Patient is white count 11.1 hemoglobin 12.0 platelet count 237.  There was no left shift.  Urine analysis is unremarkable.  Basic metabolic panel with a potassium of 5.4 chloride elevated 111 glucose 122 BUN 37 creatinine is elevated at 1.74 is improved from previous.  His blood gas with pH is 7.37 CO2 of 3902 42 bicarb 22 lactic acid was within normal limits proBNP was elevated at 2023 which is near baseline for him recently.  Troponin was within normal limits.  Chest x-ray revealed chronic interstitial abnormality in the lungs consistent with interstitial scarring and fibrosis cardiomegaly and transvenous pacemaker unchanged.  Patient was observed for some time and continued to have coughing spells.  When he laid down he would have these spells.  He began wheezing again after being clear for a bit after his neb treatment was given prednisone and another breathing treatment.  Due to his continued symptoms I repeated a CT scan of the chest without contrast this revealed improvement of what are now only mildly patchy opacities in the left lung consistent with infiltrate or inflammation no pulmonary consolidation mild cardiomegaly with bilateral pleural effusions.  Findings were discussed with patient and his son son does not feel patient will do well at home as he has been unable to  sleep well and has been having trouble getting around.  Patient ambulated and had desats down to 90 but it is significant wheezing and difficulty breathing with only minimal ambulation.  Repeat nebs were given to him and at this time I felt patient needed to be admitted for further evaluation and care.  Discussed the case with January YEPEZ with hospitalist service and she is willing to admit the patient for further evaluation and care.  Findings and plan were discussed numerous times throughout patient's stay and family is in agreement with plan.     I have reviewed the nursing notes.    I have reviewed the findings, diagnosis, plan and need for follow up with the patient.       New Prescriptions    No medications on file       Final diagnoses:   Dyspnea   COPD exacerbation (H)   Cough     This document serves as a record of the services and decisions personally performed and made by Jono Matamoros MD. It was created on HIS/HER behalf by Gisell Apodaca, a trained medical scribe. The creation of this document is based the provider's statements to the medical scribe.  Gisell Apodaca 11:13 AM 4/18/2018    Provider:   The information in this document, created by the medical scribe for me, accurately reflects the services I personally performed and the decisions made by me. I have reviewed and approved this document for accuracy prior to leaving the patient care area.  Jono Matamoros MD 11:13 AM 4/18/2018 4/18/2018   [facility] EMERGENCY DEPARTMENT     Jono Matamoros MD  04/19/18 [id number]

## 2018-04-18 NOTE — IP AVS SNAPSHOT
[facility]    [address] [address]    Phone:  [phone]    Fax:  [phone]                                       After Visit Summary   4/18/2018    Colin Shell    MRN: [id number]           After Visit Summary Signature Page     I have received my discharge instructions, and my questions have been answered. I have discussed any challenges I see with this plan with the nurse or doctor.    ..........................................................................................................................................  Patient/Patient Representative Signature      ..........................................................................................................................................  Patient Representative Print Name and Relationship to Patient    ..................................................               ................................................  Date                                            Time    ..........................................................................................................................................  Reviewed by Signature/Title    ...................................................              ..............................................  Date                                                            Time

## 2018-04-18 NOTE — PLAN OF CARE
Problem: Patient Care Overview  Goal: Plan of Care/Patient Progress Review  Outcome: No Change  [place] [facility] ADMISSION NOTE    Patient admitted to room 2309 at approximately 1830 via cart from emergency room. Patient was accompanied by transport tech.     Verbal SBAR report received from Arie prior to patient arrival.     Patient ambulated to bed with stand-by assist. Patient alert and oriented X 3. The patient is not having any pain.  . Admission vital signs: Blood pressure 147/46, pulse 63, temperature 98.6  F (37  C), temperature source Oral, resp. rate 18, SpO2 96 %. Patient was oriented to plan of care, call light, bed controls, tv, telephone and visiting hours.     Risk Assessment    The following safety risks were identified during admission: none. Yellow risk band applied: NO.     Skin Initial Assessment    This writer admitted this patient and completed a full skin assessment and Julio Cesar score in the Adult PCS flowsheet. Appropriate interventions initiated as needed.         Julio Cesar Risk Assessment  Sensory Perception: 4-->no impairment  Moisture: 3-->occasionally moist  Activity: 3-->walks occasionally  Mobility: 3-->slightly limited  Nutrition: 3-->adequate  Friction and Shear: 3-->no apparent problem  Julio Cesar Score: 19    Kristen Freeman

## 2018-04-18 NOTE — ED NOTES
Up ambulating in hallway with assist. sats drop to 90% and heart rate continues at 72. Pt is having increased wheezing and coughing during ambulation.

## 2018-04-18 NOTE — TELEPHONE ENCOUNTER
Reason for call:  Patient reporting a symptom    Symptom or request: Pt was seen 4-9, by Dr. Drake and diagnosed with pneumonia.  Pt is more SOB today.  Transferred to Clinic RN     Duration (how long have symptoms been present): ongoing    Have you been treated for this before? Yes    Additional comments:     Phone Number patient can be reached at:  Home number on file [phone] (home)    Best Time:  any    Can we leave a detailed message on this number:  YES    Call taken on 4/18/2018 at 10:03 AM by Elvi Harmon

## 2018-04-19 ENCOUNTER — PATIENT OUTREACH (OUTPATIENT)
Dept: CARE COORDINATION | Facility: CLINIC | Age: 83
End: 2018-04-19

## 2018-04-19 ENCOUNTER — APPOINTMENT (OUTPATIENT)
Dept: CARDIOLOGY | Facility: CLINIC | Age: 83
DRG: 191 | End: 2018-04-19
Attending: INTERNAL MEDICINE
Payer: COMMERCIAL

## 2018-04-19 ENCOUNTER — APPOINTMENT (OUTPATIENT)
Dept: OCCUPATIONAL THERAPY | Facility: CLINIC | Age: 83
DRG: 191 | End: 2018-04-19
Payer: COMMERCIAL

## 2018-04-19 LAB
ANION GAP SERPL CALCULATED.3IONS-SCNC: 10 MMOL/L (ref 3–14)
ANION GAP SERPL CALCULATED.3IONS-SCNC: 7 MMOL/L (ref 3–14)
BASE DEFICIT BLDV-SCNC: 3.8 MMOL/L
BASOPHILS # BLD AUTO: 0 10E9/L (ref 0–0.2)
BASOPHILS NFR BLD AUTO: 0 %
BUN SERPL-MCNC: 47 MG/DL (ref 7–30)
BUN SERPL-MCNC: 50 MG/DL (ref 7–30)
CALCIUM SERPL-MCNC: 8.3 MG/DL (ref 8.5–10.1)
CALCIUM SERPL-MCNC: 8.7 MG/DL (ref 8.5–10.1)
CHLORIDE SERPL-SCNC: 108 MMOL/L (ref 94–109)
CHLORIDE SERPL-SCNC: 109 MMOL/L (ref 94–109)
CO2 SERPL-SCNC: 19 MMOL/L (ref 20–32)
CO2 SERPL-SCNC: 22 MMOL/L (ref 20–32)
CREAT SERPL-MCNC: 1.97 MG/DL (ref 0.66–1.25)
CREAT SERPL-MCNC: 2.04 MG/DL (ref 0.66–1.25)
DIFFERENTIAL METHOD BLD: ABNORMAL
EOSINOPHIL # BLD AUTO: 0 10E9/L (ref 0–0.7)
EOSINOPHIL NFR BLD AUTO: 0 %
ERYTHROCYTE [DISTWIDTH] IN BLOOD BY AUTOMATED COUNT: 15.4 % (ref 10–15)
GFR SERPL CREATININE-BSD FRML MDRD: 31 ML/MIN/1.7M2
GFR SERPL CREATININE-BSD FRML MDRD: 32 ML/MIN/1.7M2
GLUCOSE SERPL-MCNC: 133 MG/DL (ref 70–99)
GLUCOSE SERPL-MCNC: 194 MG/DL (ref 70–99)
HCO3 BLDV-SCNC: 21 MMOL/L (ref 21–28)
HCT VFR BLD AUTO: 31.7 % (ref 40–53)
HGB BLD-MCNC: 10.3 G/DL (ref 13.3–17.7)
IMM GRANULOCYTES # BLD: 0 10E9/L (ref 0–0.4)
IMM GRANULOCYTES NFR BLD: 0.1 %
LYMPHOCYTES # BLD AUTO: 1.6 10E9/L (ref 0.8–5.3)
LYMPHOCYTES NFR BLD AUTO: 20.2 %
MCH RBC QN AUTO: 29.2 PG (ref 26.5–33)
MCHC RBC AUTO-ENTMCNC: 32.5 G/DL (ref 31.5–36.5)
MCV RBC AUTO: 90 FL (ref 78–100)
MONOCYTES # BLD AUTO: 0.2 10E9/L (ref 0–1.3)
MONOCYTES NFR BLD AUTO: 2.1 %
NEUTROPHILS # BLD AUTO: 6.1 10E9/L (ref 1.6–8.3)
NEUTROPHILS NFR BLD AUTO: 77.6 %
PCO2 BLDV: 39 MM HG (ref 40–50)
PH BLDV: 7.35 PH (ref 7.32–7.43)
PLATELET # BLD AUTO: 193 10E9/L (ref 150–450)
PO2 BLDV: 51 MM HG (ref 25–47)
POTASSIUM SERPL-SCNC: 5.7 MMOL/L (ref 3.4–5.3)
POTASSIUM SERPL-SCNC: 5.9 MMOL/L (ref 3.4–5.3)
POTASSIUM SERPL-SCNC: 6 MMOL/L (ref 3.4–5.3)
POTASSIUM SERPL-SCNC: 6.1 MMOL/L (ref 3.4–5.3)
POTASSIUM SERPL-SCNC: 6.1 MMOL/L (ref 3.4–5.3)
POTASSIUM SERPL-SCNC: 6.7 MMOL/L (ref 3.4–5.3)
POTASSIUM SERPL-SCNC: 6.8 MMOL/L (ref 3.4–5.3)
RBC # BLD AUTO: 3.53 10E12/L (ref 4.4–5.9)
SODIUM SERPL-SCNC: 137 MMOL/L (ref 133–144)
SODIUM SERPL-SCNC: 138 MMOL/L (ref 133–144)
WBC # BLD AUTO: 7.9 10E9/L (ref 4–11)

## 2018-04-19 PROCEDURE — 36415 COLL VENOUS BLD VENIPUNCTURE: CPT | Performed by: INTERNAL MEDICINE

## 2018-04-19 PROCEDURE — 25000132 ZZH RX MED GY IP 250 OP 250 PS 637: Performed by: PHYSICIAN ASSISTANT

## 2018-04-19 PROCEDURE — 96376 TX/PRO/DX INJ SAME DRUG ADON: CPT

## 2018-04-19 PROCEDURE — 80048 BASIC METABOLIC PNL TOTAL CA: CPT | Performed by: EMERGENCY MEDICINE

## 2018-04-19 PROCEDURE — 25000128 H RX IP 250 OP 636: Performed by: PHYSICIAN ASSISTANT

## 2018-04-19 PROCEDURE — 36415 COLL VENOUS BLD VENIPUNCTURE: CPT | Performed by: PHYSICIAN ASSISTANT

## 2018-04-19 PROCEDURE — 93005 ELECTROCARDIOGRAM TRACING: CPT

## 2018-04-19 PROCEDURE — 25500064 ZZH RX 255 OP 636: Performed by: INTERNAL MEDICINE

## 2018-04-19 PROCEDURE — 84132 ASSAY OF SERUM POTASSIUM: CPT | Performed by: PHYSICIAN ASSISTANT

## 2018-04-19 PROCEDURE — 25000128 H RX IP 250 OP 636: Performed by: INTERNAL MEDICINE

## 2018-04-19 PROCEDURE — 80048 BASIC METABOLIC PNL TOTAL CA: CPT | Performed by: PHYSICIAN ASSISTANT

## 2018-04-19 PROCEDURE — 40000133 ZZH STATISTIC OT WARD VISIT

## 2018-04-19 PROCEDURE — 99233 SBSQ HOSP IP/OBS HIGH 50: CPT | Performed by: INTERNAL MEDICINE

## 2018-04-19 PROCEDURE — 85025 COMPLETE CBC W/AUTO DIFF WBC: CPT | Performed by: EMERGENCY MEDICINE

## 2018-04-19 PROCEDURE — 93306 TTE W/DOPPLER COMPLETE: CPT | Mod: 26 | Performed by: INTERNAL MEDICINE

## 2018-04-19 PROCEDURE — 96375 TX/PRO/DX INJ NEW DRUG ADDON: CPT

## 2018-04-19 PROCEDURE — G0378 HOSPITAL OBSERVATION PER HR: HCPCS

## 2018-04-19 PROCEDURE — 12000007 ZZH R&B INTERMEDIATE

## 2018-04-19 PROCEDURE — 97165 OT EVAL LOW COMPLEX 30 MIN: CPT | Mod: GO

## 2018-04-19 PROCEDURE — 25000132 ZZH RX MED GY IP 250 OP 250 PS 637: Performed by: INTERNAL MEDICINE

## 2018-04-19 PROCEDURE — 82803 BLOOD GASES ANY COMBINATION: CPT | Performed by: PHYSICIAN ASSISTANT

## 2018-04-19 PROCEDURE — 84132 ASSAY OF SERUM POTASSIUM: CPT | Performed by: INTERNAL MEDICINE

## 2018-04-19 PROCEDURE — 36415 COLL VENOUS BLD VENIPUNCTURE: CPT | Performed by: EMERGENCY MEDICINE

## 2018-04-19 RX ORDER — SODIUM POLYSTYRENE SULFONATE 15 G/60ML
15 SUSPENSION ORAL; RECTAL 3 TIMES DAILY
Status: DISCONTINUED | OUTPATIENT
Start: 2018-04-19 | End: 2018-04-19

## 2018-04-19 RX ORDER — SODIUM POLYSTYRENE SULFONATE 15 G/60ML
15 SUSPENSION ORAL; RECTAL 4 TIMES DAILY PRN
Status: DISCONTINUED | OUTPATIENT
Start: 2018-04-19 | End: 2018-04-20 | Stop reason: HOSPADM

## 2018-04-19 RX ORDER — LEVOFLOXACIN 500 MG/1
500 TABLET, FILM COATED ORAL ONCE
Status: COMPLETED | OUTPATIENT
Start: 2018-04-19 | End: 2018-04-19

## 2018-04-19 RX ORDER — CALCIUM GLUCONATE 94 MG/ML
1 INJECTION, SOLUTION INTRAVENOUS ONCE
Status: DISCONTINUED | OUTPATIENT
Start: 2018-04-19 | End: 2018-04-19 | Stop reason: RX

## 2018-04-19 RX ORDER — PANTOPRAZOLE SODIUM 40 MG/1
40 TABLET, DELAYED RELEASE ORAL EVERY MORNING
Status: DISCONTINUED | OUTPATIENT
Start: 2018-04-19 | End: 2018-04-20 | Stop reason: HOSPADM

## 2018-04-19 RX ORDER — LEVOFLOXACIN 250 MG/1
250 TABLET, FILM COATED ORAL DAILY
Status: DISCONTINUED | OUTPATIENT
Start: 2018-04-20 | End: 2018-04-20 | Stop reason: HOSPADM

## 2018-04-19 RX ORDER — SODIUM CHLORIDE 9 MG/ML
INJECTION, SOLUTION INTRAVENOUS CONTINUOUS
Status: DISCONTINUED | OUTPATIENT
Start: 2018-04-19 | End: 2018-04-20 | Stop reason: HOSPADM

## 2018-04-19 RX ORDER — LEVOFLOXACIN 500 MG/1
500 TABLET, FILM COATED ORAL DAILY
Status: DISCONTINUED | OUTPATIENT
Start: 2018-04-19 | End: 2018-04-19 | Stop reason: DRUGHIGH

## 2018-04-19 RX ADMIN — ASPIRIN 325 MG: 325 TABLET, DELAYED RELEASE ORAL at 08:22

## 2018-04-19 RX ADMIN — FUROSEMIDE 20 MG: 10 INJECTION, SOLUTION INTRAVENOUS at 02:25

## 2018-04-19 RX ADMIN — SODIUM POLYSTYRENE SULFONATE 15 G: 15 SUSPENSION ORAL; RECTAL at 20:45

## 2018-04-19 RX ADMIN — FINASTERIDE 5 MG: 5 TABLET, FILM COATED ORAL at 08:22

## 2018-04-19 RX ADMIN — ATORVASTATIN CALCIUM 40 MG: 20 TABLET, FILM COATED ORAL at 08:22

## 2018-04-19 RX ADMIN — SODIUM CHLORIDE: 9 INJECTION, SOLUTION INTRAVENOUS at 08:09

## 2018-04-19 RX ADMIN — METOPROLOL SUCCINATE 50 MG: 50 TABLET, EXTENDED RELEASE ORAL at 08:22

## 2018-04-19 RX ADMIN — SODIUM POLYSTYRENE SULFONATE 15 G: 15 SUSPENSION ORAL; RECTAL at 16:51

## 2018-04-19 RX ADMIN — GUAIFENESIN 10 ML: 100 SOLUTION ORAL at 17:51

## 2018-04-19 RX ADMIN — Medication 50 MG: at 08:23

## 2018-04-19 RX ADMIN — HUMAN ALBUMIN MICROSPHERES AND PERFLUTREN 2 ML: 10; .22 INJECTION, SOLUTION INTRAVENOUS at 15:21

## 2018-04-19 RX ADMIN — CALCIUM GLUCONATE 1 G: 98 INJECTION, SOLUTION INTRAVENOUS at 16:54

## 2018-04-19 RX ADMIN — LEVOFLOXACIN 500 MG: 500 TABLET, FILM COATED ORAL at 10:34

## 2018-04-19 RX ADMIN — CALCIUM GLUCONATE 1 G: 98 INJECTION, SOLUTION INTRAVENOUS at 08:19

## 2018-04-19 RX ADMIN — SODIUM CHLORIDE: 9 INJECTION, SOLUTION INTRAVENOUS at 23:45

## 2018-04-19 RX ADMIN — PANTOPRAZOLE SODIUM 40 MG: 40 TABLET, DELAYED RELEASE ORAL at 11:17

## 2018-04-19 ASSESSMENT — ENCOUNTER SYMPTOMS
WEAKNESS: 0
VOMITING: 0
TROUBLE SWALLOWING: 0
DIZZINESS: 0
BACK PAIN: 0
FEVER: 0
CONFUSION: 0
ABDOMINAL PAIN: 0
ACTIVITY CHANGE: 0
HEADACHES: 0
DYSURIA: 0
NUMBNESS: 0
NECK PAIN: 0
NAUSEA: 0

## 2018-04-19 ASSESSMENT — ACTIVITIES OF DAILY LIVING (ADL): PREVIOUS_RESPONSIBILITIES: MEAL PREP;HOUSEKEEPING

## 2018-04-19 NOTE — CONSULTS
Care Transition Initial Assessment - RN      Met with: Patient and Family.    DATA   Principal Problem:    Chronic systolic CHF (congestive heart failure) (H)  Active Problems:    Hypertension goal BP (blood pressure) < 140/90    Gout    Coronary artery disease involving coronary bypass graft of native heart without angina pectoris    Chronic kidney disease, stage III (moderate)    Benign prostatic hyperplasia    Hyperlipidemia LDL goal <100    Bronchiectasis (H)    Fibrosis of lung (H)    Paroxysmal atrial fibrillation (H)    Dyspnea       Primary Care Clinic Name: (P) [facility]  Primary Care MD Name: Jono Drake  Contact information and PCP information verified: Yes    ASSESSMENT  Cognitive Status: awake, alert and oriented.      Lives With: (P) alone  Living Arrangements: (P) house     Description of Support System: (P) Supportive, Involved   Who is your support system?: (P) Children   Support Assessment: (P) Adequate family and caregiver support   Insurance Concerns: No Insurance issues identified      This writer met with pt and his son, introduced self and role.  Patient stated that he lives independently in a house in the community and he drove himself to the ED, his car is in the parking lot. Discussed discharge planning and Medicare guidelines in regards to home care, TCU and LTC. Patients' goal is to return to home, he stated that if he needs it he has two sons that can help, he refused home care and the need for TCU. Son confirmed that his is available to assist when needed. Follow up with PCP.    PLAN  Home      Discharge Planner   Discharge Plans in progress: Home  Barriers to discharge plan: Medical stability  Follow up plan: Follow up with PCP       Entered by: Leann Carmona 04/19/2018 1:01 PM         Leann Carmona RN, BSN, PHN   RN Care Coordinator  [facility] [phone]  [facility] [phone]

## 2018-04-19 NOTE — PLAN OF CARE
Problem: Patient Care Overview  Goal: Plan of Care/Patient Progress Review  Outcome: Improving  Lasix 20 mg given x1 at around 0230. He has had 600 ml output total so far overnight. LS expiratory wheezes. Sats 94-95% on 2L. Continues to have 3+ bilateral lower extremity edema L>R.

## 2018-04-19 NOTE — PROGRESS NOTES
Addendum    K recheck 6.7    Plan  1.ca gluc 1 gram  2.kayaxelate  3. Cont to follow      Ivana Younger

## 2018-04-19 NOTE — PROGRESS NOTES
Lab called at 1440 with critical K 6.8.  Dr. Younger updated, orders received to recheck K now to ensure accuracy of lab.  Orders placed for lab.

## 2018-04-19 NOTE — PROGRESS NOTES
04/19/18 1300   Quick Adds   Type of Visit Initial Occupational Therapy Evaluation   Living Environment   Lives With alone   Living Arrangements house   Number of Stairs to Enter Home 3   Number of Stairs Within Home 0   Living Environment Comment Pt has walk-in fill up tub with grab bars.    Self-Care   Equipment Currently Used at Home grab bar  (near toilet and tub. )   Functional Level Prior   Ambulation 0-->independent   Transferring 0-->independent   Toileting 0-->independent   Bathing 0-->independent   Dressing 0-->independent   Eating 0-->independent   Communication 0-->understands/communicates without difficulty   Swallowing 0-->swallows foods/liquids without difficulty   Cognition 0 - no cognition issues reported   Fall history within last six months no   Which of the above functional risks had a recent onset or change? none   General Information   Onset of Illness/Injury or Date of Surgery - Date 04/18/18   Referring Physician Casie Ashford PA-C   Patient/Family Goals Statement To return home.    Additional Occupational Profile Info/Pertinent History of Current Problem Colin Shell is a 89 year old male with past medical history of bronchiectasis, pulmonary fibrosis, cad, paroxysmal atrial fibrillation, ventricular dysrhythmia,hx ICD for arrythmias, hypertension, hyperlipidemia and BPH who presents with progressive SOB and cough    Cognitive Status Examination   Orientation orientation to person, place and time   Level of Consciousness alert   Cognitive Comment answers all questions appropriately.    Visual Perception   Visual Perception No deficits were identified   Range of Motion (ROM)   ROM Comment B UE ROM: WNL   Strength   Strength Comments Denies any recent changes in strength. UE strength: WFL for ADLs.    Hand Strength   Hand Strength Comments B  strength: WFL for ADLs   Transfer Skill: Bed to Chair/Chair to Bed   Level of [place]: Bed to Chair independent   Physical  "Assist/Nonphysical Assist: Bed to Chair supervision   Transfer Skill: Sit to Stand   Level of [place]: Sit/Stand independent   Physical Assist/Nonphysical Assist: Sit/Stand supervision   Transfer Skill: Toilet Transfer   Level of [place]: Toilet independent   Physical Assist/Nonphysical Assist: Toilet supervision   Upper Body Dressing   Level of [place]: Dress Upper Body independent   Lower Body Dressing   Level of [place]: Dress Lower Body independent   Instrumental Activities of Daily Living (IADL)   Previous Responsibilities meal prep;housekeeping   IADL Comments completes microwave meals.    General Therapy Interventions   Intervention Comments Per discussion with RN pt with dried BM on bottom upon admit. Provided education to pt on AE to assist with pericares following BM. Pt denies issues with this and ROM for pericares appears to be WNL. Ambulates to/from bathroom and throughout room with SBA and no AD.    Clinical Impression   Criteria for Skilled Therapeutic Interventions Met evaluation only;no problems identified which require skilled intervention   OT Diagnosis assess ADLs   Influenced by the following impairments generalized weakness- pt denies any weakness   Anticipated Discharge Disposition Home   Risks and Benefits of Treatment have been explained. Yes   Patient, Family & other staff in agreement with plan of care Yes   [facility] AM-PAC  \"6 Clicks\" Daily Activity Inpatient Short Form   1. Putting on and taking off regular lower body clothing? 4 - None   2. Bathing (including washing, rinsing, drying)? 4 - None   3. Toileting, which includes using toilet, bedpan or urinal? 4 - None   4. Putting on and taking off regular upper body clothing? 4 - None   5. Taking care of personal grooming such as brushing teeth? 4 - None   6. Eating meals? 4 - None   Daily Activity Raw Score (Score out of 24.Lower scores equate to lower levels of function) 24   Total Evaluation Time   Total " Evaluation Time (Minutes) 15

## 2018-04-19 NOTE — LETTER
[facility] CARE [facility]ORDIN[facility]  [address] [place] [address]        April 23, 2018      Colin Shell  [address]  [facility] [address]    Dear Keron,    I am a clinic care coordinator who works with  at the [facility]. I wanted to thank you for spending the time to talk with me.I also wanted to provide you with my contact information so that you can call me with questions or concerns about your health care. Below is a description of clinic care coordination and how I can further assist you.     The clinic care coordinator is a registered nurse and/or  who understand the health care system. The goal of clinic care coordination is to help you manage your health and improve access to the [facility] in the most efficient manner. The registered nurse can assist you in meeting your health care goals by providing education, coordinating services, and strengthening the communication among your providers. The  can assist you with financial, behavioral, psychosocial, chemical dependency, counseling, and/or psychiatric resources.    Please feel free to contact me at [phone], with any questions or concerns. We at [facility] are focused on providing you with the highest-quality healthcare experience possible and that all starts with you.     Sincerely,     Edwin GRACIA,RN- BC  Clinic Care Coordinator  [facility]  Phone: [phone]    Enclosed: I have enclosed a copy of a 24 Hour Access Plan. This has helpful phone numbers for you to call when needed. Please keep this in an easy to access place to use as needed. and I have enclosed helpful educational material. Please review and call me with any questions.

## 2018-04-19 NOTE — PROGRESS NOTES
Skin affirmation note    Admitting nurse, Kristin,  completed full skin assessment, Julio Cesar score and Julio Cesar interventions. This writer agrees with the initial skin assessment findings.  COBY Hayes RN

## 2018-04-19 NOTE — PROVIDER NOTIFICATION
MD on call (MARLENI Sin) paged to request parameters for patient's BP meds. HR currently in high 40s-low 50s & has metoprolol xr, losartan & proscar due. Awaiting callback

## 2018-04-19 NOTE — PLAN OF CARE
Problem: Patient Care Overview  Goal: Plan of Care/Patient Progress Review  Outcome: Improving  Pt says breathing is improved from yesterday.  O2 sats at rest 95% on RA.  Still PUENTES and has infrequent nonproductive cough, bilateral crackles heard this AM.  Denies other pain or nausea, appetite good, voiding had BM today.  Up in room with SBA, steady when up.  Potassium 5.7 at 1000, recheck due at 1400.  Pt to have echocardiogram later this afternoon.  Son here at bedside this morning.  Both pt and son updated about POC, questions answered.

## 2018-04-19 NOTE — PROGRESS NOTES
Clinic Care Coordination Contact    Situation: Patient chart reviewed by care coordinator.    Background: Patient currently hospitalized at [facility]. RN CC received CTS as follows:  Transition Communication Hand-off for Care Transitions to Next Level of Care Provider  Name: Colin Shell  : 1927  MRN #: [id number]  Primary Care Provider: Jono Drake  Primary Care MD Name: Jono Drake  Primary Clinic: [address] [facility] [address]  Primary Care Clinic Name: (P) [facility]  Reason for Hospitalization:  Cough [R05]  Dyspnea [R06.00]  COPD exacerbation (H) [J44.1]  Admit Date/Time: 2018 10:39 AM  Discharge Date: 1-2 days  Payor Source: Payor: [employer] / Plan: [employer] FOR SENIORS / Product Type: HMO /   Reason for Communication Hand-off Referral: Admission diagnoses: CHF  Admission diagnoses: COPD  Discharge Plan: Home   Concern for non-adherence with plan of care:no  Already enrolled in Tele-monitoring program and name of program:  no  Follow-up plan:  Future Appointments  Date Time Provider Department Center   2018 10:00 AM Rn, [facility] Device [facility]   [place] Recommendations:  Follow up with PCP, Patient lives independently in a house in the community and he drove himself to the ED, his car is in the parking lot. Patients' goal is to return to home, he stated that if he needs it he has two sons that can help, he refused home care and the need for TCU. Son confirmed that his is available to assist when needed.     Assessment:Patient remains hospitalized at this time. It appears he will not be ready for DC for at least 1-2 days    Plan/Recommendations: RN CC will monitor chart and follow up as needed after DC.    Edwin GRACIA,RN- BC  Clinic Care Coordinator  [facility]  Phone: [phone]

## 2018-04-19 NOTE — LETTER
Health Care Home - Access Care Plan  About Me  Patient Name:  Colin Anaya  YOB: 1927  Age:                             89 year old   [place] MRN:            [id number] Telephone Information:     Home Phone [phone]   Mobile Not on file.       Address:    [address]  [facility] [place] [facility] [address] Email address:  No e-mail address on record      Emergency Contact(s)  Name Relationship Lgl Grd Work Phone Home Phone Mobile Phone   1. FUAD ANAYA Son   [phone]   2. KRIS ANAYA Son   [phone]         My Access Plan  Medical Emergency 914   Questions or concerns during clinic hours Primary Clinic Line, I will call the clinic directly:   [phone]   24 Hour Appointment Line [phone] or  [phone]) (toll free)   24 Hour Nurse Line [phone] (toll free)   Questions or concerns outside clinic hours 24 Hour Appointment Line, I will call the after-hours on-call line:   [facility] [phone] or [phone]) (toll-free)   Preferred Urgent Care [facility], [phone]   Preferred Hospital [place], [place]  [phone]   Preferred Pharmacy [facility] - [place], [place] - [address]      Behavioral Health Crisis Line The National Suicide Prevention Lifeline at 1-873.246.9512 or 911                 My Care Team Members  Patient Care Team       Relationship Specialty Notifications Start End    Jono Drake MD PCP - General Family Practice  4/11/18     Phone: [phone] Fax: [phone] [address] [facility] [address]    Edwin Marley, RN Clinic Care Coordinator Primary Care - CC Admissions 4/19/18     Phone: [phone]                My Medical and Care Information  Problem List   Patient Active Problem List   Diagnosis     Hypertension goal BP (blood pressure) < 140/90     Gout     Coronary artery disease involving coronary bypass graft of  native heart without angina pectoris     Chronic kidney disease, stage III (moderate)     Obesity     Benign prostatic hyperplasia     Hyperlipidemia LDL goal <100     Health Care Home     Bronchiectasis (H)     Cholelithiasis     Fibrosis of lung (H)     Pulmonary nodule, right     Advance Care Planning     Chronic systolic CHF (congestive heart failure) (H)     Paroxysmal atrial fibrillation (H)     Chronic systolic congestive heart failure (H)     Dyspnea      Current Medications and Allergies:  See printed Medication Report

## 2018-04-19 NOTE — PROGRESS NOTES
[facility] Medicine Progress Note  Date of Service: 04/19/2018    Assessment & Plan     Colin Shell is a 89 year old male with past medical history of bronchiectasis, pulmonary fibrosis, cad, paroxysmal atrial fibrillation, ventricular dysrhythmia,hx ICD for arrythmias, hypertension, hyperlipidemia and BPH who presents with progressive SOB and cough      Progressive Dyspnea and Cough- probable exacerbation of bronchiectasis-elevated bnp may be from RV strain with pulm dz.  CT scan suggestive of more focal lung process and not chf.  -d/c lasix bc of progressive renal dz  -add levofloxacin, may benefit from chronic suppressive antibx once tx for exacerbation  -sputum  -start protonix which in some studies has helped bronchiectasis  -strongly rec pulm consult as outpt- pt willing to go if he doesn't have to go to [facility]  -check echo to look for pulm pressures, rv fx     Generalized Weakness  Patient with generalized weakness over past several years. Feels he is able to do less at home.  - PT/OT consult  - care transitions consult  - palliative care consult     CAD- hx cabg . Not clear that current presentation acute chf and may be primarily from bronchiectasis, as above. BNP could be explained by RV strain with pulm dz as could le edema     Leukocytosis-resolved   11.1 on admit and now 7.9.         Acute on Chronic kidney disease, stage III  Review of chart over the year shows a slow progression and worsening of his renal fx.creat 1.11 2/2018 and on admit 1.74 ans after lasix overnight 1.97. No clear med changes that would have predated this. I have stopped losartan. I will stop lasix. Start ivf 75 cc/hr and follow renal fx. Consider renal u/s if does not improve     Hyperkalemia  Review of chart shows slowly increasing K that corresponds to increase in creat.admit Potassium elevated at 5.4. Then this am, K-6. No sx. ekg paced. Given ca gluc x 1. Stopped losartan and  lasix and started ivf. K lab subsequently 5.7. cont to follow    Paroxysmal atrial fibrillation  History of Ventricular dysrhythmia s/p ICD implantation  Previously managed on Xarelto, though patient was apparently non-compliant due to cost then declined further anticoagulation aside from ASA.  - continue home metoprolol hold for SBP < 100 or HR < 50  - continue home ASA      Coronary artery disease s/p CABG  CABG in 1996. Follows with cardiology, Cardiolite stress test 2017 showed transmural scar with mild ellie-infarct ischemia.   - continue home ASA, statin, beta blocker, ARB stopped bc of hyperkalemia   Hypertension  Chronic and stable. Blood pressures reviewed, stable, though somewhat low.   - continue metoprolol   - hold home amlodipine given lower  -d/c losartam nc of high k and incr creat  LE edema- unclear if right chf or if secondary to amlodipine. Amlodipine on hold and will check echo     Hyperlipidemia  Chronic and stable.  - continue PTA atorvastatin     Gout  - continue home allopurinol      Benign prostatic hyperplasia  - continue home finasteride     FEN:  -started ns 75 cc /hr bc of concern over worsening renal fx in setting of having received lasix  - Low sodium diet     DVT Prophylaxis: Pneumatic Compression Devices  Code Status: DNR / DNI  Lines: piv   Mckenzie catheter: no  Disposition: Anticipate discharge tomorrow        Ivana Younger       Interval History   Admit yesterday with pulm sx- sob cough. Per pt this has been intermittent for a long time, ongoing at least 6 months. He has been off and on antib.  He says he will have a few days of feeling poorly and then 2 days feeling better. He feels better since admit. He was treated with lasix/duoneb/pred.  Review of his chart shows his primary has rec pulm consult for bronchiectasis but he has not gone. When I rec a pulm consult today it seemed he had never heard this rec before.. He does report LE edema chronic left le swelling since cabg but new r  le swelling past 6 months    Physical Exam   Temp:  [97.2  F (36.2  C)-98.6  F (37  C)] 97.9  F (36.6  C)  Pulse:  [63-74] 74  Heart Rate:  [49-82] 74  Resp:  [10-39] 20  BP: (106-147)/() 106/53  SpO2:  [92 %-98 %] 97 %    Weights:   Vitals:    04/18/18 1943 04/19/18 0623   Weight: 219 lb 12.8 oz (99.7 kg) 216 lb 0.8 oz (98 kg)    Body mass index is 31 kg/(m^2).    Constitutional: nad  CV: Regular  Respiratory: diffuse insp and exp rales  GI: Soft, nontender  Skin: Warm and dry  Musculoskeletal:edema to knees-1+    Data     Recent Labs  Lab 04/19/18  0952 04/19/18  0553 04/18/18  1052   WBC  --  7.9 11.1*   HGB  --  10.3* 12.0*   MCV  --  90 90   PLT  --  193 237   NA  --  138 140   POTASSIUM 5.7* 6.0* 5.4*   CHLORIDE  --  109 111*   CO2  --  22 23   BUN  --  47* 37*   CR  --  1.97* 1.74*   ANIONGAP  --  7 6   MARTA  --  8.3* 8.8   GLC  --  194* 122*   TROPI  --   --  <0.015         Recent Labs  Lab 04/19/18  0553 04/18/18  1052   * 122*        Unresulted Labs Ordered in the Past 30 Days of this Admission     No orders found for last 61 day(s).           Imaging  Recent Results (from the past 24 hour(s))   Chest XR,  PA & LAT    Narrative    XR CHEST 2 VW 4/18/2018 12:24 PM    HISTORY: Short of breath.    COMPARISON: Several prior studies, the most recent one being dated  4/1/2018.      Impression    IMPRESSION: 2 views of the chest show no convincing acute  cardiopulmonary disease. Chronic interstitial abnormality in the lungs  is stable and can relate to interstitial scarring/fibrosis.  Cardiomegaly and transvenous pacer are unchanged.    MONICA SALAS MD   Chest CT w/o contrast    Narrative    CT CHEST WITHOUT CONTRAST  4/18/2018 2:57 PM    HISTORY:  Shortness of breath. Cough.      COMPARISON: A CT on 3/25/2018.    TECHNIQUE: Routine transverse CT imaging of the chest was performed  without contrast. Radiation dose for this scan was reduced using  automated exposure control, adjustment of the mA and/or  kV according  to patient size, or iterative reconstruction technique.    FINDINGS: The heart size is mildly enlarged. There is a moderate-sized  hiatal hernia.. There are numerous mildly enlarged mediastinal lymph  nodes. These are unchanged. No other abnormal mediastinal mass is  seen. There is prominent calcification within the thoracic aorta and  coronary arteries. The vascular structures are otherwise unremarkable,  allowing for the absence of contrast. There are surgical changes of  presumed coronary bypass surgery. There are emphysematous changes in  the lungs. There has been improvement of what are now only mild patchy  groundglass opacities throughout the left lung. The lungs are  otherwise clear. No pneumothorax is demonstrated. Again seen are small  bilateral pleural effusions, right greater than left. This has  progressed on the right but decreased on the left. There are  degenerative changes in the spine and surgical changes of a median  sternotomy. There is a left chest wall intracardiac stimulator device.  No other chest wall abnormality is seen. Within the visualized upper  abdomen, again seen are gallstones.      Impression    IMPRESSION:   1. Improvement of what are now only mild patchy opacities of the left  lung representing mild infiltrate or inflammation. No pulmonary  consolidation is demonstrated.  2. No change in mild mediastinal lymphadenopathy.  3. Mild cardiomegaly and small bilateral pleural effusions.    RUBIO PARADA MD      ekg today paced    Medications     Continuing ACE inhibitor/ARB/ARNI from home medication list OR ACE inhibitor/ARB order already placed during this visit       - MEDICATION INSTRUCTIONS -       sodium chloride 75 mL/hr at 04/19/18 0809       allopurinol  50 mg Oral Daily     aspirin  325 mg Oral Daily     atorvastatin  40 mg Oral Daily     finasteride  5 mg Oral Daily     [START ON 4/20/2018] levofloxacin  250 mg Oral Daily     levofloxacin  500 mg Oral Once      metoprolol succinate  50 mg Oral Daily     pantoprazole  40 mg Oral QAM     sodium chloride (PF)  3 mL Intracatheter Q8H       Ivana Younger

## 2018-04-20 VITALS
WEIGHT: 220.9 LBS | RESPIRATION RATE: 20 BRPM | DIASTOLIC BLOOD PRESSURE: 55 MMHG | BODY MASS INDEX: 31.7 KG/M2 | OXYGEN SATURATION: 93 % | HEART RATE: 54 BPM | SYSTOLIC BLOOD PRESSURE: 117 MMHG | TEMPERATURE: 97.3 F

## 2018-04-20 LAB
ANION GAP SERPL CALCULATED.3IONS-SCNC: 6 MMOL/L (ref 3–14)
BUN SERPL-MCNC: 45 MG/DL (ref 7–30)
CALCIUM SERPL-MCNC: 8.3 MG/DL (ref 8.5–10.1)
CHLORIDE SERPL-SCNC: 108 MMOL/L (ref 94–109)
CO2 SERPL-SCNC: 24 MMOL/L (ref 20–32)
CREAT SERPL-MCNC: 1.82 MG/DL (ref 0.66–1.25)
GFR SERPL CREATININE-BSD FRML MDRD: 35 ML/MIN/1.7M2
GLUCOSE SERPL-MCNC: 107 MG/DL (ref 70–99)
POTASSIUM SERPL-SCNC: 4.9 MMOL/L (ref 3.4–5.3)
POTASSIUM SERPL-SCNC: 5 MMOL/L (ref 3.4–5.3)
SODIUM SERPL-SCNC: 138 MMOL/L (ref 133–144)

## 2018-04-20 PROCEDURE — 84132 ASSAY OF SERUM POTASSIUM: CPT | Performed by: INTERNAL MEDICINE

## 2018-04-20 PROCEDURE — 36415 COLL VENOUS BLD VENIPUNCTURE: CPT | Performed by: INTERNAL MEDICINE

## 2018-04-20 PROCEDURE — 25000132 ZZH RX MED GY IP 250 OP 250 PS 637: Performed by: INTERNAL MEDICINE

## 2018-04-20 PROCEDURE — 25000132 ZZH RX MED GY IP 250 OP 250 PS 637: Performed by: PHYSICIAN ASSISTANT

## 2018-04-20 PROCEDURE — 99239 HOSP IP/OBS DSCHRG MGMT >30: CPT | Performed by: INTERNAL MEDICINE

## 2018-04-20 PROCEDURE — 80048 BASIC METABOLIC PNL TOTAL CA: CPT | Performed by: INTERNAL MEDICINE

## 2018-04-20 RX ORDER — LEVOFLOXACIN 500 MG/1
500 TABLET, FILM COATED ORAL DAILY
Qty: 6 TABLET | Refills: 0 | Status: SHIPPED | OUTPATIENT
Start: 2018-04-21 | End: 2018-04-27

## 2018-04-20 RX ORDER — PANTOPRAZOLE SODIUM 40 MG/1
40 TABLET, DELAYED RELEASE ORAL EVERY MORNING
Qty: 30 TABLET | Refills: 0 | Status: SHIPPED | OUTPATIENT
Start: 2018-04-21 | End: 2018-05-07

## 2018-04-20 RX ORDER — ALLOPURINOL 100 MG/1
50 TABLET ORAL DAILY
Qty: 30 TABLET
Start: 2018-04-21 | End: 2018-01-01

## 2018-04-20 RX ADMIN — LEVOFLOXACIN 250 MG: 250 TABLET, FILM COATED ORAL at 08:00

## 2018-04-20 RX ADMIN — METOPROLOL SUCCINATE 50 MG: 50 TABLET, EXTENDED RELEASE ORAL at 08:01

## 2018-04-20 RX ADMIN — ASPIRIN 325 MG: 325 TABLET, DELAYED RELEASE ORAL at 07:59

## 2018-04-20 RX ADMIN — Medication 50 MG: at 07:59

## 2018-04-20 RX ADMIN — PANTOPRAZOLE SODIUM 40 MG: 40 TABLET, DELAYED RELEASE ORAL at 08:01

## 2018-04-20 RX ADMIN — ATORVASTATIN CALCIUM 40 MG: 20 TABLET, FILM COATED ORAL at 08:00

## 2018-04-20 RX ADMIN — FINASTERIDE 5 MG: 5 TABLET, FILM COATED ORAL at 08:00

## 2018-04-20 NOTE — PROGRESS NOTES
[facility]    Hospital Medicine   Care Update  Date of Service: 4/19/2018     I was called due to potassium of 6.1.  Patient with fluctuations of potassium throughout the day.  Per rounding MD note, this was though to be secondary to renal dysfunction.  Given kayexalate and calcium gluconate at approximately 17:00.  Potassium at 18:38 was 6.1.    Plan  - will add on BMP to assure no evidence of acidosis and to re-assess renal function  - continue IVF  - repeat potassium at 22:00; if this is continuing to fall, will not repeat value until tomorrow AM.    This was discussed with Dr. Caleb Cedeño PA-C

## 2018-04-20 NOTE — PLAN OF CARE
[place] [facility] DISCHARGE NOTE    Patient discharged to home at 2:03 PM via wheel chair. Accompanied by son and staff. Discharge instructions reviewed with patient, opportunity offered to ask questions. Prescriptions sent to patients preferred pharmacy. All belongings sent with patient.    Jaron Maldonado

## 2018-04-20 NOTE — CONSULTS
Nutrition note  Reason for Assessment:  Nutrition consult requested for nutrition education regarding the 2 gram sodium diet  Diet History:   The patient reports that he usually eats cold cereal for breakfast. He eats frozen microwave meals for lunch and supper. He states that he has noticed that soup contains a lot of sodium. He does not add salt to his food.He does like to eat some fruits and vegetables. Reviewed lower sodium choices that he can make when purchasing his groceries.  Nutrition Diagnosis:  Food- and nutrition-related knowledge deficit r/t no previous knowledge of need to follow a 2 gram sodium diet as evidenced by the patient is selecting some foods with high sodium content.  Interventions:  Provided instruction on the 2 gram sodium diet  Provided handouts the 2 gram sodium diet  Goals:   Patient will verbalize understanding of high sodium containing foods to avoid   Follow-up:    Patient to ask any further nutrition-related questions before discharge.  In addition, pt may request outpatient RD appointment.    Shannan Keita RD,LD  Clinical Dietitian

## 2018-04-20 NOTE — DISCHARGE SUMMARY
[facility]    Discharge Summary  Hospital Medicine    Date of Admission:  4/18/2018  Date of Discharge:  4/20/2018   Discharging Provider: Ivana Younger  Date of Service: 4/20/2018     Primary Care     Jono Drake  [address] [facility] [address]      .  Assessment & Plan     Colin Shell is a 89 year old male with past medical history of bronchiectasis, pulmonary fibrosis, cad, paroxysmal atrial fibrillation, ventricular dysrhythmia,hx ICD for arrythmias, hypertension, hyperlipidemia and BPH who presents with progressive SOB and cough       Progressive Dyspnea and Cough- probable exacerbation of bronchiectasis-  CT scan suggestive of more focal lung process and not chf.    -levofloxacin for tx of acute exacerbation, may benefit from chronic suppressive antibx once tx for exacerbation  -sputum unable to be collected  -start protonix which in some studies has helped bronchiectasis  -strongly rec pulm consult as outpt- pt willing to go if he doesn't have to go to [facility]        Generalized Weakness  Patient with generalized weakness over past several years. Feels he is able to do less at home.  Pt was able to ambulate independently and will return home          Leukocytosis-resolved   11.1 on admit and subsequently 7.9.           Acute on Chronic kidney disease, stage III  Review of chart over the year shows a slow progression and worsening of his renal fx.creat 1.11 2/2018 and on admit 1.74 and after lasix overnight 1.97. Stopped lasix and losartan and creat peaked at 2.04 and then decr to 1.84.Pt need f/u labs at next clinic visit      Hyperkalemia- likely from combo renal failure and losaratan- improved   Review of chart shows slowly increasing K that corresponds to increase in creat.admit Potassium elevated at 5.4. Then this am, K-6. No sx. ekg paced. Given ca gluc x 1. Stopped losartan and lasix and started ivf. K lab subsequently 5.7. and then 6.8. Started  kayaxelate. Got 2 doses. Today K better at 4.9 at 6 am and 5  At 10am. neds recheck at clinic appt     Paroxysmal atrial fibrillation  History of Ventricular dysrhythmia s/p ICD implantation  Previously managed on Xarelto, though patient was apparently non-compliant due to cost then declined further anticoagulation aside from ASA.  - continue home metoprolol hold for SBP < 100 or HR < 50  - continue home ASA       Coronary artery disease s/p CABG  CABG in 1996. Follows with cardiology, Cardiolite stress test 2017 showed transmural scar with mild ellie-infarct ischemia.   - continue home ASA, statin, beta blocker, ARB stopped bc of hyperkalemia   Hypertension  Chronic and stable. Blood pressures reviewed, stable, though somewhat low.   - continue metoprolol   - hold home amlodipine given lower extremity edema and lower bp  -d/c sammy moncada of high k and incr creat    LE edema-Amlodipine on hold . Offered rx for support stockings and he declined      Hyperlipidemia  Chronic and stable.  - continue PTA atorvastatin      Gout  - bc of BRIEN, dose of allopurinol 50      Benign prostatic hyperplasia  - continue home finasteride      Discharge Disposition   home    Discharge Orders     PULMONARY MEDICINE REFERRAL     Reason for your hospital stay   Bronchiectasis exacerbation     Follow-up and recommended labs and tests    Primary md 1 week. Needs bmp at that visit to follow hyperkalemia and BRIEN     Follow Up ([facility])   Do not take magnesium or vitamins while on antibiotic       Discharge Medications   Current Discharge Medication List      START taking these medications    Details   levofloxacin (LEVAQUIN) 500 MG tablet Take 1 tablet (500 mg) by mouth daily for 6 days  Qty: 6 tablet, Refills: 0    Associated Diagnoses: Bronchiectasis with acute lower respiratory infection (H)      pantoprazole (PROTONIX) 40 MG EC tablet Take 1 tablet (40 mg) by mouth every morning  Qty: 30 tablet, Refills: 0    Associated Diagnoses:  Gastroesophageal reflux disease without esophagitis         CONTINUE these medications which have CHANGED    Details   allopurinol (ZYLOPRIM) 100 MG tablet Take 0.5 tablets (50 mg) by mouth daily  Qty: 30 tablet    Associated Diagnoses: Gout of foot, unspecified cause, unspecified chronicity, unspecified laterality         CONTINUE these medications which have NOT CHANGED    Details   albuterol (PROAIR HFA/PROVENTIL HFA/VENTOLIN HFA) 108 (90 BASE) MCG/ACT Inhaler Inhale 2 puffs into the lungs every 4 hours as needed for shortness of breath / dyspnea or wheezing WITH SPACER  Qty: 1 Inhaler, Refills: 0      aspirin  MG EC tablet Take 1 tablet (325 mg) by mouth daily  Qty: 90 tablet, Refills: 3      atorvastatin (LIPITOR) 40 MG tablet Take 1 tablet (40 mg) by mouth daily  Qty: 90 tablet, Refills: 3    Associated Diagnoses: Hyperlipidemia LDL goal <100      finasteride (PROSCAR) 5 MG tablet Take 1 tablet (5 mg) by mouth daily  Qty: 90 tablet, Refills: 3    Comments: Do not fill until pt request  Associated Diagnoses: Benign non-nodular prostatic hyperplasia without lower urinary tract symptoms      metoprolol succinate (TOPROL-XL) 25 MG 24 hr tablet Take 2 tablets (50 mg) by mouth daily  Qty: 180 tablet, Refills: 3    Associated Diagnoses: Paroxysmal atrial fibrillation (H); Coronary artery disease involving native coronary artery of native heart without angina pectoris      Multiple Vitamins-Minerals (CENTRUM SILVER) per tablet Take 1 tablet by mouth daily      Magnesium Hydroxide (NAVA MILK OF MAGNESIA PO)       triamcinolone (KENALOG) 0.1 % ointment Apply topically 2 times daily Apply sparingly to affected area twice times daily for the next 7-10 days.  Qty: 30 g, Refills: 1    Associated Diagnoses: Rash and nonspecific skin eruption         STOP taking these medications       amLODIPine (NORVASC) 10 MG tablet Comments:   Reason for Stopping:         losartan (COZAAR) 100 MG tablet Comments:   Reason for  Stopping:             Allergies   Allergies   Allergen Reactions     Flomax [Tamsulosin Hydrochloride]      Hctz Other (See Comments)     Caused loss of balance     Lisinopril Cough     Simvastatin Nausea and Vomiting and Diarrhea     Vytorin Nausea and Diarrhea       Consultations This Hospital Stay     CARE COORDINATOR IP CONSULT  NUTRITION SERVICES ADULT IP CONSULT  PALLIATIVE CARE ADULT IP CONSULT  PHYSICAL THERAPY ADULT IP CONSULT  OCCUPATIONAL THERAPY ADULT IP CONSULT  CARE TRANSITION RN/SW IP CONSULT  PALLIATIVE CARE ADULT IP CONSULT    Significant Results and Procedures   Procedures        Data      Echo-  Interpretation Summary     The left ventricle is normal in size.  There is moderate concentric left ventricular hypertrophy.  The visual ejection fraction is estimated at 45-50%.  There is mild global hypokinesia of the left ventricle.  There is sclerosis, calcification, and restriction of the aortic valve opening  compatible with mild aortic stenosis.  Overall, findings appear similar to previous echo 10/18/2017    Results for orders placed or performed during the hospital encounter of 04/18/18   Chest XR,  PA & LAT    Narrative    XR CHEST 2 VW 4/18/2018 12:24 PM    HISTORY: Short of breath.    COMPARISON: Several prior studies, the most recent one being dated  4/1/2018.      Impression    IMPRESSION: 2 views of the chest show no convincing acute  cardiopulmonary disease. Chronic interstitial abnormality in the lungs  is stable and can relate to interstitial scarring/fibrosis.  Cardiomegaly and transvenous pacer are unchanged.    MONICA SALAS MD   Chest CT w/o contrast    Narrative    CT CHEST WITHOUT CONTRAST  4/18/2018 2:57 PM    HISTORY:  Shortness of breath. Cough.      COMPARISON: A CT on 3/25/2018.    TECHNIQUE: Routine transverse CT imaging of the chest was performed  without contrast. Radiation dose for this scan was reduced using  automated exposure control, adjustment of the mA and/or kV  according  to patient size, or iterative reconstruction technique.    FINDINGS: The heart size is mildly enlarged. There is a moderate-sized  hiatal hernia.. There are numerous mildly enlarged mediastinal lymph  nodes. These are unchanged. No other abnormal mediastinal mass is  seen. There is prominent calcification within the thoracic aorta and  coronary arteries. The vascular structures are otherwise unremarkable,  allowing for the absence of contrast. There are surgical changes of  presumed coronary bypass surgery. There are emphysematous changes in  the lungs. There has been improvement of what are now only mild patchy  groundglass opacities throughout the left lung. The lungs are  otherwise clear. No pneumothorax is demonstrated. Again seen are small  bilateral pleural effusions, right greater than left. This has  progressed on the right but decreased on the left. There are  degenerative changes in the spine and surgical changes of a median  sternotomy. There is a left chest wall intracardiac stimulator device.  No other chest wall abnormality is seen. Within the visualized upper  abdomen, again seen are gallstones.      Impression    IMPRESSION:   1. Improvement of what are now only mild patchy opacities of the left  lung representing mild infiltrate or inflammation. No pulmonary  consolidation is demonstrated.  2. No change in mild mediastinal lymphadenopathy.  3. Mild cardiomegaly and small bilateral pleural effusions.    RUBIO PARADA MD     Lab Results   Component Value Date    WBC 7.9 04/19/2018    HGB 10.3 (L) 04/19/2018    HCT 31.7 (L) 04/19/2018     04/19/2018    CHOL 204 (H) 01/04/2018    TRIG 147 01/04/2018    HDL 47 01/04/2018    ALT 72 (H) 04/01/2018    AST 62 (H) 04/01/2018     04/20/2018    BUN 45 (H) 04/20/2018    CO2 24 04/20/2018    TSH 4.12 (H) 01/13/2017    PSA 3.06 09/16/2009   creat-1.84 ( down from peak of  2.04)  k-5 ( peak 6.8)    Physical Exam   Temp:  [97.3  F (36.3   C)-98.4  F (36.9  C)] 97.3  F (36.3  C)  Pulse:  [50-65] 54  Heart Rate:  [50-60] 60  Resp:  [20-22] 20  BP: (111-133)/(50-62) 117/55  SpO2:  [90 %-93 %] 93 %  Vitals:    04/18/18 1943 04/19/18 0623 04/20/18 0351   Weight: 219 lb 12.8 oz (99.7 kg) 216 lb 0.8 oz (98 kg) 220 lb 14.4 oz (100.2 kg)       Constitutional: nad  CV: Regular  Respiratory: CTA bilaterally  GI: Soft, nontender  Skin: Warm and dry  Le 3+ edema    The discharge plan was discussed with the patient     Total time on this discharge was 40 min    Ivana Younger

## 2018-04-20 NOTE — PROGRESS NOTES
Clinic Care Coordination Contact    Situation: Patient chart reviewed by care coordinator.    Background: This RN CC received CTS on this patient yesterday, indicating he would be discharging today. Per chart review, he is currently being treated for a high potassium and the is no plan for DC at this time.    Assessment: Patient remains hospitalized and being treated for medical issues.    Plan/Recommendations: RN CC will monitor chart and will f/u with patient as needed.    Edwin GRACIA,RN- BC  Clinic Care Coordinator  [facility]  Phone: [phone]

## 2018-04-20 NOTE — PHARMACY - DISCHARGE MEDICATION RECONCILIATION
Discharge medication review for this patient is complete. Pharmacist assisted with medication reconciliation of discharge medications with prior to admission medications.     The following changes were made to the discharge medication list based on pharmacist review:  Added:  none  Discontinued: none  Changed: none      Patient's Discharge Medication List  - medications as listed on After Visit Summary (AVS)     Review of your medicines      START taking       Dose / Directions    levofloxacin 500 MG tablet   Commonly known as:  LEVAQUIN   Indication:  bronchiectasis        Dose:  500 mg   Start taking on:  4/21/2018   Take 1 tablet (500 mg) by mouth daily for 6 days   Quantity:  6 tablet   Refills:  0       pantoprazole 40 MG EC tablet   Commonly known as:  PROTONIX   Used for:  Gastroesophageal reflux disease without esophagitis        Dose:  40 mg   Start taking on:  4/21/2018   Take 1 tablet (40 mg) by mouth every morning   Quantity:  30 tablet   Refills:  0         CONTINUE these medicines which may have CHANGED, or have new prescriptions. If we are uncertain of the size of tablets/capsules you have at home, strength may be listed as something that might have changed.       Dose / Directions    allopurinol 100 MG tablet   Commonly known as:  ZYLOPRIM   This may have changed:    - how much to take  - how to take this  - when to take this  - additional instructions        Dose:  50 mg   Start taking on:  4/21/2018   Take 0.5 tablets (50 mg) by mouth daily   Quantity:  30 tablet   Refills:  0         CONTINUE these medicines which have NOT CHANGED       Dose / Directions    albuterol 108 (90 Base) MCG/ACT Inhaler   Commonly known as:  PROAIR HFA/PROVENTIL HFA/VENTOLIN HFA        Dose:  2 puff   Inhale 2 puffs into the lungs every 4 hours as needed for shortness of breath / dyspnea or wheezing WITH SPACER   Quantity:  1 Inhaler   Refills:  0       aspirin 325 MG EC tablet        Dose:  325 mg   Take 1 tablet (325  mg) by mouth daily   Quantity:  90 tablet   Refills:  3       atorvastatin 40 MG tablet   Commonly known as:  LIPITOR   Used for:  Hyperlipidemia LDL goal <100        Dose:  40 mg   Take 1 tablet (40 mg) by mouth daily   Quantity:  90 tablet   Refills:  3       CENTRUM SILVER per tablet        Dose:  1 tablet   Take 1 tablet by mouth daily   Refills:  0       finasteride 5 MG tablet   Commonly known as:  PROSCAR   Used for:  Benign non-nodular prostatic hyperplasia without lower urinary tract symptoms        Dose:  5 mg   Take 1 tablet (5 mg) by mouth daily   Quantity:  90 tablet   Refills:  3       metoprolol succinate 25 MG 24 hr tablet   Commonly known as:  TOPROL-XL   Used for:  Paroxysmal atrial fibrillation (H), Coronary artery disease involving native coronary artery of native heart without angina pectoris        Dose:  50 mg   Take 2 tablets (50 mg) by mouth daily   Quantity:  180 tablet   Refills:  3       NAVA MILK OF MAGNESIA PO        Refills:  0       triamcinolone 0.1 % ointment   Commonly known as:  KENALOG   Used for:  Rash and nonspecific skin eruption        Apply topically 2 times daily Apply sparingly to affected area twice times daily for the next 7-10 days.   Quantity:  30 g   Refills:  1         STOP taking          amLODIPine 10 MG tablet   Commonly known as:  NORVASC           losartan 100 MG tablet   Commonly known as:  COZAAR                Where to get your medicines      These medications were sent to [place] [address] [place] [address] [address]     Phone:  [phone]      levofloxacin 500 MG tablet     pantoprazole 40 MG EC tablet         Some of these will need a paper prescription and others can be bought over the counter. Ask your nurse if you have questions.     You don't need a prescription for these medications      allopurinol 100 MG tablet

## 2018-04-20 NOTE — PLAN OF CARE
Problem: Patient Care Overview  Goal: Plan of Care/Patient Progress Review  Outcome: No Change  Recheck of potassium this afternoon was still critically high at 6.7, Dr. Younger notified and orders received for kayexalate and IV calcium gluconate.  Next potassium level check at 1830 was still critically high 6.1 and Linda YEPEZ was updated at that time.  Pt received second dose of kayexalate per orders and potassium level at 2200 down to 5.9.  Planning to recheck level again in the morning.  Pt complaining of cough this evening - robitussin given, still PUENTES, and has intermittent wheezing.  Ambulating in room with SBA and calls for assistance.

## 2018-04-20 NOTE — PLAN OF CARE
"Problem: Patient Care Overview  Goal: Plan of Care/Patient Progress Review  Outcome: No Change  Pt denies pain, N/V, or dizziness. Pt's K+5.9 at 2200, writer informed that no further interventions at this time, will recheck K+ in the morning. Telemetry is on. Vital signs stable, O2 sat 92-93% on room air, HR low to mid 60's. Pt continues to have SOB with activity -- he stated to writer this SOB is \"about the same\", has expiratory wheezes, frequent nonproductive cough. IV fluids infusing at 75 ml/hr. Has had x1 small bowel movement overnight. Pt ambulates with SBA. Will call at time for assistance's. Bed alarm on for safety.       "

## 2018-04-23 NOTE — PROGRESS NOTES
Clinic Care Coordination Contact  OUTREACH  Referral Information:  Referral Source: IP Handoff  Primary Diagnosis: CHF (No worsening CHF on admission)  Chief Complaint   Patient presents with     Clinic Care Coordination - [facility]     RN CC   Universal Utilization:   Utilization    Last refreshed: 4/20/2018 11:00 PM:  No Show Count (past year) 2       Last refreshed: 4/20/2018 11:00 PM:  ED visits 3       Last refreshed: 4/20/2018 11:00 PM:  Hospital admissions 2          Current as of: 4/20/2018 11:00 PM         Clinical Concerns:  Current Medical Concerns:  Patient with hospitalization for bronchiectasis exacerbation. Per notes, not thought to be CHF but focal lung process. Per DC instructions:  PULMONARY MEDICINE REFERRAL      Reason for your hospital stay   Bronchiectasis exacerbation      Follow-up and recommended labs and tests    Primary md 1 week. Needs bmp at that visit to follow hyperkalemia and BRIEN      Follow Up ([facility])   Do not take magnesium or vitamins while on antibiotic   Discharge Medications        Current Discharge Medication List             START taking these medications     Details   levofloxacin (LEVAQUIN) 500 MG tablet Take 1 tablet (500 mg) by mouth daily for 6 days  Qty: 6 tablet, Refills: 0     Associated Diagnoses: Bronchiectasis with acute lower respiratory infection (H)       pantoprazole (PROTONIX) 40 MG EC tablet Take 1 tablet (40 mg) by mouth every morning  Qty: 30 tablet, Refills: 0     Associated Diagnoses: Gastroesophageal reflux disease without esophagitis                 CONTINUE these medications which have CHANGED     Details   allopurinol (ZYLOPRIM) 100 MG tablet Take 0.5 tablets (50 mg) by mouth daily  Qty: 30 tablet     Associated Diagnoses: Gout of foot, unspecified cause, unspecified chronicity, unspecified laterality                 CONTINUE these medications which have NOT CHANGED     Details   albuterol (PROAIR HFA/PROVENTIL HFA/VENTOLIN HFA) 108 (90 BASE)  MCG/ACT Inhaler Inhale 2 puffs into the lungs every 4 hours as needed for shortness of breath / dyspnea or wheezing WITH SPACER  Qty: 1 Inhaler, Refills: 0       aspirin  MG EC tablet Take 1 tablet (325 mg) by mouth daily  Qty: 90 tablet, Refills: 3       atorvastatin (LIPITOR) 40 MG tablet Take 1 tablet (40 mg) by mouth daily  Qty: 90 tablet, Refills: 3     Associated Diagnoses: Hyperlipidemia LDL goal <100       finasteride (PROSCAR) 5 MG tablet Take 1 tablet (5 mg) by mouth daily  Qty: 90 tablet, Refills: 3     Comments: Do not fill until pt request  Associated Diagnoses: Benign non-nodular prostatic hyperplasia without lower urinary tract symptoms       metoprolol succinate (TOPROL-XL) 25 MG 24 hr tablet Take 2 tablets (50 mg) by mouth daily  Qty: 180 tablet, Refills: 3     Associated Diagnoses: Paroxysmal atrial fibrillation (H); Coronary artery disease involving native coronary artery of native heart without angina pectoris       Multiple Vitamins-Minerals (CENTRUM SILVER) per tablet Take 1 tablet by mouth daily       Magnesium Hydroxide (NAVA MILK OF MAGNESIA PO)         triamcinolone (KENALOG) 0.1 % ointment Apply topically 2 times daily Apply sparingly to affected area twice times daily for the next 7-10 days.  Qty: 30 g, Refills: 1     Associated Diagnoses: Rash and nonspecific skin eruption           STOP taking these medications         amLODIPine (NORVASC) 10 MG tablet Comments:   Reason for Stopping:            losartan (COZAAR) 100 MG tablet Comments:   Reason for Stopping:              Current Behavioral Concerns: None    Education Provided to patient: Role of CC, Medications, f/u, returning for worsening symptoms   Health Maintenance Reviewed: Due/Overdue: (HF AP, Microalbumin, PNVX)    Medication Management:  Verbalizes good understanding of medications an is taking appropriately. He did not  Protonix as he did not think he needed it. Now would like to have this med sent to [facility]. This RN CC checked and prescription is at [facility]     Functional Status:  Mobility Status: Independent w/Device (cane)  Equipment Currently Used at Home: cane, straight  Transportation means:: Accessible car, Regular car, Family     Psychosocial:  Current living arrangement: I live alone, I live in a private home (sons check on him often)  Financial/Insurance: No concerns     Resources and Interventions:  Current Resources: None    Advanced Care Plans/Directives on file: Yes  Patient/Caregiver understanding: Patient reports he is not doing so well after DC. Reports breathing is difficult. Audible wheezes present. Reports he is doing inhalers as ordered. This RN CC asked about fluid. He has not weighed but feel like he is retaining fluid. Advised him to come back to ED for worsening symptoms. Asked if sons have checked on him. He states they have and that they tell him to drink more and get exercise. Reports he is taking care of his laundry right now. Advised resting with feet up. He reports he will when he is finished with laundry. Again advised to be seen if breathing and fluid don't improve. He plans to see PCP at the end of the week. He would like to receive CC materials and think about enrolling in CC.     Future Appointments              [facility]    In 1 week Rn, [facility], [place] [facility]    In 1 month Angel Luis Gudino MD [facility]           Plan: *RN CC will sent materials and will f/u with patient after PCP visit.    Edwin GRACIA,RN- BC  Clinic Care Coordinator  [facility]  Phone: [phone]

## 2018-04-27 ENCOUNTER — OFFICE VISIT (OUTPATIENT)
Dept: FAMILY MEDICINE | Facility: CLINIC | Age: 83
End: 2018-04-27
Payer: COMMERCIAL

## 2018-04-27 VITALS
WEIGHT: 215 LBS | TEMPERATURE: 96.8 F | HEIGHT: 70 IN | OXYGEN SATURATION: 95 % | DIASTOLIC BLOOD PRESSURE: 67 MMHG | BODY MASS INDEX: 30.78 KG/M2 | HEART RATE: 56 BPM | SYSTOLIC BLOOD PRESSURE: 137 MMHG

## 2018-04-27 DIAGNOSIS — I89.0 LYMPHEDEMA: ICD-10-CM

## 2018-04-27 DIAGNOSIS — J47.9 BRONCHIECTASIS WITHOUT COMPLICATION (H): Primary | ICD-10-CM

## 2018-04-27 PROCEDURE — 99214 OFFICE O/P EST MOD 30 MIN: CPT | Performed by: FAMILY MEDICINE

## 2018-04-27 RX ORDER — FUROSEMIDE 20 MG
20 TABLET ORAL DAILY
Qty: 30 TABLET | Refills: 1 | Status: SHIPPED | OUTPATIENT
Start: 2018-04-27 | End: 2018-05-01

## 2018-04-27 NOTE — PROGRESS NOTES
SUBJECTIVE:   Colin Shell is a 89 year old male who presents to clinic today for the following health issues:          Hospital Follow-up Visit:    Hospital/Nursing Home/IP Rehab Facility: [facility]  Date of Admission: 4/18/18  Date of Discharge: 4/20/18  Reason(s) for Admission: Bronchiectasis with acute lower respiratory infection            Problems taking medications regularly:  None       Medication changes since discharge: none       Problems adhering to non-medication therapy:  None  Would also like to discuss his edema.  Summary of hospitalization:  [facility] discharge summary reviewed  Diagnostic Tests/Treatments reviewed.  Follow up needed: see below.   Other Healthcare Providers Involved in Patient s Care:        Home .   Update since discharge: improved.     Post Discharge Medication Reconciliation: discharge medications reconciled, continue medications without change.  Plan of care communicated with patient and family     Coding guidelines for this visit:  Type of Medical   Decision Making Face-to-Face Visit       within 7 Days of discharge Face-to-Face Visit        within 14 days of discharge   Moderate Complexity 53245 [id number]   High Complexity 56210 72355       Current Outpatient Prescriptions:      albuterol (PROAIR HFA/PROVENTIL HFA/VENTOLIN HFA) 108 (90 BASE) MCG/ACT Inhaler, Inhale 2 puffs into the lungs every 4 hours as needed for shortness of breath / dyspnea or wheezing WITH SPACER, Disp: 1 Inhaler, Rfl: 0     allopurinol (ZYLOPRIM) 100 MG tablet, Take 0.5 tablets (50 mg) by mouth daily, Disp: 30 tablet, Rfl:      aspirin  MG EC tablet, Take 1 tablet (325 mg) by mouth daily, Disp: 90 tablet, Rfl: 3     atorvastatin (LIPITOR) 40 MG tablet, Take 1 tablet (40 mg) by mouth daily, Disp: 90 tablet, Rfl: 3     finasteride (PROSCAR) 5 MG tablet, Take 1 tablet (5 mg) by mouth daily, Disp: 90 tablet, Rfl: 3     levofloxacin (LEVAQUIN) 500 MG tablet, Take 1  "tablet (500 mg) by mouth daily for 6 days, Disp: 6 tablet, Rfl: 0     Magnesium Hydroxide (NAVA MILK OF MAGNESIA PO), , Disp: , Rfl:      metoprolol succinate (TOPROL-XL) 25 MG 24 hr tablet, Take 2 tablets (50 mg) by mouth daily, Disp: 180 tablet, Rfl: 3     Multiple Vitamins-Minerals (CENTRUM SILVER) per tablet, Take 1 tablet by mouth daily, Disp: , Rfl:      pantoprazole (PROTONIX) 40 MG EC tablet, Take 1 tablet (40 mg) by mouth every morning, Disp: 30 tablet, Rfl: 0     triamcinolone (KENALOG) 0.1 % ointment, Apply topically 2 times daily Apply sparingly to affected area twice times daily for the next 7-10 days., Disp: 30 g, Rfl: 1    Patient Active Problem List   Diagnosis     Hypertension goal BP (blood pressure) < 140/90     Gout     Coronary artery disease involving coronary bypass graft of native heart without angina pectoris     Chronic kidney disease, stage III (moderate)     Obesity     Benign prostatic hyperplasia     Hyperlipidemia LDL goal <100     Health Care Home     Bronchiectasis (H)     Cholelithiasis     Fibrosis of lung (H)     Pulmonary nodule, right     Advance Care Planning     Chronic systolic CHF (congestive heart failure) (H)     Paroxysmal atrial fibrillation (H)     Chronic systolic congestive heart failure (H)     Dyspnea        Exam: Blood pressure 137/67, pulse 56, temperature 96.8  F (36  C), temperature source Tympanic, height 5' 10\" (1.778 m), weight 215 lb (97.5 kg), SpO2 95 %.    GENERAL APPEARANCE: healthy, alert and no distress  EYES: EOMI,  PERRL  HENT: ear canals and TM's normal and nose and mouth without ulcers or lesions  NECK: no adenopathy, no asymmetry, masses, or scars and thyroid normal to palpation  RESP: decreased breath sounds bilaterally.   CV: regular rates and rhythm, normal S1 S2, no S3 or S4 and no, click or rub -there is a 2/6 systolic murmur on the LUSB  MS: moderately severe edema to the upper shins bilaterally  SKIN: no suspicious lesions or " rashes  PSYCH: mentation appears normal and affect normal/bright      (J47.9) Bronchiectasis without complication (H)  (primary encounter diagnosis)  Comment:   Plan: he has an appt with Pulmonology on 5-31-18. The records should be available by computer. Avoid contagious exposures. Use good hygiene.     (I89.0) Lymphedema  Comment:   Plan: furosemide (LASIX) 20 MG tablet        For the swelling in the legs, this is edema and elevate the legs when possible. Use the lower sodium diet. Consider the compression stockings.   Weigh your self daily. The weight should slowly go down. Lasix at 20 mg daily in the am will be ordered. If not better in 2-3 weeks a referral to the lymphedema clinic has been done and call for that appt.   Call for the lab appt at [phone] for creatinine next week.     Jono Drake

## 2018-04-27 NOTE — MR AVS SNAPSHOT
After Visit Summary   4/27/2018    Colin Shell    MRN: [id number]           Patient Information     Date Of Birth          6/25/1927        Visit Information        Provider Department      4/27/2018 10:00 AM Jono Drake MD [facility]        Today's Diagnoses     Bronchiectasis without complication (H)    -  1    Lymphedema          Care Instructions    (J47.9) Bronchiectasis without complication (H)  (primary encounter diagnosis)  Comment:   Plan: he has an appt with Pulmonology on 5-31-18. The records should be available by computer. Avoid contagious exposures. Use good hygiene.     (I89.0) Lymphedema  Comment:   Plan: furosemide (LASIX) 20 MG tablet        For the swelling in the legs, this is edema and elevate the legs when possible. Use the lower sodium diet. Consider the compression stockings.   Weigh your self daily. The weight should slowly go down. Lasix at 20 mg daily in the am will be ordered. If not better in 2-3 weeks a referral to the lymphedema clinic has been done and call for that appt.   Call for the lab appt at [phone] for creatinine next week.           Follow-ups after your visit        Additional Services     LYMPHEDEMA THERAPY REFERRAL                 Your next 10 appointments already scheduled     May 01, 2018 10:00 AM CDT   ICD Check with Wy Device Rn   [facility] ([facility])    [address] [facility] [address] [phone]            May 31, 2018 11:00 AM CDT   New Visit with Angel Luis Gudino MD   [facility] ([facility])    [address] [place] [id number] [phone]              Who to contact     If you have questions or need follow up information about today's clinic visit or your schedule please contact [facility] directly at [phone].  Normal or non-critical lab and imaging results will be communicated to you by [facility]  "letter or phone within 4 business days after the clinic has received the results. If you do not hear from us within 7 days, please contact the clinic through [employer] or phone. If you have a critical or abnormal lab result, we will notify you by phone as soon as possible.  Submit refill requests through [employer] or call your pharmacy and they will forward the refill request to us. Please allow 3 business days for your refill to be completed.          Additional Information About Your Visit        [employer] Information     [employer] lets you send messages to your doctor, view your test results, renew your prescriptions, schedule appointments and more. To sign up, go to [email or web address] . Click on \"Log in\" on the left side of the screen, which will take you to the Welcome page. Then click on \"Sign up Now\" on the right side of the page.     You will be asked to enter the access code listed below, as well as some personal information. Please follow the directions to create your username and password.     Your access code is: [email or web address]  Expires: 2018 11:54 AM     Your access code will  in 90 days. If you need help or a new code, please call your [facility] clinic or [phone].        Care EveryWhere ID     This is your Care EveryWhere ID. This could be used by other organizations to access your [facility] medical records  [id number]        Your Vitals Were     Pulse Temperature Height Pulse Oximetry BMI (Body Mass Index)       56 96.8  F (36  C) (Tympanic) 5' 10\" (1.778 m) 95% 30.85 kg/m2        Blood Pressure from Last 3 Encounters:   18 137/67   18 117/55   18 129/63    Weight from Last 3 Encounters:   18 215 lb (97.5 kg)   18 220 lb 14.4 oz (100.2 kg)   18 215 lb (97.5 kg)              We Performed the Following     LYMPHEDEMA THERAPY REFERRAL          Today's Medication Changes          These changes are accurate as of 18 11:16 AM.  If you have any questions, " ask your nurse or doctor.               Start taking these medicines.        Dose/Directions    furosemide 20 MG tablet   Commonly known as:  LASIX   Used for:  Lymphedema   Started by:  Jono Drake MD        Dose:  20 mg   Take 1 tablet (20 mg) by mouth daily   Quantity:  30 tablet   Refills:  1            Where to get your medicines      These medications were sent to [place] [address] [facility] - [address] [address]     Phone:  [phone]     furosemide 20 MG tablet                Primary Care Provider Office Phone # Fax #    Jono Drake [phone] [address] [facility] [address]        Equal Access to Services     LESLIE PACE : Hadii lanny freedmano Sobernadine, waaxda luqadaha, qaybta kaalmada adelinetteyada, fina vo. So [facility] [phone].    ATENCIÓN: Si habla español, tiene a rojo disposición servicios gratuitos de asistencia lingüística. Llame al [phone].    We comply with applicable federal civil rights laws and [place] laws. We do not discriminate on the basis of race, color, national origin, age, disability, sex, sexual orientation, or gender identity.            Thank you!     Thank you for choosing [facility]  for your care. Our goal is always to provide you with excellent care. Hearing back from our patients is one way we can continue to improve our services. Please take a few minutes to complete the written survey that you may receive in the mail after your visit with us. Thank you!             Your Updated Medication List - Protect others around you: Learn how to safely use, store and throw away your medicines at www.disposemymeds.org.          This list is accurate as of 4/27/18 11:16 AM.  Always use your most recent med list.                   Brand Name Dispense Instructions for use Diagnosis    albuterol 108 (90 Base) MCG/ACT Inhaler    PROAIR  HFA/PROVENTIL HFA/VENTOLIN HFA    1 Inhaler    Inhale 2 puffs into the lungs every 4 hours as needed for shortness of breath / dyspnea or wheezing WITH SPACER        allopurinol 100 MG tablet    ZYLOPRIM    30 tablet    Take 0.5 tablets (50 mg) by mouth daily    Gout of foot, unspecified cause, unspecified chronicity, unspecified laterality       aspirin 325 MG EC tablet     90 tablet    Take 1 tablet (325 mg) by mouth daily        atorvastatin 40 MG tablet    LIPITOR    90 tablet    Take 1 tablet (40 mg) by mouth daily    Hyperlipidemia LDL goal <100       CENTRUM SILVER per tablet      Take 1 tablet by mouth daily        finasteride 5 MG tablet    PROSCAR    90 tablet    Take 1 tablet (5 mg) by mouth daily    Benign non-nodular prostatic hyperplasia without lower urinary tract symptoms       furosemide 20 MG tablet    LASIX    30 tablet    Take 1 tablet (20 mg) by mouth daily    Lymphedema       levofloxacin 500 MG tablet    LEVAQUIN    6 tablet    Take 1 tablet (500 mg) by mouth daily for 6 days    Bronchiectasis with acute lower respiratory infection (H)       metoprolol succinate 25 MG 24 hr tablet    TOPROL-XL    180 tablet    Take 2 tablets (50 mg) by mouth daily    Paroxysmal atrial fibrillation (H), Coronary artery disease involving native coronary artery of native heart without angina pectoris       pantoprazole 40 MG EC tablet    PROTONIX    30 tablet    Take 1 tablet (40 mg) by mouth every morning    Gastroesophageal reflux disease without esophagitis       NAVA MILK OF MAGNESIA PO           triamcinolone 0.1 % ointment    KENALOG    30 g    Apply topically 2 times daily Apply sparingly to affected area twice times daily for the next 7-10 days.    Rash and nonspecific skin eruption

## 2018-04-27 NOTE — PATIENT INSTRUCTIONS
(J47.9) Bronchiectasis without complication (H)  (primary encounter diagnosis)  Comment:   Plan: he has an appt with Pulmonology on 5-31-18. The records should be available by computer. Avoid contagious exposures. Use good hygiene.     (I89.0) Lymphedema  Comment:   Plan: furosemide (LASIX) 20 MG tablet        For the swelling in the legs, this is edema and elevate the legs when possible. Use the lower sodium diet. Consider the compression stockings.   Weigh your self daily. The weight should slowly go down. Lasix at 20 mg daily in the am will be ordered. If not better in 2-3 weeks a referral to the lymphedema clinic has been done and call for that appt.   Call for the lab appt at [phone] for creatinine next week.

## 2018-05-01 ENCOUNTER — HOSPITAL ENCOUNTER (OUTPATIENT)
Dept: CARDIOLOGY | Facility: CLINIC | Age: 83
Discharge: HOME | End: 2018-05-01
Attending: FAMILY MEDICINE | Admitting: FAMILY MEDICINE
Payer: COMMERCIAL

## 2018-05-01 ENCOUNTER — OFFICE VISIT (OUTPATIENT)
Dept: CARDIOLOGY | Facility: CLINIC | Age: 83
End: 2018-05-01
Payer: COMMERCIAL

## 2018-05-01 ENCOUNTER — DOCUMENTATION ONLY (OUTPATIENT)
Dept: CARDIOLOGY | Facility: CLINIC | Age: 83
End: 2018-05-01

## 2018-05-01 VITALS
SYSTOLIC BLOOD PRESSURE: 121 MMHG | DIASTOLIC BLOOD PRESSURE: 50 MMHG | BODY MASS INDEX: 30.99 KG/M2 | OXYGEN SATURATION: 90 % | HEART RATE: 57 BPM | WEIGHT: 216 LBS

## 2018-05-01 DIAGNOSIS — E78.5 HYPERLIPIDEMIA LDL GOAL <100: ICD-10-CM

## 2018-05-01 DIAGNOSIS — I89.0 LYMPHEDEMA: ICD-10-CM

## 2018-05-01 DIAGNOSIS — R55 SYNCOPE, UNSPECIFIED SYNCOPE TYPE: ICD-10-CM

## 2018-05-01 DIAGNOSIS — I10 ESSENTIAL HYPERTENSION WITH GOAL BLOOD PRESSURE LESS THAN 140/90: ICD-10-CM

## 2018-05-01 DIAGNOSIS — I10 BENIGN ESSENTIAL HYPERTENSION: ICD-10-CM

## 2018-05-01 DIAGNOSIS — E78.5 HYPERLIPIDEMIA LDL GOAL <70: ICD-10-CM

## 2018-05-01 DIAGNOSIS — Z95.1 S/P CABG (CORONARY ARTERY BYPASS GRAFT): ICD-10-CM

## 2018-05-01 DIAGNOSIS — N40.0 BENIGN NON-NODULAR PROSTATIC HYPERPLASIA WITHOUT LOWER URINARY TRACT SYMPTOMS: ICD-10-CM

## 2018-05-01 DIAGNOSIS — M10.9 GOUT OF FOOT, UNSPECIFIED CAUSE, UNSPECIFIED CHRONICITY, UNSPECIFIED LATERALITY: ICD-10-CM

## 2018-05-01 DIAGNOSIS — I25.810 CORONARY ARTERY DISEASE INVOLVING CORONARY BYPASS GRAFT OF NATIVE HEART WITHOUT ANGINA PECTORIS: Chronic | ICD-10-CM

## 2018-05-01 DIAGNOSIS — I25.10 CORONARY ARTERY DISEASE INVOLVING NATIVE CORONARY ARTERY OF NATIVE HEART WITHOUT ANGINA PECTORIS: ICD-10-CM

## 2018-05-01 DIAGNOSIS — I50.9 ACUTE CONGESTIVE HEART FAILURE, UNSPECIFIED CONGESTIVE HEART FAILURE TYPE: Primary | ICD-10-CM

## 2018-05-01 DIAGNOSIS — I48.0 PAROXYSMAL ATRIAL FIBRILLATION (H): ICD-10-CM

## 2018-05-01 LAB
ALT SERPL W P-5'-P-CCNC: 40 U/L (ref 0–70)
ANION GAP SERPL CALCULATED.3IONS-SCNC: 5 MMOL/L (ref 3–14)
BUN SERPL-MCNC: 14 MG/DL (ref 7–30)
CALCIUM SERPL-MCNC: 8.4 MG/DL (ref 8.5–10.1)
CHLORIDE SERPL-SCNC: 104 MMOL/L (ref 94–109)
CHOLEST SERPL-MCNC: 100 MG/DL
CO2 SERPL-SCNC: 27 MMOL/L (ref 20–32)
CREAT SERPL-MCNC: 0.92 MG/DL (ref 0.66–1.25)
GFR SERPL CREATININE-BSD FRML MDRD: 77 ML/MIN/1.7M2
GLUCOSE SERPL-MCNC: 102 MG/DL (ref 70–99)
HDLC SERPL-MCNC: 34 MG/DL
LDLC SERPL CALC-MCNC: 50 MG/DL
NONHDLC SERPL-MCNC: 66 MG/DL
POTASSIUM SERPL-SCNC: 4.4 MMOL/L (ref 3.4–5.3)
SODIUM SERPL-SCNC: 136 MMOL/L (ref 133–144)
TRIGL SERPL-MCNC: 81 MG/DL

## 2018-05-01 PROCEDURE — 80061 LIPID PANEL: CPT | Performed by: INTERNAL MEDICINE

## 2018-05-01 PROCEDURE — 93288 INTERROG EVL PM/LDLS PM IP: CPT

## 2018-05-01 PROCEDURE — 80048 BASIC METABOLIC PNL TOTAL CA: CPT | Performed by: INTERNAL MEDICINE

## 2018-05-01 PROCEDURE — 93288 INTERROG EVL PM/LDLS PM IP: CPT | Mod: 26 | Performed by: INTERNAL MEDICINE

## 2018-05-01 PROCEDURE — 84460 ALANINE AMINO (ALT) (SGPT): CPT | Performed by: INTERNAL MEDICINE

## 2018-05-01 PROCEDURE — 36415 COLL VENOUS BLD VENIPUNCTURE: CPT | Performed by: INTERNAL MEDICINE

## 2018-05-01 PROCEDURE — 99214 OFFICE O/P EST MOD 30 MIN: CPT | Mod: 25 | Performed by: PHYSICIAN ASSISTANT

## 2018-05-01 RX ORDER — FUROSEMIDE 20 MG
40 TABLET ORAL DAILY
Qty: 30 TABLET | Refills: 1
Start: 2018-05-01 | End: 2018-05-17

## 2018-05-01 NOTE — PROGRESS NOTES
[employer] Evera (D) ICD Device Check/[place]/Umm DESIR  AP: 10.4% : 75 %  Mode: DDD        Underlying Rhythm: SR/ PACs  Heart Rate: Histogram is stable/ Optivol elevated since the beginning of March 2018; HR variability showing elevated increase this same time frame. Lungs wet/ sometimes uncontrollable cough/ significant peripheral edema to upper thigh and into abd.   Sensing: Stable    Pacing Threshold: Stable    Impedance: WNL  Battery Status: 8.5 years estimated longevity  Device Site: No issues  Atrial Arrhythmia: 57 mode switch episodes in the past 3 months/ burden 16%, longest episode 16 days. Pt chooses not to use OAC,  Ventricular Arrhythmia: 1 NSVT episode lasting 1-2 seconds   ATP: NONE    Shocks: NONE  Setting Change: NONE    Care Plan: Remote in 3 months. Reviewed with Ekaterina Sewell RN; Umm has an avail today at 1100; discussed with Umm and will add Colin to see her today. l

## 2018-05-01 NOTE — LETTER
5/1/2018    Jono Drake MD  [address] [address]    RE: Colin Shell       Dear Colleague,    I had the pleasure of seeing Colin Shell in the [facility].    Please see separate dictation for HPI, PHYSICAL EXAM AND IMPRESSION/PLAN.    CURRENT MEDICATIONS:  Current Outpatient Prescriptions   Medication Sig Dispense Refill     albuterol (PROAIR HFA/PROVENTIL HFA/VENTOLIN HFA) 108 (90 BASE) MCG/ACT Inhaler Inhale 2 puffs into the lungs every 4 hours as needed for shortness of breath / dyspnea or wheezing WITH SPACER 1 Inhaler 0     allopurinol (ZYLOPRIM) 100 MG tablet Take 0.5 tablets (50 mg) by mouth daily 30 tablet      aspirin  MG EC tablet Take 1 tablet (325 mg) by mouth daily 90 tablet 3     atorvastatin (LIPITOR) 40 MG tablet Take 1 tablet (40 mg) by mouth daily 90 tablet 3     finasteride (PROSCAR) 5 MG tablet Take 1 tablet (5 mg) by mouth daily 90 tablet 3     furosemide (LASIX) 20 MG tablet Take 1 tablet (20 mg) by mouth daily 30 tablet 1     Magnesium Hydroxide (NAVA MILK OF MAGNESIA PO)        metoprolol succinate (TOPROL-XL) 25 MG 24 hr tablet Take 2 tablets (50 mg) by mouth daily 180 tablet 3     Multiple Vitamins-Minerals (CENTRUM SILVER) per tablet Take 1 tablet by mouth daily       pantoprazole (PROTONIX) 40 MG EC tablet Take 1 tablet (40 mg) by mouth every morning 30 tablet 0     triamcinolone (KENALOG) 0.1 % ointment Apply topically 2 times daily Apply sparingly to affected area twice times daily for the next 7-10 days. 30 g 1       ALLERGIES:     Allergies   Allergen Reactions     Flomax [Tamsulosin Hydrochloride]      Hctz Other (See Comments)     Caused loss of balance     Lisinopril Cough     Simvastatin Nausea and Vomiting and Diarrhea     Vytorin Nausea and Diarrhea       PAST MEDICAL HISTORY:  Past Medical History:   Diagnosis Date     Arrhythmia      Congestive heart failure (H)      Dizziness and giddiness  11/24/2007    uncertain etiology- MRI/MRA head , carotid duplex normal 2007     Foreign body in unspecified site on external eye      Hypertension      Microscopic hematuria      microscopic hematuria 2001 with  IVP and cystoscopy     Renal disease      Rhabdomyolysis     2003- in setting of Ecoli UTI, gemfibrozil/lipitor       PAST SURGICAL HISTORY:  Past Surgical History:   Procedure Laterality Date     BIOPSY ARTERY TEMPORAL Left 9/18/2017    Procedure: BIOPSY ARTERY TEMPORAL;  Left Temporal Biopsy ;  Surgeon: Ivana Pahn MD;  Location: [facility] OR     SURGICAL HISTORY OF -       heel spurs     SURGICAL HISTORY OF -   2005, 2006    bilateral eye cataract     SURGICAL HISTORY OF -   1996    CABG x 5 vessels     SURGICAL HISTORY OF -   2010    circumcision     TONSILLECTOMY & ADENOIDECTOMY         SOCIAL HISTORY:  Social History     Social History     Marital status:      Spouse name: N/A     Number of children: N/A     Years of education: N/A     Social History Main Topics     Smoking status: Former Smoker     Smokeless tobacco: Never Used      Comment: Quit at age 32     Alcohol use Yes      Comment: 12 pack a year     Drug use: No     Sexual activity: Yes     Other Topics Concern     Parent/Sibling W/ Cabg, Mi Or Angioplasty Before 65f 55m? No     Social History Narrative       FAMILY HISTORY:  Family History   Problem Relation Age of Onset     DIABETES Father      DIABETES Brother      DIABETES Brother      Hypertension Son      Unknown/Adopted Maternal Grandmother      Unknown/Adopted Maternal Grandfather      Unknown/Adopted Paternal Grandmother      Unknown/Adopted Paternal Grandfather      Dementia Sister        Review of Systems:  Skin:  Negative       Eyes:  Negative      ENT:  Negative      Respiratory:  Positive for dyspnea on exertion;cough shortness of breath walking up hills.   Cardiovascular:  chest pain;syncope or near-syncope;Negative for;palpitations Positive for;lower extremity  symptoms;edema;lightheadedness;fatigue;dizziness    Gastroenterology: Negative      Genitourinary:  Negative   CKD- patient does not see nephrology  Musculoskeletal:  Negative      Neurologic:  Positive for numbness or tingling of feet;memory problems    Psychiatric:  Negative for anxiety;depression    Heme/Lymph/Imm:  Negative for bleeding disorder    Endocrine:  Negative for thyroid disorder;diabetes       Reviewed. Remainder of the note dictated.    Umm Cabrera PA-C        Service Date: 05/01/2018      HISTORY OF PRESENT ILLNESS:  Mr. Shell is a 89-year-old gentleman who was added on to my schedule today for complaints of increased lower extremity edema and weight gain at his device check appointment today.      I met the patient 1 time previously back in February for a routine followup.  At that point, he overall was doing fairly well.      His cardiovascular history includes coronary artery disease, status post coronary artery bypass grafting in 1986, history of syncope, atrial dysrhythmia including atrial fibrillation and ventricular dysrhythmia for which he underwent ICD implantation in early 2017, hypertension and hyperlipidemia.  He has followed closely with Dr. Erickson since the beginning of 2017.  He has declined anticoagulation with anything aside from aspirin.  Again, when I saw the patient in February, he overall was doing well.  His weight on our office scale was 207 pounds at that time.        Since that time he has been in the emergency room 3 times for cough/shortness of breath and was admitted 1 of these times here at [facility] from 04/18 through 04/20.  During his first ER visit, he underwent a CT scan which showed patchy pulmonary ground-glass opacities throughout the left lung.  He was also noted to have mild bronchiectasis bilaterally.  He did have small bilateral effusions.  He was treated with antibiotics for pneumonia.  Unfortunately, his symptoms did not improve after  the antibiotic therapy.  He presented again to the ED about 7 days later, he underwent a chest x-ray which overall showed unchanged findings.  His NT-proBNP was about 2000.  He was treated with cough medicine and a DuoNeb and instructed to follow up with his primary care provider and consider a Pulmonary followup.  About 2 weeks later he again presented with similar symptoms.  He underwent a repeat CT scan of the chest without contrast.  There was some improvement in his patchy infiltrates.  He again was noted to have small bilateral effusions.  At that point he was admitted to the hospital.  It was again felt that his dyspnea and cough were likely to be from an exacerbation of bronchiectasis.  He was treated with antibiotic therapy again.  He followed up with his primary care provider after this.  This was about 5 days ago.  At that point he was started on a low dose of furosemide.  The patient states he is not really noted an increase in urine output and that his symptoms have not improved.      Today he complains of lower extremity edema.  His skin feels quite tense and it is somewhat painful to above the knee.  He has noted some blistering but no open areas.  He continues to feel significantly short of breath with activity and when trying to lie down at night.  He notices that he is coughing a lot.  Sometimes his cough has been productive.      He had a device check today.  He is again noted to have intermittent atrial fibrillation.  It appears that his overall heart rate when in atrial fibrillation is fairly well controlled.  His OptiVol index does show an increase in fluid starting in mid March.  His levels are lower than the beginning of April but continue to be elevated above his prior findings (baseline).      CURRENT CARDIAC MEDICATIONS:   1.  Aspirin 325 mg daily.   2.  Atorvastatin 40 mg daily.   3.  Lasix 20 mg daily.   4.  Toprol-XL 25 mg 2 tablets daily.      The remainder of his medications,  allergies and review of systems were reviewed and as are documented separately.      PHYSICAL EXAMINATION:   GENERAL:  The patient is a 89-year-old gentleman who appears slightly younger than his age.  He is in no apparent distress.   VITAL SIGNS:  His blood pressure is 121/50, pulse 57, weight is 216 pounds.  This is up 9 pounds on our office scale.   LUNGS:  Lungs have crackles bilaterally.   CARDIAC:  Reveals an irregular rate and rhythm.  No significant murmur is appreciated.   EXTREMITIES:  Lower extremities show 2 to 3+ pitting edema bilaterally.   NEUROLOGIC:  Alert and oriented.      He had a basic metabolic panel performed this morning, but the results are in process at the time of the dictation.      He did have a repeat echocardiogram during his recent hospitalization.  His LV was normal in size.  He was noted to have moderate concentric left ventricular hypertrophy.  The EF was 45%-50%.  Mild aortic stenosis was noted.  It was felt that these findings were similar to his echo from 10/2017.  It was mentioned that the IVC was normal in size with greater than 50% respiratory collapse.      ASSESSMENT AND PLAN:  The patient is a 89-year-old gentleman with coronary artery disease and a mild ischemic cardiomyopathy with an EF of 45%-50% with a history of atrial and ventricular dysrhythmia for which he has previously undergone permanent pacemaker implantation.  He has been noted to have paroxysmal atrial fibrillation and has chosen to remain on aspirin alone for anticoagulation.  Recently he has been having trouble with increased lower extremity edema, weight gain and dyspnea on exertion along with a cough.  His weight is up 9 pounds on our office scale.  He looks volume up on exam.  He was recently started on low dose of Lasix but has not seen much benefit.  I am increasing his Lasix to 40 mg once a day for the time being.  We will see what his labs show from today.  We will arrange for close followup in the  Cardiology Clinic in about 1 week with repeat labs prior to that office visit.  Of course, I encouraged the patient to contact us sooner with worsening symptoms.        In regard to his cough, it may be multifactorial.  He does have a reported history of bronchiectasis.  He is scheduled to see a pulmonologist at the end of May.  I am hoping that we can diurese him some before that time to see how much of his symptoms may be related to volume overload versus an underlying pulmonary problem.      Thank you for allowing us to participate in the care of this pleasant patient.     ADDENDUM: Cr back to baseline. Continue with above plan. MICHAELA Aguila PA-C             D: 2018   T: 2018   MT: OMAR      Name:     HALI ANAYA   MRN:      [id number]        Account:      [id number]   :      1927           Service Date: 2018      Document: [id number]        Thank you for allowing me to participate in the care of your patient.      Sincerely,     Umm Cabrera PA-C     [facility]    cc:   No referring provider defined for this encounter.

## 2018-05-01 NOTE — MR AVS SNAPSHOT
After Visit Summary   5/1/2018    Colin Shell    MRN: [id number]           Patient Information     Date Of Birth          6/25/1927        Visit Information        Provider Department      5/1/2018 11:00 AM Umm Cabrera PA-C [facility]        Today's Diagnoses     Acute congestive heart failure, unspecified congestive heart failure type (H)    -  1    Lymphedema          Care Instructions    [facility]~[address]~[place], [place]~[address]  Thank you for your  Heart Care visit today. If you have questions regarding your visit, please contact your cardiology RN's, Ekaterina Sewell or Monica Ramirez, at [phone]. Your provider has recommended the following:  Medication Changes:  INCREASE the furosemide to 40mg (two 20mg tabs in the AM) starting today  Recommendations:  Labs in 1 week  Follow-up:  See Bette for cardiology follow up in [facility] in 1 week  To schedule a future appointment, we kindly ask that you call cardiology scheduling at [phone] three months prior to requested revisit date.               Reminder:  For your safety, we ask that you bring in your current medication(s) or an updated list of your medications with you to EACH office visit. Include the medication name, dose of pill on bottle and how you are taking it. Include over-the-counter medications or supplements. Your provider will review this at each visit and plan your care based on your current information.               Follow-ups after your visit        Additional Services     Follow-Up with Cardiac Advanced Practice Provider                 Your next 10 appointments already scheduled     May 31, 2018 11:00 AM CDT   New Visit with Angel Luis Gudino MD   [facility] ([facility]    [address] [place] [id number]   [phone]            Aug 21, 2018  4:30 PM  "CDT   Remote ICD Check with CASEY DCR2   [facility] ([facility] [place])    [address]  [place] [address] [phone] OPT 2           This appointment is for a remote check of your debrillator.  This is not an appointment at the office.              Future tests that were ordered for you today     Open Future Orders        Priority Expected Expires Ordered    Basic metabolic panel STAT 5/8/2018 5/1/2019 5/1/2018    Follow-Up with Cardiac Advanced Practice Provider Routine 5/8/2018 5/1/2019 5/1/2018            Who to contact     If you have questions or need follow up information about today's clinic visit or your schedule please contact [facility] directly at [phone].  Normal or non-critical lab and imaging results will be communicated to you by [employer]Stream[employer], letter or phone within 4 business days after the clinic has received the results. If you do not hear from us within 7 days, please contact the clinic through [employer]Stream[employer] or phone. If you have a critical or abnormal lab result, we will notify you by phone as soon as possible.  Submit refill requests through [employer] or call your pharmacy and they will forward the refill request to us. Please allow 3 business days for your refill to be completed.          Additional Information About Your Visit        [employer] Information     [employer] lets you send messages to your doctor, view your test results, renew your prescriptions, schedule appointments and more. To sign up, go to [email or web address] . Click on \"Log in\" on the left side of the screen, which will take you to the Welcome page. Then click on \"Sign up Now\" on the right side of the page.     You will be asked to enter the access code listed below, as well as some personal information. Please follow the directions to create your username and password.     Your access code is: [email or web address]  Expires: 7/8/2018 11:54 " AM     Your access code will  in 90 days. If you need help or a new code, please call your [place] clinic or [phone].        Care EveryWhere ID     This is your Care EveryWhere ID. This could be used by other organizations to access your [place] medical records  [id number]        Your Vitals Were     Pulse Pulse Oximetry BMI (Body Mass Index)             57 90% 30.99 kg/m2          Blood Pressure from Last 3 Encounters:   18 121/50   18 137/67   18 117/55    Weight from Last 3 Encounters:   18 98 kg (216 lb)   18 97.5 kg (215 lb)   18 100.2 kg (220 lb 14.4 oz)                 Today's Medication Changes          These changes are accurate as of 18 11:14 AM.  If you have any questions, ask your nurse or doctor.               These medicines have changed or have updated prescriptions.        Dose/Directions    furosemide 20 MG tablet   Commonly known as:  LASIX   This may have changed:  how much to take   Used for:  Lymphedema   Changed by:  Umm Cabrera PA-C        Dose:  40 mg   Take 2 tablets (40 mg) by mouth daily   Quantity:  30 tablet   Refills:  1            Where to get your medicines      Some of these will need a paper prescription and others can be bought over the counter.  Ask your nurse if you have questions.     You don't need a prescription for these medications     furosemide 20 MG tablet                Primary Care Provider Office Phone # Fax #    Jono Norm Drake [phone] [address] [address]        Equal Access to Services     ALEXIS PACE AH: Hadtoney bailey Sobernadine, waaxda luqadaha, qaybta kaalmada fina dumont. So [facility] [phone].    ATENCIÓN: Si habla español, tiene a rojo disposición servicios gratuitos de asistencia lingüística. Llame al [phone].    We comply with applicable federal civil rights laws and [place] laws. We do not  discriminate on the basis of race, color, national origin, age, disability, sex, sexual orientation, or gender identity.            Thank you!     Thank you for choosing [facility]  for your care. Our goal is always to provide you with excellent care. Hearing back from our patients is one way we can continue to improve our services. Please take a few minutes to complete the written survey that you may receive in the mail after your visit with us. Thank you!             Your Updated Medication List - Protect others around you: Learn how to safely use, store and throw away your medicines at www.disposemymeds.org.          This list is accurate as of 5/1/18 11:14 AM.  Always use your most recent med list.                   Brand Name Dispense Instructions for use Diagnosis    albuterol 108 (90 Base) MCG/ACT Inhaler    PROAIR HFA/PROVENTIL HFA/VENTOLIN HFA    1 Inhaler    Inhale 2 puffs into the lungs every 4 hours as needed for shortness of breath / dyspnea or wheezing WITH SPACER        allopurinol 100 MG tablet    ZYLOPRIM    30 tablet    Take 0.5 tablets (50 mg) by mouth daily    Gout of foot, unspecified cause, unspecified chronicity, unspecified laterality       aspirin 325 MG EC tablet     90 tablet    Take 1 tablet (325 mg) by mouth daily        atorvastatin 40 MG tablet    LIPITOR    90 tablet    Take 1 tablet (40 mg) by mouth daily    Hyperlipidemia LDL goal <100       CENTRUM SILVER per tablet      Take 1 tablet by mouth daily        finasteride 5 MG tablet    PROSCAR    90 tablet    Take 1 tablet (5 mg) by mouth daily    Benign non-nodular prostatic hyperplasia without lower urinary tract symptoms       furosemide 20 MG tablet    LASIX    30 tablet    Take 2 tablets (40 mg) by mouth daily    Lymphedema       metoprolol succinate 25 MG 24 hr tablet    TOPROL-XL    180 tablet    Take 2 tablets (50 mg) by mouth daily    Paroxysmal atrial fibrillation (H), Coronary artery  disease involving native coronary artery of native heart without angina pectoris       pantoprazole 40 MG EC tablet    PROTONIX    30 tablet    Take 1 tablet (40 mg) by mouth every morning    Gastroesophageal reflux disease without esophagitis       NAVA MILK OF MAGNESIA PO           triamcinolone 0.1 % ointment    KENALOG    30 g    Apply topically 2 times daily Apply sparingly to affected area twice times daily for the next 7-10 days.    Rash and nonspecific skin eruption

## 2018-05-01 NOTE — LETTER
5/1/2018      Jono Drake MD  [address] [address]      RE: Colin Shell       Dear Colleague,    I had the pleasure of seeing Colin Shell in the [facility].    Service Date: 05/01/2018      HISTORY OF PRESENT ILLNESS:  Mr. Shell is a 89-year-old gentleman who was added on to my schedule today for complaints of increased lower extremity edema and weight gain at his device check appointment today.      I met the patient 1 time previously back in February for a routine followup.  At that point, he overall was doing fairly well.      His cardiovascular history includes coronary artery disease, status post coronary artery bypass grafting in 1986, history of syncope, atrial dysrhythmia including atrial fibrillation and ventricular dysrhythmia for which he underwent ICD implantation in early 2017, hypertension and hyperlipidemia.  He has followed closely with Dr. Erickson since the beginning of 2017.  He has declined anticoagulation with anything aside from aspirin.  Again, when I saw the patient in February, he overall was doing well.  His weight on our office scale was 207 pounds at that time.        Since that time he has been in the emergency room 3 times for cough/shortness of breath and was admitted 1 of these times here at [facility] from 04/18 through 04/20.  During his first ER visit, he underwent a CT scan which showed patchy pulmonary ground-glass opacities throughout the left lung.  He was also noted to have mild bronchiectasis bilaterally.  He did have small bilateral effusions.  He was treated with antibiotics for pneumonia.  Unfortunately, his symptoms did not improve after the antibiotic therapy.  He presented again to the ED about 7 days later, he underwent a chest x-ray which overall showed unchanged findings.  His NT-proBNP was about 2000.  He was treated with cough medicine and a DuoNeb and instructed to follow up with his  primary care provider and consider a Pulmonary followup.  About 2 weeks later he again presented with similar symptoms.  He underwent a repeat CT scan of the chest without contrast.  There was some improvement in his patchy infiltrates.  He again was noted to have small bilateral effusions.  At that point he was admitted to the hospital.  It was again felt that his dyspnea and cough were likely to be from an exacerbation of bronchiectasis.  He was treated with antibiotic therapy again.  He followed up with his primary care provider after this.  This was about 5 days ago.  At that point he was started on a low dose of furosemide.  The patient states he is not really noted an increase in urine output and that his symptoms have not improved.      Today he complains of lower extremity edema.  His skin feels quite tense and it is somewhat painful to above the knee.  He has noted some blistering but no open areas.  He continues to feel significantly short of breath with activity and when trying to lie down at night.  He notices that he is coughing a lot.  Sometimes his cough has been productive.      He had a device check today.  He is again noted to have intermittent atrial fibrillation.  It appears that his overall heart rate when in atrial fibrillation is fairly well controlled.  His OptiVol index does show an increase in fluid starting in mid March.  His levels are lower than the beginning of April but continue to be elevated above his prior findings (baseline).      CURRENT CARDIAC MEDICATIONS:   1.  Aspirin 325 mg daily.   2.  Atorvastatin 40 mg daily.   3.  Lasix 20 mg daily.   4.  Toprol-XL 25 mg 2 tablets daily.      The remainder of his medications, allergies and review of systems were reviewed and as are documented separately.      PHYSICAL EXAMINATION:   GENERAL:  The patient is a 89-year-old gentleman who appears slightly younger than his age.  He is in no apparent distress.   VITAL SIGNS:  His blood pressure  is 121/50, pulse 57, weight is 216 pounds.  This is up 9 pounds on our office scale.   LUNGS:  Lungs have crackles bilaterally.   CARDIAC:  Reveals an irregular rate and rhythm.  No significant murmur is appreciated.   EXTREMITIES:  Lower extremities show 2 to 3+ pitting edema bilaterally.   NEUROLOGIC:  Alert and oriented.      He had a basic metabolic panel performed this morning, but the results are in process at the time of the dictation.      He did have a repeat echocardiogram during his recent hospitalization.  His LV was normal in size.  He was noted to have moderate concentric left ventricular hypertrophy.  The EF was 45%-50%.  Mild aortic stenosis was noted.  It was felt that these findings were similar to his echo from 10/2017.  It was mentioned that the IVC was normal in size with greater than 50% respiratory collapse.      ASSESSMENT AND PLAN:  The patient is a 89-year-old gentleman with coronary artery disease and a mild ischemic cardiomyopathy with an EF of 45%-50% with a history of atrial and ventricular dysrhythmia for which he has previously undergone permanent pacemaker implantation.  He has been noted to have paroxysmal atrial fibrillation and has chosen to remain on aspirin alone for anticoagulation.  Recently he has been having trouble with increased lower extremity edema, weight gain and dyspnea on exertion along with a cough.  His weight is up 9 pounds on our office scale.  He looks volume up on exam.  He was recently started on low dose of Lasix but has not seen much benefit.  I am increasing his Lasix to 40 mg once a day for the time being.  We will see what his labs show from today.  We will arrange for close followup in the Cardiology Clinic in about 1 week with repeat labs prior to that office visit.  Of course, I encouraged the patient to contact us sooner with worsening symptoms.        In regard to his cough, it may be multifactorial.  He does have a reported history of  bronchiectasis.  He is scheduled to see a pulmonologist at the end of May.  I am hoping that we can diurese him some before that time to see how much of his symptoms may be related to volume overload versus an underlying pulmonary problem.      Thank you for allowing us to participate in the care of this pleasant patient.     ADDENDUM: Cr back to baseline. Continue with above plan. MICHAELA Aguila PA-C             D: 2018   T: 2018   MT: OMAR      Name:     HALI ANAYA   MRN:      [id number]        Account:      [id number]   :      1927           Service Date: 2018      Document: [id number]           Outpatient Encounter Prescriptions as of 2018   Medication Sig Dispense Refill     albuterol (PROAIR HFA/PROVENTIL HFA/VENTOLIN HFA) 108 (90 BASE) MCG/ACT Inhaler Inhale 2 puffs into the lungs every 4 hours as needed for shortness of breath / dyspnea or wheezing WITH SPACER 1 Inhaler 0     allopurinol (ZYLOPRIM) 100 MG tablet Take 0.5 tablets (50 mg) by mouth daily 30 tablet      aspirin  MG EC tablet Take 1 tablet (325 mg) by mouth daily 90 tablet 3     atorvastatin (LIPITOR) 40 MG tablet Take 1 tablet (40 mg) by mouth daily 90 tablet 3     finasteride (PROSCAR) 5 MG tablet Take 1 tablet (5 mg) by mouth daily 90 tablet 3     furosemide (LASIX) 20 MG tablet Take 2 tablets (40 mg) by mouth daily 30 tablet 1     Magnesium Hydroxide (NAVA MILK OF MAGNESIA PO) Take 30 mLs by mouth daily as needed        metoprolol succinate (TOPROL-XL) 25 MG 24 hr tablet Take 2 tablets (50 mg) by mouth daily 180 tablet 3     Multiple Vitamins-Minerals (CENTRUM SILVER) per tablet Take 1 tablet by mouth daily       triamcinolone (KENALOG) 0.1 % ointment Apply topically 2 times daily Apply sparingly to affected area twice times daily for the next 7-10 days. (Patient not taking: Reported on 2018) 30 g 1     [DISCONTINUED] pantoprazole (PROTONIX) 40  MG EC tablet Take 1 tablet (40 mg) by mouth every morning 30 tablet 0     [DISCONTINUED] furosemide (LASIX) 20 MG tablet Take 1 tablet (20 mg) by mouth daily 30 tablet 1     No facility-administered encounter medications on file as of 5/1/2018.        Again, thank you for allowing me to participate in the care of your patient.      Sincerely,    Umm Cabrera PA-C     [facility]

## 2018-05-01 NOTE — PROGRESS NOTES
Service Date: 05/01/2018      HISTORY OF PRESENT ILLNESS:  Mr. Shell is a 89-year-old gentleman who was added on to my schedule today for complaints of increased lower extremity edema and weight gain at his device check appointment today.      I met the patient 1 time previously back in February for a routine followup.  At that point, he overall was doing fairly well.      His cardiovascular history includes coronary artery disease, status post coronary artery bypass grafting in 1986, history of syncope, atrial dysrhythmia including atrial fibrillation and ventricular dysrhythmia for which he underwent ICD implantation in early 2017, hypertension and hyperlipidemia.  He has followed closely with Dr. Erickson since the beginning of 2017.  He has declined anticoagulation with anything aside from aspirin.  Again, when I saw the patient in February, he overall was doing well.  His weight on our office scale was 207 pounds at that time.        Since that time he has been in the emergency room 3 times for cough/shortness of breath and was admitted 1 of these times here at [facility] from 04/18 through 04/20.  During his first ER visit, he underwent a CT scan which showed patchy pulmonary ground-glass opacities throughout the left lung.  He was also noted to have mild bronchiectasis bilaterally.  He did have small bilateral effusions.  He was treated with antibiotics for pneumonia.  Unfortunately, his symptoms did not improve after the antibiotic therapy.  He presented again to the ED about 7 days later, he underwent a chest x-ray which overall showed unchanged findings.  His NT-proBNP was about 2000.  He was treated with cough medicine and a DuoNeb and instructed to follow up with his primary care provider and consider a Pulmonary followup.  About 2 weeks later he again presented with similar symptoms.  He underwent a repeat CT scan of the chest without contrast.  There was some improvement in his patchy infiltrates.   He again was noted to have small bilateral effusions.  At that point he was admitted to the hospital.  It was again felt that his dyspnea and cough were likely to be from an exacerbation of bronchiectasis.  He was treated with antibiotic therapy again.  He followed up with his primary care provider after this.  This was about 5 days ago.  At that point he was started on a low dose of furosemide.  The patient states he is not really noted an increase in urine output and that his symptoms have not improved.      Today he complains of lower extremity edema.  His skin feels quite tense and it is somewhat painful to above the knee.  He has noted some blistering but no open areas.  He continues to feel significantly short of breath with activity and when trying to lie down at night.  He notices that he is coughing a lot.  Sometimes his cough has been productive.      He had a device check today.  He is again noted to have intermittent atrial fibrillation.  It appears that his overall heart rate when in atrial fibrillation is fairly well controlled.  His OptiVol index does show an increase in fluid starting in mid March.  His levels are lower than the beginning of April but continue to be elevated above his prior findings (baseline).      CURRENT CARDIAC MEDICATIONS:   1.  Aspirin 325 mg daily.   2.  Atorvastatin 40 mg daily.   3.  Lasix 20 mg daily.   4.  Toprol-XL 25 mg 2 tablets daily.      The remainder of his medications, allergies and review of systems were reviewed and as are documented separately.      PHYSICAL EXAMINATION:   GENERAL:  The patient is a 89-year-old gentleman who appears slightly younger than his age.  He is in no apparent distress.   VITAL SIGNS:  His blood pressure is 121/50, pulse 57, weight is 216 pounds.  This is up 9 pounds on our office scale.   LUNGS:  Lungs have crackles bilaterally.   CARDIAC:  Reveals an irregular rate and rhythm.  No significant murmur is appreciated.   EXTREMITIES:   Lower extremities show 2 to 3+ pitting edema bilaterally.   NEUROLOGIC:  Alert and oriented.      He had a basic metabolic panel performed this morning, but the results are in process at the time of the dictation.      He did have a repeat echocardiogram during his recent hospitalization.  His LV was normal in size.  He was noted to have moderate concentric left ventricular hypertrophy.  The EF was 45%-50%.  Mild aortic stenosis was noted.  It was felt that these findings were similar to his echo from 10/2017.  It was mentioned that the IVC was normal in size with greater than 50% respiratory collapse.      ASSESSMENT AND PLAN:  The patient is a 89-year-old gentleman with coronary artery disease and a mild ischemic cardiomyopathy with an EF of 45%-50% with a history of atrial and ventricular dysrhythmia for which he has previously undergone permanent pacemaker implantation.  He has been noted to have paroxysmal atrial fibrillation and has chosen to remain on aspirin alone for anticoagulation.  Recently he has been having trouble with increased lower extremity edema, weight gain and dyspnea on exertion along with a cough.  His weight is up 9 pounds on our office scale.  He looks volume up on exam.  He was recently started on low dose of Lasix but has not seen much benefit.  I am increasing his Lasix to 40 mg once a day for the time being.  We will see what his labs show from today.  We will arrange for close followup in the Cardiology Clinic in about 1 week with repeat labs prior to that office visit.  Of course, I encouraged the patient to contact us sooner with worsening symptoms.        In regard to his cough, it may be multifactorial.  He does have a reported history of bronchiectasis.  He is scheduled to see a pulmonologist at the end of May.  I am hoping that we can diurese him some before that time to see how much of his symptoms may be related to volume overload versus an underlying pulmonary problem.       Thank you for allowing us to participate in the care of this pleasant patient.     ADDENDUM: Cr back to baseline. Continue with above plan. MICHAELA Aguila PA-C             D: 2018   T: 2018   MT: OMAR      Name:     HALI ANAYA   MRN:      [id number]        Account:      [id number]   :      1927           Service Date: 2018      Document: [id number]

## 2018-05-01 NOTE — PROGRESS NOTES
Please see separate dictation for HPI, PHYSICAL EXAM AND IMPRESSION/PLAN.    CURRENT MEDICATIONS:  Current Outpatient Prescriptions   Medication Sig Dispense Refill     albuterol (PROAIR HFA/PROVENTIL HFA/VENTOLIN HFA) 108 (90 BASE) MCG/ACT Inhaler Inhale 2 puffs into the lungs every 4 hours as needed for shortness of breath / dyspnea or wheezing WITH SPACER 1 Inhaler 0     allopurinol (ZYLOPRIM) 100 MG tablet Take 0.5 tablets (50 mg) by mouth daily 30 tablet      aspirin  MG EC tablet Take 1 tablet (325 mg) by mouth daily 90 tablet 3     atorvastatin (LIPITOR) 40 MG tablet Take 1 tablet (40 mg) by mouth daily 90 tablet 3     finasteride (PROSCAR) 5 MG tablet Take 1 tablet (5 mg) by mouth daily 90 tablet 3     furosemide (LASIX) 20 MG tablet Take 1 tablet (20 mg) by mouth daily 30 tablet 1     Magnesium Hydroxide (NAVA MILK OF MAGNESIA PO)        metoprolol succinate (TOPROL-XL) 25 MG 24 hr tablet Take 2 tablets (50 mg) by mouth daily 180 tablet 3     Multiple Vitamins-Minerals (CENTRUM SILVER) per tablet Take 1 tablet by mouth daily       pantoprazole (PROTONIX) 40 MG EC tablet Take 1 tablet (40 mg) by mouth every morning 30 tablet 0     triamcinolone (KENALOG) 0.1 % ointment Apply topically 2 times daily Apply sparingly to affected area twice times daily for the next 7-10 days. 30 g 1       ALLERGIES:     Allergies   Allergen Reactions     Flomax [Tamsulosin Hydrochloride]      Hctz Other (See Comments)     Caused loss of balance     Lisinopril Cough     Simvastatin Nausea and Vomiting and Diarrhea     Vytorin Nausea and Diarrhea       PAST MEDICAL HISTORY:  Past Medical History:   Diagnosis Date     Arrhythmia      Congestive heart failure (H)      Dizziness and giddiness 11/24/2007    uncertain etiology- MRI/MRA head , carotid duplex normal 2007     Foreign body in unspecified site on external eye      Hypertension      Microscopic hematuria      microscopic hematuria 2001 with  IVP and cystoscopy      Renal disease      Rhabdomyolysis     2003- in setting of Ecoli UTI, gemfibrozil/lipitor       PAST SURGICAL HISTORY:  Past Surgical History:   Procedure Laterality Date     BIOPSY ARTERY TEMPORAL Left 9/18/2017    Procedure: BIOPSY ARTERY TEMPORAL;  Left Temporal Biopsy ;  Surgeon: Ivana Phan MD;  Location: [facility] OR     SURGICAL HISTORY OF -       heel spurs     SURGICAL HISTORY OF -   2005, 2006    bilateral eye cataract     SURGICAL HISTORY OF -   1996    CABG x 5 vessels     SURGICAL HISTORY OF -   2010    circumcision     TONSILLECTOMY & ADENOIDECTOMY         SOCIAL HISTORY:  Social History     Social History     Marital status:      Spouse name: N/A     Number of children: N/A     Years of education: N/A     Social History Main Topics     Smoking status: Former Smoker     Smokeless tobacco: Never Used      Comment: Quit at age 32     Alcohol use Yes      Comment: 12 pack a year     Drug use: No     Sexual activity: Yes     Other Topics Concern     Parent/Sibling W/ Cabg, Mi Or Angioplasty Before 65f 55m? No     Social History Narrative       FAMILY HISTORY:  Family History   Problem Relation Age of Onset     DIABETES Father      DIABETES Brother      DIABETES Brother      Hypertension Son      Unknown/Adopted Maternal Grandmother      Unknown/Adopted Maternal Grandfather      Unknown/Adopted Paternal Grandmother      Unknown/Adopted Paternal Grandfather      Dementia Sister        Review of Systems:  Skin:  Negative       Eyes:  Negative      ENT:  Negative      Respiratory:  Positive for dyspnea on exertion;cough shortness of breath walking up hills.   Cardiovascular:  chest pain;syncope or near-syncope;Negative for;palpitations Positive for;lower extremity symptoms;edema;lightheadedness;fatigue;dizziness    Gastroenterology: Negative      Genitourinary:  Negative   CKD- patient does not see nephrology  Musculoskeletal:  Negative      Neurologic:  Positive for numbness or tingling of  feet;memory problems    Psychiatric:  Negative for anxiety;depression    Heme/Lymph/Imm:  Negative for bleeding disorder    Endocrine:  Negative for thyroid disorder;diabetes       Reviewed. Remainder of the note dictated.    Umm Cabrera PA-C

## 2018-05-01 NOTE — PATIENT INSTRUCTIONS
[facility]  [facility]~[address]~[place], [place]~[address]  Thank you for your [facility] visit today. If you have questions regarding your visit, please contact your cardiology RN's, Ekaterina Sewell or Monica Ramirez, at [phone]. Your provider has recommended the following:  Medication Changes:  INCREASE the furosemide to 40mg (two 20mg tabs in the AM) starting today  Recommendations:  Labs in 1 week  Follow-up:  See Bette for cardiology follow up in [facility] in 1 week  To schedule a future appointment, we kindly ask that you call cardiology scheduling at [phone] three months prior to requested revisit date.               Reminder:  For your safety, we ask that you bring in your current medication(s) or an updated list of your medications with you to EACH office visit. Include the medication name, dose of pill on bottle and how you are taking it. Include over-the-counter medications or supplements. Your provider will review this at each visit and plan your care based on your current information.

## 2018-05-04 ENCOUNTER — PATIENT OUTREACH (OUTPATIENT)
Dept: CARE COORDINATION | Facility: CLINIC | Age: 83
End: 2018-05-04

## 2018-05-04 NOTE — LETTER
My Heart Failure Action Plan   Name: Colin Shell    YOB: 1927   Date: 5/4/2018    My doctor: Jono Drake     [facility]     [address] [place] [id number] [phone]  My Diagnosis: Diastolic Heart Failure    .     My Weight Goal:205  Wt Readings from Last 2 Encounters:   05/01/18 216 lb (98 kg)   04/27/18 215 lb (97.5 kg)     Weigh yourself daily using the same scale. If you gain more than 2 pounds in 24 hours or 5 pounds in a week, notify your doctor. Take 2 of your 20 mg Lasix tablets at this time.  My Diet Goal: No added salt    Emergency Room Visits:    Our goal is to improve your quality of life and help you avoid a visit to the emergency room or hospital.  If we work together, we can achieve this goal. But, if you feel you need to call 911 or go to the emergency room, please do so.  If you go to the emergency room, please bring your list of medicines and your daily weight chart with you.       GREEN ZONE     Doing well today    Weight gained is no more than 2 pounds a day or 5 pounds a week.    No swelling in feet, ankles, legs or stomach.    No more swelling than usual.    No more trouble breathing than usual.    No change in my sleep.    No other problems. Actions:    I am doing fine.  I will take my medicine, follow my diet, see my doctor, exercise, and watch for symptoms.           YELLOW ZONE         Having a bad day or flare up    Weight gain of more than 2 pounds in one day or 5 pounds in one week.    New swelling in ankle, leg, knee or thigh.    Bloating in belly, pants feel tighter.    Swelling in hands or face.    Coughing or trouble breathing while walking or talking.    Harder to breathe last night.    Have trouble sleeping, wake up short of breath.    Much more tired than usual.    Not eating.    Pain in my chest or bad leg cramps.    Feel weak or dizzy. Actions:    I need to take action and call my doctor or nurse today.                  RED ZONE         Need medical care now    Weight gain of 5 pounds overnight.    Chest pain or pressure that does not go away.    Feel less alert.    Wheezing or have trouble breathing when at rest.    Cannot sleep lying down.    Cannot take my water pill.    Pass out or faint. Actions:    I need to call my doctor or nurse now!    Call 911 if I have chest pain or cannot breathe.

## 2018-05-04 NOTE — PROGRESS NOTES
Clinic Care Coordination Contact  Care Team Conversations  D/I: Spoke to patient by phone. He indicates he is doing about the same. Has seen PCP and Cardiology. Has  A Cardiology F/U and Lymphedema appointment next week. Reports his weight is decreasing, though unable to state what he weighs. He has a scale. Discussed starting a log so that he can keep track of his weights. PCP started lasix 20 mg daily. He saw Cardiology after that and it was increased to 40 mg daily. He was waiting for a new script at his pharmacy so he has not increased as instructed. This RN CC called his pharmacy ([facility]). They did not receive the new script. Have the original 20 mg script on file and he can get a refill as of 5/23/18. Checked cardiology visit record. The directions were changed but a new script was not written. Called patient back, explained to him. He verbalized understanding that he needs to increase his Furosemide to 2 tablets daily. Instructed him to write this on the bottle so he would remember. Advised that he discuss at his cardiology appointment next week as his script will run out if he increases the number of tabs he is taking.   Patient reports that he will see lymphedema next week as well. Is hoping to get relief of weeping legs.    Discussed CC for heart failure. He is in agreement. AP and complex CP sent today.    P: RN CC will f/u after cardiology and lymphedema appointments.    Edwin GRACIA,RN- BC  Clinic Care Coordinator  [facility]  Phone: [phone]

## 2018-05-04 NOTE — LETTER
[facility] Home[facility] Care Plan  About Me  Patient Name:  Colin Anaya  YOB: 1927  Age:     89 year old   Abdullahi MRN:   [id number] Telephone Information:    Home Phone [phone]   Mobile Not on file.       Address:    [address]  [facility] [address] Email address:  No e-mail address on record      Emergency Contact(s)  Name Relationship Lgl Grd Work Phone Home Phone Mobile Phone   1. FUAD ANAYA Son   [phone]   2. KRIS ANAYA Son   [phone]            Primary language:  English     needed? No   [employer]:  [phone] op. 1  Other communication barriers:    Preferred Method of Communication:  Mail  Current living arrangement: I live alone, I live in a private home  Mobility Status/ Medical Equipment: Independent w/Device (cane)    Health Maintenance  Health Maintenance Reviewed: Due/Overdue:(HF AP, Microalbumin,PNVX)    My Access Plan  Medical Emergency 911   Primary Clinic Line [phone]   24 Hour Appointment Line [phone] or  [phone] ([phone]) (toll-free)   24 Hour Nurse Line [phone] (toll-free)   Preferred Urgent Care [facility], [phone]   Preferred [facility] [place] [place]  [phone]   Preferred Pharmacy [id number] - [place], [place] - [address]     Behavioral Health Crisis Line The National Suicide Prevention Lifeline at 1-582.313.5142 or 911     My Care Team Members    Patient Care Team       Relationship Specialty Notifications Start End    Jono Drake MD PCP - General Family Practice  4/11/18     Phone: [phone] Fax: [phone] [address] [facility] [address]    Edwin Marley, RN Lead Care Coordinator Primary Care - [facility] Admissions 4/19/18     Phone: [phone]                 My Care Plans  Goals and (Comments)  Goals        General    Medication 1 (pt-stated)     Notes - Note created   5/4/2018  2:39 PM by Edwin Marley, RN    Goal Statement: I will increase my Lasix to help decrease my weight  Measure of Success: Weight will drop to 204 lb range and my leg swelling will decreas  Supportive Steps to Achieve:increase your lasix to two tablets as instructed by Cardiology  Barriers: Cardiologist did not write new script so bottle states 1 tablet  Strengths: Patient willing to increase to help with fluid  Date to Achieve By:5/15/18             Action Plans on File: Heat Failure Sent  Advance Care Plans/Directives Type:   Type Advanced Care Plans/Directives: Advanced Directive - On File    My Medical and Care Information  Problem List   Patient Active Problem List   Diagnosis     Hypertension goal BP (blood pressure) < 140/90     Gout     Coronary artery disease involving coronary bypass graft of native heart without angina pectoris     Chronic kidney disease, stage III (moderate)     Obesity     Benign prostatic hyperplasia     Hyperlipidemia LDL goal <100     Bronchiectasis (H)     Cholelithiasis     Fibrosis of lung (H)     Pulmonary nodule, right     Advance Care Planning     Chronic systolic CHF (congestive heart failure) (H)     Paroxysmal atrial fibrillation (H)     Dyspnea     Bronchiectasis without complication (H)     Lymphedema     Ischemic cardiomyopathy      Current Medications and Allergies:  See printed Medication Report.    Care Coordination Start Date: 5/4/2018   Frequency of Care Coordination:     Form Last Updated: 05/04/2018

## 2018-05-06 ENCOUNTER — HOSPITAL ENCOUNTER (EMERGENCY)
Facility: CLINIC | Age: 83
Discharge: HOME | End: 2018-05-06
Attending: FAMILY MEDICINE | Admitting: FAMILY MEDICINE
Payer: COMMERCIAL

## 2018-05-06 VITALS
WEIGHT: 205 LBS | BODY MASS INDEX: 29.41 KG/M2 | TEMPERATURE: 97 F | OXYGEN SATURATION: 94 % | SYSTOLIC BLOOD PRESSURE: 116 MMHG | RESPIRATION RATE: 16 BRPM | DIASTOLIC BLOOD PRESSURE: 65 MMHG | HEART RATE: 77 BPM

## 2018-05-06 DIAGNOSIS — I89.0 LYMPHEDEMA: ICD-10-CM

## 2018-05-06 DIAGNOSIS — I50.23 ACUTE ON CHRONIC SYSTOLIC CONGESTIVE HEART FAILURE (H): ICD-10-CM

## 2018-05-06 LAB
ANION GAP SERPL CALCULATED.3IONS-SCNC: 7 MMOL/L (ref 3–14)
BASOPHILS # BLD AUTO: 0.1 10E9/L (ref 0–0.2)
BASOPHILS NFR BLD AUTO: 0.5 %
BUN SERPL-MCNC: 19 MG/DL (ref 7–30)
CALCIUM SERPL-MCNC: 8.6 MG/DL (ref 8.5–10.1)
CHLORIDE SERPL-SCNC: 104 MMOL/L (ref 94–109)
CO2 SERPL-SCNC: 28 MMOL/L (ref 20–32)
CREAT SERPL-MCNC: 1.07 MG/DL (ref 0.66–1.25)
DIFFERENTIAL METHOD BLD: ABNORMAL
EOSINOPHIL # BLD AUTO: 1 10E9/L (ref 0–0.7)
EOSINOPHIL NFR BLD AUTO: 9.1 %
ERYTHROCYTE [DISTWIDTH] IN BLOOD BY AUTOMATED COUNT: 15.5 % (ref 10–15)
GFR SERPL CREATININE-BSD FRML MDRD: 65 ML/MIN/1.7M2
GLUCOSE SERPL-MCNC: 128 MG/DL (ref 70–99)
HCT VFR BLD AUTO: 34.1 % (ref 40–53)
HGB BLD-MCNC: 11.2 G/DL (ref 13.3–17.7)
IMM GRANULOCYTES # BLD: 0 10E9/L (ref 0–0.4)
IMM GRANULOCYTES NFR BLD: 0.3 %
LYMPHOCYTES # BLD AUTO: 2.3 10E9/L (ref 0.8–5.3)
LYMPHOCYTES NFR BLD AUTO: 21.2 %
MCH RBC QN AUTO: 29 PG (ref 26.5–33)
MCHC RBC AUTO-ENTMCNC: 32.8 G/DL (ref 31.5–36.5)
MCV RBC AUTO: 88 FL (ref 78–100)
MONOCYTES # BLD AUTO: 1.3 10E9/L (ref 0–1.3)
MONOCYTES NFR BLD AUTO: 11.6 %
NEUTROPHILS # BLD AUTO: 6.3 10E9/L (ref 1.6–8.3)
NEUTROPHILS NFR BLD AUTO: 57.3 %
NT-PROBNP SERPL-MCNC: 6882 PG/ML (ref 0–1800)
PLATELET # BLD AUTO: 319 10E9/L (ref 150–450)
POTASSIUM SERPL-SCNC: 4.2 MMOL/L (ref 3.4–5.3)
RBC # BLD AUTO: 3.86 10E12/L (ref 4.4–5.9)
SODIUM SERPL-SCNC: 139 MMOL/L (ref 133–144)
T4 FREE SERPL-MCNC: 1.12 NG/DL (ref 0.76–1.46)
TSH SERPL DL<=0.005 MIU/L-ACNC: 5 MU/L (ref 0.4–4)
WBC # BLD AUTO: 11.1 10E9/L (ref 4–11)

## 2018-05-06 PROCEDURE — 84439 ASSAY OF FREE THYROXINE: CPT | Performed by: FAMILY MEDICINE

## 2018-05-06 PROCEDURE — 96374 THER/PROPH/DIAG INJ IV PUSH: CPT | Performed by: FAMILY MEDICINE

## 2018-05-06 PROCEDURE — 85025 COMPLETE CBC W/AUTO DIFF WBC: CPT | Performed by: FAMILY MEDICINE

## 2018-05-06 PROCEDURE — 25000128 H RX IP 250 OP 636: Performed by: FAMILY MEDICINE

## 2018-05-06 PROCEDURE — 99284 EMERGENCY DEPT VISIT MOD MDM: CPT | Mod: Z6 | Performed by: FAMILY MEDICINE

## 2018-05-06 PROCEDURE — 83880 ASSAY OF NATRIURETIC PEPTIDE: CPT | Performed by: FAMILY MEDICINE

## 2018-05-06 PROCEDURE — 80048 BASIC METABOLIC PNL TOTAL CA: CPT | Performed by: FAMILY MEDICINE

## 2018-05-06 PROCEDURE — 99284 EMERGENCY DEPT VISIT MOD MDM: CPT | Mod: 25 | Performed by: FAMILY MEDICINE

## 2018-05-06 PROCEDURE — 84443 ASSAY THYROID STIM HORMONE: CPT | Performed by: FAMILY MEDICINE

## 2018-05-06 RX ORDER — GUAIFENESIN 600 MG/1
600 TABLET, EXTENDED RELEASE ORAL DAILY
COMMUNITY

## 2018-05-06 RX ORDER — FUROSEMIDE 10 MG/ML
40 INJECTION INTRAMUSCULAR; INTRAVENOUS ONCE
Status: COMPLETED | OUTPATIENT
Start: 2018-05-06 | End: 2018-05-06

## 2018-05-06 RX ADMIN — FUROSEMIDE 40 MG: 10 INJECTION, SOLUTION INTRAVENOUS at 10:36

## 2018-05-06 NOTE — ED AVS SNAPSHOT
[facility]    [address] [facility] [address]    Phone:  [phone]    Fax:  [phone]                                       Colin Shell   MRN: [id number]    Department:  [facility] Emergency Department   Date of Visit:  5/6/2018           After Visit Summary Signature Page     I have received my discharge instructions, and my questions have been answered. I have discussed any challenges I see with this plan with the nurse or doctor.    ..........................................................................................................................................  Patient/Patient Representative Signature      ..........................................................................................................................................  Patient Representative Print Name and Relationship to Patient    ..................................................               ................................................  Date                                            Time    ..........................................................................................................................................  Reviewed by Signature/Title    ...................................................              ..............................................  Date                                                            Time

## 2018-05-06 NOTE — DISCHARGE INSTRUCTIONS
Schedule appointment in the lymphedema clinic  Follow-up in the cardiology clinic as planned tomorrow  Avoid high salt foods.  You can remove the Ace wraps this evening before bed.  Try to elevate your legs as much as possible.

## 2018-05-06 NOTE — ED TRIAGE NOTES
Patient states that his legs have been swelling more for about a month, is short of breath, states that he was able to walk around and do ok as of yesterday, but this am was having a hard time walking today. Pitting edema in bilateral legs up the thighs and also in lower abdomen.

## 2018-05-06 NOTE — ED PROVIDER NOTES
History     Chief Complaint   Patient presents with     Leg Pain     leg pain and swelling bilat. CHF     HPI     Colin Shell is a 89 year old male who has a history of coronary artery disease s/p CABG, CHF, mild ischemic cardiomyopathy with an EF of 45%-50%, paroxysmal atrial fibrillation, hypertension, CKD  Stage III, hyperlipidemia, fibrosis of lung, and pulmonary nodule who presents to the ED for evaluation of leg pain due to edema. Patient was seen in cardiology clinic on 5/1/2018 by Umm Cabrera for evaluation of his chronic systolic heart failure. Patient's main concerns that time were increased lower extremity edema, weight gain and dyspnea on exertion along with a cough. Patient's lasix dosage was increased from 20 mg to 40 mg daily. He was instructed to follow up in clinic tomorrow. He is also scheduled to follow up in pulmonology in a few weeks for his coughing, possibly multifactorial.     Here today, patient reports that he woke up nearly unable to walk due to his lower extremity edema. He notes that his left is slightly worse than his right, but both are severe. Patient has weeping from his left leg. He states that he has been compliant with his new Lasix dosage. He reports that his breathing is good. He is unsure what is sodium intake is at, but son notes that they have him on the lowest sodium diet that he is compliant with. He tries to elevate his legs as much as possible. He denies fever.     He does not use much ibuprofen, Aleve, or Advil. Patient's daily cardiac medications include 325 mg aspirin daily, atorvastatin 40 mg daily, 40 mg Lasix daily, and 50 mg Toprol-XL daily. Patient is not being seen in the lymphedema clinic.He does not use compression stockings.     Problem List:    Patient Active Problem List    Diagnosis Date Noted     Coronary artery disease involving coronary bypass graft of native heart without angina pectoris      Priority: High     Hx of CABG 1996    ECHO  "01/2017: \"Left ventricular systolic function is mildly reduced.The visual ejection  fraction is estimated at 45%.Moderate basal to mid inferior wall hypokinesia\"    NM MPI 01/2017: \"Myocardial perfusion imaging using single isotope technique  demonstrated transmural scar of the inferior basal inferolateral wall  with mild ellie-infarct ischemia in the basal inferior septal wall.   2. Gated images demonstrated normal LV cavity size with mildly reduced  LV systolic function.  The left ventricular systolic function is 46%.  3. Compared to the prior study from no prior study .\"         Ischemic cardiomyopathy      Priority: Medium     mild- EF 45-50%       Bronchiectasis without complication (H) 04/27/2018     Priority: Medium     Lymphedema 04/27/2018     Priority: Medium     Dyspnea 04/18/2018     Priority: Medium     Paroxysmal atrial fibrillation (H) 02/20/2018     Priority: Medium     Chronic systolic CHF (congestive heart failure) (H) 06/13/2016     Priority: Medium     Advance Care Planning 10/16/2012     Priority: Medium     Advance Care Planning: Receipt of ACP document:  Received: Health Care Directive which was witnessed or notarized on 8/26/09.  Document not previously scanned.  Validation form completed and scanned.  Code Status reflects choices in most recent ACP document. Confirmed/documented designated decision maker(s). See permanent comments section of demographics in clinical tab. View document(s) and details by clicking on code status. Added by Vitcorino Aguilera on 4/9/2015.  Patient is DNR/DNI             Cholelithiasis 05/09/2012     Priority: Medium     2012 assymptomatic   IMO update changed this record. Please review for accuracy       Fibrosis of lung (H) 05/09/2012     Priority: Medium     Suggested pulmonology in 2012 to eval--pt deferred this.  (Problem list name updated by automated process. Provider to review and confirm.)       Pulmonary nodule, right 05/09/2012     Priority: Medium     May " 2012 ct showed right sided probable benign nodule.  No further follow-up needed. After stability noted on CT 2017.       Bronchiectasis (H) 04/30/2012     Priority: Medium     Noted on 2012 ct       Hyperlipidemia LDL goal <100 10/31/2010     Priority: Medium     Chronic kidney disease, stage III (moderate) 07/13/2009     Priority: Medium     Hypertension goal BP (blood pressure) < 140/90 05/20/2005     Priority: Medium     Benign prostatic hyperplasia      Priority: Low     history of elevated PSAs       Obesity 07/13/2009     Priority: Low     Gout 05/20/2005     Priority: Low        Past Medical History:    Past Medical History:   Diagnosis Date     Arrhythmia      Chronic systolic CHF (congestive heart failure) (H) 6/13/2016     Congestive heart failure (H)      Dizziness and giddiness 11/24/2007     Foreign body in unspecified site on external eye      Hypertension      Ischemic cardiomyopathy      Microscopic hematuria      Renal disease      Rhabdomyolysis        Past Surgical History:    Past Surgical History:   Procedure Laterality Date     BIOPSY ARTERY TEMPORAL Left 9/18/2017    Procedure: BIOPSY ARTERY TEMPORAL;  Left Temporal Biopsy ;  Surgeon: Ivana Phan MD;  Location: [facility] OR     SURGICAL HISTORY OF -       heel spurs     SURGICAL HISTORY OF -   2005, 2006    bilateral eye cataract     SURGICAL HISTORY OF -   1996    CABG x 5 vessels     SURGICAL HISTORY OF -   2010    circumcision     TONSILLECTOMY & ADENOIDECTOMY         Family History:    Family History   Problem Relation Age of Onset     DIABETES Father      DIABETES Brother      DIABETES Brother      Hypertension Son      Unknown/Adopted Maternal Grandmother      Unknown/Adopted Maternal Grandfather      Unknown/Adopted Paternal Grandmother      Unknown/Adopted Paternal Grandfather      Dementia Sister        Social History:  Marital Status:   [2]  Social History   Substance Use Topics     Smoking status: Former Smoker      Smokeless tobacco: Never Used      Comment: Quit at age 32     Alcohol use Yes      Comment: 12 pack a year        Medications:      albuterol (PROAIR HFA/PROVENTIL HFA/VENTOLIN HFA) 108 (90 BASE) MCG/ACT Inhaler   allopurinol (ZYLOPRIM) 100 MG tablet   aspirin  MG EC tablet   atorvastatin (LIPITOR) 40 MG tablet   finasteride (PROSCAR) 5 MG tablet   furosemide (LASIX) 20 MG tablet   guaiFENesin (MUCINEX) 600 MG 12 hr tablet   metoprolol succinate (TOPROL-XL) 25 MG 24 hr tablet   Multiple Vitamins-Minerals (CENTRUM SILVER) per tablet   pantoprazole (PROTONIX) 40 MG EC tablet   triamcinolone (KENALOG) 0.1 % ointment   Magnesium Hydroxide (NAVA MILK OF MAGNESIA PO)         Review of Systems  All other systems are reviewed and are negative    Physical Exam   BP: 130/60  Pulse: 77  Temp: 97  F (36.1  C)  Resp: 16  Weight: 93 kg (205 lb)  SpO2: 93 %      Physical Exam    Nursing note and vitals were reviewed.  Constitutional: Awake and alert, chronically ill but not acutely ill appearing 89-year-old in no apparent discomfort,  who answers questions appropriately and cooperates with examination.  Cardiovascular: Cardiac examination reveals normal heart rate and regular rhythm without murmur.  Pulmonary/Chest: Breathing is unlabored.  Breath sounds equal bilaterally.  Bibasilar rales are present there no retractions, tachypnea, wheezes, or rhonchi.  Abdomen: Soft, nontender, no HSM or masses rebound or guarding.  Musculoskeletal: Extremities are warm and well-perfused with 2-3+ pretibial pitting edema up to the knees.  No erythema or induration or warmth to suggest infection  Neurological: Alert, oriented, thought content logical, coherent   Psychiatric: Affect broad and appropriate.        ED Course     ED Course     Procedures               Critical Care time:  none               Results for orders placed or performed during the hospital encounter of 05/06/18 (from the past 24 hour(s))   CBC with platelets  differential   Result Value Ref Range    WBC 11.1 (H) 4.0 - 11.0 10e9/L    RBC Count 3.86 (L) 4.4 - 5.9 10e12/L    Hemoglobin 11.2 (L) 13.3 - 17.7 g/dL    Hematocrit 34.1 (L) 40.0 - 53.0 %    MCV 88 78 - 100 fl    MCH 29.0 26.5 - 33.0 pg    MCHC 32.8 31.5 - 36.5 g/dL    RDW 15.5 (H) 10.0 - 15.0 %    Platelet Count 319 150 - 450 10e9/L    Diff Method Automated Method     % Neutrophils 57.3 %    % Lymphocytes 21.2 %    % Monocytes 11.6 %    % Eosinophils 9.1 %    % Basophils 0.5 %    % Immature Granulocytes 0.3 %    Absolute Neutrophil 6.3 1.6 - 8.3 10e9/L    Absolute Lymphocytes 2.3 0.8 - 5.3 10e9/L    Absolute Monocytes 1.3 0.0 - 1.3 10e9/L    Absolute Eosinophils 1.0 (H) 0.0 - 0.7 10e9/L    Absolute Basophils 0.1 0.0 - 0.2 10e9/L    Abs Immature Granulocytes 0.0 0 - 0.4 10e9/L   Basic metabolic panel   Result Value Ref Range    Sodium 139 133 - 144 mmol/L    Potassium 4.2 3.4 - 5.3 mmol/L    Chloride 104 94 - 109 mmol/L    Carbon Dioxide 28 20 - 32 mmol/L    Anion Gap 7 3 - 14 mmol/L    Glucose 128 (H) 70 - 99 mg/dL    Urea Nitrogen 19 7 - 30 mg/dL    Creatinine 1.07 0.66 - 1.25 mg/dL    GFR Estimate 65 >60 mL/min/1.7m2    GFR Estimate If Black 78 >60 mL/min/1.7m2    Calcium 8.6 8.5 - 10.1 mg/dL   Nt probnp inpatient (BNP)   Result Value Ref Range    N-Terminal Pro BNP Inpatient 6882 (H) 0 - 1800 pg/mL   TSH with free T4 reflex   Result Value Ref Range    TSH 5.00 (H) 0.40 - 4.00 mU/L   T4 free   Result Value Ref Range    T4 Free 1.12 0.76 - 1.46 ng/dL       Medications   furosemide (LASIX) injection 40 mg (40 mg Intravenous Given 5/6/18 1036)       9:57 AM Patient assessed. Course of care outlined.    Assessments & Plan (with Medical Decision Making)     89-year-old male with chronic systolic heart failure presented with worsening lower extremity edema.  I do not get a good history for dietary indiscretion but he has been hanging his legs down and not elevating them up much during the day because he does not have  a good way to do this.  His son is going to work on this.  His BNP is increased from his baseline.  We gave him a dose of IV Lasix in the emergency department he urinated quite a bit.  I wrapped his legs in six-inch Ace wraps that he should remove in the evening before bed.  I have asked him to follow-up in the lymphedema clinic to help manage his lymphedema.  He is actually been referred to this in the past by his primary care physician but has not followed up to make the appointment.  He will not take any additional Lasix today because he is due to be seen in the heart failure clinic tomorrow and they will further adjust his meds at that time.  Given that he is breathing comfortably and able to manage at home with help of his sons, I do not think he needs hospitalization at this time.  He is comfortable with this plan and his questions were all answered.    I have reviewed the nursing notes.    I have reviewed the findings, diagnosis, plan and need for follow up with the patient.       Discharge Medication List as of 5/6/2018 11:49 AM          Final diagnoses:   Acute on chronic systolic congestive heart failure (H)   Lymphedema     This document serves as a record of the services and decisions personally performed and made by Manjinder Leung MD. It was created on HIS/HER behalf by   Pippa Dobbs, a trained medical scribe. The creation of this document is based the provider's statements to the medical scribe.  Pippa Dobbs 9:57 AM 5/6/2018    Provider:   The information in this document, created by the medical scribe for me, accurately reflects the services I personally performed and the decisions made by me. I have reviewed and approved this document for accuracy prior to leaving the patient care area.  Manjinder Leung MD 9:57 AM 5/6/2018 5/6/2018   [facility] EMERGENCY DEPARTMENT     Manjinder Leung MD  05/06/18 [id number]

## 2018-05-06 NOTE — ED AVS SNAPSHOT
[facility] Emergency Department    [address] [place] [address]    Phone:  [phone]    Fax:  [phone]                                       Colin Shell   MRN: [id number]    Department:  [facility] Emergency Department   Date of Visit:  5/6/2018           Patient Information     Date Of Birth          6/25/1927        Your diagnoses for this visit were:     Acute on chronic systolic congestive heart failure (H)     Lymphedema        You were seen by Manjinder Leung MD.      Follow-up Information     Schedule an appointment as soon as possible for a visit with [facility] Lymphedema.    Specialty:  Physical Therapy    Contact information:    [address] [place] [id number] [phone]    Additional information:    The medical center is located at   [address] [place] [address] (between [address] and   [address] in [place], [place] north   of [place]).        Discharge Instructions       Schedule appointment in the lymphedema clinic  Follow-up in the cardiology clinic as planned tomorrow  Avoid high salt foods.  You can remove the Ace wraps this evening before bed.  Try to elevate your legs as much as possible.    Your next 10 appointments already scheduled     May 07, 2018  8:40 AM CDT   LAB with [facility] ([facility])    [address] [place] [id number] [phone]           Please do not eat 10-12 hours before your appointment if you are coming in fasting for labs on lipids, cholesterol, or glucose (sugar). This does not apply to pregnant women. Water, hot tea and black coffee (with nothing added) are okay. Do not drink other fluids, diet soda or chew gum.            May 07, 2018  9:40 AM CDT   CORE Enrollment with Rita Samuel PA-C   [facility] ([facility])    [address] [place] [id number] [phone]            May 08,  2018  8:30 AM CDT   Evaluation with Maribell Garcia PT   [facility] Lymphedema ([facility])    [address]  [facility] [address]   [phone]            May 31, 2018 11:00 AM CDT   New Visit with Angel Luis Gudino MD   [facility] ([facility])    [address] [facility] [address] [phone]            Aug 21, 2018  4:30 PM CDT   Remote ICD Check with CASEY DCR2   [facility] ([facility])    [address]  [place] [id number] [phone] OPT 2           This appointment is for a remote check of your debrillator.  This is not an appointment at the office.              24 Hour Appointment Hotline       To make an appointment at any [facility], call [phone] ([phone]). If you don't have a family doctor or clinic, we will help you find one. [facility] are conveniently located to serve the needs of you and your family.             Review of your medicines      Our records show that you are taking the medicines listed below. If these are incorrect, please call your family doctor or clinic.        Dose / Directions Last dose taken    albuterol 108 (90 Base) MCG/ACT Inhaler   Commonly known as:  PROAIR HFA/PROVENTIL HFA/VENTOLIN HFA   Dose:  2 puff   Quantity:  1 Inhaler        Inhale 2 puffs into the lungs every 4 hours as needed for shortness of breath / dyspnea or wheezing WITH SPACER   Refills:  0        allopurinol 100 MG tablet   Commonly known as:  ZYLOPRIM   Dose:  50 mg   Quantity:  30 tablet        Take 0.5 tablets (50 mg) by mouth daily   Refills:  0        aspirin 325 MG EC tablet   Dose:  325 mg   Quantity:  90 tablet        Take 1 tablet (325 mg) by mouth daily   Refills:  3        atorvastatin 40 MG tablet   Commonly known as:  LIPITOR   Dose:  40 mg   Quantity:  90 tablet        Take 1 tablet (40 mg) by mouth daily   Refills:  3         CENTRUM SILVER per tablet   Dose:  1 tablet        Take 1 tablet by mouth daily   Refills:  0        finasteride 5 MG tablet   Commonly known as:  PROSCAR   Dose:  5 mg   Quantity:  90 tablet        Take 1 tablet (5 mg) by mouth daily   Refills:  3        furosemide 20 MG tablet   Commonly known as:  LASIX   Dose:  40 mg   Quantity:  30 tablet        Take 2 tablets (40 mg) by mouth daily   Refills:  1        guaiFENesin 600 MG 12 hr tablet   Commonly known as:  MUCINEX   Dose:  600 mg        Take 600 mg by mouth 2 times daily   Refills:  0        metoprolol succinate 25 MG 24 hr tablet   Commonly known as:  TOPROL-XL   Dose:  50 mg   Quantity:  180 tablet        Take 2 tablets (50 mg) by mouth daily   Refills:  3        pantoprazole 40 MG EC tablet   Commonly known as:  PROTONIX   Dose:  40 mg   Quantity:  30 tablet        Take 1 tablet (40 mg) by mouth every morning   Refills:  0        NAVA MILK OF MAGNESIA PO   Dose:  30 mL        Take 30 mLs by mouth daily as needed   Refills:  0        triamcinolone 0.1 % ointment   Commonly known as:  KENALOG   Quantity:  30 g        Apply topically 2 times daily Apply sparingly to affected area twice times daily for the next 7-10 days.   Refills:  1                Procedures and tests performed during your visit     Basic metabolic panel    CBC with platelets differential    Nt probnp inpatient (BNP)      Orders Needing Specimen Collection     None      Pending Results     No orders found from 5/4/2018 to 5/7/2018.            Pending Culture Results     No orders found from 5/4/2018 to 5/7/2018.            Pending Results Instructions     If you had any lab results that were not finalized at the time of your Discharge, you can call the ED Lab Result RN at [phone]. You will be contacted by this team for any positive Lab results or changes in treatment. The nurses are available 7 days a week from 10A to 6:30P.  You can leave a message 24 hours per day and they will  return your call.        Test Results From Your Hospital Stay        5/6/2018 10:34 AM      Component Results     Component Value Ref Range & Units Status    WBC 11.1 (H) 4.0 - 11.0 10e9/L Final    RBC Count 3.86 (L) 4.4 - 5.9 10e12/L Final    Hemoglobin 11.2 (L) 13.3 - 17.7 g/dL Final    Hematocrit 34.1 (L) 40.0 - 53.0 % Final    MCV 88 78 - 100 fl Final    MCH 29.0 26.5 - 33.0 pg Final    MCHC 32.8 31.5 - 36.5 g/dL Final    RDW 15.5 (H) 10.0 - 15.0 % Final    Platelet Count 319 150 - 450 10e9/L Final    Diff Method Automated Method  Final    % Neutrophils 57.3 % Final    % Lymphocytes 21.2 % Final    % Monocytes 11.6 % Final    % Eosinophils 9.1 % Final    % Basophils 0.5 % Final    % Immature Granulocytes 0.3 % Final    Absolute Neutrophil 6.3 1.6 - 8.3 10e9/L Final    Absolute Lymphocytes 2.3 0.8 - 5.3 10e9/L Final    Absolute Monocytes 1.3 0.0 - 1.3 10e9/L Final    Absolute Eosinophils 1.0 (H) 0.0 - 0.7 10e9/L Final    Absolute Basophils 0.1 0.0 - 0.2 10e9/L Final    Abs Immature Granulocytes 0.0 0 - 0.4 10e9/L Final         5/6/2018 10:50 AM      Component Results     Component Value Ref Range & Units Status    Sodium 139 133 - 144 mmol/L Final    Potassium 4.2 3.4 - 5.3 mmol/L Final    Chloride 104 94 - 109 mmol/L Final    Carbon Dioxide 28 20 - 32 mmol/L Final    Anion Gap 7 3 - 14 mmol/L Final    Glucose 128 (H) 70 - 99 mg/dL Final    Urea Nitrogen 19 7 - 30 mg/dL Final    Creatinine 1.07 0.66 - 1.25 mg/dL Final    GFR Estimate 65 >60 mL/min/1.7m2 Final    Non  GFR Calc    GFR Estimate If Black 78 >60 mL/min/1.7m2 Final    African American GFR Calc    Calcium 8.6 8.5 - 10.1 mg/dL Final         5/6/2018 10:53 AM      Component Results     Component Value Ref Range & Units Status    N-Terminal Pro BNP Inpatient 6882 (H) 0 - 1800 pg/mL Final       Reference range shown and results flagged as abnormal are suggested inpatient   cut points for confirming diagnosis if CHF in an acute setting.  "Establishing a   baseline value for each individual patient is useful for follow-up. An   inpatient or emergency department NT-proPBNP <300 pg/mL effectively rules out   acute CHF, with 99% negative predictive value.  The outpatient non-acute reference range for ruling out CHF is:   0-125 pg/mL (age 18 to less than 75)   0-450 pg/mL (age 75 yrs and older)                  Thank you for choosing [facility]       Thank you for choosing [facility] for your care. Our goal is always to provide you with excellent care. Hearing back from our patients is one way we can continue to improve our services. Please take a few minutes to complete the written survey that you may receive in the mail after you visit with us. Thank you!        [facility] [employer] Information     [employer] lets you send messages to your doctor, view your test results, renew your prescriptions, schedule appointments and more. To sign up, go to [email or web address] . Click on \"Log in\" on the left side of the screen, which will take you to the Welcome page. Then click on \"Sign up Now\" on the right side of the page.     You will be asked to enter the access code listed below, as well as some personal information. Please follow the directions to create your username and password.     Your access code is: [email or web address]  Expires: 2018 11:54 AM     Your access code will  in 90 days. If you need help or a new code, please call your [facility] clinic or [phone].        Care EveryWhere ID     This is your Care EveryWhere ID. This could be used by other organizations to access your [facility] medical records  [id number]        Equal Access to Services     [facility]: Hadii lanny Ramirez, waaxda lujonathonadaha, qaybta kadaniela duomnt, fina vo. So [facility] [phone].    ATENCIÓN: Si habla español, tiene a rojo disposición servicios gratuitos de asistencia lingüística. Llame al [phone].    We comply with applicable federal " civil rights laws and [place] laws. We do not discriminate on the basis of race, color, national origin, age, disability, sex, sexual orientation, or gender identity.            After Visit Summary       This is your record. Keep this with you and show to your community pharmacist(s) and doctor(s) at your next visit.

## 2018-05-07 ENCOUNTER — PATIENT OUTREACH (OUTPATIENT)
Dept: CARE COORDINATION | Facility: CLINIC | Age: 83
End: 2018-05-07

## 2018-05-07 ENCOUNTER — OFFICE VISIT (OUTPATIENT)
Dept: CARDIOLOGY | Facility: CLINIC | Age: 83
End: 2018-05-07
Attending: PHYSICIAN ASSISTANT
Payer: COMMERCIAL

## 2018-05-07 VITALS
HEART RATE: 83 BPM | BODY MASS INDEX: 28.7 KG/M2 | WEIGHT: 200 LBS | OXYGEN SATURATION: 95 % | SYSTOLIC BLOOD PRESSURE: 128 MMHG | DIASTOLIC BLOOD PRESSURE: 58 MMHG

## 2018-05-07 DIAGNOSIS — I50.9 ACUTE CONGESTIVE HEART FAILURE, UNSPECIFIED CONGESTIVE HEART FAILURE TYPE: ICD-10-CM

## 2018-05-07 PROCEDURE — 99214 OFFICE O/P EST MOD 30 MIN: CPT | Performed by: PHYSICIAN ASSISTANT

## 2018-05-07 RX ORDER — LOSARTAN POTASSIUM 100 MG/1
100 TABLET ORAL DAILY
COMMUNITY
End: 2018-05-07

## 2018-05-07 NOTE — PATIENT INSTRUCTIONS
Call CORE nurse for any questions or concerns:  [phone]   *If you have concerns after hours, please call [phone], option 2 to speak with on call Cardiologist.    1. Medication changes from today: Continue on lasix 40 mg daily. Stay off of the losartan/cozaar for now.     2. Weigh yourself daily and write it down. Call if your weight is > 200 lbs.     3. Call CORE nurse if your weight is up more than 2 pounds in one day or 5 pounds in one week.     4. Call CORE nurse if you feel more short of breath, have more abdominal bloating, or leg swelling.     5. Continue low sodium diet (less than 2000 mg daily). If you eat less salt, you will retain less fluid.     6. Alcohol can weaken your heart further. You should avoid alcohol or limit its use to special times, such as a holiday or birthday.      7. Do NOT take Aleve or ibuprofen without talking to your doctor first.      8. Lab Results: labs today look stable.  Component      Latest Ref Rng & Units 5/6/2018   WBC      4.0 - 11.0 10e9/L 11.1 (H)   RBC Count      4.4 - 5.9 10e12/L 3.86 (L)   Hemoglobin      13.3 - 17.7 g/dL 11.2 (L)   Hematocrit      40.0 - 53.0 % 34.1 (L)   MCV      78 - 100 fl 88   MCH      26.5 - 33.0 pg 29.0   MCHC      31.5 - 36.5 g/dL 32.8   RDW      10.0 - 15.0 % 15.5 (H)   Platelet Count      150 - 450 10e9/L 319   Diff Method       Automated Method   % Neutrophils      % 57.3   % Lymphocytes      % 21.2   % Monocytes      % 11.6   % Eosinophils      % 9.1   % Basophils      % 0.5   % Immature Granulocytes      % 0.3   Absolute Neutrophil      1.6 - 8.3 10e9/L 6.3   Absolute Lymphocytes      0.8 - 5.3 10e9/L 2.3   Absolute Monocytes      0.0 - 1.3 10e9/L 1.3   Absolute Eosinophils      0.0 - 0.7 10e9/L 1.0 (H)   Absolute Basophils      0.0 - 0.2 10e9/L 0.1   Abs Immature Granulocytes      0 - 0.4 10e9/L 0.0   Sodium      133 - 144 mmol/L 139   Potassium      3.4 - 5.3 mmol/L 4.2   Chloride      94 - 109 mmol/L 104   Carbon Dioxide       20 - 32 mmol/L 28   Anion Gap      3 - 14 mmol/L 7   Glucose      70 - 99 mg/dL 128 (H)   Urea Nitrogen      7 - 30 mg/dL 19   Creatinine      0.66 - 1.25 mg/dL 1.07   GFR Estimate      >60 mL/min/1.7m2 65   GFR Estimate If Black      >60 mL/min/1.7m2 78   Calcium      8.5 - 10.1 mg/dL 8.6   N-Terminal Pro BNP Inpatient      0 - 1800 pg/mL 6882 (H)   TSH      0.40 - 4.00 mU/L 5.00 (H)   T4 Free      0.76 - 1.46 ng/dL 1.12     9. Follow up plan:  Follow up with Bette TNEORIO or Umm DESIR in 2 weeks with labs prior.  *If you need to speak with [facility] Scheduling, please call:   Cardiology Scheduling~[phone]  Diagnostic Imaging Scheduling~[phone]  Lab Scheduling~[phone]     [facility]: Cardiomyopathy, Optimization, Rehabilitation, Education  The [facility] is a heart failure specialty clinic within the [facility] where you will work with specialized nurse practitioners, physician assistants, doctors, and registered nurses. They are dedicated to helping patients with heart failure to carefully adjust medications, receive education, and learn who and when to call if symptoms develop. They specialize in helping you better understand your condition, slow the progression of your disease, improve the length and quality of your life, help you detect future heart problems before they become life threatening, and avoid hospitalizations.

## 2018-05-07 NOTE — MR AVS SNAPSHOT
After Visit Summary   5/7/2018    Colin Shell    MRN: [id number]           Patient Information     Date Of Birth          6/25/1927        Visit Information        Provider Department      5/7/2018 9:40 AM Rita Samuel PA-C [facility]        Today's Diagnoses     Acute congestive heart failure, unspecified congestive heart failure type (H)          Care Instructions    Call [facility] nurse for any questions or concerns:  [phone]   *If you have concerns after hours, please call [phone], option 2 to speak with on call Cardiologist.    1. Medication changes from today:  No medication changes today. Continue on lasix 40 mg daily. Stay off of the losartan/cozaar for now.     2. Weigh yourself daily and write it down. Call if your weight is > 200 lbs.     3. Call [facility] nurse if your weight is up more than 2 pounds in one day or 5 pounds in one week.     4. Call [facility] nurse if you feel more short of breath, have more abdominal bloating, or leg swelling.     5. Continue low sodium diet (less than 2000 mg daily). If you eat less salt, you will retain less fluid.     6. Alcohol can weaken your heart further. You should avoid alcohol or limit its use to special times, such as a holiday or birthday.      7. Do NOT take Aleve or ibuprofen without talking to your doctor first.      8. Lab Results: labs today look stable.  Component      Latest Ref Rng & Units 5/6/2018   WBC      4.0 - 11.0 10e9/L 11.1 (H)   RBC Count      4.4 - 5.9 10e12/L 3.86 (L)   Hemoglobin      13.3 - 17.7 g/dL 11.2 (L)   Hematocrit      40.0 - 53.0 % 34.1 (L)   MCV      78 - 100 fl 88   MCH      26.5 - 33.0 pg 29.0   MCHC      31.5 - 36.5 g/dL 32.8   RDW      10.0 - 15.0 % 15.5 (H)   Platelet Count      150 - 450 10e9/L 319   Diff Method       Automated Method   % Neutrophils      % 57.3   % Lymphocytes      % 21.2   % Monocytes      % 11.6   % Eosinophils      % 9.1   %  Basophils      % 0.5   % Immature Granulocytes      % 0.3   Absolute Neutrophil      1.6 - 8.3 10e9/L 6.3   Absolute Lymphocytes      0.8 - 5.3 10e9/L 2.3   Absolute Monocytes      0.0 - 1.3 10e9/L 1.3   Absolute Eosinophils      0.0 - 0.7 10e9/L 1.0 (H)   Absolute Basophils      0.0 - 0.2 10e9/L 0.1   Abs Immature Granulocytes      0 - 0.4 10e9/L 0.0   Sodium      133 - 144 mmol/L 139   Potassium      3.4 - 5.3 mmol/L 4.2   Chloride      94 - 109 mmol/L 104   Carbon Dioxide      20 - 32 mmol/L 28   Anion Gap      3 - 14 mmol/L 7   Glucose      70 - 99 mg/dL 128 (H)   Urea Nitrogen      7 - 30 mg/dL 19   Creatinine      0.66 - 1.25 mg/dL 1.07   GFR Estimate      >60 mL/min/1.7m2 65   GFR Estimate If Black      >60 mL/min/1.7m2 78   Calcium      8.5 - 10.1 mg/dL 8.6   N-Terminal Pro BNP Inpatient      0 - 1800 pg/mL 6882 (H)   TSH      0.40 - 4.00 mU/L 5.00 (H)   T4 Free      0.76 - 1.46 ng/dL 1.12     9. Follow up plan:  Follow up with Bette TENORIO or Umm DESIR in 2 weeks with labs prior.  *If you need to speak with [facility] Scheduling, please call:   Cardiology Scheduling~[phone]  Diagnostic Imaging Scheduling~[phone]  Lab Scheduling~[phone]     [facility]: Cardiomyopathy, Optimization, Rehabilitation, Education  The [facility] is a heart failure specialty clinic within the [facility] where you will work with specialized nurse practitioners, physician assistants, doctors, and registered nurses. They are dedicated to helping patients with heart failure to carefully adjust medications, receive education, and learn who and when to call if symptoms develop. They specialize in helping you better understand your condition, slow the progression of your disease, improve the length and quality of your life, help you detect future heart problems before they become life threatening, and avoid hospitalizations.              Follow-ups after your visit        Additional Services     Follow-Up  with [facility] - JIMI visit                 Your next 10 appointments already scheduled     May 08, 2018  8:30 AM CDT   Evaluation with Maribell Garcia PT   [facility] ([facility])    [address] [place]  [facility] [place] [address]   [phone]            May 31, 2018 11:00 AM CDT   New Visit with Angel Luis Gudino MD   [facility]    [address] [id number] [phone]            Aug 21, 2018  4:30 PM CDT   Remote ICD Check with CASEY DCR2   [facility] ([facility])    [address]  [facility] [place] [id number] [phone] OPT 2           This appointment is for a remote check of your debrillator.  This is not an appointment at the office.              Future tests that were ordered for you today     Open Future Orders        Priority Expected Expires Ordered    Follow-Up with [facility] - JIMI visit Routine 5/21/2018 5/7/2019 5/7/2018    Basic metabolic panel Routine 5/21/2018 5/7/2019 5/7/2018            Who to contact     If you have questions or need follow up information about today's clinic visit or your schedule please contact [facility] directly at [phone].  Normal or non-critical lab and imaging results will be communicated to you by LogicBayhart, letter or phone within 4 business days after the clinic has received the results. If you do not hear from us within 7 days, please contact the clinic through LogicBayhart or phone. If you have a critical or abnormal lab result, we will notify you by phone as soon as possible.  Submit refill requests through Shoulder Tap or call your pharmacy and they will forward the refill request to us. Please allow 3 business days for your refill to be completed.          Additional Information About Your Visit        Shoulder Tap Information     Shoulder Tap lets you send messages to your doctor,  "view your test results, renew your prescriptions, schedule appointments and more. To sign up, go to [email or web address] . Click on \"Log in\" on the left side of the screen, which will take you to the Welcome page. Then click on \"Sign up Now\" on the right side of the page.     You will be asked to enter the access code listed below, as well as some personal information. Please follow the directions to create your username and password.     Your access code is: [email or web address]  Expires: 2018 11:54 AM     Your access code will  in 90 days. If you need help or a new code, please call your [facility] clinic or [phone].        Care EveryWhere ID     This is your Care EveryWhere ID. This could be used by other organizations to access your [facility] medical records  [id number]        Your Vitals Were     Pulse Pulse Oximetry BMI (Body Mass Index)             83 95% 28.7 kg/m2          Blood Pressure from Last 3 Encounters:   18 128/58   18 116/65   18 121/50    Weight from Last 3 Encounters:   18 90.7 kg (200 lb)   18 93 kg (205 lb)   18 98 kg (216 lb)              We Performed the Following     Follow-Up with Cardiac Advanced Practice Provider          Today's Medication Changes          These changes are accurate as of 18 10:42 AM.  If you have any questions, ask your nurse or doctor.               Stop taking these medicines if you haven't already. Please contact your care team if you have questions.     losartan 100 MG tablet   Commonly known as:  COZAAR   Stopped by:  Rita Samuel PA-C           pantoprazole 40 MG EC tablet   Commonly known as:  PROTONIX   Stopped by:  Rita Samuel PA-C                    Primary Care Provider Office Phone # Fax #    Jono Drake [phone] [address] [address]        Goals        General    Improve chronic symptoms (pt-stated)     Notes - Note created  " 5/4/2018  3:03 PM by Edwin Marley, RN    Goal Statement: I want to keep my heart failure controlled  Measure of Success: my breathing will improve and my weight will go down  Supportive Steps to Achieve: I will weigh daily and record                                                  I will take 2 lasix tabs until instructions change                                                  I will eat a low salt diet  Barriers: Did not understand that he should increase to 2 lasix tablets, has not been recording weights  Strengths: patient willing to take needed steps  Date to Achieve By: 5/14/18      Medication 1 (pt-stated)     Notes - Note created  5/4/2018  2:39 PM by Edwin Marley, RN    Goal Statement: I will increase my Lasix to help decrease my weight  Measure of Success: Weight will drop to 204 lb range and my leg swelling will decreas  Supportive Steps to Achieve:increase your lasix to two tablets as instructed by Cardiology  Barriers: Cardiologist did not write new script so bottle states 1 tablet  Strengths: Patient willing to increase to help with fluid  Date to Achieve By:5/15/18        Equal Access to Services     ALEXIS PACE AH: Hadii aad ku hadasho Soomaali, waaxda luqadaha, qaybta kaalmada adeegyada, waxay idiin hayephraimn [place] . So [facility] [phone].    ATENCIÓN: Si habla español, tiene a rojo disposición servicios gratuitos de asistencia lingüística. Llame al [phone].    We comply with applicable federal civil rights laws and [place] laws. We do not discriminate on the basis of race, color, national origin, age, disability, sex, sexual orientation, or gender identity.            Thank you!     Thank you for choosing [facility]  for your care. Our goal is always to provide you with excellent care. Hearing back from our patients is one way we can continue to improve our services. Please take a few minutes to complete the written survey that you  may receive in the mail after your visit with us. Thank you!             Your Updated Medication List - Protect others around you: Learn how to safely use, store and throw away your medicines at www.disposemymeds.org.          This list is accurate as of 5/7/18 10:42 AM.  Always use your most recent med list.                   Brand Name Dispense Instructions for use Diagnosis    albuterol 108 (90 Base) MCG/ACT Inhaler    PROAIR HFA/PROVENTIL HFA/VENTOLIN HFA    1 Inhaler    Inhale 2 puffs into the lungs every 4 hours as needed for shortness of breath / dyspnea or wheezing WITH SPACER        allopurinol 100 MG tablet    ZYLOPRIM    30 tablet    Take 0.5 tablets (50 mg) by mouth daily    Gout of foot, unspecified cause, unspecified chronicity, unspecified laterality       aspirin 325 MG EC tablet     90 tablet    Take 1 tablet (325 mg) by mouth daily        atorvastatin 40 MG tablet    LIPITOR    90 tablet    Take 1 tablet (40 mg) by mouth daily    Hyperlipidemia LDL goal <100       CENTRUM SILVER per tablet      Take 1 tablet by mouth daily        finasteride 5 MG tablet    PROSCAR    90 tablet    Take 1 tablet (5 mg) by mouth daily    Benign non-nodular prostatic hyperplasia without lower urinary tract symptoms       furosemide 20 MG tablet    LASIX    30 tablet    Take 2 tablets (40 mg) by mouth daily    Lymphedema       guaiFENesin 600 MG 12 hr tablet    MUCINEX     Take 600 mg by mouth 2 times daily        metoprolol succinate 25 MG 24 hr tablet    TOPROL-XL    180 tablet    Take 2 tablets (50 mg) by mouth daily    Paroxysmal atrial fibrillation (H), Coronary artery disease involving native coronary artery of native heart without angina pectoris       NAVA MILK OF MAGNESIA PO      Take 30 mLs by mouth daily as needed        triamcinolone 0.1 % ointment    KENALOG    30 g    Apply topically 2 times daily Apply sparingly to affected area twice times daily for the next 7-10 days.    Rash and nonspecific skin  eruption

## 2018-05-07 NOTE — NURSING NOTE
The After Visit Summary was reviewed with the patient and his son following office visit with MARLENI Louis. Patient and son were educated about any medication changes, the importance of following a low sodium diet, importance of recording daily weights, and when to call [facility] clinic. Patient verbalized understanding of the information and agrees to call with any questions or concerns.     Labs: Lab results from today were reviewed with the patient during the office visit. A copy of the results were provided on the After Visit Summary.     Return appointment: Patient was given instructions on when and how to schedule their next office visit with the [facility] clinic. Patient to follow up with MARLENI Louis with labs in 2 weeks.     Medication changes: No changes. Patient to stay off Losartan. Patient to continue higher dose of Lasix at 40mg daily.     Went through med list with patient and son. Wrote down what each medication was for per request of patient and son.     Patient lives independently in his own home alone. Son involved. Patient does his own medications. Son will assist if needed. Asked patient and son, if we were going to call to make a medication change, who should we call. Patient and son both stated we can call the patient directly with changes, but should have him write it down and read it back to make sure he has it correct. Specialty comments updated with this information.     Patient has difficult looking at sodium mg on nutrition labels. Patient does not cook anything from scratch. He eats a lot of canned soup and frozen prepared meals. Son has had patient looking at nutrition labels and looking for items that are 15-20% sodium, instead of 60% like he used to be doing. Patient does not add salt to anything. Did discuss looking at mg of sodium versus % on nutrition labels, but this is too much for patient per son. Patient is going to continue to look for lower % on nutrition labels.     Patient is  weighing himself every day and writing it down.       Loni Orellana, RN  [facility] RN Care Coordinator  [facility]  [phone]

## 2018-05-07 NOTE — PROGRESS NOTES
Cardiology Progress Note    Date of Service: 05/07/2018  Patient seen today in follow up of: CHF  Primary cardiologist: Dr. Erickson    HPI:  Colin Shell is a very pleasant 89 year old male with a history of CAD s/p CABG in 1986, syncope, atrial dysrhythmia including atrial fibrillation and ventricular dysrhythmia for which he underwent ICD implantation in early 2017, hypertension and hyperlipidemia. He is a patient of Dr. Sommers and has been followed closely in clinic. In regards to anticoagulation, he has decline full anticoagulation and has remained on aspirin alone.     He was seen in clinic in February of this year and was doing well at that time.  Since then, he was in the emergency department 3 times for complaints of cough and shortness of breath.  He was also admitted 1 of these times from 4/18 through 4/20.  During his first ER visit, he underwent a CAT scan.  This showed patchy pulmonary groundglass opacities throughout the left lung.  He was also noted to have mild bronchiectasis bilaterally and small bilateral pleural effusions.  Antibiotics were prescribed for pneumonia.  About a week later he re-presented to the emergency room.  His symptoms had not improved with antibiotic therapy.  Chest x-ray was unchanged.  NT proBNP was about 2000.  He was treated with cough medicine and a DuoNeb.  He was instructed to follow-up with his PCP and consider a pulmonology follow-up.  Again, 2 weeks later he presented with similar symptoms.  A repeat chest CT without contrast showed some improvement in his patchy infiltrates.  Small bilateral pleural effusions were again noted.  At that point he was admitted for further workup and treatment.  His dyspnea and cough are felt to likely be an exacerbation of bronchiectasis and he was again treated with antibiotic therapy.  A repeat echocardiogram during this admission showed normal LV size.  There was moderate concentric LVH.  The LVEF was 45-50%.  Mild aortic  stenosis was noted.  Overall, these findings were unchanged from October 2017.  He followed up with his PCP post hospital and at that point was started on a low-dose of Lasix, 20 mg daily.    He then presented for his annual device clinic check about a week ago and was added on to Umm Cabrera PA-C's schedule.  He noted significant lower extremity edema as some exertional dyspnea and orthopnea.  He continued to have a cough which was productive at times. His device check showed intermittent atrial fibrillation although his heart rates appear to be fairly well controlled when is in atrial fibrillation. His OptiVol index did show an increase in his fluid starting in mid March and his weight was up 9 pounds on our clinic scale. His lasix dose was increased to 40 mg daily and a one week follow up was recommended.  He was also referred to pulmonology and has an appointment with them at the end of this month.    Yesterday he presented to the emergency department as he woke up and was unable to walk due to his peripheral edema.  His left leg has been weeping.  He has been trying to elevate his legs and be compliant with his sodium restriction.  NT proBNP was 6882. He was given a dose of 40 mg of IV lasix.  His legs were wrapped with Ace wraps and he was referred to the lymphedema clinic for further management of his peripheral edema.      He returns today for routine follow-up. His weight is down over 15 lbs on our scale at 200 lbs.  Overall, he cannot really tell me if he feels much better despite this weight loss.  He continues to have a cough which is productive at times.  He has not been sleeping in bed.  When he has tried he develops a significant cough.  He has limited his activity quite a bit in the last 6 months.  He tells me this is because he is 89 years old so he stopped doing things such as shoveling snow, etc.  He does walk down to his mailbox which is about 300 feet.  He tells me recently on the way  back he has to stop several times due to shortness of breath.  He continues to have quite significant peripheral edema on his left leg has been weeping.  He is scheduled for evaluation in the lymphedema clinic tomorrow.    He does not cook and eats mostly microwave meals. He has been trying to look at some food labels to make better choices.      ASSESSMENT/PLAN:   1.  Exertional dyspnea.   2.  Mild ischemic cardiomyopathy. LVEF 45 - 50% by recent echocardiogram.   3.  Paroxsymal atrial fibrillation. He has declined full anticoagulation and remains on aspirin 325 mg daily. He is rate controlled on Metoprolol XL 50 mg daily.   4.  CAD. S/p CABG in 1986.  He will continue on aspirin and statin.  5.  ICD in place.     Colin returns today for follow up. It is difficult to say if he has had much symptomatic improvement in the past week despite a 15 lb weight loss. He is fairly sedentary at home and has not pushed himself much this week. I suspect his dyspnea is likely multifactorial related to congestive heart failure, possibly some underlying pulmonary process, and deconditioning.  His basic metabolic panel yesterday showed his renal function is stable and electrolytes are normal. NTproBNP was still elevated at 6882. For now, I recommend he continue the higher dose of lasix at 40 mg daily. He will continue to keep any eye on his weights. I suspect we should aim to keep him at least under 200 lbs. We discussed sodium restriction today. He eats mostly frozen microwave meals and therefore sodium restriction is difficult for him. He is trying to make some better choices though by reading labels. He has a follow up appointment with pulmonology at the end of the month. HE is also scheduled in the lyphedema clinic tomorrow which I think will be very beneficial for him.     Orders this Visit:  Orders Placed This Encounter   Procedures     Basic metabolic panel     Follow-Up with [facility] - JIMI visit     Orders Placed This  Encounter   Medications     DISCONTD: losartan (COZAAR) 100 MG tablet     Sig: Take 100 mg by mouth daily     Medications Discontinued During This Encounter   Medication Reason     pantoprazole (PROTONIX) 40 MG EC tablet      losartan (COZAAR) 100 MG tablet        CURRENT MEDICATIONS:  Current Outpatient Prescriptions   Medication Sig Dispense Refill     albuterol (PROAIR HFA/PROVENTIL HFA/VENTOLIN HFA) 108 (90 BASE) MCG/ACT Inhaler Inhale 2 puffs into the lungs every 4 hours as needed for shortness of breath / dyspnea or wheezing WITH SPACER 1 Inhaler 0     allopurinol (ZYLOPRIM) 100 MG tablet Take 0.5 tablets (50 mg) by mouth daily 30 tablet      aspirin  MG EC tablet Take 1 tablet (325 mg) by mouth daily 90 tablet 3     atorvastatin (LIPITOR) 40 MG tablet Take 1 tablet (40 mg) by mouth daily 90 tablet 3     finasteride (PROSCAR) 5 MG tablet Take 1 tablet (5 mg) by mouth daily 90 tablet 3     furosemide (LASIX) 20 MG tablet Take 2 tablets (40 mg) by mouth daily 30 tablet 1     guaiFENesin (MUCINEX) 600 MG 12 hr tablet Take 600 mg by mouth 2 times daily       Magnesium Hydroxide (NAVA MILK OF MAGNESIA PO) Take 30 mLs by mouth daily as needed        metoprolol succinate (TOPROL-XL) 25 MG 24 hr tablet Take 2 tablets (50 mg) by mouth daily 180 tablet 3     Multiple Vitamins-Minerals (CENTRUM SILVER) per tablet Take 1 tablet by mouth daily       triamcinolone (KENALOG) 0.1 % ointment Apply topically 2 times daily Apply sparingly to affected area twice times daily for the next 7-10 days. (Patient not taking: Reported on 5/7/2018) 30 g 1     ALLERGIES  Allergies   Allergen Reactions     Flomax [Tamsulosin Hydrochloride]      Hctz Other (See Comments)     Caused loss of balance     Lisinopril Cough     Simvastatin Nausea and Vomiting and Diarrhea     Vytorin Nausea and Diarrhea     PAST MEDICAL HISTORY:  Past Medical History:   Diagnosis Date     Arrhythmia      Chronic systolic CHF (congestive heart failure)  (H) 6/13/2016     Congestive heart failure (H)      Dizziness and giddiness 11/24/2007    uncertain etiology- MRI/MRA head , carotid duplex normal 2007     Foreign body in unspecified site on external eye      Hypertension      Ischemic cardiomyopathy     mild- EF 45-50%     Microscopic hematuria      microscopic hematuria 2001 with  IVP and cystoscopy     Renal disease      Rhabdomyolysis     2003- in setting of Ecoli UTI, gemfibrozil/lipitor     PAST SURGICAL HISTORY:  Past Surgical History:   Procedure Laterality Date     BIOPSY ARTERY TEMPORAL Left 9/18/2017    Procedure: BIOPSY ARTERY TEMPORAL;  Left Temporal Biopsy ;  Surgeon: Ivana Phan MD;  Location: [facility] OR     SURGICAL HISTORY OF -       heel spurs     SURGICAL HISTORY OF -   2005, 2006    bilateral eye cataract     SURGICAL HISTORY OF -   1996    CABG x 5 vessels     SURGICAL HISTORY OF -   2010    circumcision     TONSILLECTOMY & ADENOIDECTOMY       FAMILY HISTORY:  Family History   Problem Relation Age of Onset     DIABETES Father      DIABETES Brother      DIABETES Brother      Hypertension Son      Unknown/Adopted Maternal Grandmother      Unknown/Adopted Maternal Grandfather      Unknown/Adopted Paternal Grandmother      Unknown/Adopted Paternal Grandfather      Dementia Sister      SOCIAL HISTORY:  Social History     Social History     Marital status:      Spouse name: N/A     Number of children: N/A     Years of education: N/A     Social History Main Topics     Smoking status: Former Smoker     Smokeless tobacco: Never Used      Comment: Quit at age 32     Alcohol use Yes      Comment: 12 pack a year     Drug use: No     Sexual activity: Yes     Other Topics Concern     Parent/Sibling W/ Cabg, Mi Or Angioplasty Before 65f 55m? No     Social History Narrative     Review of Systems:  Negative unless otherwise mentioned in the HPI     Physical Exam:  Vitals: /58  Pulse 83  Wt 90.7 kg (200 lb)  SpO2 95%  BMI 28.7 kg/m2   Wt  Readings from Last 4 Encounters:   05/07/18 90.7 kg (200 lb)   05/06/18 93 kg (205 lb)   05/01/18 98 kg (216 lb)   04/27/18 97.5 kg (215 lb)     GEN: well nourished, in no acute distress.  HEENT:  Pupils equal, round. Sclerae nonicteric.   NECK: Supple, no masses appreciated. No JvD at 30 degrees.  C/V:  Regular rate and rhythm, II/VI systolic murmur heard best at the LSB  RESP: Respirations are unlabored. Initially he has some rhonchi in the lower lobes which improve with coughing.  GI: Abdomen soft, nontender.  EXTREM: 2-3+ bilateral LE swelling to the lower thigh. He has has some scattered abrasions/scrapes.  NEURO: Alert and oriented, cooperative.  SKIN: Warm and dry.     Recent Lab Results:  LIPID RESULTS:  Lab Results   Component Value Date    CHOL 100 05/01/2018    HDL 34 (L) 05/01/2018    LDL 50 05/01/2018    TRIG 81 05/01/2018    CHOLHDLRATIO 5.0 12/11/2014     LIVER ENZYME RESULTS:  Lab Results   Component Value Date    AST 62 (H) 04/01/2018    ALT 40 05/01/2018     CBC RESULTS:  Lab Results   Component Value Date    WBC 11.1 (H) 05/06/2018    RBC 3.86 (L) 05/06/2018    HGB 11.2 (L) 05/06/2018    HCT 34.1 (L) 05/06/2018    MCV 88 05/06/2018    MCH 29.0 05/06/2018    MCHC 32.8 05/06/2018    RDW 15.5 (H) 05/06/2018     05/06/2018     BMP RESULTS:  Lab Results   Component Value Date     05/06/2018    POTASSIUM 4.2 05/06/2018    CHLORIDE 104 05/06/2018    CO2 28 05/06/2018    ANIONGAP 7 05/06/2018     (H) 05/06/2018    BUN 19 05/06/2018    CR 1.07 05/06/2018    GFRESTIMATED 65 05/06/2018    GFRESTBLACK 78 05/06/2018    MARTA 8.6 05/06/2018      A1C RESULTS:  Lab Results   Component Value Date    A1C 6.3 (H) 12/07/2007     INR RESULTS:  No results found for: INR    New/Pertinent imaging results since last visit:  None    Rita Samuel PA-C  [facility]

## 2018-05-07 NOTE — PROGRESS NOTES
Clinic Care Coordination Contact    Situation: Patient chart reviewed by care coordinator.    Background: ER f/u 5/6/18 for CHF exacerbation.    Assessment: Per chart review, patient seen in the ER 5/6/18 for CHF. He was given IV lasix, legs wrapped with Ace Wraps, referred to Lymphedema clinic. He also was seen today by Cardiology for CHF management. Patient and son were educated more about CHF, sodium diet, medication changes, importance of recording daily weights, when to call the [facility]. He is to see MARLENI Louis in two weeks.    Plan/Recommendations:  No outreach made due to seen by Cardiology clinic today.    Mary Anne Damian RN, [facility]  RN Care Coordinator  [facility], [facility]  Phone # [phone]  5/7/2018 2:45 PM

## 2018-05-07 NOTE — LETTER
5/7/2018    Jono Drake MD  [address] [facility] [address]    RE: Colin Shell       Dear Colleague,    I had the pleasure of seeing Colin Shell in the [facility].      Cardiology Progress Note    Date of Service: 05/07/2018  Patient seen today in follow up of: CHF  Primary cardiologist: Dr. Erickson    HPI:  Colin Shell is a very pleasant 89 year old male with a history of CAD s/p CABG in 1986, syncope, atrial dysrhythmia including atrial fibrillation and ventricular dysrhythmia for which he underwent ICD implantation in early 2017, hypertension and hyperlipidemia. He is a patient of Dr. Sommers and has been followed closely in clinic. In regards to anticoagulation, he has decline full anticoagulation and has remained on aspirin alone.     He was seen in clinic in February of this year and was doing well at that time.  Since then, he was in the emergency department 3 times for complaints of cough and shortness of breath.  He was also admitted 1 of these times from 4/18 through 4/20.  During his first ER visit, he underwent a CAT scan.  This showed patchy pulmonary groundglass opacities throughout the left lung.  He was also noted to have mild bronchiectasis bilaterally and small bilateral pleural effusions.  Antibiotics were prescribed for pneumonia.  About a week later he re-presented to the emergency room.  His symptoms had not improved with antibiotic therapy.  Chest x-ray was unchanged.  NT proBNP was about 2000.  He was treated with cough medicine and a DuoNeb.  He was instructed to follow-up with his PCP and consider a pulmonology follow-up.  Again, 2 weeks later he presented with similar symptoms.  A repeat chest CT without contrast showed some improvement in his patchy infiltrates.  Small bilateral pleural effusions were again noted.  At that point he was admitted for further workup and treatment.  His dyspnea and cough are felt to likely be an  exacerbation of bronchiectasis and he was again treated with antibiotic therapy.  A repeat echocardiogram during this admission showed normal LV size.  There was moderate concentric LVH.  The LVEF was 45-50%.  Mild aortic stenosis was noted.  Overall, these findings were unchanged from October 2017.  He followed up with his PCP post hospital and at that point was started on a low-dose of Lasix, 20 mg daily.    He then presented for his annual device clinic check about a week ago and was added on to Umm Cabrera PA-C's schedule.  He noted significant lower extremity edema as some exertional dyspnea and orthopnea.  He continued to have a cough which was productive at times. His device check showed intermittent atrial fibrillation although his heart rates appear to be fairly well controlled when is in atrial fibrillation. His OptiVol index did show an increase in his fluid starting in mid March and his weight was up 9 pounds on our clinic scale. His lasix dose was increased to 40 mg daily and a one week follow up was recommended.  He was also referred to pulmonology and has an appointment with them at the end of this month.    Yesterday he presented to the emergency department as he woke up and was unable to walk due to his peripheral edema.  His left leg has been weeping.  He has been trying to elevate his legs and be compliant with his sodium restriction.  NT proBNP was 6882. He was given a dose of 40 mg of IV lasix.  His legs were wrapped with Ace wraps and he was referred to the lymphedema clinic for further management of his peripheral edema.      He returns today for routine follow-up. His weight is down over 15 lbs on our scale at 200 lbs.  Overall, he cannot really tell me if he feels much better despite this weight loss.  He continues to have a cough which is productive at times.  He has not been sleeping in bed.  When he has tried he develops a significant cough.  He has limited his activity quite a  bit in the last 6 months.  He tells me this is because he is 89 years old so he stopped doing things such as shoveling snow, etc.  He does walk down to his mailbox which is about 300 feet.  He tells me recently on the way back he has to stop several times due to shortness of breath.  He continues to have quite significant peripheral edema on his left leg has been weeping.  He is scheduled for evaluation in the lymphedema clinic tomorrow.    He does not cook and eats mostly microwave meals. He has been trying to look at some food labels to make better choices.      ASSESSMENT/PLAN:   1.  Exertional dyspnea.   2.  Mild ischemic cardiomyopathy. LVEF 45 - 50% by recent echocardiogram.   3.  Paroxsymal atrial fibrillation. He has declined full anticoagulation and remains on aspirin 325 mg daily. He is rate controlled on Metoprolol XL 50 mg daily.   4.  CAD. S/p CABG in 1986.  He will continue on aspirin and statin.  5.  ICD in place.     Colin returns today for follow up. It is difficult to say if he has had much symptomatic improvement in the past week despite a 15 lb weight loss. He is fairly sedentary at home and has not pushed himself much this week. I suspect his dyspnea is likely multifactorial related to congestive heart failure, possibly some underlying pulmonary process, and deconditioning.  His basic metabolic panel yesterday showed his renal function is stable and electrolytes are normal. NTproBNP was still elevated at 6882. For now, I recommend he continue the higher dose of lasix at 40 mg daily. He will continue to keep any eye on his weights. I suspect we should aim to keep him at least under 200 lbs. We discussed sodium restriction today. He eats mostly frozen microwave meals and therefore sodium restriction is difficult for him. He is trying to make some better choices though by reading labels. He has a follow up appointment with pulmonology at the end of the month. HE is also scheduled in the lyphedema  clinic tomorrow which I think will be very beneficial for him.     Orders this Visit:  Orders Placed This Encounter   Procedures     Basic metabolic panel     Follow-Up with [facility] - JIMI visit     Orders Placed This Encounter   Medications     DISCONTD: losartan (COZAAR) 100 MG tablet     Sig: Take 100 mg by mouth daily     Medications Discontinued During This Encounter   Medication Reason     pantoprazole (PROTONIX) 40 MG EC tablet      losartan (COZAAR) 100 MG tablet        CURRENT MEDICATIONS:  Current Outpatient Prescriptions   Medication Sig Dispense Refill     albuterol (PROAIR HFA/PROVENTIL HFA/VENTOLIN HFA) 108 (90 BASE) MCG/ACT Inhaler Inhale 2 puffs into the lungs every 4 hours as needed for shortness of breath / dyspnea or wheezing WITH SPACER 1 Inhaler 0     allopurinol (ZYLOPRIM) 100 MG tablet Take 0.5 tablets (50 mg) by mouth daily 30 tablet      aspirin  MG EC tablet Take 1 tablet (325 mg) by mouth daily 90 tablet 3     atorvastatin (LIPITOR) 40 MG tablet Take 1 tablet (40 mg) by mouth daily 90 tablet 3     finasteride (PROSCAR) 5 MG tablet Take 1 tablet (5 mg) by mouth daily 90 tablet 3     furosemide (LASIX) 20 MG tablet Take 2 tablets (40 mg) by mouth daily 30 tablet 1     guaiFENesin (MUCINEX) 600 MG 12 hr tablet Take 600 mg by mouth 2 times daily       Magnesium Hydroxide (NAVA MILK OF MAGNESIA PO) Take 30 mLs by mouth daily as needed        metoprolol succinate (TOPROL-XL) 25 MG 24 hr tablet Take 2 tablets (50 mg) by mouth daily 180 tablet 3     Multiple Vitamins-Minerals (CENTRUM SILVER) per tablet Take 1 tablet by mouth daily       triamcinolone (KENALOG) 0.1 % ointment Apply topically 2 times daily Apply sparingly to affected area twice times daily for the next 7-10 days. (Patient not taking: Reported on 5/7/2018) 30 g 1     ALLERGIES  Allergies   Allergen Reactions     Flomax [Tamsulosin Hydrochloride]      Hctz Other (See Comments)     Caused loss of balance     Lisinopril  Cough     Simvastatin Nausea and Vomiting and Diarrhea     Vytorin Nausea and Diarrhea     PAST MEDICAL HISTORY:  Past Medical History:   Diagnosis Date     Arrhythmia      Chronic systolic CHF (congestive heart failure) (H) 6/13/2016     Congestive heart failure (H)      Dizziness and giddiness 11/24/2007    uncertain etiology- MRI/MRA head , carotid duplex normal 2007     Foreign body in unspecified site on external eye      Hypertension      Ischemic cardiomyopathy     mild- EF 45-50%     Microscopic hematuria      microscopic hematuria 2001 with  IVP and cystoscopy     Renal disease      Rhabdomyolysis     2003- in setting of Ecoli UTI, gemfibrozil/lipitor     PAST SURGICAL HISTORY:  Past Surgical History:   Procedure Laterality Date     BIOPSY ARTERY TEMPORAL Left 9/18/2017    Procedure: BIOPSY ARTERY TEMPORAL;  Left Temporal Biopsy ;  Surgeon: Ivana Phan MD;  Location: [facility] OR     SURGICAL HISTORY OF -       heel spurs     SURGICAL HISTORY OF -   2005, 2006    bilateral eye cataract     SURGICAL HISTORY OF -   1996    CABG x 5 vessels     SURGICAL HISTORY OF -   2010    circumcision     TONSILLECTOMY & ADENOIDECTOMY       FAMILY HISTORY:  Family History   Problem Relation Age of Onset     DIABETES Father      DIABETES Brother      DIABETES Brother      Hypertension Son      Unknown/Adopted Maternal Grandmother      Unknown/Adopted Maternal Grandfather      Unknown/Adopted Paternal Grandmother      Unknown/Adopted Paternal Grandfather      Dementia Sister      SOCIAL HISTORY:  Social History     Social History     Marital status:      Spouse name: N/A     Number of children: N/A     Years of education: N/A     Social History Main Topics     Smoking status: Former Smoker     Smokeless tobacco: Never Used      Comment: Quit at age 32     Alcohol use Yes      Comment: 12 pack a year     Drug use: No     Sexual activity: Yes     Other Topics Concern     Parent/Sibling W/ Cabg, Mi Or Angioplasty  Before 65f 55m? No     Social History Narrative     Review of Systems:  Negative unless otherwise mentioned in the HPI     Physical Exam:  Vitals: /58  Pulse 83  Wt 90.7 kg (200 lb)  SpO2 95%  BMI 28.7 kg/m2   Wt Readings from Last 4 Encounters:   05/07/18 90.7 kg (200 lb)   05/06/18 93 kg (205 lb)   05/01/18 98 kg (216 lb)   04/27/18 97.5 kg (215 lb)     GEN: well nourished, in no acute distress.  HEENT:  Pupils equal, round. Sclerae nonicteric.   NECK: Supple, no masses appreciated. No JvD at 30 degrees.  C/V:  Regular rate and rhythm, II/VI systolic murmur heard best at the LSB  RESP: Respirations are unlabored. Initially he has some rhonchi in the lower lobes which improve with coughing.  GI: Abdomen soft, nontender.  EXTREM: 2-3+ bilateral LE swelling to the lower thigh. He has has some scattered abrasions/scrapes.  NEURO: Alert and oriented, cooperative.  SKIN: Warm and dry.     Recent Lab Results:  LIPID RESULTS:  Lab Results   Component Value Date    CHOL 100 05/01/2018    HDL 34 (L) 05/01/2018    LDL 50 05/01/2018    TRIG 81 05/01/2018    CHOLHDLRATIO 5.0 12/11/2014     LIVER ENZYME RESULTS:  Lab Results   Component Value Date    AST 62 (H) 04/01/2018    ALT 40 05/01/2018     CBC RESULTS:  Lab Results   Component Value Date    WBC 11.1 (H) 05/06/2018    RBC 3.86 (L) 05/06/2018    HGB 11.2 (L) 05/06/2018    HCT 34.1 (L) 05/06/2018    MCV 88 05/06/2018    MCH 29.0 05/06/2018    MCHC 32.8 05/06/2018    RDW 15.5 (H) 05/06/2018     05/06/2018     BMP RESULTS:  Lab Results   Component Value Date     05/06/2018    POTASSIUM 4.2 05/06/2018    CHLORIDE 104 05/06/2018    CO2 28 05/06/2018    ANIONGAP 7 05/06/2018     (H) 05/06/2018    BUN 19 05/06/2018    CR 1.07 05/06/2018    GFRESTIMATED 65 05/06/2018    GFRESTBLACK 78 05/06/2018    MARTA 8.6 05/06/2018      A1C RESULTS:  Lab Results   Component Value Date    A1C 6.3 (H) 12/07/2007     INR RESULTS:  No results found for:  INR    New/Pertinent imaging results since last visit:  None    Thank you for allowing me to participate in the care of your patient.    Sincerely,     Rita Samuel PA-C     [facility]

## 2018-05-07 NOTE — LETTER
5/7/2018    Jono Drake MD  [address] [facility] [address]    RE: Colin Shell       Dear Colleague,    I had the pleasure of seeing Colin Shell in the [facility].      Cardiology Progress Note    Date of Service: 05/07/2018  Patient seen today in follow up of: CHF  Primary cardiologist: Dr. Erickson    HPI:  Colin Shell is a very pleasant 89 year old male with a history of CAD s/p CABG in 1986, syncope, atrial dysrhythmia including atrial fibrillation and ventricular dysrhythmia for which he underwent ICD implantation in early 2017, hypertension and hyperlipidemia. He is a patient of Dr. Sommers and has been followed closely in clinic. In regards to anticoagulation, he has decline full anticoagulation and has remained on aspirin alone.     He was seen in clinic in February of this year and was doing well at that time.  Since then, he was in the emergency department 3 times for complaints of cough and shortness of breath.  He was also admitted 1 of these times from 4/18 through 4/20.  During his first ER visit, he underwent a CAT scan.  This showed patchy pulmonary groundglass opacities throughout the left lung.  He was also noted to have mild bronchiectasis bilaterally and small bilateral pleural effusions.  Antibiotics were prescribed for pneumonia.  About a week later he re-presented to the emergency room.  His symptoms had not improved with antibiotic therapy.  Chest x-ray was unchanged.  NT proBNP was about 2000.  He was treated with cough medicine and a DuoNeb.  He was instructed to follow-up with his PCP and consider a pulmonology follow-up.  Again, 2 weeks later he presented with similar symptoms.  A repeat chest CT without contrast showed some improvement in his patchy infiltrates.  Small bilateral pleural effusions were again noted.  At that point he was admitted for further workup and treatment.  His dyspnea and cough are felt to likely be an  exacerbation of bronchiectasis and he was again treated with antibiotic therapy.  A repeat echocardiogram during this admission showed normal LV size.  There was moderate concentric LVH.  The LVEF was 45-50%.  Mild aortic stenosis was noted.  Overall, these findings were unchanged from October 2017.  He followed up with his PCP post hospital and at that point was started on a low-dose of Lasix, 20 mg daily.    He then presented for his annual device clinic check about a week ago and was added on to Umm Cabrera PA-C's schedule.  He noted significant lower extremity edema as some exertional dyspnea and orthopnea.  He continued to have a cough which was productive at times. His device check showed intermittent atrial fibrillation although his heart rates appear to be fairly well controlled when is in atrial fibrillation. His OptiVol index did show an increase in his fluid starting in mid March and his weight was up 9 pounds on our clinic scale. His lasix dose was increased to 40 mg daily and a one week follow up was recommended.  He was also referred to pulmonology and has an appointment with them at the end of this month.    Yesterday he presented to the emergency department as he woke up and was unable to walk due to his peripheral edema.  His left leg has been weeping.  He has been trying to elevate his legs and be compliant with his sodium restriction.  NT proBNP was 6882. He was given a dose of 40 mg of IV lasix.  His legs were wrapped with Ace wraps and he was referred to the lymphedema clinic for further management of his peripheral edema.      He returns today for routine follow-up. His weight is down over 15 lbs on our scale at 200 lbs.  Overall, he cannot really tell me if he feels much better despite this weight loss.  He continues to have a cough which is productive at times.  He has not been sleeping in bed.  When he has tried he develops a significant cough.  He has limited his activity quite a  bit in the last 6 months.  He tells me this is because he is 89 years old so he stopped doing things such as shoveling snow, etc.  He does walk down to his mailbox which is about 300 feet.  He tells me recently on the way back he has to stop several times due to shortness of breath.  He continues to have quite significant peripheral edema on his left leg has been weeping.  He is scheduled for evaluation in the lymphedema clinic tomorrow.    He does not cook and eats mostly microwave meals. He has been trying to look at some food labels to make better choices.      ASSESSMENT/PLAN:   1.  Exertional dyspnea.   2.  Mild ischemic cardiomyopathy. LVEF 45 - 50% by recent echocardiogram.   3.  Paroxsymal atrial fibrillation. He has declined full anticoagulation and remains on aspirin 325 mg daily. He is rate controlled on Metoprolol XL 50 mg daily.   4.  CAD. S/p CABG in 1986.  He will continue on aspirin and statin.  5.  ICD in place.     Colin returns today for follow up. It is difficult to say if he has had much symptomatic improvement in the past week despite a 15 lb weight loss. He is fairly sedentary at home and has not pushed himself much this week. I suspect his dyspnea is likely multifactorial related to congestive heart failure, possibly some underlying pulmonary process, and deconditioning.  His basic metabolic panel yesterday showed his renal function is stable and electrolytes are normal. NTproBNP was still elevated at 6882. For now, I recommend he continue the higher dose of lasix at 40 mg daily. He will continue to keep any eye on his weights. I suspect we should aim to keep him at least under 200 lbs. We discussed sodium restriction today. He eats mostly frozen microwave meals and therefore sodium restriction is difficult for him. He is trying to make some better choices though by reading labels. He has a follow up appointment with pulmonology at the end of the month. HE is also scheduled in the lyphedema  clinic tomorrow which I think will be very beneficial for him.     Orders this Visit:  Orders Placed This Encounter   Procedures     Basic metabolic panel     Follow-Up with [facility] - JIMI visit     Orders Placed This Encounter   Medications     DISCONTD: losartan (COZAAR) 100 MG tablet     Sig: Take 100 mg by mouth daily     Medications Discontinued During This Encounter   Medication Reason     pantoprazole (PROTONIX) 40 MG EC tablet      losartan (COZAAR) 100 MG tablet        CURRENT MEDICATIONS:  Current Outpatient Prescriptions   Medication Sig Dispense Refill     albuterol (PROAIR HFA/PROVENTIL HFA/VENTOLIN HFA) 108 (90 BASE) MCG/ACT Inhaler Inhale 2 puffs into the lungs every 4 hours as needed for shortness of breath / dyspnea or wheezing WITH SPACER 1 Inhaler 0     allopurinol (ZYLOPRIM) 100 MG tablet Take 0.5 tablets (50 mg) by mouth daily 30 tablet      aspirin  MG EC tablet Take 1 tablet (325 mg) by mouth daily 90 tablet 3     atorvastatin (LIPITOR) 40 MG tablet Take 1 tablet (40 mg) by mouth daily 90 tablet 3     finasteride (PROSCAR) 5 MG tablet Take 1 tablet (5 mg) by mouth daily 90 tablet 3     furosemide (LASIX) 20 MG tablet Take 2 tablets (40 mg) by mouth daily 30 tablet 1     guaiFENesin (MUCINEX) 600 MG 12 hr tablet Take 600 mg by mouth 2 times daily       Magnesium Hydroxide (NAVA MILK OF MAGNESIA PO) Take 30 mLs by mouth daily as needed        metoprolol succinate (TOPROL-XL) 25 MG 24 hr tablet Take 2 tablets (50 mg) by mouth daily 180 tablet 3     Multiple Vitamins-Minerals (CENTRUM SILVER) per tablet Take 1 tablet by mouth daily       triamcinolone (KENALOG) 0.1 % ointment Apply topically 2 times daily Apply sparingly to affected area twice times daily for the next 7-10 days. (Patient not taking: Reported on 5/7/2018) 30 g 1     ALLERGIES  Allergies   Allergen Reactions     Flomax [Tamsulosin Hydrochloride]      Hctz Other (See Comments)     Caused loss of balance     Lisinopril  Cough     Simvastatin Nausea and Vomiting and Diarrhea     Vytorin Nausea and Diarrhea     PAST MEDICAL HISTORY:  Past Medical History:   Diagnosis Date     Arrhythmia      Chronic systolic CHF (congestive heart failure) (H) 6/13/2016     Congestive heart failure (H)      Dizziness and giddiness 11/24/2007    uncertain etiology- MRI/MRA head , carotid duplex normal 2007     Foreign body in unspecified site on external eye      Hypertension      Ischemic cardiomyopathy     mild- EF 45-50%     Microscopic hematuria      microscopic hematuria 2001 with  IVP and cystoscopy     Renal disease      Rhabdomyolysis     2003- in setting of Ecoli UTI, gemfibrozil/lipitor     PAST SURGICAL HISTORY:  Past Surgical History:   Procedure Laterality Date     BIOPSY ARTERY TEMPORAL Left 9/18/2017    Procedure: BIOPSY ARTERY TEMPORAL;  Left Temporal Biopsy ;  Surgeon: Ivana Phan MD;  Location: [facility] OR     SURGICAL HISTORY OF -       heel spurs     SURGICAL HISTORY OF -   2005, 2006    bilateral eye cataract     SURGICAL HISTORY OF -   1996    CABG x 5 vessels     SURGICAL HISTORY OF -   2010    circumcision     TONSILLECTOMY & ADENOIDECTOMY       FAMILY HISTORY:  Family History   Problem Relation Age of Onset     DIABETES Father      DIABETES Brother      DIABETES Brother      Hypertension Son      Unknown/Adopted Maternal Grandmother      Unknown/Adopted Maternal Grandfather      Unknown/Adopted Paternal Grandmother      Unknown/Adopted Paternal Grandfather      Dementia Sister      SOCIAL HISTORY:  Social History     Social History     Marital status:      Spouse name: N/A     Number of children: N/A     Years of education: N/A     Social History Main Topics     Smoking status: Former Smoker     Smokeless tobacco: Never Used      Comment: Quit at age 32     Alcohol use Yes      Comment: 12 pack a year     Drug use: No     Sexual activity: Yes     Other Topics Concern     Parent/Sibling W/ Cabg, Mi Or Angioplasty  Before 65f 55m? No     Social History Narrative     Review of Systems:  Negative unless otherwise mentioned in the HPI     Physical Exam:  Vitals: /58  Pulse 83  Wt 90.7 kg (200 lb)  SpO2 95%  BMI 28.7 kg/m2   Wt Readings from Last 4 Encounters:   05/07/18 90.7 kg (200 lb)   05/06/18 93 kg (205 lb)   05/01/18 98 kg (216 lb)   04/27/18 97.5 kg (215 lb)     GEN: well nourished, in no acute distress.  HEENT:  Pupils equal, round. Sclerae nonicteric.   NECK: Supple, no masses appreciated. No JvD at 30 degrees.  C/V:  Regular rate and rhythm, II/VI systolic murmur heard best at the LSB  RESP: Respirations are unlabored. Initially he has some rhonchi in the lower lobes which improve with coughing.  GI: Abdomen soft, nontender.  EXTREM: 2-3+ bilateral LE swelling to the lower thigh. He has has some scattered abrasions/scrapes.  NEURO: Alert and oriented, cooperative.  SKIN: Warm and dry.     Recent Lab Results:  LIPID RESULTS:  Lab Results   Component Value Date    CHOL 100 05/01/2018    HDL 34 (L) 05/01/2018    LDL 50 05/01/2018    TRIG 81 05/01/2018    CHOLHDLRATIO 5.0 12/11/2014     LIVER ENZYME RESULTS:  Lab Results   Component Value Date    AST 62 (H) 04/01/2018    ALT 40 05/01/2018     CBC RESULTS:  Lab Results   Component Value Date    WBC 11.1 (H) 05/06/2018    RBC 3.86 (L) 05/06/2018    HGB 11.2 (L) 05/06/2018    HCT 34.1 (L) 05/06/2018    MCV 88 05/06/2018    MCH 29.0 05/06/2018    MCHC 32.8 05/06/2018    RDW 15.5 (H) 05/06/2018     05/06/2018     BMP RESULTS:  Lab Results   Component Value Date     05/06/2018    POTASSIUM 4.2 05/06/2018    CHLORIDE 104 05/06/2018    CO2 28 05/06/2018    ANIONGAP 7 05/06/2018     (H) 05/06/2018    BUN 19 05/06/2018    CR 1.07 05/06/2018    GFRESTIMATED 65 05/06/2018    GFRESTBLACK 78 05/06/2018    MARTA 8.6 05/06/2018      A1C RESULTS:  Lab Results   Component Value Date    A1C 6.3 (H) 12/07/2007     INR RESULTS:  No results found for:  INR    New/Pertinent imaging results since last visit:  None    Rita Samuel PA-C  [facility]      Thank you for allowing me to participate in the care of your patient.      Sincerely,     Rita Samuel PA-C     [facility]    cc:   Umm Cabrera PA-C  [address] [place] [address]

## 2018-05-08 ENCOUNTER — HOSPITAL ENCOUNTER (OUTPATIENT)
Dept: PHYSICAL THERAPY | Facility: CLINIC | Age: 83
Setting detail: THERAPIES SERIES
End: 2018-05-08
Attending: FAMILY MEDICINE
Payer: COMMERCIAL

## 2018-05-08 PROCEDURE — G8987 SELF CARE CURRENT STATUS: HCPCS | Mod: GP,CK | Performed by: PHYSICAL THERAPIST

## 2018-05-08 PROCEDURE — 97161 PT EVAL LOW COMPLEX 20 MIN: CPT | Mod: GP | Performed by: PHYSICAL THERAPIST

## 2018-05-08 PROCEDURE — 40000099 ZZH STATISTIC LYMPHEDEMA VISIT: Performed by: PHYSICAL THERAPIST

## 2018-05-08 PROCEDURE — G8988 SELF CARE GOAL STATUS: HCPCS | Mod: GP,CI | Performed by: PHYSICAL THERAPIST

## 2018-05-08 PROCEDURE — 97140 MANUAL THERAPY 1/> REGIONS: CPT | Mod: GP | Performed by: PHYSICAL THERAPIST

## 2018-05-08 NOTE — PROGRESS NOTES
Outpatient Lymphedema Therapy Evaluation     05/08/18 0800   Rehab Discipline   Discipline PT   Type of Visit   Type of visit Initial Edema Evaluation       present No   General Information   Start of care 05/08/18   Referring physician Dr. Noelle MD   Orders Evaluate and treat as indicated   Order date 04/27/18   Medical diagnosis BLE secondary lymphedema   Edema onset (~1month; 4/27/18 - date of referral)   Affected body parts RLE;LLE   Edema etiology comments PMH: coronary artery disease s/p CABG, CHD, mild ischemic cardiomyopathy with EF of 45-50%, a-fib, hypertension, CKD Stage III, hyperlipidemia, fibrosis of lung, pulmonary nodule; pt see in cardiology clinic on 5/1/18 and lasix dosage was increased from 20mg to 40mg daily and had weeping from his LLE at that time; vein harvest out of LLE for CABG (31 years ago)   Pertinent history of current problem (PT: include personal factors and/or comorbidities that impact the POC; OT: include additional occupational profile info) tries to elevate as much as possible; does not use compression stockings; pt weighs himself daily and writes it down   Surgical / medical history reviewed Yes   Edema special tests (no history of DVTs)   Prior level of functional mobility independendent ambulation, no AD   Prior treatment Elevation;Diuretics;ACE wraps   Patient role / employment history Retired   Living environment House / [facility]   Living environment comments pt lives independently in his own home alone. Son involved. Patient does his own medications and son will assist if needed.    Current assistive devices Standard cane  (x1-2)   Fall Risk Screen   Fall screen completed by PT   Have you fallen 2 or more times in the past year? No   Have you fallen and had an injury in the past year? No   Is patient a fall risk? No   System Outcome Measures   Outcome Measures Lymphedema   Lymphedema Life Impact Scale (score range 0-72). A higher score indicates  "greater impairment. 30   Subjective Report   Patient report of symptoms BLEs are a little painful and a little heavy   Precautions   Precautions Cardiac Edema/CHF;Renal Insufficiency   Precautions comments no MLD; monitor GCB/compression closely and educate pt on cardiac s&s   Patient / Family Goals   Patient / family goals statement to make the swelling better   Pain   Patient currently in pain Yes   Pain location BLEs   Pain rating 2/10   Pain description Ache   Pain comments constant   Vitals Signs   Heart Rate 77   SpO2 97   Cognitive Status   Orientation Orientation to person, place and time   Level of consciousness Alert   Follows commands and answers questions 100% of the time   Personal safety and judgement Intact   Memory Intact   Edema Exam / Assessment   Skin condition Pitting;Intact   Skin condition comments BLE skin intact at present with scattered scabs on LLE from past/recent weeping; RLE with 2+ soft pitting from foot to knee and then 3+ pitting on LLE frmo foot to knee   Scar Yes   Location LLE medial leg from ankle to groin from CABG   Capillary refill Symmetrical   Dorsal pedal pulse Decreased   Dorsal pedal pulse comments from edema   Stemmer sign Positive   Ulceration No   Girth Measurements   Girth Measurements Refer to separate girth measurement flowsheet   Volume LE   Right LE (mL) 4080   Left LE (mL) 4281.0   LE volume comparison LLE volume greater than RLE volume   % difference 5.0%   Range of Motion   ROM No deficits were identified   ROM comments BLEs functional   Strength   Strength No deficits were identified   Strength comments functional LE strength via observation   Posture   Posture Normal   Palpation   Palpation denies sensitivities during pitting assessment/palpation   Activities of Daily Living   Activities of Daily Living mod-I using SEC; pt's son reports pt doesn't move around alot but pt reports \"I do more than you think\"   Sensory   Sensory perception Light touch   Light " touch Intact   Vascular Assessment   Vascular Assessment Vascular concerns   Vascular Assessment Comments vein harvest from LLE 30+ years ago for CABG   Coordination   Coordination Gross motor coordination appropriate   Muscle Tone   Muscle tone No deficits were identified   Planned Edema Interventions   Planned edema interventions Gradient compression bandaging;Fit for compression garment;Exercises;Precautions to prevent infection / exacerbation;Education;Manual therapy;Skin care / precautions;Home management program development   Planned edema intervention comments no MLD   Clinical Impression   Criteria for skilled therapeutic intervention met Yes   Therapy diagnosis BLE phlebolymphedema   Influenced by the following impairments / conditions Stage 2;Phlebolymphedema   Functional limitations due to impairments / conditions snug fitting socks and shoes; heaviness in BLEs with ambulation and getting in/out of bed and son's truck   Clinical Presentation Stable/Uncomplicated   Clinical Presentation Rationale clinical judgement; no current weeping on this date; no wound   Clinical Decision Making (Complexity) Low complexity   Treatment frequency Other  (3x/wk for 4 wks and 2x/wk for 8 weeks)   Treatment duration 12 weeks   Patient / family and/or staff in agreement with plan of care Yes   Risks and benefits of therapy have been explained Yes   Education Assessment   Preferred learning style Listening   Barriers to learning No barriers   Goals   Edema Eval Goals 1;2;3;4   Goal 1   Goal identifier stg   Goal description pt to have around the clock tolerance to BLE GCB for edema reduction response   Target date 05/22/18   Goal 2   Goal identifier ltg   Goal description once appropriate, pt to be independent with donning, doffing and care of compressoin garments for longterm edema management for maintenance   Target date 08/06/18   Goal 3   Goal identifier ltg   Goal description pt to be independent in managing BLE edema  longterm via diet, exercise, elevation and compression garment wear/use   Target date 08/06/18   Goal 4   Goal identifier ltg   Goal description pt to have at least 5 point improvement on LLIS due to decreased edema in BLEs   Target date 08/06/18   Total Evaluation Time   Total evaluation time 10

## 2018-05-08 NOTE — PROGRESS NOTES
[facility]        OUTPATIENT PHYSICAL THERAPY EDEMA EVALUATION  PLAN OF TREATMENT FOR OUTPATIENT REHABILITATION  (COMPLETE FOR INITIAL CLAIMS ONLY)  Patient's Last Name, First Name, Colin Enriquez                           Provider s Name:   [facility] Medical Record No.  [id number]     Start of Care Date:  05/08/18   Onset Date:   (4/27/18 - date of referral)   Type:  PT   Medical Diagnosis:      Therapy Diagnosis:  BLE phlebolymphedema Visits from SOC:  1                                     __________________________________________________________________________________   Plan of Treatment/Functional Goals:    Gradient compression bandaging, Fit for compression garment, Exercises, Precautions to prevent infection / exacerbation, Education, Manual therapy, Skin care / precautions, Home management program development  no MLD     GOALS  1. Goal description: pt to have around the clock tolerance to BLE GCB for edema reduction response       Target date: 05/22/18  2. Goal description: once appropriate, pt to be independent with donning, doffing and care of compressoin garments for longterm edema management for maintenance       Target date: 08/06/18  3. Goal description: pt to be independent in managing BLE edema longterm via diet, exercise, elevation and compression garment wear/use       Target date: 08/06/18  4. Goal description: pt to have at least 5 point improvement on LLIS due to decreased edema in BLEs       Target date: 08/06/18           Treatment frequency: Other (3x/wk for 4 wks and 2x/wk for 8 weeks)   Treatment duration: 12 weeks (5/8/18 - 8/6/18)    Maribell Garcia, PT, DPT, CLT                                    I CERTIFY THE NEED FOR THESE SERVICES FURNISHED UNDER        THIS PLAN OF TREATMENT AND WHILE UNDER MY CARE     (Physician  co-signature of this document indicates review and certification of the therapy plan).                      Referring physician: Dr. Noelle MD   Initial Assessment  See Epic Evaluation- Start of care: 05/08/18

## 2018-05-08 NOTE — PROGRESS NOTES
Outpatient Lymphedema Therapy Evaluation     05/08/18 0800   Rehab Discipline   Discipline PT   Type of Visit   Type of visit Initial Edema Evaluation       present No   General Information   Start of care 05/08/18   Referring physician Dr. Noelle MD   Orders Evaluate and treat as indicated   Order date 04/27/18   Medical diagnosis BLE secondary lymphedema   Edema onset (~1month; 4/27/18 - date of referral)   Affected body parts RLE;LLE   Edema etiology comments PMH: coronary artery disease s/p CABG, CHD, mild ischemic cardiomyopathy with EF of 45-50%, a-fib, hypertension, CKD Stage III, hyperlipidemia, fibrosis of lung, pulmonary nodule; pt see in cardiology clinic on 5/1/18 and lasix dosage was increased from 20mg to 40mg daily and had weeping from his LLE at that time; vein harvest out of LLE for CABG (31 years ago)   Pertinent history of current problem (PT: include personal factors and/or comorbidities that impact the POC; OT: include additional occupational profile info) tries to elevate as much as possible; does not use compression stockings; pt weighs himself daily and writes it down   Surgical / medical history reviewed Yes   Edema special tests (no history of DVTs)   Prior level of functional mobility independendent ambulation, no AD   Prior treatment Elevation;Diuretics;ACE wraps   Patient role / employment history Retired   Living environment House / [facility]   Living environment comments pt lives independently in his own home alone. Son involved. Patient does his own medications and son will assist if needed.    Current assistive devices Standard cane  (x1-2)   Fall Risk Screen   Fall screen completed by PT   Have you fallen 2 or more times in the past year? No   Have you fallen and had an injury in the past year? No   Is patient a fall risk? No   System Outcome Measures   Outcome Measures Lymphedema   Lymphedema Life Impact Scale (score range 0-72). A higher score indicates  "greater impairment. 30   Subjective Report   Patient report of symptoms BLEs are a little painful and a little heavy   Precautions   Precautions Cardiac Edema/CHF;Renal Insufficiency   Precautions comments no MLD; monitor GCB/compression closely and educate pt on cardiac s&s   Patient / Family Goals   Patient / family goals statement to make the swelling better   Pain   Patient currently in pain Yes   Pain location BLEs   Pain rating 2/10   Pain description Ache   Pain comments constant   Vitals Signs   Heart Rate 77   SpO2 97   Cognitive Status   Orientation Orientation to person, place and time   Level of consciousness Alert   Follows commands and answers questions 100% of the time   Personal safety and judgement Intact   Memory Intact   Edema Exam / Assessment   Skin condition Pitting;Intact   Skin condition comments BLE skin intact at present with scattered scabs on LLE from past/recent weeping; RLE with 2+ soft pitting from foot to knee and then 3+ pitting on LLE frmo foot to knee   Scar Yes   Location LLE medial leg from ankle to groin from CABG   Capillary refill Symmetrical   Dorsal pedal pulse Decreased   Dorsal pedal pulse comments from edema   Stemmer sign Positive   Ulceration No   Girth Measurements   Girth Measurements Refer to separate girth measurement flowsheet   Volume LE   Right LE (mL) 4080   Left LE (mL) 4281.0   LE volume comparison LLE volume greater than RLE volume   % difference 5.0%   Range of Motion   ROM No deficits were identified   ROM comments BLEs functional   Strength   Strength No deficits were identified   Strength comments functional LE strength via observation   Posture   Posture Normal   Palpation   Palpation denies sensitivities during pitting assessment/palpation   Activities of Daily Living   Activities of Daily Living mod-I using SEC; pt's son reports pt doesn't move around alot but pt reports \"I do more than you think\"   Sensory   Sensory perception Light touch   Light " touch Intact   Vascular Assessment   Vascular Assessment Vascular concerns   Vascular Assessment Comments vein harvest from LLE 30+ years ago for CABG   Coordination   Coordination Gross motor coordination appropriate   Muscle Tone   Muscle tone No deficits were identified   Planned Edema Interventions   Planned edema interventions Gradient compression bandaging;Fit for compression garment;Exercises;Precautions to prevent infection / exacerbation;Education;Manual therapy;Skin care / precautions;Home management program development   Planned edema intervention comments no MLD   Clinical Impression   Criteria for skilled therapeutic intervention met Yes   Therapy diagnosis BLE phlebolymphedema   Influenced by the following impairments / conditions Stage 2;Phlebolymphedema   Functional limitations due to impairments / conditions snug fitting socks and shoes; heaviness in BLEs with ambulation and getting in/out of bed and son's truck   Clinical Presentation Stable/Uncomplicated   Clinical Presentation Rationale clinical judgement; no current weeping on this date; no wound   Clinical Decision Making (Complexity) Low complexity   Treatment frequency Other  (3x/wk for 4 wks and 2x/wk for 8 weeks)   Treatment duration 12 weeks   Patient / family and/or staff in agreement with plan of care Yes   Risks and benefits of therapy have been explained Yes   Education Assessment   Preferred learning style Listening   Barriers to learning No barriers   Total Evaluation Time   Total evaluation time 10

## 2018-05-09 ENCOUNTER — HOSPITAL ENCOUNTER (OUTPATIENT)
Dept: PHYSICAL THERAPY | Facility: CLINIC | Age: 83
Setting detail: THERAPIES SERIES
End: 2018-05-09
Attending: FAMILY MEDICINE
Payer: COMMERCIAL

## 2018-05-09 PROCEDURE — 97140 MANUAL THERAPY 1/> REGIONS: CPT | Mod: GP | Performed by: REHABILITATION PRACTITIONER

## 2018-05-09 PROCEDURE — 40000099 ZZH STATISTIC LYMPHEDEMA VISIT: Performed by: REHABILITATION PRACTITIONER

## 2018-05-11 ENCOUNTER — HOSPITAL ENCOUNTER (OUTPATIENT)
Dept: PHYSICAL THERAPY | Facility: CLINIC | Age: 83
Setting detail: THERAPIES SERIES
End: 2018-05-11
Attending: FAMILY MEDICINE
Payer: COMMERCIAL

## 2018-05-11 PROCEDURE — 40000099 ZZH STATISTIC LYMPHEDEMA VISIT: Performed by: REHABILITATION PRACTITIONER

## 2018-05-11 PROCEDURE — 97140 MANUAL THERAPY 1/> REGIONS: CPT | Mod: GP | Performed by: REHABILITATION PRACTITIONER

## 2018-05-14 ENCOUNTER — HOSPITAL ENCOUNTER (OUTPATIENT)
Dept: PHYSICAL THERAPY | Facility: CLINIC | Age: 83
Setting detail: THERAPIES SERIES
End: 2018-05-14
Attending: FAMILY MEDICINE
Payer: COMMERCIAL

## 2018-05-14 PROCEDURE — 40000185 ZZHC STATISTIC PT OUTPT VISIT: Performed by: PHYSICAL THERAPIST

## 2018-05-14 PROCEDURE — 97140 MANUAL THERAPY 1/> REGIONS: CPT | Mod: GP | Performed by: PHYSICAL THERAPIST

## 2018-05-16 ENCOUNTER — HOSPITAL ENCOUNTER (OUTPATIENT)
Dept: PHYSICAL THERAPY | Facility: CLINIC | Age: 83
Setting detail: THERAPIES SERIES
End: 2018-05-16
Attending: FAMILY MEDICINE
Payer: COMMERCIAL

## 2018-05-16 PROCEDURE — 97140 MANUAL THERAPY 1/> REGIONS: CPT | Mod: GP | Performed by: PHYSICAL THERAPIST

## 2018-05-16 PROCEDURE — 40000099 ZZH STATISTIC LYMPHEDEMA VISIT: Performed by: PHYSICAL THERAPIST

## 2018-05-17 DIAGNOSIS — I89.0 LYMPHEDEMA: ICD-10-CM

## 2018-05-17 RX ORDER — FUROSEMIDE 20 MG
40 TABLET ORAL DAILY
Qty: 60 TABLET | Refills: 1 | Status: SHIPPED | OUTPATIENT
Start: 2018-05-17 | End: 2018-05-21

## 2018-05-18 ENCOUNTER — HOSPITAL ENCOUNTER (OUTPATIENT)
Dept: PHYSICAL THERAPY | Facility: CLINIC | Age: 83
Setting detail: THERAPIES SERIES
End: 2018-05-18
Attending: FAMILY MEDICINE
Payer: COMMERCIAL

## 2018-05-18 PROCEDURE — 97140 MANUAL THERAPY 1/> REGIONS: CPT | Mod: GP | Performed by: REHABILITATION PRACTITIONER

## 2018-05-18 PROCEDURE — 40000099 ZZH STATISTIC LYMPHEDEMA VISIT: Performed by: REHABILITATION PRACTITIONER

## 2018-05-21 ENCOUNTER — HOSPITAL ENCOUNTER (OUTPATIENT)
Dept: PHYSICAL THERAPY | Facility: CLINIC | Age: 83
Setting detail: THERAPIES SERIES
End: 2018-05-21
Attending: FAMILY MEDICINE
Payer: COMMERCIAL

## 2018-05-21 ENCOUNTER — OFFICE VISIT (OUTPATIENT)
Dept: CARDIOLOGY | Facility: CLINIC | Age: 83
End: 2018-05-21
Attending: PHYSICIAN ASSISTANT
Payer: COMMERCIAL

## 2018-05-21 VITALS
WEIGHT: 180 LBS | BODY MASS INDEX: 25.83 KG/M2 | OXYGEN SATURATION: 96 % | HEART RATE: 53 BPM | SYSTOLIC BLOOD PRESSURE: 123 MMHG | DIASTOLIC BLOOD PRESSURE: 57 MMHG

## 2018-05-21 DIAGNOSIS — I89.0 LYMPHEDEMA: ICD-10-CM

## 2018-05-21 DIAGNOSIS — I50.9 ACUTE CONGESTIVE HEART FAILURE, UNSPECIFIED CONGESTIVE HEART FAILURE TYPE: ICD-10-CM

## 2018-05-21 DIAGNOSIS — I50.9 CHF (CONGESTIVE HEART FAILURE) (H): ICD-10-CM

## 2018-05-21 DIAGNOSIS — I50.9 CHF (CONGESTIVE HEART FAILURE) (H): Primary | ICD-10-CM

## 2018-05-21 LAB
ANION GAP SERPL CALCULATED.3IONS-SCNC: 6 MMOL/L (ref 3–14)
BUN SERPL-MCNC: 27 MG/DL (ref 7–30)
CALCIUM SERPL-MCNC: 8.4 MG/DL (ref 8.5–10.1)
CHLORIDE SERPL-SCNC: 106 MMOL/L (ref 94–109)
CO2 SERPL-SCNC: 26 MMOL/L (ref 20–32)
CREAT SERPL-MCNC: 1.14 MG/DL (ref 0.66–1.25)
GFR SERPL CREATININE-BSD FRML MDRD: 60 ML/MIN/1.7M2
GLUCOSE SERPL-MCNC: 193 MG/DL (ref 70–99)
POTASSIUM SERPL-SCNC: 4.3 MMOL/L (ref 3.4–5.3)
SODIUM SERPL-SCNC: 138 MMOL/L (ref 133–144)

## 2018-05-21 PROCEDURE — 80048 BASIC METABOLIC PNL TOTAL CA: CPT | Performed by: PHYSICIAN ASSISTANT

## 2018-05-21 PROCEDURE — 40000099 ZZH STATISTIC LYMPHEDEMA VISIT: Performed by: REHABILITATION PRACTITIONER

## 2018-05-21 PROCEDURE — 36415 COLL VENOUS BLD VENIPUNCTURE: CPT | Performed by: PHYSICIAN ASSISTANT

## 2018-05-21 PROCEDURE — 97140 MANUAL THERAPY 1/> REGIONS: CPT | Mod: GP | Performed by: REHABILITATION PRACTITIONER

## 2018-05-21 PROCEDURE — 99214 OFFICE O/P EST MOD 30 MIN: CPT | Performed by: PHYSICIAN ASSISTANT

## 2018-05-21 RX ORDER — FUROSEMIDE 20 MG
20 TABLET ORAL DAILY
Qty: 60 TABLET | Refills: 1 | COMMUNITY
Start: 2018-05-21 | End: 2018-01-01

## 2018-05-21 NOTE — PROGRESS NOTES
Cardiology Progress Note    Date of Service: 05/21/2018  Patient seen today in follow up of: CHF  Primary cardiologist: Dr. Erickson    HPI:  Colin Shell is a very pleasant 89 year old male with a history of CAD s/p CABG in 1986, syncope, atrial dysrhythmia including atrial fibrillation and ventricular dysrhythmia for which he underwent ICD implantation in early 2017, hypertension and hyperlipidemia. He is a patient of Dr. Sommers and has been followed closely in clinic. In regards to anticoagulation, he has decline full anticoagulation and has remained on aspirin alone.      He was seen in clinic in February of this year and was doing well at that time.  Since then, he was in the emergency department 3 times for complaints of cough and shortness of breath.  He was also admitted 1 of these times from 4/18 through 4/20.  During his first ER visit, he underwent a CAT scan.  This showed patchy pulmonary groundglass opacities throughout the left lung.  He was also noted to have mild bronchiectasis bilaterally and small bilateral pleural effusions.  Antibiotics were prescribed for pneumonia.  About a week later he re-presented to the emergency room.  His symptoms had not improved with antibiotic therapy. He was treated with cough medicine and a DuoNeb.  He was instructed to follow-up with his PCP and consider a pulmonology follow-up.  Again, 2 weeks later he presented with similar symptoms.  A repeat chest CT without contrast showed some improvement in his patchy infiltrates.  Small bilateral pleural effusions were again noted.  At that point he was admitted for further workup and treatment. He was again treated with antibiotics.  A repeat echocardiogram during this admission showed normal LV size.  There was moderate concentric LVH.  The LVEF was 45-50%.  Mild aortic stenosis was noted.  Overall, these findings were unchanged from October 2017.  He followed up with his PCP post hospital and at that point was  started on a low-dose of Lasix, 20 mg daily.    He then presented for his annual device clinic check about a week ago and was added on to Umm Cabrera PA-C's schedule.  He noted significant lower extremity edema as some exertional dyspnea and orthopnea.  He continued to have a cough which was productive at times. His device check showed intermittent atrial fibrillation although his heart rates appear to be fairly well controlled when is in atrial fibrillation. His OptiVol index did show an increase in his fluid starting in mid March and his weight was up 9 pounds on our clinic scale. His lasix dose was increased to 40 mg daily and a one week follow up was recommended.  He was also referred to pulmonology and has an appointment with them at the end of this month.    I met him on 5/7 for [facility] clinic enrollment. He had been seen in the ED the day prior as he was unable to walk due to significant leg swelling. He received a dose of IV lasix and was referred to the lymphedema clinic. His weight had dropped 15 lbs but he hadn't really noted much symptomatic improvement. He continued to report a productive cough and was still unable to sleep in bed. He continued to have to stop several times when walking to his mailbox which is about 300 ft. I had him continue on the higher dose of 40 mg of lasix daily.    He returns today for follow up. He has been following with the lymphedema therapy. Since I last saw him, his weight is down to 180 lbs on his home scale. He symptomatic feels much better. His dyspnea has significantly improved. He is now able to sleep in bed at night. He is also able to walk to and from the mailbox without stopping. His peripheral edema has also significantly improved since our last visit. He is trying to read the labels and be more cautious about his sodium intake.    ASSESSMENT/PLAN:  1.  Exertional dyspnea.   2.  Mild ischemic cardiomyopathy. LVEF 45 - 50% by recent echocardiogram.   3.   Paroxsymal atrial fibrillation. He has declined full anticoagulation and remains on aspirin 325 mg daily. He is rate controlled on Metoprolol XL 50 mg daily.   4.  CAD. S/p CABG in 1986.  He will continue on aspirin and statin.  5.  ICD in place.     Colin returns today for follow up. Over the last three weeks he has lost 35 lbs on his own scale. Symptomatically, he has significantly improved on the higher dose of lasix and the lymphedema clinic has been very beneficial for him.  His basic metabolic shows overall stable renal function although his GFR has been trending down. His electrolytes are normal. Today, I recommend we cut back his lasix to 20 mg daily and see how he does. He will continue to be cautious with his sodium. It is difficult for him to get to appointments, so I will see him back for re-evaluation in 3 - 4 months. In the meantime I asked him to keep an eye on his weights. He will call us if his weight hits 185 lbs or higher on his home scale. I would then increase his lasix to 40 mg for a few days.    Orders this Visit:  Orders Placed This Encounter   Procedures     Basic metabolic panel     Follow-Up with [facility] - JIMI visit     Orders Placed This Encounter   Medications     furosemide (LASIX) 20 MG tablet     Sig: Take 1 tablet (20 mg) by mouth daily     Dispense:  60 tablet     Refill:  1     Medications Discontinued During This Encounter   Medication Reason     furosemide (LASIX) 20 MG tablet Reorder       CURRENT MEDICATIONS:  Current Outpatient Prescriptions   Medication Sig Dispense Refill     albuterol (PROAIR HFA/PROVENTIL HFA/VENTOLIN HFA) 108 (90 BASE) MCG/ACT Inhaler Inhale 2 puffs into the lungs every 4 hours as needed for shortness of breath / dyspnea or wheezing WITH SPACER 1 Inhaler 0     allopurinol (ZYLOPRIM) 100 MG tablet Take 0.5 tablets (50 mg) by mouth daily 30 tablet      aspirin  MG EC tablet Take 1 tablet (325 mg) by mouth daily 90 tablet 3     atorvastatin  (LIPITOR) 40 MG tablet Take 1 tablet (40 mg) by mouth daily 90 tablet 3     finasteride (PROSCAR) 5 MG tablet Take 1 tablet (5 mg) by mouth daily 90 tablet 3     furosemide (LASIX) 20 MG tablet Take 2 tablets (40 mg) by mouth daily 60 tablet 1     guaiFENesin (MUCINEX) 600 MG 12 hr tablet Take 600 mg by mouth 2 times daily       Magnesium Hydroxide (NAVA MILK OF MAGNESIA PO) Take 30 mLs by mouth daily as needed        metoprolol succinate (TOPROL-XL) 25 MG 24 hr tablet Take 2 tablets (50 mg) by mouth daily 180 tablet 3     Multiple Vitamins-Minerals (CENTRUM SILVER) per tablet Take 1 tablet by mouth daily       triamcinolone (KENALOG) 0.1 % ointment Apply topically 2 times daily Apply sparingly to affected area twice times daily for the next 7-10 days. 30 g 1     ALLERGIES  Allergies   Allergen Reactions     Flomax [Tamsulosin Hydrochloride]      Hctz Other (See Comments)     Caused loss of balance     Lisinopril Cough     Simvastatin Nausea and Vomiting and Diarrhea     Vytorin Nausea and Diarrhea     PAST MEDICAL HISTORY:  Past Medical History:   Diagnosis Date     Arrhythmia      Chronic systolic CHF (congestive heart failure) (H) 6/13/2016     Congestive heart failure (H)      Dizziness and giddiness 11/24/2007    uncertain etiology- MRI/MRA head , carotid duplex normal 2007     Foreign body in unspecified site on external eye      Hypertension      Ischemic cardiomyopathy     mild- EF 45-50%     Microscopic hematuria      microscopic hematuria 2001 with  IVP and cystoscopy     Renal disease      Rhabdomyolysis     2003- in setting of Ecoli UTI, gemfibrozil/lipitor     PAST SURGICAL HISTORY:  Past Surgical History:   Procedure Laterality Date     BIOPSY ARTERY TEMPORAL Left 9/18/2017    Procedure: BIOPSY ARTERY TEMPORAL;  Left Temporal Biopsy ;  Surgeon: Ivana Phan MD;  Location: [facility] OR     SURGICAL HISTORY OF -       heel spurs     SURGICAL HISTORY OF -   2005, 2006    bilateral eye cataract      SURGICAL HISTORY OF -   1996    CABG x 5 vessels     SURGICAL HISTORY OF -   2010    circumcision     TONSILLECTOMY & ADENOIDECTOMY       FAMILY HISTORY:  Family History   Problem Relation Age of Onset     DIABETES Father      DIABETES Brother      DIABETES Brother      Hypertension Son      Unknown/Adopted Maternal Grandmother      Unknown/Adopted Maternal Grandfather      Unknown/Adopted Paternal Grandmother      Unknown/Adopted Paternal Grandfather      Dementia Sister      SOCIAL HISTORY:  Social History     Social History     Marital status:      Spouse name: N/A     Number of children: N/A     Years of education: N/A     Social History Main Topics     Smoking status: Former Smoker     Smokeless tobacco: Never Used      Comment: Quit at age 32     Alcohol use Yes      Comment: 12 pack a year     Drug use: No     Sexual activity: Yes     Other Topics Concern     Parent/Sibling W/ Cabg, Mi Or Angioplasty Before 65f 55m? No     Social History Narrative     Review of Systems:  Skin:  Negative     Eyes:  Negative    ENT:  Negative    Respiratory:  Positive for dyspnea on exertion;cough  Cardiovascular:  chest pain;syncope or near-syncope;Negative for;palpitations;dizziness;lightheadedness;fatigue;edema    Gastroenterology: Negative    Genitourinary:  Negative    Musculoskeletal:  Negative    Neurologic:  Positive for numbness or tingling of feet;memory problems  Psychiatric:  Negative for anxiety;depression  Heme/Lymph/Imm:  Negative for bleeding disorder  Endocrine:  Negative for thyroid disorder;diabetes     Physical Exam:  Vitals: /57  Pulse 53  Wt 81.6 kg (180 lb)  SpO2 96%  BMI 25.83 kg/m2   Wt Readings from Last 4 Encounters:   05/21/18 81.6 kg (180 lb)   05/07/18 90.7 kg (200 lb)   05/06/18 93 kg (205 lb)   05/01/18 98 kg (216 lb)     GEN: well nourished, in no acute distress.  HEENT:  Pupils equal, round. Sclerae nonicteric.   NECK: Supple, no masses appreciated.   C/V:  IRR, systolic  murmur heard best at the RUSB  RESP: Respirations are unlabored. Breath sounds with bibasilar crackles, L > R. Otherwise clear.  GI: Abdomen soft, nontender.  EXTREM: trace LE edema. Compression stockings in place.  NEURO: Alert and oriented, cooperative.  SKIN: Warm and dry.     Recent Lab Results:  LIPID RESULTS:  Lab Results   Component Value Date    CHOL 100 05/01/2018    HDL 34 (L) 05/01/2018    LDL 50 05/01/2018    TRIG 81 05/01/2018    CHOLHDLRATIO 5.0 12/11/2014     LIVER ENZYME RESULTS:  Lab Results   Component Value Date    AST 62 (H) 04/01/2018    ALT 40 05/01/2018     CBC RESULTS:  Lab Results   Component Value Date    WBC 11.1 (H) 05/06/2018    RBC 3.86 (L) 05/06/2018    HGB 11.2 (L) 05/06/2018    HCT 34.1 (L) 05/06/2018    MCV 88 05/06/2018    MCH 29.0 05/06/2018    MCHC 32.8 05/06/2018    RDW 15.5 (H) 05/06/2018     05/06/2018     BMP RESULTS:  Lab Results   Component Value Date     05/21/2018    POTASSIUM 4.3 05/21/2018    CHLORIDE 106 05/21/2018    CO2 26 05/21/2018    ANIONGAP 6 05/21/2018     (H) 05/21/2018    BUN 27 05/21/2018    CR 1.14 05/21/2018    GFRESTIMATED 60 (L) 05/21/2018    GFRESTBLACK 73 05/21/2018    MARTA 8.4 (L) 05/21/2018      A1C RESULTS:  Lab Results   Component Value Date    A1C 6.3 (H) 12/07/2007     INR RESULTS:  No results found for: INR    New/Pertinent imaging results since last visit:  None    Rita Samuel PA-C  [facility]     no

## 2018-05-21 NOTE — MR AVS SNAPSHOT
After Visit Summary   5/21/2018    Colin Shell    MRN: [id number]           Patient Information     Date Of Birth          6/25/1927        Visit Information        Provider Department      5/21/2018 9:00 AM Rita Samuel PA-C [facility]        Today's Diagnoses     Acute congestive heart failure, unspecified congestive heart failure type (H)          Care Instructions    Call [facility] nurse for any questions or concerns:  [phone]   *If you have concerns after hours, please call [phone], option 2 to speak with on call Cardiologist.    1. Medication changes and/or recommendations from today:  DECREASE your lasix back to 20 mg daily.     2. Follow up plan: follow up with Bette TENORIO in September with labs prior.     3. Weigh yourself daily and write it down. CALL IF YOUR WEIGHT HITS 185 lbs or ABOVE. We'll plan to increase your lasix dose then.     4. Call [facility] nurse if your weight is up more than 2 pounds in one day or 5 pounds in one week.     5. Call [facility] nurse if you feel more short of breath, have more abdominal bloating, or leg swelling.     6. Continue low sodium diet (less than 2000 mg daily). If you eat less salt, you will retain less fluid.     7. Alcohol can weaken your heart further. You should avoid alcohol or limit its use to special times, such as a holiday or birthday.      8. Do NOT take Aleve or ibuprofen without talking to your doctor first.     9. Lab Results: your labs today look stable.    Component      Latest Ref Rng & Units 5/6/2018 5/21/2018   Sodium      133 - 144 mmol/L 139 138   Potassium      3.4 - 5.3 mmol/L 4.2 4.3   Chloride      94 - 109 mmol/L 104 106   Carbon Dioxide      20 - 32 mmol/L 28 26   Anion Gap      3 - 14 mmol/L 7 6   Glucose      70 - 99 mg/dL 128 (H) 193 (H)   Urea Nitrogen      7 - 30 mg/dL 19 27   Creatinine      0.66 - 1.25 mg/dL 1.07 1.14   GFR Estimate      >60 mL/min/1.7m2 65 60 (L)   GFR  Estimate If Black      >60 mL/min/1.7m2 78 73   Calcium      8.5 - 10.1 mg/dL 8.6 8.4 (L)     *If you need to speak with [facility] Scheduling, please call:   Cardiology Scheduling~[phone]  Diagnostic Imaging Scheduling~[phone]  Lab Scheduling~[phone]  [facility]: Cardiomyopathy, Optimization, Rehabilitation, Education  The [facility] is a heart failure specialty clinic within the [facility] [place] where you will work with specialized nurse practitioners, physician assistants, doctors, and registered nurses. They are dedicated to helping patients with heart failure to carefully adjust medications, receive education, and learn who and when to call if symptoms develop. They specialize in helping you better understand your condition, slow the progression of your disease, improve the length and quality of your life, help you detect future heart problems before they become life threatening, and avoid hospitalizations.          Follow-ups after your visit        Additional Services     Follow-Up with [facility] - JIMI visit                 Your next 10 appointments already scheduled     May 21, 2018 10:00 AM CDT   Lymphedema Treatment with Kate Holland PTA   [facility] ([facility])    [address] [place] [address]   [phone]            May 23, 2018  9:00 AM CDT   Lymphedema Treatment with Maribell Garcia PT   [facility] ([facility])    [address] [address]   [phone]            May 25, 2018  9:00 AM CDT   Lymphedema Treatment with Kate Holland PTA   [facility] ([facility])    [address] [address]   [phone]            May 31, 2018 11:00 AM CDT   New Visit with Angel Luis Gudino MD   [facility]    [address] [place] [address] [phone]             "Aug 21, 2018  4:30 PM CDT   Remote ICD Check with CASEY DCR2   [facility] ([facility])    [id number]  [place] [id number] [phone] OPT 2           This appointment is for a remote check of your debrillator.  This is not an appointment at the office.              Future tests that were ordered for you today     Open Future Orders        Priority Expected Expires Ordered    Follow-Up with [facility] - JIMI visit Routine 9/21/2018 5/21/2019 5/21/2018    Basic metabolic panel Routine 9/21/2018 5/21/2019 5/21/2018            Who to contact     If you have questions or need follow up information about today's clinic visit or your schedule please contact [facility] directly at [phone].  Normal or non-critical lab and imaging results will be communicated to you by [email or web address], letter or phone within 4 business days after the clinic has received the results. If you do not hear from us within 7 days, please contact the clinic through [email or web address] or phone. If you have a critical or abnormal lab result, we will notify you by phone as soon as possible.  Submit refill requests through [employer] or call your pharmacy and they will forward the refill request to us. Please allow 3 business days for your refill to be completed.          Additional Information About Your Visit        [email or web address] Information     [employer] lets you send messages to your doctor, view your test results, renew your prescriptions, schedule appointments and more. To sign up, go to [email or web address] . Click on \"Log in\" on the left side of the screen, which will take you to the Welcome page. Then click on \"Sign up Now\" on the right side of the page.     You will be asked to enter the access code listed below, as well as some personal information. Please follow the directions to create your username and password.     Your access code is: " [email or web address]  Expires: 2018 11:54 AM     Your access code will  in 90 days. If you need help or a new code, please call your [facility] or [phone].        Care EveryWhere ID     This is your Care EveryWhere ID. This could be used by other organizations to access your [facility] medical records  [id number]        Your Vitals Were     Pulse Pulse Oximetry BMI (Body Mass Index)             53 96% 25.83 kg/m2          Blood Pressure from Last 3 Encounters:   18 123/57   18 128/58   18 116/65    Weight from Last 3 Encounters:   18 81.6 kg (180 lb)   18 90.7 kg (200 lb)   18 93 kg (205 lb)              We Performed the Following     Follow-Up with [facility] - JIMI visit        Primary Care Provider Office Phone # Fax #    Jono Norm Drake [phone] [address] [facility] [address]        Goals        General    Improve chronic symptoms (pt-stated)     Notes - Note created  2018  3:03 PM by Edwin Marley RN    Goal Statement: I want to keep my heart failure controlled  Measure of Success: my breathing will improve and my weight will go down  Supportive Steps to Achieve: I will weigh daily and record                                                  I will take 2 lasix tabs until instructions change                                                  I will eat a low salt diet  Barriers: Did not understand that he should increase to 2 lasix tablets, has not been recording weights  Strengths: patient willing to take needed steps  Date to Achieve By: 18      Medication 1 (pt-stated)     Notes - Note created  2018  2:39 PM by Edwin Marley, RN    Goal Statement: I will increase my Lasix to help decrease my weight  Measure of Success: Weight will drop to 204 lb range and my leg swelling will decreas  Supportive Steps to Achieve:increase your lasix to two tablets as instructed by Cardiology  Barriers: Cardiologist did not  write new script so bottle states 1 tablet  Strengths: Patient willing to increase to help with fluid  Date to Achieve By:5/15/18        Equal Access to Services     ALEXIS PACE : Priya Ramirez, kwan law, jasminsantana rdzalvertoyasmin dumont, fina rhodesin hayaawhitney smileyadiliaamber vo. So [facility] [phone].    ATENCIÓN: Si habla español, tiene a rojo disposición servicios gratuitos de asistencia lingüística. Llame al [phone].    We comply with applicable federal civil rights laws and [place] laws. We do not discriminate on the basis of race, color, national origin, age, disability, sex, sexual orientation, or gender identity.            Thank you!     Thank you for choosing [facility]  for your care. Our goal is always to provide you with excellent care. Hearing back from our patients is one way we can continue to improve our services. Please take a few minutes to complete the written survey that you may receive in the mail after your visit with us. Thank you!             Your Updated Medication List - Protect others around you: Learn how to safely use, store and throw away your medicines at www.disposemymeds.org.          This list is accurate as of 5/21/18  9:04 AM.  Always use your most recent med list.                   Brand Name Dispense Instructions for use Diagnosis    albuterol 108 (90 Base) MCG/ACT Inhaler    PROAIR HFA/PROVENTIL HFA/VENTOLIN HFA    1 Inhaler    Inhale 2 puffs into the lungs every 4 hours as needed for shortness of breath / dyspnea or wheezing WITH SPACER        allopurinol 100 MG tablet    ZYLOPRIM    30 tablet    Take 0.5 tablets (50 mg) by mouth daily    Gout of foot, unspecified cause, unspecified chronicity, unspecified laterality       aspirin 325 MG EC tablet     90 tablet    Take 1 tablet (325 mg) by mouth daily        atorvastatin 40 MG tablet    LIPITOR    90 tablet    Take 1 tablet (40 mg) by mouth daily    Hyperlipidemia  LDL goal <100       CENTRUM SILVER per tablet      Take 1 tablet by mouth daily        finasteride 5 MG tablet    PROSCAR    90 tablet    Take 1 tablet (5 mg) by mouth daily    Benign non-nodular prostatic hyperplasia without lower urinary tract symptoms       furosemide 20 MG tablet    LASIX    60 tablet    Take 2 tablets (40 mg) by mouth daily    Lymphedema       guaiFENesin 600 MG 12 hr tablet    MUCINEX     Take 600 mg by mouth 2 times daily        metoprolol succinate 25 MG 24 hr tablet    TOPROL-XL    180 tablet    Take 2 tablets (50 mg) by mouth daily    Paroxysmal atrial fibrillation (H), Coronary artery disease involving native coronary artery of native heart without angina pectoris       NAVA MILK OF MAGNESIA PO      Take 30 mLs by mouth daily as needed        triamcinolone 0.1 % ointment    KENALOG    30 g    Apply topically 2 times daily Apply sparingly to affected area twice times daily for the next 7-10 days.    Rash and nonspecific skin eruption

## 2018-05-21 NOTE — PATIENT INSTRUCTIONS
Call C[facility] nurse for any questions or concerns:  [phone]   *If you have concerns after hours, please call [phone], option 2 to speak with on call Cardiologist.    1. Medication changes and/or recommendations from today:  DECREASE your lasix back to 20 mg daily.     2. Follow up plan: follow up with Bette TENORIO in September with labs prior.     3. Weigh yourself daily and write it down. CALL IF YOUR WEIGHT HITS 185 lbs or ABOVE. We'll plan to increase your lasix dose then.     4. Call CORE nurse if your weight is up more than 2 pounds in one day or 5 pounds in one week.     5. Call CORE nurse if you feel more short of breath, have more abdominal bloating, or leg swelling.     6. Continue low sodium diet (less than 2000 mg daily). If you eat less salt, you will retain less fluid.     7. Alcohol can weaken your heart further. You should avoid alcohol or limit its use to special times, such as a holiday or birthday.      8. Do NOT take Aleve or ibuprofen without talking to your doctor first.     9. Lab Results: your labs today look stable.    Component      Latest Ref Rng & Units 5/6/2018 5/21/2018   Sodium      133 - 144 mmol/L 139 138   Potassium      3.4 - 5.3 mmol/L 4.2 4.3   Chloride      94 - 109 mmol/L 104 106   Carbon Dioxide      20 - 32 mmol/L 28 26   Anion Gap      3 - 14 mmol/L 7 6   Glucose      70 - 99 mg/dL 128 (H) 193 (H)   Urea Nitrogen      7 - 30 mg/dL 19 27   Creatinine      0.66 - 1.25 mg/dL 1.07 1.14   GFR Estimate      >60 mL/min/1.7m2 65 60 (L)   GFR Estimate If Black      >60 mL/min/1.7m2 78 73   Calcium      8.5 - 10.1 mg/dL 8.6 8.4 (L)     *If you need to speak with [facility] Scheduling, please call:   Cardiology Scheduling~[phone]  Diagnostic Imaging Scheduling~[phone]  Lab Scheduling~[phone]  [facility]: Cardiomyopathy, Optimization, Rehabilitation, Education  The [facility] is a heart failure specialty clinic within the [facility] where you will work  with specialized nurse practitioners, physician assistants, doctors, and registered nurses. They are dedicated to helping patients with heart failure to carefully adjust medications, receive education, and learn who and when to call if symptoms develop. They specialize in helping you better understand your condition, slow the progression of your disease, improve the length and quality of your life, help you detect future heart problems before they become life threatening, and avoid hospitalizations.

## 2018-05-31 NOTE — MR AVS SNAPSHOT
After Visit Summary   5/31/2018    Colin Shell    MRN: [id number]           Patient Information     Date Of Birth          6/25/1927        Visit Information        Provider Department      5/31/2018 11:00 AM Angel Luis Gudino MD [facility]        Today's Diagnoses     Cough    -  1    Pulmonary fibrosis (H)        Hiatal hernia        Chronic systolic congestive heart failure (H)           Follow-ups after your visit        Your next 10 appointments already scheduled     Jun 20, 2018 10:00 AM CDT   Lymphedema Treatment with Kate Holland PTA   [facility] Lymphedema ([facility])    [address] [facility] [address]   [phone]            Aug 21, 2018  4:30 PM CDT   Remote ICD Check with CASEY DCR2   [facility] ([facility])    [address] [place] [address] [address]  [facility] [id number] [phone] OPT 2           This appointment is for a remote check of your debrillator.  This is not an appointment at the office.              Who to contact     If you have questions or need follow up information about today's clinic visit or your schedule please contact [facility] directly at [phone].  Normal or non-critical lab and imaging results will be communicated to you by MyChart, letter or phone within 4 business days after the clinic has received the results. If you do not hear from us within 7 days, please contact the clinic through MyChart or phone. If you have a critical or abnormal lab result, we will notify you by phone as soon as possible.  Submit refill requests through [employer] or call your pharmacy and they will forward the refill request to us. Please allow 3 business days for your refill to be completed.          Additional Information About Your Visit        Care EveryWhere ID     This is your Care EveryWhere ID. This could be used by other organizations to access your  "[place] medical records  [id number]        Your Vitals Were     Pulse Temperature Height Pulse Oximetry BMI (Body Mass Index)       78 97  F (36.1  C) 5' 10\" (1.778 m) 95% 26.98 kg/m2        Blood Pressure from Last 3 Encounters:   05/31/18 156/86   05/21/18 123/57   05/07/18 128/58    Weight from Last 3 Encounters:   05/31/18 188 lb (85.3 kg)   05/21/18 180 lb (81.6 kg)   05/07/18 200 lb (90.7 kg)              Today, you had the following     No orders found for display       Primary Care Provider Office Phone # Fax #    Jono Drake [phone] [address] [facility] [address]        Goals        General    Improve chronic symptoms (pt-stated)     Notes - Note created  5/4/2018  3:03 PM by Edwin Marley RN    Goal Statement: I want to keep my heart failure controlled  Measure of Success: my breathing will improve and my weight will go down  Supportive Steps to Achieve: I will weigh daily and record                                                  I will take 2 lasix tabs until instructions change                                                  I will eat a low salt diet  Barriers: Did not understand that he should increase to 2 lasix tablets, has not been recording weights  Strengths: patient willing to take needed steps  Date to Achieve By: 5/14/18      Medication 1 (pt-stated)     Notes - Note created  5/4/2018  2:39 PM by Edwin Marley RN    Goal Statement: I will increase my Lasix to help decrease my weight  Measure of Success: Weight will drop to 204 lb range and my leg swelling will decreas  Supportive Steps to Achieve:increase your lasix to two tablets as instructed by Cardiology  Barriers: Cardiologist did not write new script so bottle states 1 tablet  Strengths: Patient willing to increase to help with fluid  Date to Achieve By:5/15/18        Equal Access to Services     ALEXIS PACE AH: Priya Ramirez, wacostada ani, qaybta amarilis " fina dumontlinette henriquez'aan ah. So [facility] [phone].    ATENCIÓN: Si ijeomala jocelyn, tiene a rojo disposición servicios gratuitos de asistencia lingüística. Laureano al [phone].    We comply with applicable federal civil rights laws and [place] laws. We do not discriminate on the basis of race, color, national origin, age, disability, sex, sexual orientation, or gender identity.            Thank you!     Thank you for choosing [facility]  for your care. Our goal is always to provide you with excellent care. Hearing back from our patients is one way we can continue to improve our services. Please take a few minutes to complete the written survey that you may receive in the mail after your visit with us. Thank you!             Your Updated Medication List - Protect others around you: Learn how to safely use, store and throw away your medicines at www.disposemymeds.org.          This list is accurate as of 5/31/18  3:11 PM.  Always use your most recent med list.                   Brand Name Dispense Instructions for use Diagnosis    albuterol 108 (90 Base) MCG/ACT Inhaler    PROAIR HFA/PROVENTIL HFA/VENTOLIN HFA    1 Inhaler    Inhale 2 puffs into the lungs every 4 hours as needed for shortness of breath / dyspnea or wheezing WITH SPACER        allopurinol 100 MG tablet    ZYLOPRIM    30 tablet    Take 0.5 tablets (50 mg) by mouth daily    Gout of foot, unspecified cause, unspecified chronicity, unspecified laterality       aspirin 325 MG EC tablet     90 tablet    Take 1 tablet (325 mg) by mouth daily        atorvastatin 40 MG tablet    LIPITOR    90 tablet    Take 1 tablet (40 mg) by mouth daily    Hyperlipidemia LDL goal <100       CENTRUM SILVER per tablet      Take 1 tablet by mouth daily        finasteride 5 MG tablet    PROSCAR    90 tablet    Take 1 tablet (5 mg) by mouth daily    Benign non-nodular prostatic hyperplasia without lower urinary tract symptoms        guaiFENesin 600 MG 12 hr tablet    MUCINEX     Take 600 mg by mouth 2 times daily        LASIX 20 MG tablet   Generic drug:  furosemide     60 tablet    Take 1 tablet (20 mg) by mouth daily    Lymphedema       metoprolol succinate 25 MG 24 hr tablet    TOPROL-XL    180 tablet    Take 2 tablets (50 mg) by mouth daily    Paroxysmal atrial fibrillation (H), Coronary artery disease involving native coronary artery of native heart without angina pectoris       NAVA MILK OF MAGNESIA PO      Take 30 mLs by mouth daily as needed        triamcinolone 0.1 % ointment    KENALOG    30 g    Apply topically 2 times daily Apply sparingly to affected area twice times daily for the next 7-10 days.    Rash and nonspecific skin eruption

## 2018-05-31 NOTE — PROGRESS NOTES
"[facility]  Pulmonary Medicine  Visit Clinic Note  May 31, 2018         ASSESSMENT & PLAN       Cough: He has a few reasons for cough. He has pulmonary fibrosis, bronchiectasis, dysphagia with possible aspiration, and a large hiatal hernia which is like causing acid reflux. We spoke about a lot of these possibilities.      At this moment, his cough is much improved. Almost to the point that it is not bothering him much.      I talked to him about limiting his food intake prior to going to bed.  He should try not to eat at least 2 hours before going to bed in order to limit the amount of food in his hiatal hernia.     My advice to him and his primary care physician if he develops increased cough in the future would be to:  - start a proton pump inhibitor to treat GERD and consider a GI consult  - give a trial of 40 mg prednisone for 7 days to treat any potential inflammatory interstitial lung disease   - give a course of azithromycin to treat bronchiectasis.   - speech and swallow evaluation.     These could be done in any order that the primary care physician feels appropriate based on his clinical symptoms at the time.    At this time, he is not interested in further testing or adding medications.  He will try to limit his food intake prior to bedtime.       Pulmonary Fibrosis: He has evidence of progressive pulmonary fibrosis.  Etiology unclear to me.  No signs of asbestosis on chest CT.  It does not appear that he has been on offending medications. I am concerned about the possibility of his hiatal hernia causing significant GERD or aspiration of food/liquids.  IPF is also a possibility. We discussed potential work up, serial spirometry, and potential empiric medication use.  He is not interested in pursuing these given the fact he is feeling better and he \"is 89 years old.\"    I let him know he can feel free to make another appointment with me if his shortness of breath is getting worse and " it is not felt to be related to his heart disease.         Reji Gudino MD          Today's visit note:     Chief Complaint: Colin Shell is a 89 year old year old male who is being seen for Chronic Cough      HISTORY OF PRESENT ILLNESS:    89 year old male with a history of CHF presents for evaluation of cough.      He has been in and out of the hospital with shortness of breath recently.  Numerous pneumonias CHF exacerbation.    A couple months ago, he had pneumonia and renal failure for which he was hospitalized.  He was given a lot of fluids, and then gained quite a lot of weight.  He had a persistent cough with yellow sputum production and significant dyspnea after being discharged from the hospital.  Things were really bad up until about 3 weeks ago.  At that point, he had his diuretic dosing increased.  He has been losing a lot of weight since then.  His symptoms have markedly improved.  For example, he used to have to stop 3 times to catch his breath when he walked the 300 ft to his mailbox.  Now he doesn't need to stop at all anymore.      He still does have a bit of a cough.  It is not nearly as bad as it used to be, and he is not too concerned about it.  However, he had this office visit scheduled about 6 weeks ago and decided to come here anyway.     He denies any significant shortness of breath while doing his daily activies, but admits that he has relinquished a lot of the daily responsibilities to other people now.      He notes that he chokes on his food sometimes. No issues with liquids.                Past Medical and Surgical History:     Past Medical History:   Diagnosis Date     Arrhythmia      Chronic systolic CHF (congestive heart failure) (H) 6/13/2016     Congestive heart failure (H)      Dizziness and giddiness 11/24/2007    uncertain etiology- MRI/MRA head , carotid duplex normal 2007     Foreign body in unspecified site on external eye      Hypertension      Ischemic cardiomyopathy      mild- EF 45-50%     Microscopic hematuria      microscopic hematuria 2001 with  IVP and cystoscopy     Renal disease      Rhabdomyolysis     2003- in setting of Ecoli UTI, gemfibrozil/lipitor     Past Surgical History:   Procedure Laterality Date     BIOPSY ARTERY TEMPORAL Left 9/18/2017    Procedure: BIOPSY ARTERY TEMPORAL;  Left Temporal Biopsy ;  Surgeon: Ivana Phan MD;  Location: [facility] OR     SURGICAL HISTORY OF -       heel spurs     SURGICAL HISTORY OF -   2005, 2006    bilateral eye cataract     SURGICAL HISTORY OF -   1996    CABG x 5 vessels     SURGICAL HISTORY OF -   2010    circumcision     TONSILLECTOMY & ADENOIDECTOMY             Family History:     Family History   Problem Relation Age of Onset     DIABETES Father      DIABETES Brother      DIABETES Brother      Hypertension Son      Unknown/Adopted Maternal Grandmother      Unknown/Adopted Maternal Grandfather      Unknown/Adopted Paternal Grandmother      Unknown/Adopted Paternal Grandfather      Dementia Sister               Social History:     Social History     Social History     Marital status:      Spouse name: N/A     Number of children: N/A     Years of education: N/A     Occupational History     Not on file.     Social History Main Topics     Smoking status: Former Smoker     Smokeless tobacco: Never Used      Comment: Quit at age 32     Alcohol use Yes      Comment: 12 pack a year     Drug use: No     Sexual activity: Yes     Other Topics Concern     Parent/Sibling W/ Cabg, Mi Or Angioplasty Before 65f 55m? No     Social History Narrative            Medications:     Current Outpatient Prescriptions   Medication     albuterol (PROAIR HFA/PROVENTIL HFA/VENTOLIN HFA) 108 (90 BASE) MCG/ACT Inhaler     allopurinol (ZYLOPRIM) 100 MG tablet     aspirin  MG EC tablet     atorvastatin (LIPITOR) 40 MG tablet     finasteride (PROSCAR) 5 MG tablet     furosemide (LASIX) 20 MG tablet     guaiFENesin (MUCINEX) 600 MG 12 hr  "tablet     Magnesium Hydroxide (NAVA MILK OF MAGNESIA PO)     metoprolol succinate (TOPROL-XL) 25 MG 24 hr tablet     Multiple Vitamins-Minerals (CENTRUM SILVER) per tablet     triamcinolone (KENALOG) 0.1 % ointment     No current facility-administered medications for this visit.      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   Raised cows.  Raised corn, feed for them.  Stopped mid-70s.          Review of Systems:       A complete review of systems was otherwise negative except as noted in the HPI.      PHYSICAL EXAM:  /86  Pulse 78  Temp 97  F (36.1  C)  Ht 5' 10\" (1.778 m)  Wt 188 lb (85.3 kg)  SpO2 95%  BMI 26.98 kg/m2     General: Well developed, well nourished, No apparent distress  Eyes: Anicteric  Nose: Nasal mucosa with no edema or hyperemia.  No polyps  Ears: Hearing grossly normal  Mouth: Oral mucosa is moist, without any lesions. No oropharyngeal exudate.  Neck: supple, no thyromegaly  Lymphatics: No cervical or supraclavicular nodes  Respiratory: Good air movement. Dry crackles bilaterally.   Cardiac: RRR, normal S1, S2. 4/6 systolic murmur.  No JVD  Abdomen: Soft, NT/ND  Musculoskeletal: Extremities normal. No clubbing. No cyanosis. No edema.  Skin: No rash on limited exam  Neuro: Normal mentation. Normal speech.  Psych:Normal affect           Data:   All laboratory and imaging data reviewed.      PFT:    FEV1/FVC ratio 81%. FEV1 1.64 (68% predicted). FVC 2.02 (57% predicted).    PFT Interpretation:  No airflow obstruction. Low FEV1 and FVC suggest restriction.   Valid Maneuver    Chest CT: I have personally reviewed the chest CT from 4/2018, 3/2018, 1/2017, 4/2012, and abdominal CT scan from 2007. In 2007, he had very minimal bibasilar interstitial changes.     4/2018:  FINDINGS: The heart size is mildly enlarged. There is a moderate-sized  hiatal hernia.. There are numerous mildly enlarged mediastinal lymph  nodes. These are unchanged. No other abnormal mediastinal mass is  seen. There " is prominent calcification within the thoracic aorta and  coronary arteries. The vascular structures are otherwise unremarkable,  allowing for the absence of contrast. There are surgical changes of  presumed coronary bypass surgery. There are emphysematous changes in  the lungs. There has been improvement of what are now only mild patchy  groundglass opacities throughout the left lung. The lungs are  otherwise clear. No pneumothorax is demonstrated. Again seen are small  bilateral pleural effusions, right greater than left. This has  progressed on the right but decreased on the left. There are  degenerative changes in the spine and surgical changes of a median  sternotomy. There is a left chest wall intracardiac stimulator device.  No other chest wall abnormality is seen. Within the visualized upper  abdomen, again seen are gallstones.         IMPRESSION:   1. Improvement of what are now only mild patchy opacities of the left  lung representing mild infiltrate or inflammation. No pulmonary  consolidation is demonstrated.  2. No change in mild mediastinal lymphadenopathy.  3. Mild cardiomegaly and small bilateral pleural effusions.

## 2018-05-31 NOTE — LETTER
5/31/2018         RE: Colin Shell  [address]  [facility] [address]        Dear Colleague,    Thank you for referring your patient, Colin Shell, to the [facility]. Please see a copy of my visit note below.    [facility]  Pulmonary Medicine  Visit Clinic Note  May 31, 2018         ASSESSMENT & PLAN       Cough: He has a few reasons for cough. He has pulmonary fibrosis, bronchiectasis, dysphagia with possible aspiration, and a large hiatal hernia which is like causing acid reflux. We spoke about a lot of these possibilities.      At this moment, his cough is much improved. Almost to the point that it is not bothering him much.      I talked to him about limiting his food intake prior to going to bed.  He should try not to eat at least 2 hours before going to bed in order to limit the amount of food in his hiatal hernia.     My advice to him and his primary care physician if he develops increased cough in the future would be to:  - start a proton pump inhibitor to treat GERD and consider a GI consult  - give a trial of 40 mg prednisone for 7 days to treat any potential inflammatory interstitial lung disease   - give a course of azithromycin to treat bronchiectasis.   - speech and swallow evaluation.     These could be done in any order that the primary care physician feels appropriate based on his clinical symptoms at the time.    At this time, he is not interested in further testing or adding medications.  He will try to limit his food intake prior to bedtime.       Pulmonary Fibrosis: He has evidence of progressive pulmonary fibrosis.  Etiology unclear to me.  No signs of asbestosis on chest CT.  It does not appear that he has been on offending medications. I am concerned about the possibility of his hiatal hernia causing significant GERD or aspiration of food/liquids.  IPF is also a possibility. We discussed potential work up, serial spirometry, and  "potential empiric medication use.  He is not interested in pursuing these given the fact he is feeling better and he \"is 89 years old.\"    I let him know he can feel free to make another appointment with me if his shortness of breath is getting worse and it is not felt to be related to his heart disease.         Reji Gudino MD          Today's visit note:     Chief Complaint: Colin Shell is a 89 year old year old male who is being seen for Chronic Cough      HISTORY OF PRESENT ILLNESS:    89 year old male with a history of CHF presents for evaluation of cough.      He has been in and out of the hospital with shortness of breath recently.  Numerous pneumonias CHF exacerbation.    A couple months ago, he had pneumonia and renal failure for which he was hospitalized.  He was given a lot of fluids, and then gained quite a lot of weight.  He had a persistent cough with yellow sputum production and significant dyspnea after being discharged from the hospital.  Things were really bad up until about 3 weeks ago.  At that point, he had his diuretic dosing increased.  He has been losing a lot of weight since then.  His symptoms have markedly improved.  For example, he used to have to stop 3 times to catch his breath when he walked the 300 ft to his mailbox.  Now he doesn't need to stop at all anymore.      He still does have a bit of a cough.  It is not nearly as bad as it used to be, and he is not too concerned about it.  However, he had this office visit scheduled about 6 weeks ago and decided to come here anyway.     He denies any significant shortness of breath while doing his daily activies, but admits that he has relinquished a lot of the daily responsibilities to other people now.      He notes that he chokes on his food sometimes. No issues with liquids.                Past Medical and Surgical History:     Past Medical History:   Diagnosis Date     Arrhythmia      Chronic systolic CHF (congestive heart " failure) (H) 6/13/2016     Congestive heart failure (H)      Dizziness and giddiness 11/24/2007    uncertain etiology- MRI/MRA head , carotid duplex normal 2007     Foreign body in unspecified site on external eye      Hypertension      Ischemic cardiomyopathy     mild- EF 45-50%     Microscopic hematuria      microscopic hematuria 2001 with  IVP and cystoscopy     Renal disease      Rhabdomyolysis     2003- in setting of Ecoli UTI, gemfibrozil/lipitor     Past Surgical History:   Procedure Laterality Date     BIOPSY ARTERY TEMPORAL Left 9/18/2017    Procedure: BIOPSY ARTERY TEMPORAL;  Left Temporal Biopsy ;  Surgeon: Ivana Phan MD;  Location: [facility] OR     SURGICAL HISTORY OF -       heel spurs     SURGICAL HISTORY OF -   2005, 2006    bilateral eye cataract     SURGICAL HISTORY OF -   1996    CABG x 5 vessels     SURGICAL HISTORY OF -   2010    circumcision     TONSILLECTOMY & ADENOIDECTOMY             Family History:     Family History   Problem Relation Age of Onset     DIABETES Father      DIABETES Brother      DIABETES Brother      Hypertension Son      Unknown/Adopted Maternal Grandmother      Unknown/Adopted Maternal Grandfather      Unknown/Adopted Paternal Grandmother      Unknown/Adopted Paternal Grandfather      Dementia Sister               Social History:     Social History     Social History     Marital status:      Spouse name: N/A     Number of children: N/A     Years of education: N/A     Occupational History     Not on file.     Social History Main Topics     Smoking status: Former Smoker     Smokeless tobacco: Never Used      Comment: Quit at age 32     Alcohol use Yes      Comment: 12 pack a year     Drug use: No     Sexual activity: Yes     Other Topics Concern     Parent/Sibling W/ Cabg, Mi Or Angioplasty Before 65f 55m? No     Social History Narrative            Medications:     Current Outpatient Prescriptions   Medication     albuterol (PROAIR HFA/PROVENTIL HFA/VENTOLIN  "HFA) 108 (90 BASE) MCG/ACT Inhaler     allopurinol (ZYLOPRIM) 100 MG tablet     aspirin  MG EC tablet     atorvastatin (LIPITOR) 40 MG tablet     finasteride (PROSCAR) 5 MG tablet     furosemide (LASIX) 20 MG tablet     guaiFENesin (MUCINEX) 600 MG 12 hr tablet     Magnesium Hydroxide (NAVA MILK OF MAGNESIA PO)     metoprolol succinate (TOPROL-XL) 25 MG 24 hr tablet     Multiple Vitamins-Minerals (CENTRUM SILVER) per tablet     triamcinolone (KENALOG) 0.1 % ointment     No current facility-administered medications for this visit.      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  Stopped mid-70s.          Review of Systems:       A complete review of systems was otherwise negative except as noted in the HPI.      PHYSICAL EXAM:  /86  Pulse 78  Temp 97  F (36.1  C)  Ht 5' 10\" (1.778 m)  Wt 188 lb (85.3 kg)  SpO2 95%  BMI 26.98 kg/m2     General: Well developed, well nourished, No apparent distress  Eyes: Anicteric  Nose: Nasal mucosa with no edema or hyperemia.  No polyps  Ears: Hearing grossly normal  Mouth: Oral mucosa is moist, without any lesions. No oropharyngeal exudate.  Neck: supple, no thyromegaly  Lymphatics: No cervical or supraclavicular nodes  Respiratory: Good air movement. Dry crackles bilaterally.   Cardiac: RRR, normal S1, S2. 4/6 systolic murmur.  No JVD  Abdomen: Soft, NT/ND  Musculoskeletal: Extremities normal. No clubbing. No cyanosis. No edema.  Skin: No rash on limited exam  Neuro: Normal mentation. Normal speech.  Psych:Normal affect           Data:   All laboratory and imaging data reviewed.      PFT:    FEV1/FVC ratio 81%. FEV1 1.64 (68% predicted). FVC 2.02 (57% predicted).    PFT Interpretation:  No airflow obstruction. Low FEV1 and FVC suggest restriction.   Valid Maneuver    Chest CT: I have personally reviewed the chest CT from 4/2018, 3/2018, 1/2017, 4/2012, and abdominal CT scan from 2007. In 2007, he had very minimal " bibasilar interstitial changes.     4/2018:  FINDINGS: The heart size is mildly enlarged. There is a moderate-sized  hiatal hernia.. There are numerous mildly enlarged mediastinal lymph  nodes. These are unchanged. No other abnormal mediastinal mass is  seen. There is prominent calcification within the thoracic aorta and  coronary arteries. The vascular structures are otherwise unremarkable,  allowing for the absence of contrast. There are surgical changes of  presumed coronary bypass surgery. There are emphysematous changes in  the lungs. There has been improvement of what are now only mild patchy  groundglass opacities throughout the left lung. The lungs are  otherwise clear. No pneumothorax is demonstrated. Again seen are small  bilateral pleural effusions, right greater than left. This has  progressed on the right but decreased on the left. There are  degenerative changes in the spine and surgical changes of a median  sternotomy. There is a left chest wall intracardiac stimulator device.  No other chest wall abnormality is seen. Within the visualized upper  abdomen, again seen are gallstones.         IMPRESSION:   1. Improvement of what are now only mild patchy opacities of the left  lung representing mild infiltrate or inflammation. No pulmonary  consolidation is demonstrated.  2. No change in mild mediastinal lymphadenopathy.  3. Mild cardiomegaly and small bilateral pleural effusions.                                              Again, thank you for allowing me to participate in the care of your patient.        Sincerely,        Angel Luis Gudino MD

## 2018-06-04 NOTE — NURSING NOTE
"Initial /77  Pulse 51  Temp 97.7  F (36.5  C) (Tympanic)  SpO2 98% Estimated body mass index is 26.98 kg/(m^2) as calculated from the following:    Height as of 5/31/18: 5' 10\" (1.778 m).    Weight as of 5/31/18: 188 lb (85.3 kg). .      "

## 2018-06-04 NOTE — PATIENT INSTRUCTIONS
You will be contacted in 1-2 business days to get a schedule for the medical spine specialist.    Acetaminophen 325 mg orally 1-2 tablets every 4-6 hrs as needed for pain.    Avoid activities that increase pain.    Observe fall precautions.  Use cane to stabilize yourself when walking.      Understanding Lumbar Radiculopathy    Lumbar radiculopathy is irritation or inflammation of a nerve root in the low back. It causes symptoms that spread out from the back down one or both legs. To understand this condition, it helps to understand the parts of the spine:    Vertebrae. These are bones that stack to form the spine. The lumbar spine contains the 5 bottom vertebrae.    Disks. These are soft pads of tissue between the vertebrae. They act as shock absorbers for the spine.    Spinal canal. This is a tunnel formed within the stacked vertebrae. In the lumbar spine, nerves run through this canal.    Nerves. These branch off and leave the spinal canal, traveling out to parts of the body. As they leave the spinal canal, nerves pass through openings between the vertebrae. The nerve root is the part of the nerve that is closest to the spinal canal.    Sciatic nerve. This is a large nerve formed from several nerve roots in the low back. This nerve extends down the back of the leg to the foot.  With lumbar radiculopathy, nerve roots in the low back become irritated. This leads to pain and symptoms. The sciatic nerve is commonly involved, so the condition is often called sciatica.  What causes lumbar radiculopathy?  Aging, injury, poor posture, extra body weight, and other issues can lead to problems in the low back. These problems may then irritate nerve roots. They include:    Damage to a disk in the lumbar spine. The damaged disk may then press on nearby nerve roots.    Degeneration from wear and tear, and aging. This can lead to narrowing (stenosis) of the openings between the vertebrae. The narrowed openings press on nerve  roots as they leave the spinal canal.    Unstable spine. This is when a vertebra slips forward. It can then press on a nerve root.  Other, less common things can put pressure on nerves in the low back. These include diabetes, infection, or a tumor.  Symptoms of lumbar radiculopathy  These include:    Pain in the low back    Pain, numbness, tingling, or weakness that travels into the buttocks, hip, groin, or leg    Muscle spasms  Treatment for lumbar radiculopathy  In most cases, your healthcare provider will first try treatments that help relieve symptoms. These may include:    Prescription and over-the-counter pain medicines. These help relieve pain, swelling, and irritation.    Limits on positions and activities that increase pain. But lying in bed or avoiding all movement is only recommended for a short period of time.    Physical therapy, including exercises and stretches. This helps decrease pain and increase movement and function.    Steroid shots into the lower back. This may help relieve symptoms for a time.    Weight-loss program. If you are overweight, losing extra pounds may help relieve symptoms.  In some cases, you may need surgery to fix the underlying problem. This depends on the cause, the symptoms, and how long the pain has lasted.  Possible complications  Over time, an irritated and inflamed nerve may become damaged. This may lead to long-lasting (permanent) numbness or weakness in your legs and feet. If symptoms change suddenly or get worse, be sure to let your healthcare provider know.  When to call your healthcare provider  Call your healthcare provider right away if you have any of these:    New pain or pain that gets worse    New or increasing weakness, tingling, or numbness in your leg or foot    Problems controlling your bladder or bowel   Date Last Reviewed: 3/10/2016    [id number] [employer] [address] [facility], [place] [address]. All rights reserved. This information is  not intended as a substitute for professional medical care. Always follow your healthcare professional's instructions.

## 2018-06-04 NOTE — PROGRESS NOTES
SUBJECTIVE:   Colin Shell is a 89 year old male who presents to clinic today for the following health issues:    Musculoskeletal problem/pain      Duration: 3 days    Description  Location: left hip    Intensity:  severe, 9/10    Accompanying signs and symptoms: radiation of pain to leg and tingling of foot; patient reports left leg feels like giving out a lot - started quite rapidly 3 days ago. Had to use cane for walking since then    History  Previous similar problem: no   Previous evaluation:  none    Precipitating or alleviating factors:  Trauma or overuse: no   Aggravating factors include: walking    Therapies tried and outcome: nothing      Verified above history with patient.        Problem list and histories reviewed & adjusted, as indicated.  Additional history: as documented    Patient Active Problem List   Diagnosis     Hypertension goal BP (blood pressure) < 140/90     Gout     Coronary artery disease involving coronary bypass graft of native heart without angina pectoris     Chronic kidney disease, stage III (moderate)     Obesity     Benign prostatic hyperplasia     Hyperlipidemia LDL goal <100     Bronchiectasis (H)     Cholelithiasis     Fibrosis of lung (H)     Pulmonary nodule, right     Advance Care Planning     Chronic systolic CHF (congestive heart failure) (H)     Paroxysmal atrial fibrillation (H)     Dyspnea     Bronchiectasis without complication (H)     Lymphedema     Ischemic cardiomyopathy     Past Surgical History:   Procedure Laterality Date     BIOPSY ARTERY TEMPORAL Left 9/18/2017    Procedure: BIOPSY ARTERY TEMPORAL;  Left Temporal Biopsy ;  Surgeon: Ivana Phan MD;  Location: [facility] OR     SURGICAL HISTORY OF -       heel spurs     SURGICAL HISTORY OF -   2005, 2006    bilateral eye cataract     SURGICAL HISTORY OF -   1996    CABG x 5 vessels     SURGICAL HISTORY OF -   2010    circumcision     TONSILLECTOMY & ADENOIDECTOMY         Social History   Substance Use  Topics     Smoking status: Former Smoker     Smokeless tobacco: Never Used      Comment: Quit at age 32     Alcohol use Yes      Comment: 12 pack a year     Family History   Problem Relation Age of Onset     DIABETES Father      DIABETES Brother      DIABETES Brother      Hypertension Son      Unknown/Adopted Maternal Grandmother      Unknown/Adopted Maternal Grandfather      Unknown/Adopted Paternal Grandmother      Unknown/Adopted Paternal Grandfather      Dementia Sister          Current Outpatient Prescriptions   Medication Sig Dispense Refill     allopurinol (ZYLOPRIM) 100 MG tablet Take 0.5 tablets (50 mg) by mouth daily 30 tablet      aspirin  MG EC tablet Take 1 tablet (325 mg) by mouth daily 90 tablet 3     atorvastatin (LIPITOR) 40 MG tablet Take 1 tablet (40 mg) by mouth daily 90 tablet 3     finasteride (PROSCAR) 5 MG tablet Take 1 tablet (5 mg) by mouth daily 90 tablet 3     furosemide (LASIX) 20 MG tablet Take 1 tablet (20 mg) by mouth daily 60 tablet 1     guaiFENesin (MUCINEX) 600 MG 12 hr tablet Take 600 mg by mouth daily        Magnesium Hydroxide (NAVA MILK OF MAGNESIA PO) Take 30 mLs by mouth daily as needed        metoprolol succinate (TOPROL-XL) 25 MG 24 hr tablet Take 2 tablets (50 mg) by mouth daily 180 tablet 3     Multiple Vitamins-Minerals (CENTRUM SILVER) per tablet Take 1 tablet by mouth daily       cyclobenzaprine (FLEXERIL) 5 MG tablet Take 0.5-1 tablets (2.5-5 mg) by mouth 3 times daily as needed for muscle spasms Do not take prior to driving. 30 tablet 1     methylPREDNISolone (MEDROL DOSEPAK) 4 MG tablet Follow package instructions 21 tablet 0     triamcinolone (KENALOG) 0.1 % ointment Apply topically 2 times daily Apply sparingly to affected area twice times daily for the next 7-10 days. (Patient not taking: Reported on 6/4/2018) 30 g 1     Allergies   Allergen Reactions     Flomax [Tamsulosin Hydrochloride]      Hctz Other (See Comments)     Caused loss of balance      Lisinopril Cough     Simvastatin Nausea and Vomiting and Diarrhea     Vytorin Nausea and Diarrhea       Reviewed and updated as needed this visit by clinical staff  Tobacco  Allergies  Meds  Problems  Med Hx  Surg Hx  Fam Hx  Soc Hx        Reviewed and updated as needed this visit by Provider  Allergies  Meds  Problems         ROS:  C: NEGATIVE for fever, chills or change in weight  I: NEGATIVE for worrisome rashes, moles or lesions  GI: NEGATIVE for nausea, abdominal pain, heartburn, or change in bowel habits  : NEGATIVE for frequency, dysuria, or hematuria  MUSCULOSKELETAL: see above  N: NEGATIVE for weakness, dizziness or paresthesias  H: NEGATIVE for bleeding problems    OBJECTIVE:                                                    /77  Pulse 51  Temp 97.7  F (36.5  C) (Tympanic)  SpO2 98%  There is no height or weight on file to calculate BMI.  GENERAL: antalgic gait with assist of cane, alert and no distress  NECK: no tenderness, no adenopathy, no asymmetry, no masses, no stiffness  MS: extremities- no gross deformities noted, no edema  SKIN: no suspicious lesions, no rashes  NEURO: Patient is A and O X 3; Cranial nerves 2-12 intact;  Strength 4/5 on LLE and 5/5 all other extremities; DTR: ++ x 4; Romberg negative; Sensory hypoesthesia on left foot and leg per patient ; no tremors No problems with motor coordination    BACK: normal curvature, no deformity, no skin changes, mild posterolateral left hip tenderness, no lumbosacral tenderness;  range of motion limited due to stiffness per patient, SLR negative bilaterally    Diagnostic test results:  Diagnostic Test Results:  No results found for this or any previous visit (from the past 24 hour(s)).     ASSESSMENT/PLAN:                                                        ICD-10-CM    1. Spinal stenosis, lumbar region, with neurogenic claudication M48.062 ORTHO  REFERRAL  Patient was advised of imaging findings.  Discussed due to  severity of his symptoms, should consult medical spine clinic for [id number] options. Patient expressed he is not interested in surgery.   Candidate for MARCIA?  Activity as tolerated, rest as needed.  Acetaminophen 325 mg orally 1-2 tabs every 4-6 hrs as needed for pain    Return precautions discussed and given to patient.     2. Weakness of left leg R29.898 XR Lumbar Spine 2/3 Views     CT Lumbar Spine w/o Contrast     ORTHO  REFERRAL     Due to reported weakness of leg that started rather rapidly, imaging ordered. CT chosen as patient has ICD.      3. Hip pain, left M25.552 XR Pelvis w Hip Left 1 View       Follow up with Provider - at ortho consult   Patient Instructions     You will be contacted in 1-2 business days to get a schedule for the medical spine specialist.    Acetaminophen 325 mg orally 1-2 tablets every 4-6 hrs as needed for pain.    Avoid activities that increase pain.    Observe fall precautions.  Use cane to stabilize yourself when walking.      Understanding Lumbar Radiculopathy    Lumbar radiculopathy is irritation or inflammation of a nerve root in the low back. It causes symptoms that spread out from the back down one or both legs. To understand this condition, it helps to understand the parts of the spine:    Vertebrae. These are bones that stack to form the spine. The lumbar spine contains the 5 bottom vertebrae.    Disks. These are soft pads of tissue between the vertebrae. They act as shock absorbers for the spine.    Spinal canal. This is a tunnel formed within the stacked vertebrae. In the lumbar spine, nerves run through this canal.    Nerves. These branch off and leave the spinal canal, traveling out to parts of the body. As they leave the spinal canal, nerves pass through openings between the vertebrae. The nerve root is the part of the nerve that is closest to the spinal canal.    Sciatic nerve. This is a large nerve formed from several nerve roots in the low back. This nerve  extends down the back of the leg to the foot.  With lumbar radiculopathy, nerve roots in the low back become irritated. This leads to pain and symptoms. The sciatic nerve is commonly involved, so the condition is often called sciatica.  What causes lumbar radiculopathy?  Aging, injury, poor posture, extra body weight, and other issues can lead to problems in the low back. These problems may then irritate nerve roots. They include:    Damage to a disk in the lumbar spine. The damaged disk may then press on nearby nerve roots.    Degeneration from wear and tear, and aging. This can lead to narrowing (stenosis) of the openings between the vertebrae. The narrowed openings press on nerve roots as they leave the spinal canal.    Unstable spine. This is when a vertebra slips forward. It can then press on a nerve root.  Other, less common things can put pressure on nerves in the low back. These include diabetes, infection, or a tumor.  Symptoms of lumbar radiculopathy  These include:    Pain in the low back    Pain, numbness, tingling, or weakness that travels into the buttocks, hip, groin, or leg    Muscle spasms  Treatment for lumbar radiculopathy  In most cases, your healthcare provider will first try treatments that help relieve symptoms. These may include:    Prescription and over-the-counter pain medicines. These help relieve pain, swelling, and irritation.    Limits on positions and activities that increase pain. But lying in bed or avoiding all movement is only recommended for a short period of time.    Physical therapy, including exercises and stretches. This helps decrease pain and increase movement and function.    Steroid shots into the lower back. This may help relieve symptoms for a time.    Weight-loss program. If you are overweight, losing extra pounds may help relieve symptoms.  In some cases, you may need surgery to fix the underlying problem. This depends on the cause, the symptoms, and how long the pain  has lasted.  Possible complications  Over time, an irritated and inflamed nerve may become damaged. This may lead to long-lasting (permanent) numbness or weakness in your legs and feet. If symptoms change suddenly or get worse, be sure to let your healthcare provider know.  When to call your healthcare provider  Call your healthcare provider right away if you have any of these:    New pain or pain that gets worse    New or increasing weakness, tingling, or numbness in your leg or foot    Problems controlling your bladder or bowel   Date Last Reviewed: 3/10/2016    [id number] [employer]. [address] [place] [address]. All rights reserved. This information is not intended as a substitute for professional medical care. Always follow your healthcare professional's instructions.            Eric Brasher MD  [facility]

## 2018-06-04 NOTE — LETTER
[facility]  [address] [address]  Phone: [phone]    June 13, 2018        Colin Shell  [address]  [place] [address]          Dear Colin,      IMAGING RESULTS:     The results of your recent hip and pelvic x-ray were He has moderate osteoarthritis of both hips but no reported fractures.    In addition to spine clinic referral, patient may also consult with orthopedics about options for treatment of his hip pain.   Patient has expressed he is not interested in surgery, but ortho may have other recommendations that are non-surgical. .  If you have any further questions or problems, please contact our office.    XR PELVIS AND HIP LEFT 1 VIEW 6/4/2018 11:09 AM     COMPARISON: 12/5/2012     HISTORY: Pain.         IMPRESSION: Moderate bilateral hip osteoarthritis. No fractures are  seen on either side.     JOHNNY AMES MD    Sincerely,        Eric Brasher MD / cb

## 2018-06-04 NOTE — MR AVS SNAPSHOT
After Visit Summary   6/4/2018    Colin Shell    MRN: [id number]           Patient Information     Date Of Birth          6/25/1927        Visit Information        Provider Department      6/4/2018 9:40 AM Eric Brasher MD [facility]        Today's Diagnoses     Spinal stenosis, lumbar region, with neurogenic claudication    -  1    Weakness of left leg        Hip pain, left          Care Instructions    You will be contacted in 1-2 business days to get a schedule for the medical spine specialist.    Acetaminophen 325 mg orally 1-2 tablets every 4-6 hrs as needed for pain.    Avoid activities that increase pain.    Observe fall precautions.  Use cane to stabilize yourself when walking.      Understanding Lumbar Radiculopathy    Lumbar radiculopathy is irritation or inflammation of a nerve root in the low back. It causes symptoms that spread out from the back down one or both legs. To understand this condition, it helps to understand the parts of the spine:    Vertebrae. These are bones that stack to form the spine. The lumbar spine contains the 5 bottom vertebrae.    Disks. These are soft pads of tissue between the vertebrae. They act as shock absorbers for the spine.    Spinal canal. This is a tunnel formed within the stacked vertebrae. In the lumbar spine, nerves run through this canal.    Nerves. These branch off and leave the spinal canal, traveling out to parts of the body. As they leave the spinal canal, nerves pass through openings between the vertebrae. The nerve root is the part of the nerve that is closest to the spinal canal.    Sciatic nerve. This is a large nerve formed from several nerve roots in the low back. This nerve extends down the back of the leg to the foot.  With lumbar radiculopathy, nerve roots in the low back become irritated. This leads to pain and symptoms. The sciatic nerve is commonly involved, so the condition is often called  sciatica.  What causes lumbar radiculopathy?  Aging, injury, poor posture, extra body weight, and other issues can lead to problems in the low back. These problems may then irritate nerve roots. They include:    Damage to a disk in the lumbar spine. The damaged disk may then press on nearby nerve roots.    Degeneration from wear and tear, and aging. This can lead to narrowing (stenosis) of the openings between the vertebrae. The narrowed openings press on nerve roots as they leave the spinal canal.    Unstable spine. This is when a vertebra slips forward. It can then press on a nerve root.  Other, less common things can put pressure on nerves in the low back. These include diabetes, infection, or a tumor.  Symptoms of lumbar radiculopathy  These include:    Pain in the low back    Pain, numbness, tingling, or weakness that travels into the buttocks, hip, groin, or leg    Muscle spasms  Treatment for lumbar radiculopathy  In most cases, your healthcare provider will first try treatments that help relieve symptoms. These may include:    Prescription and over-the-counter pain medicines. These help relieve pain, swelling, and irritation.    Limits on positions and activities that increase pain. But lying in bed or avoiding all movement is only recommended for a short period of time.    Physical therapy, including exercises and stretches. This helps decrease pain and increase movement and function.    Steroid shots into the lower back. This may help relieve symptoms for a time.    Weight-loss program. If you are overweight, losing extra pounds may help relieve symptoms.  In some cases, you may need surgery to fix the underlying problem. This depends on the cause, the symptoms, and how long the pain has lasted.  Possible complications  Over time, an irritated and inflamed nerve may become damaged. This may lead to long-lasting (permanent) numbness or weakness in your legs and feet. If symptoms change suddenly or get  worse, be sure to let your healthcare provider know.  When to call your healthcare provider  Call your healthcare provider right away if you have any of these:    New pain or pain that gets worse    New or increasing weakness, tingling, or numbness in your leg or foot    Problems controlling your bladder or bowel   Date Last Reviewed: 3/10/2016    [id number] [employer]. [address] [place] [address]. All rights reserved. This information is not intended as a substitute for professional medical care. Always follow your healthcare professional's instructions.                Follow-ups after your visit        Additional Services     ORTHO  REFERRAL       [facility] is referring you to the Orthopedic  Services at [facility].       The  Representative will assist you in the coordination of your Orthopedic and Musculoskeletal Care as prescribed by your physician.    The  Representative will call you within 1 business day to help schedule your appointment, or you may contact the  Representative at:    All areas ~ [phone]     Type of Referral : Spine: Lumbar  **Choose Medical Spine Specialist (unless patient was seen by a Medical Spine Specialist within the past 6 months).**  Surgical Evaluation is advised if the patient presents with one or more of the following red flags: Evidence of Spinal Tumor, Infection or Fracture, Cauda Equina Syndrome, Sudden or Progressive Weakness, Loss of Bowel or Bladder Control, or any other documented emergent neurological condition resulting from a Lumbar Spinal Condition. Medical Spine Specialist        Timeframe requested: 1 - 2 days    Coverage of these services is subject to the terms and limitations of your health insurance plan.  Please call member services at your health plan with any benefit or coverage questions.      If X-rays, CT or MRI's have been performed, please  contact the facility where they were done to arrange for , prior to your scheduled appointment.  Please bring this referral request to your appointment and present it to your specialist.                  Follow-up notes from your care team     Return if symptoms worsen or fail to improve.      Your next 10 appointments already scheduled     Jun 20, 2018 10:00 AM CDT   Lymphedema Treatment with Kate Holland PTA   [facility] ([facility])    [address] [address]   [phone]            Aug 21, 2018  4:30 PM CDT   Remote ICD Check with CASEY DCR2   [facility] ([facility])    [address]  [facility] [id number] [phone] OPT 2           This appointment is for a remote check of your debrillator.  This is not an appointment at the office.              Future tests that were ordered for you today     Open Future Orders        Priority Expected Expires Ordered    ORTHO  REFERRAL ASAP  12/29/2018 6/4/2018            Who to contact     If you have questions or need follow up information about today's clinic visit or your schedule please contact [facility] directly at [phone].  Normal or non-critical lab and imaging results will be communicated to you by MyChart, letter or phone within 4 business days after the clinic has received the results. If you do not hear from us within 7 days, please contact the clinic through MyChart or phone. If you have a critical or abnormal lab result, we will notify you by phone as soon as possible.  Submit refill requests through [employer] or call your pharmacy and they will forward the refill request to us. Please allow 3 business days for your refill to be completed.          Additional Information About Your Visit        Care EveryWhere ID     This is your Care EveryWhere ID. This could be used by other organizations to access your  [place] medical records  [id number]        Your Vitals Were     Pulse Temperature Pulse Oximetry             51 97.7  F (36.5  C) (Tympanic) 98%          Blood Pressure from Last 3 Encounters:   06/04/18 147/77   05/31/18 156/86   05/21/18 123/57    Weight from Last 3 Encounters:   05/31/18 188 lb (85.3 kg)   05/21/18 180 lb (81.6 kg)   05/07/18 200 lb (90.7 kg)              We Performed the Following     CT Lumbar Spine w/o Contrast     XR Lumbar Spine 2/3 Views     XR Pelvis w Hip Left 1 View        Primary Care Provider Office Phone # Fax #    Jono Norm Drake [phone] [address] [address]        Goals        General    Improve chronic symptoms (pt-stated)     Notes - Note created  5/4/2018  3:03 PM by Edwin Marley RN    Goal Statement: I want to keep my heart failure controlled  Measure of Success: my breathing will improve and my weight will go down  Supportive Steps to Achieve: I will weigh daily and record                                                  I will take 2 lasix tabs until instructions change                                                  I will eat a low salt diet  Barriers: Did not understand that he should increase to 2 lasix tablets, has not been recording weights  Strengths: patient willing to take needed steps  Date to Achieve By: 5/14/18      Medication 1 (pt-stated)     Notes - Note created  5/4/2018  2:39 PM by Edwin Marley RN    Goal Statement: I will increase my Lasix to help decrease my weight  Measure of Success: Weight will drop to 204 lb range and my leg swelling will decreas  Supportive Steps to Achieve:increase your lasix to two tablets as instructed by Cardiology  Barriers: Cardiologist did not write new script so bottle states 1 tablet  Strengths: Patient willing to increase to help with fluid  Date to Achieve By:5/15/18        Equal Access to Services     ALEXIS PACE AH: kwan Zaragoza,  westley kendelldaniela carrilloabel fina meaganin hayaan adeeg kharash la'aan ah. So [facility] [phone].    ATENCIÓN: Si rubi banks, tiene a rojo disposición servicios gratuitos de asistencia lingüística. Laureano al [phone].    We comply with applicable federal civil rights laws and [place] laws. We do not discriminate on the basis of race, color, national origin, age, disability, sex, sexual orientation, or gender identity.            Thank you!     Thank you for choosing [facility]  for your care. Our goal is always to provide you with excellent care. Hearing back from our patients is one way we can continue to improve our services. Please take a few minutes to complete the written survey that you may receive in the mail after your visit with us. Thank you!             Your Updated Medication List - Protect others around you: Learn how to safely use, store and throw away your medicines at www.disposemymeds.org.          This list is accurate as of 6/4/18  1:04 PM.  Always use your most recent med list.                   Brand Name Dispense Instructions for use Diagnosis    allopurinol 100 MG tablet    ZYLOPRIM    30 tablet    Take 0.5 tablets (50 mg) by mouth daily    Gout of foot, unspecified cause, unspecified chronicity, unspecified laterality       aspirin 325 MG EC tablet     90 tablet    Take 1 tablet (325 mg) by mouth daily        atorvastatin 40 MG tablet    LIPITOR    90 tablet    Take 1 tablet (40 mg) by mouth daily    Hyperlipidemia LDL goal <100       CENTRUM SILVER per tablet      Take 1 tablet by mouth daily        finasteride 5 MG tablet    PROSCAR    90 tablet    Take 1 tablet (5 mg) by mouth daily    Benign non-nodular prostatic hyperplasia without lower urinary tract symptoms       guaiFENesin 600 MG 12 hr tablet    MUCINEX     Take 600 mg by mouth daily        LASIX 20 MG tablet   Generic drug:  furosemide     60 tablet    Take 1 tablet (20 mg) by mouth daily    Lymphedema        metoprolol succinate 25 MG 24 hr tablet    TOPROL-XL    180 tablet    Take 2 tablets (50 mg) by mouth daily    Paroxysmal atrial fibrillation (H), Coronary artery disease involving native coronary artery of native heart without angina pectoris       NAVA MILK OF MAGNESIA PO      Take 30 mLs by mouth daily as needed        triamcinolone 0.1 % ointment    KENALOG    30 g    Apply topically 2 times daily Apply sparingly to affected area twice times daily for the next 7-10 days.    Rash and nonspecific skin eruption

## 2018-06-05 NOTE — MR AVS SNAPSHOT
After Visit Summary   6/5/2018    Colin Shell    MRN: [id number]           Patient Information     Date Of Birth          6/25/1927        Visit Information        Provider Department      6/5/2018 11:20 AM Elvi Bermudez MD [facility]        Today's Diagnoses     Multilevel degenerative disc disease    -  1      Care Instructions    Today's Plan of Care:  -Prescription Medication as directed:   Cyclobenzaprine (Flexeril). Do not take prior to driving  Medrol dose pack  -Acetaminophen (Tylenol) 1000mg every 4-6 hours as needed (Maximum of 3000mg/day)  -Ice or heat 15-20 minutes as needed (Avoid sleeping on a heating pad or ice)  -Provided home exercises. Please do 5-6 days of exercises per week.    Follow Up: As needed if symptoms fail to improve or worsen. Please call with any questions or concerns.               Follow-ups after your visit        Your next 10 appointments already scheduled     Jun 20, 2018 10:00 AM CDT   Lymphedema Treatment with Kate Holland PTA   [facility] Lymphedema ([facility])    [address] [address]   [phone]            Aug 21, 2018  4:30 PM CDT   Remote ICD Check with CASEY DCR2   [facility] ([facility])    [address] [place] [address] [address]  [facility] [id number] [phone] OPT 2           This appointment is for a remote check of your debrillator.  This is not an appointment at the office.              Who to contact     If you have questions or need follow up information about today's clinic visit or your schedule please contact [facility] directly at [phone].  Normal or non-critical lab and imaging results will be communicated to you by MyChart, letter or phone within 4 business days after the clinic has received the results. If you do not hear from us within 7 days, please contact the clinic through MyChart or phone.  If you have a critical or abnormal lab result, we will notify you by phone as soon as possible.  Submit refill requests through [employer] or call your pharmacy and they will forward the refill request to us. Please allow 3 business days for your refill to be completed.          Additional Information About Your Visit        Care EveryWhere ID     This is your Care EveryWhere ID. This could be used by other organizations to access your [facility] medical records  [id number]         Blood Pressure from Last 3 Encounters:   06/05/18 (P) 108/70   06/04/18 147/77   05/31/18 156/86    Weight from Last 3 Encounters:   05/31/18 188 lb (85.3 kg)   05/21/18 180 lb (81.6 kg)   05/07/18 200 lb (90.7 kg)              We Performed the Following     ORTHO  REFERRAL          Today's Medication Changes          These changes are accurate as of 6/5/18 12:21 PM.  If you have any questions, ask your nurse or doctor.               Start taking these medicines.        Dose/Directions    cyclobenzaprine 5 MG tablet   Commonly known as:  FLEXERIL   Used for:  Multilevel degenerative disc disease   Started by:  Elvi Bermudez MD        Dose:  2.5-5 mg   Take 0.5-1 tablets (2.5-5 mg) by mouth 3 times daily as needed for muscle spasms Do not take prior to driving.   Quantity:  30 tablet   Refills:  1       methylPREDNISolone 4 MG tablet   Commonly known as:  MEDROL DOSEPAK   Used for:  Multilevel degenerative disc disease   Started by:  Elvi Bermudez MD        Follow package instructions   Quantity:  21 tablet   Refills:  0            Where to get your medicines      These medications were sent to [place] [address] [place] [address] [address]     Phone:  [phone]     cyclobenzaprine 5 MG tablet    methylPREDNISolone 4 MG tablet                Primary Care Provider Office Phone # Fax #    Jono Drake [phone]  [facility] [address]        Goals        General    Improve chronic symptoms (pt-stated)     Notes - Note created  5/4/2018  3:03 PM by Edwin Marley, RN    Goal Statement: I want to keep my heart failure controlled  Measure of Success: my breathing will improve and my weight will go down  Supportive Steps to Achieve: I will weigh daily and record                                                  I will take 2 lasix tabs until instructions change                                                  I will eat a low salt diet  Barriers: Did not understand that he should increase to 2 lasix tablets, has not been recording weights  Strengths: patient willing to take needed steps  Date to Achieve By: 5/14/18      Medication 1 (pt-stated)     Notes - Note created  5/4/2018  2:39 PM by Edwin Marley, RN    Goal Statement: I will increase my Lasix to help decrease my weight  Measure of Success: Weight will drop to 204 lb range and my leg swelling will decreas  Supportive Steps to Achieve:increase your lasix to two tablets as instructed by Cardiology  Barriers: Cardiologist did not write new script so bottle states 1 tablet  Strengths: Patient willing to increase to help with fluid  Date to Achieve By:5/15/18        Equal Access to Services     [facility]: Hadii lanny lewis hadasho Sobernadine, waaxda luqadaha, qaybta kaalmada adeegyayasmin, fina [place] . So [facility] [phone].    ATENCIÓN: Si habla español, tiene a rojo disposición servicios gratuitos de asistencia lingüística. Llame al [phone].    We comply with applicable federal civil rights laws and [place] laws. We do not discriminate on the basis of race, color, national origin, age, disability, sex, sexual orientation, or gender identity.            Thank you!     Thank you for choosing [facility]  for your care. Our goal is always to provide you with excellent care. Hearing back from our patients is one way we can  continue to improve our services. Please take a few minutes to complete the written survey that you may receive in the mail after your visit with us. Thank you!             Your Updated Medication List - Protect others around you: Learn how to safely use, store and throw away your medicines at www.disposemymeds.org.          This list is accurate as of 6/5/18 12:21 PM.  Always use your most recent med list.                   Brand Name Dispense Instructions for use Diagnosis    allopurinol 100 MG tablet    ZYLOPRIM    30 tablet    Take 0.5 tablets (50 mg) by mouth daily    Gout of foot, unspecified cause, unspecified chronicity, unspecified laterality       aspirin 325 MG EC tablet     90 tablet    Take 1 tablet (325 mg) by mouth daily        atorvastatin 40 MG tablet    LIPITOR    90 tablet    Take 1 tablet (40 mg) by mouth daily    Hyperlipidemia LDL goal <100       CENTRUM SILVER per tablet      Take 1 tablet by mouth daily        cyclobenzaprine 5 MG tablet    FLEXERIL    30 tablet    Take 0.5-1 tablets (2.5-5 mg) by mouth 3 times daily as needed for muscle spasms Do not take prior to driving.    Multilevel degenerative disc disease       finasteride 5 MG tablet    PROSCAR    90 tablet    Take 1 tablet (5 mg) by mouth daily    Benign non-nodular prostatic hyperplasia without lower urinary tract symptoms       guaiFENesin 600 MG 12 hr tablet    MUCINEX     Take 600 mg by mouth daily        LASIX 20 MG tablet   Generic drug:  furosemide     60 tablet    Take 1 tablet (20 mg) by mouth daily    Lymphedema       methylPREDNISolone 4 MG tablet    MEDROL DOSEPAK    21 tablet    Follow package instructions    Multilevel degenerative disc disease       metoprolol succinate 25 MG 24 hr tablet    TOPROL-XL    180 tablet    Take 2 tablets (50 mg) by mouth daily    Paroxysmal atrial fibrillation (H), Coronary artery disease involving native coronary artery of native heart without angina pectoris       ELIJAH DIAZ  MAGNESIA PO      Take 30 mLs by mouth daily as needed        triamcinolone 0.1 % ointment    KENALOG    30 g    Apply topically 2 times daily Apply sparingly to affected area twice times daily for the next 7-10 days.    Rash and nonspecific skin eruption

## 2018-06-05 NOTE — LETTER
6/5/2018         RE: Colin Shell  [address]  [place] [address]        Dear Colleague,    Thank you for referring your patient, Colin Shell, to the [facility]. Please see a copy of my visit note below.    Sports Medicine Clinic Visit    PCP: Jono Drake    CC: Patient presents with:  Back Pain      HPI:  Colin Shell is a 89 year old male who is seen in consultation at the request of Eric Brasher MD.  He notes left sided low back pain with bilateral leg pain.  He notes radiating pain to the left foot that began 3 days ago with insidious onset.   He rates the pain at a 9/10 at its worst and a 4/10 currently.  Symptoms are relieved with sitting. Symptoms are worsened by standing and walking. He endorses tingling in the left foot and weakness.  He denies swelling, bruising, popping, grinding, catching, locking, instability, numbness, pain in other joints and fever, chills.  Other treatment has included nothing.       Review of Systems:  Musculoskeletal: as above  Remainder of review of systems is negative including constitutional, eyes, ENT, CV, pulmonary, GI, , endocrine, skin, hematologic, and neurologic except as noted in HPI or medical history.    History reviewed. No pertinent past surgical/medical/family/social history other than as mentioned in HPI.    Patient Active Problem List   Diagnosis     Hypertension goal BP (blood pressure) < 140/90     Gout     Coronary artery disease involving coronary bypass graft of native heart without angina pectoris     Chronic kidney disease, stage III (moderate)     Obesity     Benign prostatic hyperplasia     Hyperlipidemia LDL goal <100     Bronchiectasis (H)     Cholelithiasis     Fibrosis of lung (H)     Pulmonary nodule, right     Advance Care Planning     Chronic systolic CHF (congestive heart failure) (H)     Paroxysmal atrial fibrillation (H)     Dyspnea     Bronchiectasis without complication (H)      Lymphedema     Ischemic cardiomyopathy     Past Surgical History:   Procedure Laterality Date     BIOPSY ARTERY TEMPORAL Left 9/18/2017    Procedure: BIOPSY ARTERY TEMPORAL;  Left Temporal Biopsy ;  Surgeon: Ivana Phan MD;  Location: [facility] OR     SURGICAL HISTORY OF -       heel spurs     SURGICAL HISTORY OF -   2005, 2006    bilateral eye cataract     SURGICAL HISTORY OF -   1996    CABG x 5 vessels     SURGICAL HISTORY OF -   2010    circumcision     TONSILLECTOMY & ADENOIDECTOMY       Family History   Problem Relation Age of Onset     DIABETES Father      DIABETES Brother      DIABETES Brother      Hypertension Son      Unknown/Adopted Maternal Grandmother      Unknown/Adopted Maternal Grandfather      Unknown/Adopted Paternal Grandmother      Unknown/Adopted Paternal Grandfather      Dementia Sister      Social History     Social History     Marital status:      Spouse name: N/A     Number of children: N/A     Years of education: N/A     Occupational History     Not on file.     Social History Main Topics     Smoking status: Former Smoker     Smokeless tobacco: Never Used      Comment: Quit at age 32     Alcohol use Yes      Comment: 12 pack a year     Drug use: No     Sexual activity: Yes     Other Topics Concern     Parent/Sibling W/ Cabg, Mi Or Angioplasty Before 65f 55m? No     Social History Narrative       He lives alone, drives short distances, and does not typically use any assistive devices for ambulation    Current Outpatient Prescriptions   Medication     cyclobenzaprine (FLEXERIL) 5 MG tablet     methylPREDNISolone (MEDROL DOSEPAK) 4 MG tablet     allopurinol (ZYLOPRIM) 100 MG tablet     aspirin  MG EC tablet     atorvastatin (LIPITOR) 40 MG tablet     finasteride (PROSCAR) 5 MG tablet     furosemide (LASIX) 20 MG tablet     guaiFENesin (MUCINEX) 600 MG 12 hr tablet     Magnesium Hydroxide (NAVA MILK OF MAGNESIA PO)     metoprolol succinate (TOPROL-XL) 25 MG 24 hr tablet  "    Multiple Vitamins-Minerals (CENTRUM SILVER) per tablet     triamcinolone (KENALOG) 0.1 % ointment     No current facility-administered medications for this visit.      Allergies   Allergen Reactions     Flomax [Tamsulosin Hydrochloride]      Hctz Other (See Comments)     Caused loss of balance     Lisinopril Cough     Simvastatin Nausea and Vomiting and Diarrhea     Vytorin Nausea and Diarrhea         Objective:  BP (P) 108/70  Ht 5' 10\" (1.778 m)  Wt 188 lb (85.3 kg)  BMI 26.98 kg/m2    General: Alert and in no distress    Head: Normocephalic, atraumatic  Eyes: no scleral icterus or conjunctival erythema   Oropharynx:  Mucous membranes moist  Skin: no erythema, petechiae, or jaundice  CV: regular rhythm by palpation, 2+ distal pulses  Resp: normal respiratory effort without conversational dyspnea   Psych: normal mood and affect    Gait: Sitting in a wheelchair.  Ambulates with a cane in a forward flexed position  Neuro: Motor strength and sensation as noted below    Musculoskeletal:    Low back exam:    Inspection:     no visible deformity in the low back       normal skin       normal vascular       normal lymphatic    Posture: Forward flexed position    Palpation:  Tender over the left lumbar paraspinal muscles    ROM: Limited and painful lumbar ROM.    Strength:  5/5 hip flexion/abduction/adduction, knee flexion/extension, ankle dorsiflexion/plantarflexion, great toe extension, toe flexion    Sensation: Altered sensation to light touch over the left foot      Radiology:  Independent visualization of images performed.    Recent Results (from the past 744 hour(s))   XR Lumbar Spine 2/3 Views    Narrative    LUMBAR SPINE TWO TO THREE VIEWS  6/4/2018 10:21 AM     HISTORY:  Rule out vertebral compression. Weakness of left leg.    COMPARISON: None.      Impression    IMPRESSION: Five functional lumbar vertebral segments. Minimal grade 1  degenerative anterolisthesis L4 on L5 and alignment is otherwise  normal. " There is at least mild degenerative disc space narrowing at  L2-L3 and L4-L5. No acute bony abnormality. Vascular calcifications  are seen.    SARA SHEPHERD MD   XR Pelvis w Hip Left 1 View    Narrative    XR PELVIS AND HIP LEFT 1 VIEW 6/4/2018 11:09 AM    COMPARISON: 12/5/2012    HISTORY: Pain.      Impression    IMPRESSION: Moderate bilateral hip osteoarthritis. No fractures are  seen on either side.    JOHNNY AMES MD   CT Lumbar Spine w/o Contrast    Narrative    CT LUMBAR SPINE WITHOUT CONTRAST  6/4/2018 11:33 AM    HISTORY: Left-sided leg pain with tingling and weakness for 3 days. No  specific injury.     TECHNIQUE: Helical scans obtained from inferior T12 through the upper  sacrum using soft tissue and bone windows. Sagittal reconstructions  were also obtained. Radiation dose for this scan was reduced using  automated exposure control, adjustment of the mA and/or kV according  to patient size, or iterative reconstruction technique.    COMPARISON: None.    FINDINGS: Five functional lumbar vertebral segments are present.  Alignment is normal. No acute-appearing bony abnormality.        T12-L1: No disc bulge/protrusion or central or foraminal stenosis;  mild posterior facet degenerative changes.    L1-L2: Minimal degenerative intradiscal gas and no disc space  narrowing. No disc bulge/protrusion or central or foraminal stenosis.  Posterior facets are normal.    L2-L3: Degenerative disc space narrowing and intradiscal gas with  diffuse disc bulging. No definite acute disc protrusion. No  significant central stenosis. There is moderate bilateral foraminal  stenosis related to the bulging disc. Posterior facets are  unremarkable.    L3-L4: Diffuse disc bulging without disc space narrowing. Calcified  annular margin. No acute disc protrusion. However, there is  mild-to-moderate central stenosis related to the degenerative disc  disease superimposed on a congenitally small bony canal, thickening of  ligamentum  flavum and dorsal epidural fat. Mild-to-moderate right and  mild left foraminal stenosis. Minimal posterior facet degenerative  changes.    L4-L5: Moderate degenerative disc space narrowing and intradiscal gas.  Diffuse disc bulging superimposed on a congenitally small bony canal  and thickening of ligamentum flavum causing up to severe central  stenosis. Moderate bilateral foraminal stenosis, right greater than  left. Mild posterior facet degenerative changes bilaterally.    L5-S1: Mild left-sided degenerative disc space narrowing. Diffuse disc  bulging without acute disc protrusion/extrusion or significant central  or foraminal stenosis. Mild posterior facet degenerative changes,  right greater than left.    Additional findings: Vascular calcifications. Paraspinal soft tissues  are otherwise unremarkable.      Impression    IMPRESSION:   1. Multilevel degenerative disc disease from the L2 through the S1  level combines with other factors to cause mild-to-moderate central  stenosis at L3-4 and up to severe central stenosis at L4-5.  2. Multilevel foraminal stenosis bilaterally as described above.    SARA SHEPHERD MD         Assessment:  1. Multilevel degenerative disc disease    2. Acute left-sided low back pain with left-sided sciatica        Plan:  Discussed the assessment with the patient and developed a plan together:  -Colin has severe multilevel degenerative disc disease which has likely been present for quite some time.  At baseline, he lives alone, is quite active, does not use any assistive devices, and does not typically have back pain.  His son thinks he sustained an injury while on the riding lawnmower.  His pain may be partially due to muscular injury and partially due to his multilevel degenerative disc disease.    -Prescribed a Medrol Dose Pack.  -Prescribed Flexeril 2.5-5 mg TID PRN muscle spasms/pain.  Warned of sedating effect.  He should not take prior to driving.  Advised that initially he  should take a half tab (2.5 mg) to see how it affects him.  -Ice or heat 15-20 minutes as needed (Avoid sleeping on a heating pad or ice)  -Provided home range of motion and stretching exercises. Please do 5-6 days of exercises per week.  -Colin's son asked about chiropractic treatments.  I would advise against any adjustments or manipulations given his multilevel degenerative changes with significant foraminal and central stenosis (and a congenitally narrow spinal canal).    -Follow up as needed if symptoms fail to improve or worsen. Please call with any questions or concerns.     Gifty Bermudez MD, CAQ  [facility]      Again, thank you for allowing me to participate in the care of your patient.        Sincerely,        Elvi Bermudez MD

## 2018-06-05 NOTE — PROGRESS NOTES
Sports Medicine Clinic Visit    PCP: Jono Drake    CC: Patient presents with:  Back Pain      HPI:  Colin Shell is a 89 year old male who is seen in consultation at the request of Eric Brasher MD.  He notes left sided low back pain with bilateral leg pain.  He notes radiating pain to the left foot that began 3 days ago with insidious onset.   He rates the pain at a 9/10 at its worst and a 4/10 currently.  Symptoms are relieved with sitting. Symptoms are worsened by standing and walking. He endorses tingling in the left foot and weakness.  He denies swelling, bruising, popping, grinding, catching, locking, instability, numbness, pain in other joints and fever, chills.  Other treatment has included nothing.       Review of Systems:  Musculoskeletal: as above  Remainder of review of systems is negative including constitutional, eyes, ENT, CV, pulmonary, GI, , endocrine, skin, hematologic, and neurologic except as noted in HPI or medical history.    History reviewed. No pertinent past surgical/medical/family/social history other than as mentioned in HPI.    Patient Active Problem List   Diagnosis     Hypertension goal BP (blood pressure) < 140/90     Gout     Coronary artery disease involving coronary bypass graft of native heart without angina pectoris     Chronic kidney disease, stage III (moderate)     Obesity     Benign prostatic hyperplasia     Hyperlipidemia LDL goal <100     Bronchiectasis (H)     Cholelithiasis     Fibrosis of lung (H)     Pulmonary nodule, right     Advance Care Planning     Chronic systolic CHF (congestive heart failure) (H)     Paroxysmal atrial fibrillation (H)     Dyspnea     Bronchiectasis without complication (H)     Lymphedema     Ischemic cardiomyopathy     Past Surgical History:   Procedure Laterality Date     BIOPSY ARTERY TEMPORAL Left 9/18/2017    Procedure: BIOPSY ARTERY TEMPORAL;  Left Temporal Biopsy ;  Surgeon: Ivana Phan MD;  Location:  OR      SURGICAL HISTORY OF -       heel spurs     SURGICAL HISTORY OF -   2005, 2006    bilateral eye cataract     SURGICAL HISTORY OF -   1996    CABG x 5 vessels     SURGICAL HISTORY OF -   2010    circumcision     TONSILLECTOMY & ADENOIDECTOMY       Family History   Problem Relation Age of Onset     DIABETES Father      DIABETES Brother      DIABETES Brother      Hypertension Son      Unknown/Adopted Maternal Grandmother      Unknown/Adopted Maternal Grandfather      Unknown/Adopted Paternal Grandmother      Unknown/Adopted Paternal Grandfather      Dementia Sister      Social History     Social History     Marital status:      Spouse name: N/A     Number of children: N/A     Years of education: N/A     Occupational History     Not on file.     Social History Main Topics     Smoking status: Former Smoker     Smokeless tobacco: Never Used      Comment: Quit at age 32     Alcohol use Yes      Comment: 12 pack a year     Drug use: No     Sexual activity: Yes     Other Topics Concern     Parent/Sibling W/ Cabg, Mi Or Angioplasty Before 65f 55m? No     Social History Narrative       He lives alone, drives short distances, and does not typically use any assistive devices for ambulation    Current Outpatient Prescriptions   Medication     cyclobenzaprine (FLEXERIL) 5 MG tablet     methylPREDNISolone (MEDROL DOSEPAK) 4 MG tablet     allopurinol (ZYLOPRIM) 100 MG tablet     aspirin  MG EC tablet     atorvastatin (LIPITOR) 40 MG tablet     finasteride (PROSCAR) 5 MG tablet     furosemide (LASIX) 20 MG tablet     guaiFENesin (MUCINEX) 600 MG 12 hr tablet     Magnesium Hydroxide (NAVA MILK OF MAGNESIA PO)     metoprolol succinate (TOPROL-XL) 25 MG 24 hr tablet     Multiple Vitamins-Minerals (CENTRUM SILVER) per tablet     triamcinolone (KENALOG) 0.1 % ointment     No current facility-administered medications for this visit.      Allergies   Allergen Reactions     Flomax [Tamsulosin Hydrochloride]      Hctz Other  "(See Comments)     Caused loss of balance     Lisinopril Cough     Simvastatin Nausea and Vomiting and Diarrhea     Vytorin Nausea and Diarrhea         Objective:  BP (P) 108/70  Ht 5' 10\" (1.778 m)  Wt 188 lb (85.3 kg)  BMI 26.98 kg/m2    General: Alert and in no distress    Head: Normocephalic, atraumatic  Eyes: no scleral icterus or conjunctival erythema   Oropharynx:  Mucous membranes moist  Skin: no erythema, petechiae, or jaundice  CV: regular rhythm by palpation, 2+ distal pulses  Resp: normal respiratory effort without conversational dyspnea   Psych: normal mood and affect    Gait: Sitting in a wheelchair.  Ambulates with a cane in a forward flexed position  Neuro: Motor strength and sensation as noted below    Musculoskeletal:    Low back exam:    Inspection:     no visible deformity in the low back       normal skin       normal vascular       normal lymphatic    Posture: Forward flexed position    Palpation:  Tender over the left lumbar paraspinal muscles    ROM: Limited and painful lumbar ROM.    Strength:  5/5 hip flexion/abduction/adduction, knee flexion/extension, ankle dorsiflexion/plantarflexion, great toe extension, toe flexion    Sensation: Altered sensation to light touch over the left foot      Radiology:  Independent visualization of images performed.    Recent Results (from the past 744 hour(s))   XR Lumbar Spine 2/3 Views    Narrative    LUMBAR SPINE TWO TO THREE VIEWS  6/4/2018 10:21 AM     HISTORY:  Rule out vertebral compression. Weakness of left leg.    COMPARISON: None.      Impression    IMPRESSION: Five functional lumbar vertebral segments. Minimal grade 1  degenerative anterolisthesis L4 on L5 and alignment is otherwise  normal. There is at least mild degenerative disc space narrowing at  L2-L3 and L4-L5. No acute bony abnormality. Vascular calcifications  are seen.    SARA SHEPHERD MD   XR Pelvis w Hip Left 1 View    Narrative    XR PELVIS AND HIP LEFT 1 VIEW 6/4/2018 11:09 " AM    COMPARISON: 12/5/2012    HISTORY: Pain.      Impression    IMPRESSION: Moderate bilateral hip osteoarthritis. No fractures are  seen on either side.    JOHNNY AMES MD   CT Lumbar Spine w/o Contrast    Narrative    CT LUMBAR SPINE WITHOUT CONTRAST  6/4/2018 11:33 AM    HISTORY: Left-sided leg pain with tingling and weakness for 3 days. No  specific injury.     TECHNIQUE: Helical scans obtained from inferior T12 through the upper  sacrum using soft tissue and bone windows. Sagittal reconstructions  were also obtained. Radiation dose for this scan was reduced using  automated exposure control, adjustment of the mA and/or kV according  to patient size, or iterative reconstruction technique.    COMPARISON: None.    FINDINGS: Five functional lumbar vertebral segments are present.  Alignment is normal. No acute-appearing bony abnormality.        T12-L1: No disc bulge/protrusion or central or foraminal stenosis;  mild posterior facet degenerative changes.    L1-L2: Minimal degenerative intradiscal gas and no disc space  narrowing. No disc bulge/protrusion or central or foraminal stenosis.  Posterior facets are normal.    L2-L3: Degenerative disc space narrowing and intradiscal gas with  diffuse disc bulging. No definite acute disc protrusion. No  significant central stenosis. There is moderate bilateral foraminal  stenosis related to the bulging disc. Posterior facets are  unremarkable.    L3-L4: Diffuse disc bulging without disc space narrowing. Calcified  annular margin. No acute disc protrusion. However, there is  mild-to-moderate central stenosis related to the degenerative disc  disease superimposed on a congenitally small bony canal, thickening of  ligamentum flavum and dorsal epidural fat. Mild-to-moderate right and  mild left foraminal stenosis. Minimal posterior facet degenerative  changes.    L4-L5: Moderate degenerative disc space narrowing and intradiscal gas.  Diffuse disc bulging superimposed on a  congenitally small bony canal  and thickening of ligamentum flavum causing up to severe central  stenosis. Moderate bilateral foraminal stenosis, right greater than  left. Mild posterior facet degenerative changes bilaterally.    L5-S1: Mild left-sided degenerative disc space narrowing. Diffuse disc  bulging without acute disc protrusion/extrusion or significant central  or foraminal stenosis. Mild posterior facet degenerative changes,  right greater than left.    Additional findings: Vascular calcifications. Paraspinal soft tissues  are otherwise unremarkable.      Impression    IMPRESSION:   1. Multilevel degenerative disc disease from the L2 through the S1  level combines with other factors to cause mild-to-moderate central  stenosis at L3-4 and up to severe central stenosis at L4-5.  2. Multilevel foraminal stenosis bilaterally as described above.    SARA SHEPHERD MD         Assessment:  1. Multilevel degenerative disc disease    2. Acute left-sided low back pain with left-sided sciatica        Plan:  Discussed the assessment with the patient and developed a plan together:  -Colin has severe multilevel degenerative disc disease which has likely been present for quite some time.  At baseline, he lives alone, is quite active, does not use any assistive devices, and does not typically have back pain.  His son thinks he sustained an injury while on the riding lawnmower.  His pain may be partially due to muscular injury and partially due to his multilevel degenerative disc disease.    -Prescribed a Medrol Dose Pack.  -Prescribed Flexeril 2.5-5 mg TID PRN muscle spasms/pain.  Warned of sedating effect.  He should not take prior to driving.  Advised that initially he should take a half tab (2.5 mg) to see how it affects him.  -Ice or heat 15-20 minutes as needed (Avoid sleeping on a heating pad or ice)  -Provided home range of motion and stretching exercises. Please do 5-6 days of exercises per week.  -Colin's  son asked about chiropractic treatments.  I would advise against any adjustments or manipulations given his multilevel degenerative changes with significant foraminal and central stenosis (and a congenitally narrow spinal canal).    -Follow up as needed if symptoms fail to improve or worsen. Please call with any questions or concerns.     Gifty Bermudez MD, CAQ  [facility]

## 2018-06-05 NOTE — PATIENT INSTRUCTIONS
Today's Plan of Care:  -Prescription Medication as directed:   Cyclobenzaprine (Flexeril). Do not take prior to driving  Medrol dose pack  -Acetaminophen (Tylenol) 1000mg every 4-6 hours as needed (Maximum of 3000mg/day)  -Ice or heat 15-20 minutes as needed (Avoid sleeping on a heating pad or ice)  -Provided home exercises. Please do 5-6 days of exercises per week.    Follow Up: As needed if symptoms fail to improve or worsen. Please call with any questions or concerns.

## 2018-06-13 NOTE — PROGRESS NOTES
Outpatient Physical Therapy Progress Note     Patient: Colin Shell  : 1927    Beginning/End Dates of Reporting Period:  2018 to 2018    Referring Provider: Dr. Noelle MD    Therapy Diagnosis: BLE secondary lymphedema      Client Self Report: I think legs are doing good, I have sores on my leg they are not open     Objective Measurements:  Objective Measure: girth  Details: RLE -1.4% and LLE -0.3%     Outcome Measures (most recent score):   LLIS = 30 on date of initial evaluation; pt will again complete LLIS at time of discharge    Goals:  Goal Identifier stg   Goal Description pt to have around the clock tolerance to BLE GCB for edema reduction response   Target Date 18   Date Met  18   Progress:     Goal Identifier ltg   Goal Description once appropriate, pt to be independent with donning, doffing and care of compressoin garments for longterm edema management for maintenance   Target Date 18   Date Met  18   Progress:     Goal Identifier ltg   Goal Description pt to be independent in managing BLE edema longterm via diet, exercise, elevation and compression garment wear/use   Target Date 18   Date Met      Progress:     Goal Identifier ltg   Goal Description pt to have at least 5 point improvement on LLIS due to decreased edema in BLEs   Target Date 18   Date Met      Progress:       Progress Toward Goals:   Patient has made good progress toward goals.  Patient is wearing BLE compression stockings for maintenance and anticipate a few additional sessions to ensure that current compression stockings and wear schedule are adequate in achieving longterm edema management for maintenance.  Pt last seen on 18 and planning to return to see primary therapist on 18 for anticipated discharge      Plan:  Continue therapy per current plan of care.    Discharge:  No

## 2018-06-13 NOTE — ADDENDUM NOTE
Encounter addended by: Maribell Garcia, PT on: 6/13/2018  8:57 AM<BR>     Actions taken: Sign clinical note, Flowsheet accepted, Charge Capture section accepted

## 2018-06-25 NOTE — PATIENT INSTRUCTIONS
(M10.9) Gout of foot, unspecified cause, unspecified chronicity, unspecified laterality  Comment:   Plan: allopurinol (ZYLOPRIM) 100 MG tablet        We discussed the meds and the rehab. The forms will be filled out for the planned 3 week stay at [facility].     (I89.0) Lymphedema  Comment:   Plan: furosemide (LASIX) 20 MG tablet        Instructions given on diagnoses and the meds were reviewed. He has the compression stockings.

## 2018-06-25 NOTE — PROGRESS NOTES
SUBJECTIVE:   Colin Shell is a 89 year old male who presents to clinic today for the following health issues:    Chief Complaint   Patient presents with     Annual [facility]. This is for a 3 week order.      He is doing PT for balance and walking and strength in the legs.     Current Outpatient Prescriptions:      allopurinol (ZYLOPRIM) 100 MG tablet, Take 0.5 tablets (50 mg) by mouth daily, Disp: 30 tablet, Rfl:      aspirin  MG EC tablet, Take 1 tablet (325 mg) by mouth daily, Disp: 90 tablet, Rfl: 3     finasteride (PROSCAR) 5 MG tablet, Take 1 tablet (5 mg) by mouth daily, Disp: 90 tablet, Rfl: 3     furosemide (LASIX) 20 MG tablet, Take 1 tablet (20 mg) by mouth daily, Disp: 60 tablet, Rfl: 1     guaiFENesin (MUCINEX) 600 MG 12 hr tablet, Take 600 mg by mouth daily , Disp: , Rfl:      Magnesium Hydroxide ([employer] MILK OF MAGNESIA PO), Take 30 mLs by mouth daily as needed , Disp: , Rfl:      metoprolol succinate (TOPROL-XL) 25 MG 24 hr tablet, Take 2 tablets (50 mg) by mouth daily, Disp: 180 tablet, Rfl: 3     Multiple Vitamins-Minerals (CENTRUM SILVER) per tablet, Take 1 tablet by mouth daily, Disp: , Rfl:      triamcinolone (KENALOG) 0.1 % ointment, Apply topically 2 times daily Apply sparingly to affected area twice times daily for the next 7-10 days. (Patient not taking: Reported on 6/4/2018), Disp: 30 g, Rfl: 1    Patient Active Problem List   Diagnosis     Hypertension goal BP (blood pressure) < 140/90     Gout     Coronary artery disease involving coronary bypass graft of native heart without angina pectoris     Chronic kidney disease, stage III (moderate)     Obesity     Benign prostatic hyperplasia     Hyperlipidemia LDL goal <100     Bronchiectasis (H)     Cholelithiasis     Fibrosis of lung (H)     Pulmonary nodule, right     Advance Care Planning     Chronic systolic CHF (congestive heart failure) (H)     Paroxysmal atrial fibrillation (H)      Dyspnea     Bronchiectasis without complication (H)     Lymphedema     Ischemic cardiomyopathy       Blood pressure 114/57, pulse 76, temperature 96.5  F (35.8  C), temperature source Tympanic, SpO2 96 %.    Exam:  GENERAL APPEARANCE: healthy, alert and no distress  EYES: EOMI,  PERRL  HENT: ear canals and TM's normal and nose and mouth without ulcers or lesions  NECK: no adenopathy, no asymmetry, masses, or scars and thyroid normal to palpation  RESP: lungs clear to auscultation - no rales, rhonchi or wheezes  CV: regular rates and rhythm, normal S1 S2, no S3 or S4 and no murmur, click or rub -  ABDOMEN:  soft, nontender, no HSM or masses and bowel sounds normal  MS: extremities normal- no gross deformities noted, no evidence of inflammation in joints, FROM in all extremities.  SKIN: no suspicious lesions or rashes  SKIN: well healed incsion o the chest.   PSYCH: mentation appears normal and affect normal/bright  LYMPHATICS: No axillary, cervical, inguinal, or supraclavicular nodes      (M10.9) Gout of foot, unspecified cause, unspecified chronicity, unspecified laterality  Comment:   Plan: allopurinol (ZYLOPRIM) 100 MG tablet        We discussed the meds and the rehab. The forms will be filled out for the planned 3 week stay at [facility].     (I89.0) Lymphedema  Comment:   Plan: furosemide (LASIX) 20 MG tablet        Instructions given on diagnoses and the meds were reviewed. He has the compression stockings.     Jono Drake

## 2018-06-25 NOTE — MR AVS SNAPSHOT
After Visit Summary   6/25/2018    Colin Shell    MRN: [id number]           Patient Information     Date Of Birth          6/25/1927        Visit Information        Provider Department      6/25/2018 1:00 PM Jono Drake MD [facility]        Today's Diagnoses     Gout of foot, unspecified cause, unspecified chronicity, unspecified laterality        Lymphedema          Care Instructions    (M10.9) Gout of foot, unspecified cause, unspecified chronicity, unspecified laterality  Comment:   Plan: allopurinol (ZYLOPRIM) 100 MG tablet        We discussed the meds and the rehab. The forms will be filled out for the planned 3 week stay at [facility].     (I89.0) Lymphedema  Comment:   Plan: furosemide (LASIX) 20 MG tablet        Instructions given on diagnoses and the meds were reviewed. He has the compression stockings.             Follow-ups after your visit        Your next 10 appointments already scheduled     Aug 21, 2018  4:30 PM CDT   Remote ICD Check with CASEY DCR2   [facility] ([facility])    [address] [place], [place] [id number]  [facility] [address] [phone] OPT 2           This appointment is for a remote check of your debrillator.  This is not an appointment at the office.              Who to contact     If you have questions or need follow up information about today's clinic visit or your schedule please contact [facility] directly at [phone].  Normal or non-critical lab and imaging results will be communicated to you by MyChart, letter or phone within 4 business days after the clinic has received the results. If you do not hear from us within 7 days, please contact the clinic through MyChart or phone. If you have a critical or abnormal lab result, we will notify you by phone as soon as possible.  Submit refill requests through [employer] or call your pharmacy and they will forward the refill request to us.  Please allow 3 business days for your refill to be completed.          Additional Information About Your Visit        Care EveryWhere ID     This is your Care EveryWhere ID. This could be used by other organizations to access your [facility] medical records  [id number]        Your Vitals Were     Pulse Temperature Pulse Oximetry             76 96.5  F (35.8  C) (Tympanic) 96%          Blood Pressure from Last 3 Encounters:   06/25/18 114/57   06/05/18 (P) 108/70   06/04/18 147/77    Weight from Last 3 Encounters:   06/05/18 188 lb (85.3 kg)   05/31/18 188 lb (85.3 kg)   05/21/18 180 lb (81.6 kg)              Today, you had the following     No orders found for display         Today's Medication Changes          These changes are accurate as of 6/25/18  1:49 PM.  If you have any questions, ask your nurse or doctor.               Stop taking these medicines if you haven't already. Please contact your care team if you have questions.     atorvastatin 40 MG tablet   Commonly known as:  LIPITOR   Stopped by:  Jono Drake MD           cyclobenzaprine 5 MG tablet   Commonly known as:  FLEXERIL   Stopped by:  Jono Drake MD           methylPREDNISolone 4 MG tablet   Commonly known as:  MEDROL DOSEPAK   Stopped by:  Jono Drake MD                Where to get your medicines      These medications were sent to [facility] [address] [facility] [address] [address]     Phone:  [phone]     allopurinol 100 MG tablet    furosemide 20 MG tablet                Primary Care Provider Office Phone # Fax #    Jono Drake [phone] [address] [facility] [address]        Goals        General    Improve chronic symptoms (pt-stated)     Notes - Note created  5/4/2018  3:03 PM by Edwin Marley, RN    Goal Statement: I want to keep my heart failure controlled  Measure of Success: my breathing will improve and my weight  will go down  Supportive Steps to Achieve: I will weigh daily and record                                                  I will take 2 lasix tabs until instructions change                                                  I will eat a low salt diet  Barriers: Did not understand that he should increase to 2 lasix tablets, has not been recording weights  Strengths: patient willing to take needed steps  Date to Achieve By: 5/14/18      Medication 1 (pt-stated)     Notes - Note created  5/4/2018  2:39 PM by Edwin Marley RN    Goal Statement: I will increase my Lasix to help decrease my weight  Measure of Success: Weight will drop to 204 lb range and my leg swelling will decreas  Supportive Steps to Achieve:increase your lasix to two tablets as instructed by Cardiology  Barriers: Cardiologist did not write new script so bottle states 1 tablet  Strengths: Patient willing to increase to help with fluid  Date to Achieve By:5/15/18        Equal Access to Services     ALEXIS PACE : Hadii lanny bailey Sobernadine, waaxda luqadaha, qaybta kaalmada adeegyada, fina lake . So [facility] [phone].    ATENCIÓN: Si habla español, tiene a rojo disposición servicios gratuitos de asistencia lingüística. Llame al [phone].    We comply with applicable federal civil rights laws and [place] laws. We do not discriminate on the basis of race, color, national origin, age, disability, sex, sexual orientation, or gender identity.            Thank you!     Thank you for choosing [facility]  for your care. Our goal is always to provide you with excellent care. Hearing back from our patients is one way we can continue to improve our services. Please take a few minutes to complete the written survey that you may receive in the mail after your visit with us. Thank you!             Your Updated Medication List - Protect others around you: Learn how to safely use, store and throw away your medicines  at www.disposemymeds.org.          This list is accurate as of 6/25/18  1:49 PM.  Always use your most recent med list.                   Brand Name Dispense Instructions for use Diagnosis    allopurinol 100 MG tablet    ZYLOPRIM    30 tablet    Take 0.5 tablets (50 mg) by mouth daily    Gout of foot, unspecified cause, unspecified chronicity, unspecified laterality       aspirin 325 MG EC tablet     90 tablet    Take 1 tablet (325 mg) by mouth daily        CENTRUM SILVER per tablet      Take 1 tablet by mouth daily        finasteride 5 MG tablet    PROSCAR    90 tablet    Take 1 tablet (5 mg) by mouth daily    Benign non-nodular prostatic hyperplasia without lower urinary tract symptoms       furosemide 20 MG tablet    LASIX    60 tablet    Take 1 tablet (20 mg) by mouth daily    Lymphedema       guaiFENesin 600 MG 12 hr tablet    MUCINEX     Take 600 mg by mouth daily        metoprolol succinate 25 MG 24 hr tablet    TOPROL-XL    180 tablet    Take 2 tablets (50 mg) by mouth daily    Paroxysmal atrial fibrillation (H), Coronary artery disease involving native coronary artery of native heart without angina pectoris       NAVA MILK OF MAGNESIA PO      Take 30 mLs by mouth daily as needed        triamcinolone 0.1 % ointment    KENALOG    30 g    Apply topically 2 times daily Apply sparingly to affected area twice times daily for the next 7-10 days.    Rash and nonspecific skin eruption

## 2018-06-26 NOTE — LETTER
6/26/2018        RE: Colin Shell  [address]  [facility] [address]        [facility]  PRIMARY CARE PROVIDER AND CLINIC:  Jono Drake [address] [facility] [place] [address]  Chief Complaint   Patient presents with     [facility]     [facility] Medical Record Number:  [id number]    HPI:    Colin Shell is a 89 year old  (6/25/1927),admitted to the [facility]  from Home.    Admitted to this facility for  rehab, medical management and nursing care.  HPI information obtained from: facility chart records, facility staff, patient report and [facility] chart review.  Current issues are:         Generalized muscle weakness  Paroxysmal atrial fibrillation (H)  Chronic systolic CHF (congestive heart failure) (H)  Chronic kidney disease, stage III (moderate)  Hypertension goal BP (blood pressure) < 140/90  Coronary artery disease involving coronary bypass graft of native heart without angina pectoris  Cardiac pacemaker in situ  DDD (degenerative disc disease), lumbar  Hiatal hernia     Colin Shell is a 89 yo male with PMH significant for CAD s/p CABG in 1996, pacemaker placed, HTN, CKD, hyperlipidemia, R pulmonary nodule, CHF, gout, BPH, Afib, dyspnea, bronchiectasis, pulmonary fibrosis with chronic cough, lymphedema who has chosen an elective stay in TCU for strengthening and therapy due to new onset of low back pain and L leg sciatic type pain which is limiting his ability to maintain his independence.     In early 2018, he stopped taking xarelto due to cost and converted to aspirin 325 mg daily. Risks/Benefits of this were discussed by cardiology, and he has chosen to continue with aspirin.     ED 3/25/18 with chronic cough productive of yellow sputum, worse when lying down. Found pneumonia and treatment with levaquin and albuterol inhaler   ED 4/1/18 with ongoing cough, provided with recommendation for OTC cough medicine.  ED to HOSPITAL  admission 4/18-20/18 with dyspnea and cough, thought likely exacerbation of bronchiectasis with infection, recommended pulmonology consult  ED 5/6/18 with increased LE edema, pain, and inability to walk.   PULMONOLOGY - 5/31/18-pulmonology consult, Dr. Gudino-re cough thought related to pulmonary fibrosis, bronchiectasis, large hiatal hernia. Recommendations included trial of PPI to treat GERD, consider GI consult, trial of Prednisone 40 mg x 7 days for inflammatory interstitial disease, trial course of azithromycin, speech and swallow evaluation for possible dysphagia/aspriation.  At this point, cough was improved and patient not interested in pursuing other work up.   PCP 6/4/18-developed 3 d history of L hip pain with radiation to leg and tingling of foot-found DDD L2-L3, L4-L5, mod bilateral hip osteoarthritis, moderate central stenosis  ORTHO CONSULT 6/5/18-ortho/sports medicine for follow up of ongoing L LBP with bilateral leg pain and radiating pain to L foot without injury. Symptoms worse with standing and walking. Medrol dose pack, flexeril 2.5-5 TID prn, ice or heat, home ROM and stretching exercises, advised against chiropractic treatments given multilevel degenerative changes with significant foraminal and central stenosis and congenitally narrow spinal canal.      Per patient history, he has not improved in his mobility nor pain control since seeing ortho. He reports no improvement in pain with medrol dose pack. He reports he is not using OTC medications like tylenol nor ibuprofen. He reports he is seeing a chiropractor, Dr. Doss, in [place] 3 x per week-started last week.  He reports he has no pain at rest, nor when sitting but reports that pain is significant when he stands. He is able to walk by resting his hands on the seat of his 4 wheeled walker. There is a brief mention in the notes that maybe a muscle injury occurred when he was on the riding lawnmower. Therefore, he has chosen to enter  TCU for therapy and close management of low back pain and sciatica.     Review of recent labs indicates recurrent elevated glucose >100 with last A1C 6.3 in 2007, elevated creat baseline 0.9-1.2 with up to 2.0 at times over last 6 months, elevated TSH at 5.0-gradually increased over last 2 years, elevated BNP, low hemoglobin trending around 11    CODE STATUS/ADVANCE DIRECTIVES DISCUSSION:   DNR only  Patient's living condition: lives alone    ALLERGIES:Flomax [tamsulosin hydrochloride]; Hctz; Lisinopril; Simvastatin; and Vytorin  PAST MEDICAL HISTORY:  has a past medical history of Arrhythmia; Chronic systolic CHF (congestive heart failure) (H) (6/13/2016); Congestive heart failure (H); Dizziness and giddiness (11/24/2007); Foreign body in unspecified site on external eye; Hypertension; Ischemic cardiomyopathy; Microscopic hematuria; Renal disease; and Rhabdomyolysis. He also has no past medical history of Basal cell carcinoma or Squamous cell carcinoma.  PAST SURGICAL HISTORY:  has a past surgical history that includes surgical history of - ; surgical history of -  (2005, 2006); surgical history of -  (1996); tonsillectomy & adenoidectomy; surgical history of -  (2010); and Biopsy artery temporal (Left, 9/18/2017).  FAMILY HISTORY: family history includes Dementia in his sister; Diabetes in his brother, brother, and father; Hypertension in his son; Unknown/Adopted in his maternal grandfather, maternal grandmother, paternal grandfather, and paternal grandmother.  SOCIAL HISTORY:  reports that he has quit smoking. He has never used smokeless tobacco. He reports that he drinks alcohol. He reports that he does not use illicit drugs.    Post Discharge Medication Reconciliation Status: discharge medications reconciled and changed, per note/orders (see AVS).  Current Outpatient Prescriptions   Medication Sig Dispense Refill     Acetaminophen (TYLENOL PO) Take 650 mg by mouth every 4 hours as needed for mild pain or fever        allopurinol (ZYLOPRIM) 100 MG tablet Take 0.5 tablets (50 mg) by mouth daily 30 tablet 11     aspirin  MG EC tablet Take 1 tablet (325 mg) by mouth daily 90 tablet 3     finasteride (PROSCAR) 5 MG tablet Take 1 tablet (5 mg) by mouth daily 90 tablet 3     furosemide (LASIX) 20 MG tablet Take 1 tablet (20 mg) by mouth daily 60 tablet 11     guaiFENesin (MUCINEX) 600 MG 12 hr tablet Take 600 mg by mouth daily        Magnesium Hydroxide (NAVA MILK OF MAGNESIA PO) Take 30 mLs by mouth daily as needed        metoprolol succinate (TOPROL-XL) 25 MG 24 hr tablet Take 2 tablets (50 mg) by mouth daily 180 tablet 3     Multiple Vitamins-Minerals (CENTRUM SILVER) per tablet Take 1 tablet by mouth daily         ROS:  10 point ROS of systems including Constitutional, Eyes, Respiratory, Cardiovascular, Gastroenterology, Genitourinary, Integumentary, Muscularskeletal, Psychiatric were all negative except for pertinent positives noted in my HPI.    Exam:  /71  Pulse 67  Temp 98.1  F (36.7  C)  Resp 18  Wt 176 lb (79.8 kg)  SpO2 98%  BMI 25.25 kg/m2  GENERAL APPEARANCE:  Alert, in no distress, appears healthy, at rest  ENT:  Mouth and posterior oropharynx normal, moist mucous membranes  EYES:  EOM, conjunctivae, lids, pupils and irises normal  RESP:  respiratory effort and palpation of chest normal, no respiratory distress, crackles bibasilar  CV:  Palpation and auscultation of heart done , no edema, irregular rhythm occ irregularly irregular, rate-normal  M/S:   Gait and station abnormal ambulates with kyphotic gait, using hands on walker seat instead of hand rails  SKIN:  Inspection of skin and subcutaneous tissue baseline  NEURO:   Cranial nerves 2-12 are normal tested and grossly at patient's baseline  PSYCH:  normal insight, judgement and memory, affect and mood normal    Lab/Diagnostic data:     CBC RESULTS:   Recent Labs   Lab Test  05/06/18   0955  04/19/18   0553   WBC  11.1*  7.9   RBC  3.86*   3.53*   HGB  11.2*  10.3*   HCT  34.1*  31.7*   MCV  88  90   MCH  29.0  29.2   MCHC  32.8  32.5   RDW  15.5*  15.4*   PLT  319  193       Last Basic Metabolic Panel:  Recent Labs   Lab Test  05/21/18   0756  05/06/18   0955   NA  138  139   POTASSIUM  4.3  4.2   CHLORIDE  106  104   MARTA  8.4*  8.6   CO2  26  28   BUN  27  19   CR  1.14  1.07   GLC  193*  128*       Liver Function Studies -   Recent Labs   Lab Test  05/01/18   0900  04/01/18   1930   01/13/17   0735   PROTTOTAL   --   7.3   --   7.3   ALBUMIN   --   2.8*   --   3.3*   BILITOTAL   --   0.4   --   0.7   ALKPHOS   --   89   --   57   AST   --   62*   --   23   ALT  40  72*   < >  23    < > = values in this interval not displayed.       TSH   Date Value Ref Range Status   05/06/2018 5.00 (H) 0.40 - 4.00 mU/L Final   01/13/2017 4.12 (H) 0.40 - 4.00 mU/L Final   ]    Lab Results   Component Value Date    A1C 6.3 12/07/2007       ASSESSMENT/PLAN:  Acute left-sided low back pain with left-sided sciatica  DDD (degenerative disc disease), lumbar  Generalized muscle weakness  Admitted to TCU for acute L sided LBP with L sciatica which is interfering with his daily life, causing decreased mobility. He reports no trauma, xrays negative for fracture. He has been seen by Orthopedics, PCP-found to have DDD lumbar with foraminal narrowing, central narrowing. He did not benefit from prednisone burst. He has not tried routine OTC analgesics and is reluctant to do so, as he does not want to cover up the pain. He has no pain at rest, no shooting pain. With ambulation, his pain is significantly elevated. He is not interested in trying ice for his LBP. He was reluctant to answer questions earlier from therapy staff.     Offered many alternatives to him. He is not interested in scheduled tylenol. He is not interested in stronger pain medications. He is continuing to go to chiropractor 3x per week, verbalizes understanding that this was not recommended by ortho.     He does  agree to prn tylenol for pain control, and will have therapy eval him. He may benefit from therapy modalities-E-stim, massage, pool therapy for control of this back pain and to improve mobility. His goal is to return to independent living .     Paroxysmal atrial fibrillation (H)  Cardiac pacemaker in situ  Follows closely with cardiology, pacemaker recently interrogated. Occasionally irregular beats noted. No obvious dyspnea today at rest    Chronic systolic CHF (congestive heart failure) (H)  Wears compression socks to relieve LE edema which caused significant pain and immobility earlier this month. No edema today with socks in place. Bibasilar crackles noted in lungs on exam, and this seems to be stable when all recent notes reviewed. On low dose lasix.     Chronic kidney disease, stage III (moderate)  Creat trending around 1.0 as noted above, Avoid nephrotoxic medications, Renal dosing of medications, monitor kidney function routinely     Hypertension goal BP (blood pressure) < 140/90  On metoprolol, will monitor. The current medical regimen is effective;  continue present plan and medications.   BP Readings from Last 6 Encounters:   06/26/18 133/71   06/25/18 114/57   06/05/18 (P) 108/70   06/04/18 147/77   05/31/18 156/86   05/21/18 123/57      Pulse Readings from Last 4 Encounters:   06/26/18 67   06/25/18 76   06/04/18 51   05/31/18 78        Coronary artery disease involving coronary bypass graft of native heart without angina pectoris  No new chest pain, sp CABG in 1996. Follows with cardiology.     Hiatal hernia  This was noted on pulmonology consult, it appears he has not tried PPI for this in the past, it was recommended as a trial basis to decrease cough.        Orders:  1. Clarify diagnosis as follows-   ASA Dx CAD  Centrum Dx supplement  Lasix Dx CHF  MOM diagnosis constipation  Mucinex Dx Cough  Proscar Dx BPH  Toprol Dx HTN  Allopurinol Dx gout    2. DC triamcinolone ointment-skin condition  resolved  3. Tylenol 325 mg- give 2 tabs po Q 4 hr prn pain    At this time, will start therapy, supportive care with goal to reduce pain and improve function. He does not seem to be interested in oral medications to cover up the pain, but may benefit from lidocaine patches, diclofenac gel, other non-systemic methods of pain control. He also seems reluctant to try new things, so this will need to be adjusted slowly as he gains trust and respect for this health care team.       Total time spent with patient visit at the skilled nursing facility was 45 min including patient visit and review of past records. Greater than 50% of total time spent with counseling and coordinating care due to complex history    Electronically signed by:  ALEKSANDER Anderson CNP                    Sincerely,        ALEKSANDER Anderson CNP

## 2018-06-26 NOTE — PROGRESS NOTES
[facility]  PRIMARY CARE PROVIDER AND CLINIC:  Jono Drake [address] [facility] / [facility] [place] [address]  Chief Complaint   Patient presents with     [facility]     [facility] Record Number:  [id number]    HPI:    Colin Shell is a 89 year old  (6/25/1927),admitted to the [facility]  from Home.    Admitted to this facility for  rehab, medical management and nursing care.  HPI information obtained from: facility chart records, facility staff, patient report and [facility] chart review.  Current issues are:         Generalized muscle weakness  Paroxysmal atrial fibrillation (H)  Chronic systolic CHF (congestive heart failure) (H)  Chronic kidney disease, stage III (moderate)  Hypertension goal BP (blood pressure) < 140/90  Coronary artery disease involving coronary bypass graft of native heart without angina pectoris  Cardiac pacemaker in situ  DDD (degenerative disc disease), lumbar  Hiatal hernia     Colin Shell is a 89 yo male with PMH significant for CAD s/p CABG in 1996, pacemaker placed, HTN, CKD, hyperlipidemia, R pulmonary nodule, CHF, gout, BPH, Afib, dyspnea, bronchiectasis, pulmonary fibrosis with chronic cough, lymphedema who has chosen an elective stay in TCU for strengthening and therapy due to new onset of low back pain and L leg sciatic type pain which is limiting his ability to maintain his independence.     In early 2018, he stopped taking xarelto due to cost and converted to aspirin 325 mg daily. Risks/Benefits of this were discussed by cardiology, and he has chosen to continue with aspirin.     ED 3/25/18 with chronic cough productive of yellow sputum, worse when lying down. Found pneumonia and treatment with levaquin and albuterol inhaler   ED 4/1/18 with ongoing cough, provided with recommendation for OTC cough medicine.  ED to HOSPITAL admission 4/18-20/18 with dyspnea and cough, thought likely exacerbation of bronchiectasis with  infection, recommended pulmonology consult  ED 5/6/18 with increased LE edema, pain, and inability to walk.   PULMONOLOGY - 5/31/18-pulmonology consult, Dr. Gudino-re cough thought related to pulmonary fibrosis, bronchiectasis, large hiatal hernia. Recommendations included trial of PPI to treat GERD, consider GI consult, trial of Prednisone 40 mg x 7 days for inflammatory interstitial disease, trial course of azithromycin, speech and swallow evaluation for possible dysphagia/aspriation.  At this point, cough was improved and patient not interested in pursuing other work up.   PCP 6/4/18-developed 3 d history of L hip pain with radiation to leg and tingling of foot-found DDD L2-L3, L4-L5, mod bilateral hip osteoarthritis, moderate central stenosis  ORTHO CONSULT 6/5/18-ortho/sports medicine for follow up of ongoing L LBP with bilateral leg pain and radiating pain to L foot without injury. Symptoms worse with standing and walking. Medrol dose pack, flexeril 2.5-5 TID prn, ice or heat, home ROM and stretching exercises, advised against chiropractic treatments given multilevel degenerative changes with significant foraminal and central stenosis and congenitally narrow spinal canal.      Per patient history, he has not improved in his mobility nor pain control since seeing ortho. He reports no improvement in pain with medrol dose pack. He reports he is not using OTC medications like tylenol nor ibuprofen. He reports he is seeing a chiropractor, Dr. Doss, in [place] 3 x per week-started last week.  He reports he has no pain at rest, nor when sitting but reports that pain is significant when he stands. He is able to walk by resting his hands on the seat of his 4 wheeled walker. There is a brief mention in the notes that maybe a muscle injury occurred when he was on the riding lawnmower. Therefore, he has chosen to enter TCU for therapy and close management of low back pain and sciatica.     Review of recent labs  indicates recurrent elevated glucose >100 with last A1C 6.3 in 2007, elevated creat baseline 0.9-1.2 with up to 2.0 at times over last 6 months, elevated TSH at 5.0-gradually increased over last 2 years, elevated BNP, low hemoglobin trending around 11    CODE STATUS/ADVANCE DIRECTIVES DISCUSSION:   DNR only  Patient's living condition: lives alone    ALLERGIES:Flomax [tamsulosin hydrochloride]; Hctz; Lisinopril; Simvastatin; and Vytorin  PAST MEDICAL HISTORY:  has a past medical history of Arrhythmia; Chronic systolic CHF (congestive heart failure) (H) (6/13/2016); Congestive heart failure (H); Dizziness and giddiness (11/24/2007); Foreign body in unspecified site on external eye; Hypertension; Ischemic cardiomyopathy; Microscopic hematuria; Renal disease; and Rhabdomyolysis. He also has no past medical history of Basal cell carcinoma or Squamous cell carcinoma.  PAST SURGICAL HISTORY:  has a past surgical history that includes surgical history of - ; surgical history of -  (2005, 2006); surgical history of -  (1996); tonsillectomy & adenoidectomy; surgical history of -  (2010); and Biopsy artery temporal (Left, 9/18/2017).  FAMILY HISTORY: family history includes Dementia in his sister; Diabetes in his brother, brother, and father; Hypertension in his son; Unknown/Adopted in his maternal grandfather, maternal grandmother, paternal grandfather, and paternal grandmother.  SOCIAL HISTORY:  reports that he has quit smoking. He has never used smokeless tobacco. He reports that he drinks alcohol. He reports that he does not use illicit drugs.    Post Discharge Medication Reconciliation Status: discharge medications reconciled and changed, per note/orders (see AVS).  Current Outpatient Prescriptions   Medication Sig Dispense Refill     Acetaminophen (TYLENOL PO) Take 650 mg by mouth every 4 hours as needed for mild pain or fever       allopurinol (ZYLOPRIM) 100 MG tablet Take 0.5 tablets (50 mg) by mouth daily 30 tablet  11     aspirin  MG EC tablet Take 1 tablet (325 mg) by mouth daily 90 tablet 3     finasteride (PROSCAR) 5 MG tablet Take 1 tablet (5 mg) by mouth daily 90 tablet 3     furosemide (LASIX) 20 MG tablet Take 1 tablet (20 mg) by mouth daily 60 tablet 11     guaiFENesin (MUCINEX) 600 MG 12 hr tablet Take 600 mg by mouth daily        Magnesium Hydroxide (NAVA MILK OF MAGNESIA PO) Take 30 mLs by mouth daily as needed        metoprolol succinate (TOPROL-XL) 25 MG 24 hr tablet Take 2 tablets (50 mg) by mouth daily 180 tablet 3     Multiple Vitamins-Minerals (CENTRUM SILVER) per tablet Take 1 tablet by mouth daily         ROS:  10 point ROS of systems including Constitutional, Eyes, Respiratory, Cardiovascular, Gastroenterology, Genitourinary, Integumentary, Muscularskeletal, Psychiatric were all negative except for pertinent positives noted in my HPI.    Exam:  /71  Pulse 67  Temp 98.1  F (36.7  C)  Resp 18  Wt 176 lb (79.8 kg)  SpO2 98%  BMI 25.25 kg/m2  GENERAL APPEARANCE:  Alert, in no distress, appears healthy, at rest  ENT:  Mouth and posterior oropharynx normal, moist mucous membranes  EYES:  EOM, conjunctivae, lids, pupils and irises normal  RESP:  respiratory effort and palpation of chest normal, no respiratory distress, crackles bibasilar  CV:  Palpation and auscultation of heart done , no edema, irregular rhythm occ irregularly irregular, rate-normal  M/S:   Gait and station abnormal ambulates with kyphotic gait, using hands on walker seat instead of hand rails  SKIN:  Inspection of skin and subcutaneous tissue baseline  NEURO:   Cranial nerves 2-12 are normal tested and grossly at patient's baseline  PSYCH:  normal insight, judgement and memory, affect and mood normal    Lab/Diagnostic data:     CBC RESULTS:   Recent Labs   Lab Test  05/06/18   0955  04/19/18   0553   WBC  11.1*  7.9   RBC  3.86*  3.53*   HGB  11.2*  10.3*   HCT  34.1*  31.7*   MCV  88  90   MCH  29.0  29.2   MCHC  32.8   32.5   RDW  15.5*  15.4*   PLT  319  193       Last Basic Metabolic Panel:  Recent Labs   Lab Test  05/21/18   0756  05/06/18   0955   NA  138  139   POTASSIUM  4.3  4.2   CHLORIDE  106  104   MARTA  8.4*  8.6   CO2  26  28   BUN  27  19   CR  1.14  1.07   GLC  193*  128*       Liver Function Studies -   Recent Labs   Lab Test  05/01/18   0900  04/01/18   1930   01/13/17   0735   PROTTOTAL   --   7.3   --   7.3   ALBUMIN   --   2.8*   --   3.3*   BILITOTAL   --   0.4   --   0.7   ALKPHOS   --   89   --   57   AST   --   62*   --   23   ALT  40  72*   < >  23    < > = values in this interval not displayed.       TSH   Date Value Ref Range Status   05/06/2018 5.00 (H) 0.40 - 4.00 mU/L Final   01/13/2017 4.12 (H) 0.40 - 4.00 mU/L Final   ]    Lab Results   Component Value Date    A1C 6.3 12/07/2007       ASSESSMENT/PLAN:  Acute left-sided low back pain with left-sided sciatica  DDD (degenerative disc disease), lumbar  Generalized muscle weakness  Admitted to TCU for acute L sided LBP with L sciatica which is interfering with his daily life, causing decreased mobility. He reports no trauma, xrays negative for fracture. He has been seen by Orthopedics, PCP-found to have DDD lumbar with foraminal narrowing, central narrowing. He did not benefit from prednisone burst. He has not tried routine OTC analgesics and is reluctant to do so, as he does not want to cover up the pain. He has no pain at rest, no shooting pain. With ambulation, his pain is significantly elevated. He is not interested in trying ice for his LBP. He was reluctant to answer questions earlier from therapy staff.     Offered many alternatives to him. He is not interested in scheduled tylenol. He is not interested in stronger pain medications. He is continuing to go to chiropractor 3x per week, verbalizes understanding that this was not recommended by ortho.     He does agree to prn tylenol for pain control, and will have therapy eval him. He may benefit from  therapy modalities-E-stim, massage, pool therapy for control of this back pain and to improve mobility. His goal is to return to independent living .     Paroxysmal atrial fibrillation (H)  Cardiac pacemaker in situ  Follows closely with cardiology, pacemaker recently interrogated. Occasionally irregular beats noted. No obvious dyspnea today at rest    Chronic systolic CHF (congestive heart failure) (H)  Wears compression socks to relieve LE edema which caused significant pain and immobility earlier this month. No edema today with socks in place. Bibasilar crackles noted in lungs on exam, and this seems to be stable when all recent notes reviewed. On low dose lasix.     Chronic kidney disease, stage III (moderate)  Creat trending around 1.0 as noted above, Avoid nephrotoxic medications, Renal dosing of medications, monitor kidney function routinely     Hypertension goal BP (blood pressure) < 140/90  On metoprolol, will monitor. The current medical regimen is effective;  continue present plan and medications.   BP Readings from Last 6 Encounters:   06/26/18 133/71   06/25/18 114/57   06/05/18 (P) 108/70   06/04/18 147/77   05/31/18 156/86   05/21/18 123/57      Pulse Readings from Last 4 Encounters:   06/26/18 67   06/25/18 76   06/04/18 51   05/31/18 78        Coronary artery disease involving coronary bypass graft of native heart without angina pectoris  No new chest pain, sp CABG in 1996. Follows with cardiology.     Hiatal hernia  This was noted on pulmonology consult, it appears he has not tried PPI for this in the past, it was recommended as a trial basis to decrease cough.        Orders:  1. Clarify diagnosis as follows-   ASA Dx CAD  Centrum Dx supplement  Lasix Dx CHF  MOM diagnosis constipation  Mucinex Dx Cough  Proscar Dx BPH  Toprol Dx HTN  Allopurinol Dx gout    2. DC triamcinolone ointment-skin condition resolved  3. Tylenol 325 mg- give 2 tabs po Q 4 hr prn pain    At this time, will start therapy,  supportive care with goal to reduce pain and improve function. He does not seem to be interested in oral medications to cover up the pain, but may benefit from lidocaine patches, diclofenac gel, other non-systemic methods of pain control. He also seems reluctant to try new things, so this will need to be adjusted slowly as he gains trust and respect for this health care team.       Total time spent with patient visit at the skilled nursing facility was 45 min including patient visit and review of past records. Greater than 50% of total time spent with counseling and coordinating care due to complex history    Electronically signed by:  ALEKSANDER Anderson CNP

## 2018-06-27 PROBLEM — Z95.0 CARDIAC PACEMAKER IN SITU: Status: ACTIVE | Noted: 2018-01-01

## 2018-06-27 PROBLEM — K44.9 HIATAL HERNIA: Status: ACTIVE | Noted: 2018-01-01

## 2018-06-27 PROBLEM — M51.369 DDD (DEGENERATIVE DISC DISEASE), LUMBAR: Status: ACTIVE | Noted: 2018-01-01

## 2018-07-03 NOTE — PROGRESS NOTES
[facility]  PRIMARY CARE PROVIDER AND CLINIC:  Jono Drake [address] [facility] / [facility] [place] [address]  Chief Complaint   Patient presents with     Hospital F/U     [facility] Medical Record Number:  [id number]    HPI:    Colin Shell is a 89 year old  (6/25/1927),admitted to the [facility]  from Home.    Admitted to this facility for  rehab, medical management and nursing care.  HPI information obtained from: facility chart records, facility staff, patient report and [facility] chart review.      Pt with multiple comorbidities, recent multiple hospitalizations/acute alinement, recent left-sided sciatica, with ongoing pain, and physical deconditioning admitted to this facility from home for therapy and medical management.  Imagining showed DDD L2-5, moderated b/l hip OA, spinal stenosis    Current issues are:      - seen and examined. Reports pain is constant, sharp. Start over his left buttock area and radiates down over the latera side of his thigh and legs down to little toes. However, pain is way better, and therapy has helped about 50%, stating now can walk with back erected and uses 4WW with just the fingers tips. Aware of orthopedist recommendations not to sees chiropractor but feels it helps, and endorsed that chiropractor is aware of the x-ray findings.     CODE STATUS/ADVANCE DIRECTIVES DISCUSSION:   DNR only  Patient's living condition: lives alone    ALLERGIES:Flomax [tamsulosin hydrochloride]; Hctz; Lisinopril; Simvastatin; and Vytorin  PAST MEDICAL HISTORY:  has a past medical history of Arrhythmia; Chronic systolic CHF (congestive heart failure) (H) (6/13/2016); Congestive heart failure (H); Dizziness and giddiness (11/24/2007); Foreign body in unspecified site on external eye; Hypertension; Ischemic cardiomyopathy; Microscopic hematuria; Renal disease; and Rhabdomyolysis. He also has no past medical history of Basal cell carcinoma or Squamous cell  carcinoma.  PAST SURGICAL HISTORY:  has a past surgical history that includes surgical history of - ; surgical history of -  (2005, 2006); surgical history of -  (1996); tonsillectomy & adenoidectomy; surgical history of -  (2010); and Biopsy artery temporal (Left, 9/18/2017).  FAMILY HISTORY: family history includes Dementia in his sister; Diabetes in his brother, brother, and father; Hypertension in his son; Unknown/Adopted in his maternal grandfather, maternal grandmother, paternal grandfather, and paternal grandmother.  SOCIAL HISTORY:  reports that he has quit smoking. He has never used smokeless tobacco. He reports that he drinks alcohol. He reports that he does not use illicit drugs.    Post Discharge Medication Reconciliation Status: discharge medications reconciled and changed, per note/orders (see AVS).  Current Outpatient Prescriptions   Medication Sig Dispense Refill     Acetaminophen (TYLENOL PO) Take 650 mg by mouth every 4 hours as needed for mild pain or fever       allopurinol (ZYLOPRIM) 100 MG tablet Take 0.5 tablets (50 mg) by mouth daily 30 tablet 11     aspirin  MG EC tablet Take 1 tablet (325 mg) by mouth daily 90 tablet 3     finasteride (PROSCAR) 5 MG tablet Take 1 tablet (5 mg) by mouth daily 90 tablet 3     furosemide (LASIX) 20 MG tablet Take 1 tablet (20 mg) by mouth daily 60 tablet 11     guaiFENesin (MUCINEX) 600 MG 12 hr tablet Take 600 mg by mouth daily        Magnesium Hydroxide (NAVA MILK OF MAGNESIA PO) Take 30 mLs by mouth daily as needed        metoprolol succinate (TOPROL-XL) 25 MG 24 hr tablet Take 2 tablets (50 mg) by mouth daily 180 tablet 3     Multiple Vitamins-Minerals (CENTRUM SILVER) per tablet Take 1 tablet by mouth daily         ROS:  10 point ROS of systems including Constitutional, Eyes, Respiratory, Cardiovascular, Gastroenterology, Genitourinary, Integumentary, Muscularskeletal, Psychiatric were all negative except for pertinent positives noted in my  HPI.    Exam:  /41  Pulse 61  Temp 99.2  F (37.3  C)  Resp 18  Wt 179 lb (81.2 kg)  SpO2 92%  BMI 25.68 kg/m2  GENERAL APPEARANCE:  Alert, in no distress  ENT:  Mouth and posterior oropharynx normal, moist mucous membranes  EYES:  EOM, conjunctivae, lids, pupils and irises normal  NECK:  No adenopathy,masses or thyromegaly  RESP:  respiratory effort and palpation of chest normal, lungs clear to auscultation , diminished breath sounds at the bases with corase sound  CV:  Palpation and auscultation of heart done , s1s2 normal, irregular HR, systolic murmur over left sternal border.   ABDOMEN:  normal bowel sounds, soft, nontender, no hepatosplenomegaly or other masses  M/S:   Gait and station abnormal uses 4ww, slight kyphotic. sligth tenderness over sciatic notche b/l. no tenderness over lumbar spines or SIJ.   SKIN:  Inspection of skin and subcutaneous tissue baseline, Palpation of skin and subcutaneous tissue baseline  NEURO:   Cranial nerves 2-12 are normal tested and grossly at patient's baseline, no purposeful movement in upper and lower extremities  PSYCH:  normal insight, judgement and memory, affect and mood normal    Lab/Diagnostic data:     CBC RESULTS:   Recent Labs   Lab Test  05/06/18   0955  04/19/18   0553   WBC  11.1*  7.9   RBC  3.86*  3.53*   HGB  11.2*  10.3*   HCT  34.1*  31.7*   MCV  88  90   MCH  29.0  29.2   MCHC  32.8  32.5   RDW  15.5*  15.4*   PLT  319  193       Last Basic Metabolic Panel:  Recent Labs   Lab Test  05/21/18   0756  05/06/18   0955   NA  138  139   POTASSIUM  4.3  4.2   CHLORIDE  106  104   MARTA  8.4*  8.6   CO2  26  28   BUN  27  19   CR  1.14  1.07   GLC  193*  128*       Liver Function Studies -   Recent Labs   Lab Test  05/01/18   0900  04/01/18   1930   01/13/17   0735   PROTTOTAL   --   7.3   --   7.3   ALBUMIN   --   2.8*   --   3.3*   BILITOTAL   --   0.4   --   0.7   ALKPHOS   --   89   --   57   AST   --   62*   --   23   ALT  40  72*   < >  23    < > =  values in this interval not displayed.       TSH   Date Value Ref Range Status   05/06/2018 5.00 (H) 0.40 - 4.00 mU/L Final   01/13/2017 4.12 (H) 0.40 - 4.00 mU/L Final   ]    Lab Results   Component Value Date    A1C 6.3 12/07/2007       ASSESSMENT/PLAN:  Acute left-sided low back pain with left-sided sciatica  DDD (degenerative disc disease), lumbar  Physical deconditioning  - spinal stenosis  - improving, continue current regimen.     Paroxysmal atrial fibrillation (H)  Cardiac pacemaker in situ  - CVR, no concern.     Chronic systolic CHF (congestive heart failure) (H)  -decompensated, no concern, continue med.     Bronchiectasis without complication (H)  - at baseline, no concern, continue current regimen.      Orders:  - See above, otherwise, continue the rest of the current POC.       Total time spent with patient visit at the skilled nursing facility was 35 min including patient visit, review of past records and discussing with therapist. . Greater than 50% of total time spent with counseling and coordinating care due to above conditions in the A/P    Electronically signed by:  Kareem Monson MD

## 2018-07-03 NOTE — LETTER
7/3/2018        RE: Colin Shell  [address]  [place] [address]        [facility]  PRIMARY CARE PROVIDER AND CLINIC:  Jono Drake [address] [facility] / [facility] [address]  Chief Complaint   Patient presents with     Hospital F/U     [facility] Medical Record Number:  [id number]    HPI:    Colin Shell is a 89 year old  (6/25/1927),admitted to the [facility]  from Home.    Admitted to this facility for  rehab, medical management and nursing care.  HPI information obtained from: facility chart records, facility staff, patient report and [facility] chart review.      Pt with multiple comorbidities, recent multiple hospitalizations/acute alinement, recent left-sided sciatica, with ongoing pain, and physical deconditioning admitted to this facility from home for therapy and medical management.  Imagining showed DDD L2-5, moderated b/l hip OA, spinal stenosis    Current issues are:      - seen and examined. Reports pain is constant, sharp. Start over his left buttock area and radiates down over the latera side of his thigh and legs down to little toes. However, pain is way better, and therapy has helped about 50%, stating now can walk with back erected and uses 4WW with just the fingers tips. Aware of orthopedist recommendations not to sees chiropractor but feels it helps, and endorsed that chiropractor is aware of the x-ray findings.     CODE STATUS/ADVANCE DIRECTIVES DISCUSSION:   DNR only  Patient's living condition: lives alone    ALLERGIES:Flomax [tamsulosin hydrochloride]; Hctz; Lisinopril; Simvastatin; and Vytorin  PAST MEDICAL HISTORY:  has a past medical history of Arrhythmia; Chronic systolic CHF (congestive heart failure) (H) (6/13/2016); Congestive heart failure (H); Dizziness and giddiness (11/24/2007); Foreign body in unspecified site on external eye; Hypertension; Ischemic cardiomyopathy; Microscopic hematuria; Renal disease; and  Rhabdomyolysis. He also has no past medical history of Basal cell carcinoma or Squamous cell carcinoma.  PAST SURGICAL HISTORY:  has a past surgical history that includes surgical history of - ; surgical history of -  (2005, 2006); surgical history of -  (1996); tonsillectomy & adenoidectomy; surgical history of -  (2010); and Biopsy artery temporal (Left, 9/18/2017).  FAMILY HISTORY: family history includes Dementia in his sister; Diabetes in his brother, brother, and father; Hypertension in his son; Unknown/Adopted in his maternal grandfather, maternal grandmother, paternal grandfather, and paternal grandmother.  SOCIAL HISTORY:  reports that he has quit smoking. He has never used smokeless tobacco. He reports that he drinks alcohol. He reports that he does not use illicit drugs.    Post Discharge Medication Reconciliation Status: discharge medications reconciled and changed, per note/orders (see AVS).  Current Outpatient Prescriptions   Medication Sig Dispense Refill     Acetaminophen (TYLENOL PO) Take 650 mg by mouth every 4 hours as needed for mild pain or fever       allopurinol (ZYLOPRIM) 100 MG tablet Take 0.5 tablets (50 mg) by mouth daily 30 tablet 11     aspirin  MG EC tablet Take 1 tablet (325 mg) by mouth daily 90 tablet 3     finasteride (PROSCAR) 5 MG tablet Take 1 tablet (5 mg) by mouth daily 90 tablet 3     furosemide (LASIX) 20 MG tablet Take 1 tablet (20 mg) by mouth daily 60 tablet 11     guaiFENesin (MUCINEX) 600 MG 12 hr tablet Take 600 mg by mouth daily        Magnesium Hydroxide (NAVA MILK OF MAGNESIA PO) Take 30 mLs by mouth daily as needed        metoprolol succinate (TOPROL-XL) 25 MG 24 hr tablet Take 2 tablets (50 mg) by mouth daily 180 tablet 3     Multiple Vitamins-Minerals (CENTRUM SILVER) per tablet Take 1 tablet by mouth daily         ROS:  10 point ROS of systems including Constitutional, Eyes, Respiratory, Cardiovascular, Gastroenterology, Genitourinary, Integumentary,  Muscularskeletal, Psychiatric were all negative except for pertinent positives noted in my HPI.    Exam:  /41  Pulse 61  Temp 99.2  F (37.3  C)  Resp 18  Wt 179 lb (81.2 kg)  SpO2 92%  BMI 25.68 kg/m2  GENERAL APPEARANCE:  Alert, in no distress  ENT:  Mouth and posterior oropharynx normal, moist mucous membranes  EYES:  EOM, conjunctivae, lids, pupils and irises normal  NECK:  No adenopathy,masses or thyromegaly  RESP:  respiratory effort and palpation of chest normal, lungs clear to auscultation , diminished breath sounds at the bases with corase sound  CV:  Palpation and auscultation of heart done , s1s2 normal, irregular HR, systolic murmur over left sternal border.   ABDOMEN:  normal bowel sounds, soft, nontender, no hepatosplenomegaly or other masses  M/S:   Gait and station abnormal uses 4ww, slight kyphotic. sligth tenderness over sciatic notche b/l. no tenderness over lumbar spines or SIJ.   SKIN:  Inspection of skin and subcutaneous tissue baseline, Palpation of skin and subcutaneous tissue baseline  NEURO:   Cranial nerves 2-12 are normal tested and grossly at patient's baseline, no purposeful movement in upper and lower extremities  PSYCH:  normal insight, judgement and memory, affect and mood normal    Lab/Diagnostic data:     CBC RESULTS:   Recent Labs   Lab Test  05/06/18   0955  04/19/18   0553   WBC  11.1*  7.9   RBC  3.86*  3.53*   HGB  11.2*  10.3*   HCT  34.1*  31.7*   MCV  88  90   MCH  29.0  29.2   MCHC  32.8  32.5   RDW  15.5*  15.4*   PLT  319  193       Last Basic Metabolic Panel:  Recent Labs   Lab Test  05/21/18   0756  05/06/18   0955   NA  138  139   POTASSIUM  4.3  4.2   CHLORIDE  106  104   MARTA  8.4*  8.6   CO2  26  28   BUN  27  19   CR  1.14  1.07   GLC  193*  128*       Liver Function Studies -   Recent Labs   Lab Test  05/01/18   0900  04/01/18   1930   01/13/17   0735   PROTTOTAL   --   7.3   --   7.3   ALBUMIN   --   2.8*   --   3.3*   BILITOTAL   --   0.4   --   0.7    ALKPHOS   --   89   --   57   AST   --   62*   --   23   ALT  40  72*   < >  23    < > = values in this interval not displayed.       TSH   Date Value Ref Range Status   05/06/2018 5.00 (H) 0.40 - 4.00 mU/L Final   01/13/2017 4.12 (H) 0.40 - 4.00 mU/L Final   ]    Lab Results   Component Value Date    A1C 6.3 12/07/2007       ASSESSMENT/PLAN:  Acute left-sided low back pain with left-sided sciatica  DDD (degenerative disc disease), lumbar  Physical deconditioning  - spinal stenosis  - improving, continue current regimen.     Paroxysmal atrial fibrillation (H)  Cardiac pacemaker in situ  - CVR, no concern.     Chronic systolic CHF (congestive heart failure) (H)  -decompensated, no concern, continue med.     Bronchiectasis without complication (H)  - at baseline, no concern, continue current regimen.      Orders:  - See above, otherwise, continue the rest of the current POC.       Total time spent with patient visit at the skilled nursing facility was 35 min including patient visit, review of past records and discussing with therapist. . Greater than 50% of total time spent with counseling and coordinating care due to above conditions in the A/P    Electronically signed by:  Kareem Monson MD                    Sincerely,        Kareem Monson MD

## 2018-07-03 NOTE — PROGRESS NOTES
Outpatient Physical Therapy Discharge Note     Patient: Colin Shell  : 1927    Beginning/End Dates of Reporting Period:  2018 to 7/3/2018    Referring Provider: Dr. Noelle MD    Therapy Diagnosis: BLE secondary lymphedema      Client Self Report: I think legs are doing good, I have sores on my leg they are not open     Objective Measurements:  Objective Measure: girth  Details: RLE -1.4% and LLE -0.3%     Outcome Measures (most recent score):   LLIS = 30 on date of initial evaluation; pt didn't return to clinic for discharge    Goals:  Goal Identifier stg   Goal Description pt to have around the clock tolerance to BLE GCB for edema reduction response   Target Date 18   Date Met  18   Progress:     Goal Identifier ltg   Goal Description once appropriate, pt to be independent with donning, doffing and care of compressoin garments for longterm edema management for maintenance   Target Date 18   Date Met  18   Progress:     Goal Identifier ltg   Goal Description pt to be independent in managing BLE edema longterm via diet, exercise, elevation and compression garment wear/use   Target Date 18   Date Met      Progress:     Goal Identifier ltg   Goal Description pt to have at least 5 point improvement on LLIS due to decreased edema in BLEs   Target Date 18   Date Met      Progress:     Progress Toward Goals:   Progress limited due to patient last seen in clinic on 18. At that time patient was wearing knee hi compression stockings (OTC stockings, 20-30mmHg) per pt's preference and was instructed to f/u with primary therapist in 3 weeks, initially had an appointment scheduled on 18 that he cancelled (unknown reason) and then pt didn't return.  Per chart review, pt has chosen an elective stay in TCU for strengthening and therapy due to new onset of low back pain and L leg sciatic type pain which is limiting his ability to maintain his independence.        Plan:  Discharge from therapy.    Discharge:    Reason for Discharge: Patient has failed to schedule further appointments.  Medicare G-code: Patient did not attend a final scheduled session prior to discharge. Unable to determine discharge functional status.  See above    Equipment Issued: knee hi OTC 20-30mmHg compression stockings     Discharge Plan: Patient to continue home program.

## 2018-07-03 NOTE — ADDENDUM NOTE
Encounter addended by: Maribell Garcia, PT on: 7/3/2018 12:21 PM<BR>     Actions taken: Sign clinical note, Episode resolved

## 2018-07-09 NOTE — LETTER
7/9/2018        RE: Colin Shell  [address]  [facility] [address]          [facility] DISCHARGE SUMMARY    PATIENT'S NAME: Colin Shell  YOB: 1927  MEDICAL RECORD NUMBER:  [id number]    PRIMARY CARE PROVIDER AND CLINIC RESPONSIBLE AFTER TRANSFER: Jono Drake [address] [facility] / [facility] [address]     CODE STATUS/ADVANCE DIRECTIVES DISCUSSION:   DNR only       Allergies   Allergen Reactions     Flomax [Tamsulosin Hydrochloride]      Hctz Other (See Comments)     Caused loss of balance     Lisinopril Cough     Simvastatin Nausea and Vomiting and Diarrhea     Vytorin Nausea and Diarrhea       TRANSFERRING PROVIDERS: ALEKSANDER Anderson CNP,   DATE OF SNF ADMISSION:  June / 25 / 2018  DATE OF SNF (anticipated) DISCHARGE: July / 10 / 2018  DISCHARGE DISPOSITION: [facility] Provider   Nursing Facility: On license of [facility]     Condition on Discharge:  Improving.  Function:  Ambulatory with rolling walker or cane  Cognitive Scores: SLUMS 18/30, CPT 5.0/5.6 and Safety 17/20    Equipment: walker    DISCHARGE DIAGNOSIS:   1. Acute left-sided low back pain with left-sided sciatica    2. DDD (degenerative disc disease), lumbar    3. Physical deconditioning    4. Chronic systolic CHF (congestive heart failure) (H)    5. Paroxysmal atrial fibrillation (H)    6. Cardiac pacemaker in situ    7. Bronchiectasis without complication (H)    8. Hypertension goal BP (blood pressure) < 140/90    9. Gout of foot, unspecified cause, unspecified chronicity, unspecified laterality        HPI Nursing Facility Course:  HPI information obtained from: facility chart records, facility staff, patient report and [facility] chart review.Pt had a skilled nursing facility stay after back pain and weakness. Nursing provided medication administration, Pain management, and assistance with activities of daily living. Pt participated in PT/OT 5-7 days/wk. Pt stable at time of  "discharge with significant improvement in LBP and mobility.   Acute left-sided low back pain with left-sided sciatica  DDD (degenerative disc disease), lumbar  Physical deconditioning  No changes in medications. He refused trial of tylenol, gabapentin for control of low back pain with some sciatic nerve involvement. He reports only \"mild tingling\" of skin down the side of the L leg to below the knee. He did benefit from therapy which provided relief and exercises which improved pain and mobility. It has been recommended that he continue with outpatient PT/OT but it is unclear if he will do this or not.     He has been seeing chiropractor as well, which was not recommended by orthopedics. He reports he understands the risks but feels significant benefits.     He would like to resume local driving and I encouraged him to check in with PCP prior to driving.     Chronic systolic CHF (congestive heart failure) (H)  No new symptoms, stable and without dyspnea, chest pain, or cough. No LE edema, stable on lasix.    Paroxysmal atrial fibrillation (H)  Cardiac pacemaker in situ  No new chest pain, no new palpitations. Stable.  He remains on metoprolol, daily aspirin 325 mg.  Per pharmacy review while in TCU, per CHEST guidelines, it is recommended to consider a decrease of aspirin from 325 to 81 mg daily as doses >150 mg daily are associated with increased risk of bleeding without increased efficacy. However, patient is unwilling to consider this change during this TCU stay and so will defer to PCP for ongoing considerations.     Bronchiectasis without complication (H)  No new symptoms. Stable on daily mucinex with minimal cough.    Hypertension goal BP (blood pressure) < 140/90  On metoprolol and laisx, no new symptoms. The current medical regimen is effective;  continue present plan and medications.   BP Readings from Last 6 Encounters:   07/09/18 119/71   07/02/18 120/41   06/26/18 133/71   06/25/18 114/57   06/05/18 (P) " 108/70   06/04/18 147/77        Gout of foot, unspecified cause, unspecified chronicity, unspecified laterality  Remains on allopurinol low dose daily with no new symptoms. Stable. The current medical regimen is effective;  continue present plan and medications.     BPH  Remains on finasteride with no new symptoms. Stable. The current medical regimen is effective;  continue present plan and medications.       PAST MEDICAL HISTORY:  has a past medical history of Arrhythmia; Chronic systolic CHF (congestive heart failure) (H) (6/13/2016); Congestive heart failure (H); Dizziness and giddiness (11/24/2007); Foreign body in unspecified site on external eye; Hypertension; Ischemic cardiomyopathy; Microscopic hematuria; Renal disease; and Rhabdomyolysis. He also has no past medical history of Basal cell carcinoma or Squamous cell carcinoma.    DISCHARGE MEDICATIONS:  Current Outpatient Prescriptions   Medication Sig Dispense Refill     allopurinol (ZYLOPRIM) 100 MG tablet Take 0.5 tablets (50 mg) by mouth daily 30 tablet 11     aspirin  MG EC tablet Take 1 tablet (325 mg) by mouth daily 90 tablet 3     finasteride (PROSCAR) 5 MG tablet Take 1 tablet (5 mg) by mouth daily 90 tablet 3     furosemide (LASIX) 20 MG tablet Take 1 tablet (20 mg) by mouth daily 60 tablet 11     guaiFENesin (MUCINEX) 600 MG 12 hr tablet Take 600 mg by mouth daily        metoprolol succinate (TOPROL-XL) 25 MG 24 hr tablet Take 2 tablets (50 mg) by mouth daily 180 tablet 3     Multiple Vitamins-Minerals (CENTRUM SILVER) per tablet Take 1 tablet by mouth daily         MEDICATION CHANGES/RATIONALE:   No changes to regimen. Recommended prn tylenol for pain of low back when needed.     Controlled medications sent with patient:   not applicable/none     ROS:    4 point ROS including Respiratory, CV, GI and , other than that noted in the HPI,  is negative    Physical Exam:   Vitals: /71  Pulse 68  Temp 95.7  F (35.4  C)  Resp 19  Wt 177  lb (80.3 kg)  SpO2 95%  BMI 25.4 kg/m2  BMI= Body mass index is 25.4 kg/(m^2).    GENERAL APPEARANCE:  Alert, in no distress, appears healthy  RESP:  respiratory effort and palpation of chest normal, lungs clear to auscultation , no respiratory distress  CV:  Palpation and auscultation of heart done , regular rate and rhythm, no murmur, rub, or gallop, no edema  M/S:   Gait and station abnormal walking slowly with rolling walker or cane  PSYCH:  oriented to person, place and time with some mild memory loss as SLUMS noted above.     DISCHARGE PLAN:  Outpatient PT/OT  Patient instructed to follow-up with:  PCP in 7 days      [facility] scheduled appointments:  Future Appointments  Date Time Provider Department Center   8/[facility]/2018 4:30 PM [facility]       MTM referral needed and placed by this provider: No    Pending labs: none  SNF labs none  Discharge Treatments:  1. DC MOM and tylenol due to no use  2. May discharge to home with all current treatments and meds.  3. F/U with PCP in 5-7 days  4. Outpatient PT/OT to eval and treat Dx LBP      TOTAL DISCHARGE TIME:   Greater than 30 minutes  Electronically signed by:  ALEKSANDER Anderson CNP         Sincerely,        ALEKSANDER Anderson CNP

## 2018-07-09 NOTE — PROGRESS NOTES
[place] [facility] DISCHARGE SUMMARY    PATIENT'S NAME: Colin Shell  YOB: 1927  MEDICAL RECORD NUMBER:  [id number]    PRIMARY CARE PROVIDER AND CLINIC RESPONSIBLE AFTER TRANSFER: Jono Drake [address] [facility] / [facility] [address]     CODE STATUS/ADVANCE DIRECTIVES DISCUSSION:   DNR only       Allergies   Allergen Reactions     Flomax [Tamsulosin Hydrochloride]      Hctz Other (See Comments)     Caused loss of balance     Lisinopril Cough     Simvastatin Nausea and Vomiting and Diarrhea     Vytorin Nausea and Diarrhea       TRANSFERRING PROVIDERS: ALEKSANDER Anderson CNP,   DATE OF SNF ADMISSION:  June / 25 / 2018  DATE OF SNF (anticipated) DISCHARGE: July / 10 / 2018  DISCHARGE DISPOSITION: [facility] Provider   Nursing Facility: [facility]     Condition on Discharge:  Improving.  Function:  Ambulatory with rolling walker or cane  Cognitive Scores: SLUMS 18/30, CPT 5.0/5.6 and Safety 17/20    Equipment: walker    DISCHARGE DIAGNOSIS:   1. Acute left-sided low back pain with left-sided sciatica    2. DDD (degenerative disc disease), lumbar    3. Physical deconditioning    4. Chronic systolic CHF (congestive heart failure) (H)    5. Paroxysmal atrial fibrillation (H)    6. Cardiac pacemaker in situ    7. Bronchiectasis without complication (H)    8. Hypertension goal BP (blood pressure) < 140/90    9. Gout of foot, unspecified cause, unspecified chronicity, unspecified laterality        HPI Nursing Facility Course:  HPI information obtained from: facility chart records, facility staff, patient report and [facility] chart review.Pt had a skilled nursing facility stay after back pain and weakness. Nursing provided medication administration, Pain management, and assistance with activities of daily living. Pt participated in PT/OT 5-7 days/wk. Pt stable at time of discharge with significant improvement in LBP and mobility.   Acute left-sided low back pain with  "left-sided sciatica  DDD (degenerative disc disease), lumbar  Physical deconditioning  No changes in medications. He refused trial of tylenol, gabapentin for control of low back pain with some sciatic nerve involvement. He reports only \"mild tingling\" of skin down the side of the L leg to below the knee. He did benefit from therapy which provided relief and exercises which improved pain and mobility. It has been recommended that he continue with outpatient PT/OT but it is unclear if he will do this or not.     He has been seeing chiropractor as well, which was not recommended by orthopedics. He reports he understands the risks but feels significant benefits.     He would like to resume local driving and I encouraged him to check in with PCP prior to driving.     Chronic systolic CHF (congestive heart failure) (H)  No new symptoms, stable and without dyspnea, chest pain, or cough. No LE edema, stable on lasix.    Paroxysmal atrial fibrillation (H)  Cardiac pacemaker in situ  No new chest pain, no new palpitations. Stable.  He remains on metoprolol, daily aspirin 325 mg.  Per pharmacy review while in TCU, per CHEST guidelines, it is recommended to consider a decrease of aspirin from 325 to 81 mg daily as doses >150 mg daily are associated with increased risk of bleeding without increased efficacy. However, patient is unwilling to consider this change during this TCU stay and so will defer to PCP for ongoing considerations.     Bronchiectasis without complication (H)  No new symptoms. Stable on daily mucinex with minimal cough.    Hypertension goal BP (blood pressure) < 140/90  On metoprolol and laisx, no new symptoms. The current medical regimen is effective;  continue present plan and medications.   BP Readings from Last 6 Encounters:   07/09/18 119/71   07/02/18 120/41   06/26/18 133/71   06/25/18 114/57   06/05/18 (P) 108/70   06/04/18 147/77        Gout of foot, unspecified cause, unspecified chronicity, " unspecified laterality  Remains on allopurinol low dose daily with no new symptoms. Stable. The current medical regimen is effective;  continue present plan and medications.     BPH  Remains on finasteride with no new symptoms. Stable. The current medical regimen is effective;  continue present plan and medications.       PAST MEDICAL HISTORY:  has a past medical history of Arrhythmia; Chronic systolic CHF (congestive heart failure) (H) (6/13/2016); Congestive heart failure (H); Dizziness and giddiness (11/24/2007); Foreign body in unspecified site on external eye; Hypertension; Ischemic cardiomyopathy; Microscopic hematuria; Renal disease; and Rhabdomyolysis. He also has no past medical history of Basal cell carcinoma or Squamous cell carcinoma.    DISCHARGE MEDICATIONS:  Current Outpatient Prescriptions   Medication Sig Dispense Refill     allopurinol (ZYLOPRIM) 100 MG tablet Take 0.5 tablets (50 mg) by mouth daily 30 tablet 11     aspirin  MG EC tablet Take 1 tablet (325 mg) by mouth daily 90 tablet 3     finasteride (PROSCAR) 5 MG tablet Take 1 tablet (5 mg) by mouth daily 90 tablet 3     furosemide (LASIX) 20 MG tablet Take 1 tablet (20 mg) by mouth daily 60 tablet 11     guaiFENesin (MUCINEX) 600 MG 12 hr tablet Take 600 mg by mouth daily        metoprolol succinate (TOPROL-XL) 25 MG 24 hr tablet Take 2 tablets (50 mg) by mouth daily 180 tablet 3     Multiple Vitamins-Minerals (CENTRUM SILVER) per tablet Take 1 tablet by mouth daily         MEDICATION CHANGES/RATIONALE:   No changes to regimen. Recommended prn tylenol for pain of low back when needed.     Controlled medications sent with patient:   not applicable/none     ROS:    4 point ROS including Respiratory, CV, GI and , other than that noted in the HPI,  is negative    Physical Exam:   Vitals: /71  Pulse 68  Temp 95.7  F (35.4  C)  Resp 19  Wt 177 lb (80.3 kg)  SpO2 95%  BMI 25.4 kg/m2  BMI= Body mass index is 25.4  kg/(m^2).    GENERAL APPEARANCE:  Alert, in no distress, appears healthy  RESP:  respiratory effort and palpation of chest normal, lungs clear to auscultation , no respiratory distress  CV:  Palpation and auscultation of heart done , regular rate and rhythm, no murmur, rub, or gallop, no edema  M/S:   Gait and station abnormal walking slowly with rolling walker or cane  PSYCH:  oriented to person, place and time with some mild memory loss as SLUMS noted above.     DISCHARGE PLAN:  Outpatient PT/OT  Patient instructed to follow-up with:  PCP in 7 days      [facility] scheduled appointments:  Future Appointments  Date Time Provider Department Center   [facility]2018 4[facility]       MTM referral needed and placed by this provider: No    Pending labs: none  SNF labs none  Discharge Treatments:  1. DC MOM and tylenol due to no use  2. May discharge to home with all current treatments and meds.  3. F/U with PCP in 5-7 days  4. Outpatient PT/OT to eval and treat Dx LBP      TOTAL DISCHARGE TIME:   Greater than 30 minutes  Electronically signed by:  ALEKSANDER Anderson CNP

## 2018-07-11 NOTE — PROGRESS NOTES
Clinic Care Coordination Contact  Clinic Care Coordination Contact  OUTREACH  Referral Information:  Referral Source: ED Follow-Up  Primary Diagnosis: Other (include Comment box) (Pain/limited mobility R/T spinal Stenosis)  Chief Complaint   Patient presents with     Clinic Care Coordination - Post Hospital     [place] [facility] [place] to home with OP therapy PCP appt 7/16/18   Universal Utilization:   Clinic Utilization  Difficulty keeping appointments: No  Utilization    Last refreshed: 7/11/2018 10:43 AM:  No Show Count (past year) 3       Last refreshed: 7/11/2018 10:43 AM:  ED visits 4       Last refreshed: 7/11/2018 10:43 AM:  Hospital admissions 2          Current as of: 7/11/2018 10:43 AM         Clinical Concerns:  Current Medical Concerns:  Patient with pain and limited mobility R/T spinal stenosis. Was at [facility] for an elective stay for strengthening and pain control. Was at [facility] 6/26-7/10. Discharged home with OP therapy.    Current Behavioral Concerns: None   Education Provided to patient: Follow up with PCP, setting up PT/OT as OP     Pain  Chronic pain (GOAL): No  Health Maintenance Reviewed: Due/Overdue:  (Microalbumin, PNVX)  Clinical Pathway: None    Medication Management:  Patient Verbalizes good understanding of medications and is taking appropriately.     Functional Status:  Bed or wheelchair confined:No  Mobility Status: Independent w/Device (cane)/wheeled walker    Living Situation:  Current living arrangement: I live alone, I live in a private home  Type of residence:  (had been at [facility] for elective admit for strengthening)    Diet/Exercise/Sleep:  Diet:: No added salt  Inadequate nutrition (GOAL):No  Food Insecurity: No  Tube Feeding: No  Exercise:: Currently not exercising (moving about the house)-doing HEP from TCU  Inadequate activity/exercise (GOAL): No  Significant changes in sleep pattern (GOAL): No    Transportation:  Transportation concerns (GOAL): No  Transportation  means:: Accessible car, Regular car, Family     Psychosocial:  Hinduism or spiritual beliefs that impact treatment: No  Mental health DX:No  Mental health management concern (GOAL): No  Informal Support system: Children     Financial/Insurance:   Financial/Insurance concerns (GOAL): No     Resources and Interventions:  Current Resources: OP Therapy  Community Resources:  (OP Therapies)   Equipment Currently Used at Home: cane, straight, walker, rolling    Advance Care Plan/Directive  Advanced Care Plans/Directives on file: Yes  Type Advanced Care Plans/Directives: Advanced Directive - On File     Goals:   Goals        General    Improve chronic symptoms (pt-stated)     Notes - Note edited  7/11/2018  3:12 PM by Edwin Marley RN    Goal Statement: I want to keep my heart failure controlled  Measure of Success: my breathing will improve and my weight will go down  Supportive Steps to Achieve: I will weigh daily and record                                                  I will take 2 lasix tabs until instructions change                                                  I will eat a low salt diet  Barriers: Did not understand that he should increase to 2 lasix tablets, has not been recording weights  Strengths: patient willing to take needed steps  Date to Achieve By: 7/14/18      Medication 1 (pt-stated)     Notes - Note edited  7/11/2018  3:11 PM by Edwin Marley RN    Goal Statement: I will increase my Lasix to help decrease my weight  Measure of Success: Weight will drop to 204 lb range and my leg swelling will decreas  Supportive Steps to Achieve:increase your lasix to two tablets as instructed by Cardiology  Barriers: Cardiologist did not write new script so bottle states 1 tablet  Strengths: Patient willing to increase to help with fluid  Date to Achieve By:7/15/18          Patient/Caregiver understanding:Patient reports he is doing well since DC from TCU yesterday. He is moving about using walker. Has  not set up OP PT or OT yet as he wants to wait until after PCP visit. Note that he has 2 follow ups arranged. He reports he cancelled the one on the 16th as he had a conflict. He will come in on the 17th. Discussed starting HC sooner rather than later so he does not lose ground on the improvement he gained at the TCU. Reports he understands but wants to wait. He will call the rehab department if he would like to be seen sooner. He (and TCU DC summary) indicate that his CHF is well controlled at this time. He reports losing weight and having no breathing troubles.      Future Appointments              In 5 days Eda Ayoub APRN CNP [facility]    In 6 days Noelle Friedman MD [facility]    In 1 month CASEY DCR2 [facility]        Plan:RN CC will f/u in 2-4 weeks.    Edwin GRACIA,RN- BC  Clinic Care Coordinator  [facility]  Phone: [phone]

## 2018-07-13 NOTE — ED AVS SNAPSHOT
[facility] Emergency Department    [address] [address]    Phone:  [phone]    Fax:  [phone]                                       Colin Shell   MRN: [id number]    Department:  [facility] Emergency Department   Date of Visit:  7/13/2018           After Visit Summary Signature Page     I have received my discharge instructions, and my questions have been answered. I have discussed any challenges I see with this plan with the nurse or doctor.    ..........................................................................................................................................  Patient/Patient Representative Signature      ..........................................................................................................................................  Patient Representative Print Name and Relationship to Patient    ..................................................               ................................................  Date                                            Time    ..........................................................................................................................................  Reviewed by Signature/Title    ...................................................              ..............................................  Date                                                            Time

## 2018-07-13 NOTE — ED AVS SNAPSHOT
[facility] Emergency Department    [address] [address]    Phone:  [phone]    Fax:  [phone]                                       Colin Shell   MRN: [id number]    Department:  [facility] Emergency Department   Date of Visit:  7/13/2018           Patient Information     Date Of Birth          6/25/1927        Your diagnoses for this visit were:     Acute on chronic diastolic congestive heart failure (H)     Cough        You were seen by Jaron Vinson DO.      Follow-up Information     Follow up with Jono Drake MD. Schedule an appointment as soon as possible for a visit in 1 week.    Specialty:  Family Practice    Contact information:    [address] [id number] [phone]          Discharge Instructions       Presenting cough is related to congestive heart failure.  Recommend increasing Lasix from 20 mg daily to 40 mg daily ×1 week.  Please contact your primary clinic provider to arrange for an appointment in 1 week.    Remain on a low-sodium diet.                 Your next 10 appointments already scheduled     Jul 16, 2018  9:40 AM CDT   [facility]    [address] [id number] [phone]           Bring a current list of meds and any records pertaining to this visit. For Physicals, please bring immunization records and any forms needing to be filled out. Please arrive 10 minutes early to complete paperwork.            Jul 17, 2018  9:40 AM CDT   Office Visit with Noelle Friedman MD   [facility]    [address] [address] [phone]           Bring a current list of meds and any records pertaining to this visit. For Physicals, please bring immunization records and any forms needing to be filled out. Please arrive 10 minutes early to complete  paperwork.            Aug 21, 2018  4:30 PM CDT   Remote ICD Check with CASEY DCR2   [facility] ([facility])    [address]  [place] [address] [phone] OPT 2           This appointment is for a remote check of your debrillator.  This is not an appointment at the office.              24 Hour Appointment Hotline       To make an appointment at any [facility], call [phone] ([phone]). If you don't have a family doctor or clinic, we will help you find one. [facility] clinics are conveniently located to serve the needs of you and your family.             Review of your medicines      Our records show that you are taking the medicines listed below. If these are incorrect, please call your family doctor or clinic.        Dose / Directions Last dose taken    allopurinol 100 MG tablet   Commonly known as:  ZYLOPRIM   Dose:  50 mg   Quantity:  30 tablet        Take 0.5 tablets (50 mg) by mouth daily   Refills:  11        aspirin 325 MG EC tablet   Dose:  325 mg   Quantity:  90 tablet        Take 1 tablet (325 mg) by mouth daily   Refills:  3        CENTRUM SILVER per tablet   Dose:  1 tablet        Take 1 tablet by mouth daily   Refills:  0        finasteride 5 MG tablet   Commonly known as:  PROSCAR   Dose:  5 mg   Quantity:  90 tablet        Take 1 tablet (5 mg) by mouth daily   Refills:  3        furosemide 20 MG tablet   Commonly known as:  LASIX   Dose:  20 mg   Quantity:  60 tablet        Take 1 tablet (20 mg) by mouth daily   Refills:  11        guaiFENesin 600 MG 12 hr tablet   Commonly known as:  MUCINEX   Dose:  600 mg        Take 600 mg by mouth daily   Refills:  0        metoprolol succinate 25 MG 24 hr tablet   Commonly known as:  TOPROL-XL   Dose:  50 mg   Quantity:  180 tablet        Take 2 tablets (50 mg) by mouth daily   Refills:  3                Procedures and tests performed during your visit     Basic metabolic panel     CBC with platelets differential    Nt probnp inpatient (BNP)    XR Chest 2 Views      Orders Needing Specimen Collection     None      Pending Results     No orders found from 7/11/2018 to 7/14/2018.            Pending Culture Results     No orders found from 7/11/2018 to 7/14/2018.            Pending Results Instructions     If you had any lab results that were not finalized at the time of your Discharge, you can call the ED Lab Result RN at [phone]. You will be contacted by this team for any positive Lab results or changes in treatment. The nurses are available 7 days a week from 10A to 6:30P.  You can leave a message 24 hours per day and they will return your call.        Test Results From Your Hospital Stay        7/13/2018 12:05 PM      Component Results     Component Value Ref Range & Units Status    WBC 11.8 (H) 4.0 - 11.0 10e9/L Final    RBC Count 4.68 4.4 - 5.9 10e12/L Final    Hemoglobin 13.1 (L) 13.3 - 17.7 g/dL Final    Hematocrit 40.8 40.0 - 53.0 % Final    MCV 87 78 - 100 fl Final    MCH 28.0 26.5 - 33.0 pg Final    MCHC 32.1 31.5 - 36.5 g/dL Final    RDW 17.0 (H) 10.0 - 15.0 % Final    Platelet Count 297 150 - 450 10e9/L Final    Diff Method Automated Method  Final    % Neutrophils 55.5 % Final    % Lymphocytes 25.0 % Final    % Monocytes 11.9 % Final    % Eosinophils 6.7 % Final    % Basophils 0.6 % Final    % Immature Granulocytes 0.3 % Final    Nucleated RBCs 0 0 /100 Final    Absolute Neutrophil 6.6 1.6 - 8.3 10e9/L Final    Absolute Lymphocytes 3.0 0.8 - 5.3 10e9/L Final    Absolute Monocytes 1.4 (H) 0.0 - 1.3 10e9/L Final    Absolute Eosinophils 0.8 (H) 0.0 - 0.7 10e9/L Final    Absolute Basophils 0.1 0.0 - 0.2 10e9/L Final    Abs Immature Granulocytes 0.0 0 - 0.4 10e9/L Final    Absolute Nucleated RBC 0.0  Final         7/13/2018 12:21 PM      Component Results     Component Value Ref Range & Units Status    Sodium 135 133 - 144 mmol/L Final    Potassium 4.0 3.4 - 5.3 mmol/L Final     Chloride 102 94 - 109 mmol/L Final    Carbon Dioxide 26 20 - 32 mmol/L Final    Anion Gap 7 3 - 14 mmol/L Final    Glucose 110 (H) 70 - 99 mg/dL Final    Urea Nitrogen 23 7 - 30 mg/dL Final    Creatinine 1.12 0.66 - 1.25 mg/dL Final    GFR Estimate 61 >60 mL/min/1.7m2 Final    Non  GFR Calc    GFR Estimate If Black 74 >60 mL/min/1.7m2 Final    African American GFR Calc    Calcium 9.0 8.5 - 10.1 mg/dL Final         7/13/2018 11:55 AM      Narrative     XR CHEST 2 VW 7/13/2018 11:25 AM    HISTORY: Cough.    COMPARISON: Several prior studies, the most recent one being dated  4/18/2018.        Impression     IMPRESSION: 2 views of the chest show a no acute cardiopulmonary  disease. Heart size and transvenous pacer are stable. Underlying  interstitial scarring/ pulmonary fibrosis is again seen.    MONICA SALAS MD         7/13/2018 12:24 PM      Component Results     Component Value Ref Range & Units Status    N-Terminal Pro BNP Inpatient 5546 (H) 0 - 1800 pg/mL Final       Reference range shown and results flagged as abnormal are suggested inpatient   cut points for confirming diagnosis if CHF in an acute setting. Establishing a   baseline value for each individual patient is useful for follow-up. An   inpatient or emergency department NT-proPBNP <300 pg/mL effectively rules out   acute CHF, with 99% negative predictive value.  The outpatient non-acute reference range for ruling out CHF is:   0-125 pg/mL (age 18 to less than 75)   0-450 pg/mL (age 75 yrs and older)                  Thank you for choosing [facility]       Thank you for choosing [facility] for your care. Our goal is always to provide you with excellent care. Hearing back from our patients is one way we can continue to improve our services. Please take a few minutes to complete the written survey that you may receive in the mail after you visit with us. Thank you!        Care EveryWhere ID     This is your Care EveryWhere ID. This could be used  by other organizations to access your [facility] medical records  [id number]        Equal Access to Services     ALEXIS PACE : Priya Ramirez, kwan law, fina cedeno. So [facility] [phone].    ATENCIÓN: Si habla español, tiene a rojo disposición servicios gratuitos de asistencia lingüística. Llame al [phone].    We comply with applicable federal civil rights laws and [place] laws. We do not discriminate on the basis of race, color, national origin, age, disability, sex, sexual orientation, or gender identity.            After Visit Summary       This is your record. Keep this with you and show to your community pharmacist(s) and doctor(s) at your next visit.

## 2018-07-13 NOTE — ED PROVIDER NOTES
"  History     Chief Complaint   Patient presents with     Cough     cough and runny nose started last night, afraid has pneumonia, SOA     HPI  Colin Shell is a 89 year old male who presents with a dry cough.  Patient has had some rhinorrhea.  Denies sneezing.  No seasonal allergies.  He presents with concerns for pneumonia.  States he had pneumonia in the past.  Is had increased dyspnea.  Worse with being supine.  No chest pain or palpitations.  History for CABG 1996.  Cardiac pacemaker in situ.  His heart failure with preserved ejection fraction.  Has noted no significant fluid retention in his lower extremities.  Denies any near syncope.  No palpitations.     Cough is dry.  No fever.  Denies chills or night sweats.  Noted no wheezing.    Problem List:    Patient Active Problem List    Diagnosis Date Noted     Coronary artery disease involving coronary bypass graft of native heart without angina pectoris      Priority: High     Hx of CABG 1996    ECHO 01/2017: \"Left ventricular systolic function is mildly reduced.The visual ejection  fraction is estimated at 45%.Moderate basal to mid inferior wall hypokinesia\"    NM MPI 01/2017: \"Myocardial perfusion imaging using single isotope technique  demonstrated transmural scar of the inferior basal inferolateral wall  with mild ellie-infarct ischemia in the basal inferior septal wall.   2. Gated images demonstrated normal LV cavity size with mildly reduced  LV systolic function.  The left ventricular systolic function is 46%.  3. Compared to the prior study from no prior study .\"         Cardiac pacemaker in situ 06/27/2018     Priority: Medium     DDD (degenerative disc disease), lumbar 06/27/2018     Priority: Medium     Hiatal hernia 06/27/2018     Priority: Medium     Ischemic cardiomyopathy      Priority: Medium     mild- EF 45-50%       Bronchiectasis without complication (H) 04/27/2018     Priority: Medium     Lymphedema 04/27/2018     Priority: Medium     " Dyspnea 04/18/2018     Priority: Medium     Paroxysmal atrial fibrillation (H) 02/20/2018     Priority: Medium     Chronic systolic CHF (congestive heart failure) (H) 06/13/2016     Priority: Medium     Advance Care Planning 10/16/2012     Priority: Medium     Advance Care Planning: Receipt of ACP document:  Received: Health Care Directive which was witnessed or notarized on 8/26/09.  Document not previously scanned.  Validation form completed and scanned.  Code Status reflects choices in most recent ACP document. Confirmed/documented designated decision maker(s). See permanent comments section of demographics in clinical tab. View document(s) and details by clicking on code status. Added by Victorino Aguilera on 4/9/2015.  Patient is DNR/DNI             Cholelithiasis 05/09/2012     Priority: Medium     2012 assymptomatic   IMO update changed this record. Please review for accuracy       Fibrosis of lung (H) 05/09/2012     Priority: Medium     Suggested pulmonology in 2012 to eval--pt deferred this.  (Problem list name updated by automated process. Provider to review and confirm.)       Pulmonary nodule, right 05/09/2012     Priority: Medium     May 2012 ct showed right sided probable benign nodule.  No further follow-up needed. After stability noted on CT 2017.       Bronchiectasis (H) 04/30/2012     Priority: Medium     Noted on 2012 ct       Hyperlipidemia LDL goal <100 10/31/2010     Priority: Medium     Chronic kidney disease, stage III (moderate) 07/13/2009     Priority: Medium     Hypertension goal BP (blood pressure) < 140/90 05/20/2005     Priority: Medium     Benign prostatic hyperplasia      Priority: Low     history of elevated PSAs       Obesity 07/13/2009     Priority: Low     Gout 05/20/2005     Priority: Low        Past Medical History:    Past Medical History:   Diagnosis Date     Arrhythmia      Chronic systolic CHF (congestive heart failure) (H) 6/13/2016     Congestive heart failure (H)       Dizziness and giddiness 11/24/2007     Foreign body in unspecified site on external eye      Hypertension      Ischemic cardiomyopathy      Microscopic hematuria      Renal disease      Rhabdomyolysis        Past Surgical History:    Past Surgical History:   Procedure Laterality Date     BIOPSY ARTERY TEMPORAL Left 9/18/2017    Procedure: BIOPSY ARTERY TEMPORAL;  Left Temporal Biopsy ;  Surgeon: Ivana Phan MD;  Location: [facility] OR     SURGICAL HISTORY OF -       heel spurs     SURGICAL HISTORY OF -   2005, 2006    bilateral eye cataract     SURGICAL HISTORY OF -   1996    CABG x 5 vessels     SURGICAL HISTORY OF -   2010    circumcision     TONSILLECTOMY & ADENOIDECTOMY         Family History:    Family History   Problem Relation Age of Onset     Diabetes Father      Diabetes Brother      Diabetes Brother      Hypertension Son      Unknown/Adopted Maternal Grandmother      Unknown/Adopted Maternal Grandfather      Unknown/Adopted Paternal Grandmother      Unknown/Adopted Paternal Grandfather      Dementia Sister        Social History:  Marital Status:   [2]  Social History   Substance Use Topics     Smoking status: Former Smoker     Smokeless tobacco: Never Used      Comment: Quit at age 32     Alcohol use Yes      Comment: 12 pack a year        Medications:      allopurinol (ZYLOPRIM) 100 MG tablet   aspirin  MG EC tablet   finasteride (PROSCAR) 5 MG tablet   furosemide (LASIX) 20 MG tablet   guaiFENesin (MUCINEX) 600 MG 12 hr tablet   metoprolol succinate (TOPROL-XL) 25 MG 24 hr tablet   Multiple Vitamins-Minerals (CENTRUM SILVER) per tablet         Review of Systems  All pertinent positives and negatives are documented in the HPI.  All others reviewed and are negative .    Physical Exam   BP: 153/75  Heart Rate: 78  Temp: 97.6  F (36.4  C)  Resp: 20  SpO2: 95 %      Physical Exam  Vital signs reviewed  No distress  No hypoxia or tachypnea  Mild JVD sitting upright  Head:  Normocephalic.     Eyes:  PERRLA, full EOM.  External exams normal.    Ears:  Normal pinnae, canals, and TM's.    Nose:  Patent, without deformity.  No rhinorrhea  Throat:  Moist mucous membranes without lesions, erythema, or exudate.    Neck:  Supple, without masses, lymphadenopathy or tenderness.    Lower extremities showed no significant edema  Heart was regular without ectopy  Old sternotomy incision noted on chest wall.  Pacemaker present left upper chest wall  Lungs revealed crackles bilateral at 50% of the chest cavity.  Dullness at the lung bases concerning for effusions  Abdomen is soft and nontender.  Skin is warm, dry with normal capillary refill  ED Course     ED Course     Procedures                   Results for orders placed or performed during the hospital encounter of 07/13/18 (from the past 24 hour(s))   XR Chest 2 Views    Narrative    XR CHEST 2 VW 7/13/2018 11:25 AM    HISTORY: Cough.    COMPARISON: Several prior studies, the most recent one being dated  4/18/2018.      Impression    IMPRESSION: 2 views of the chest show a no acute cardiopulmonary  disease. Heart size and transvenous pacer are stable. Underlying  interstitial scarring/ pulmonary fibrosis is again seen.    MONICA SALAS MD   CBC with platelets differential   Result Value Ref Range    WBC 11.8 (H) 4.0 - 11.0 10e9/L    RBC Count 4.68 4.4 - 5.9 10e12/L    Hemoglobin 13.1 (L) 13.3 - 17.7 g/dL    Hematocrit 40.8 40.0 - 53.0 %    MCV 87 78 - 100 fl    MCH 28.0 26.5 - 33.0 pg    MCHC 32.1 31.5 - 36.5 g/dL    RDW 17.0 (H) 10.0 - 15.0 %    Platelet Count 297 150 - 450 10e9/L    Diff Method Automated Method     % Neutrophils 55.5 %    % Lymphocytes 25.0 %    % Monocytes 11.9 %    % Eosinophils 6.7 %    % Basophils 0.6 %    % Immature Granulocytes 0.3 %    Nucleated RBCs 0 0 /100    Absolute Neutrophil 6.6 1.6 - 8.3 10e9/L    Absolute Lymphocytes 3.0 0.8 - 5.3 10e9/L    Absolute Monocytes 1.4 (H) 0.0 - 1.3 10e9/L    Absolute Eosinophils 0.8 (H) 0.0 - 0.7  10e9/L    Absolute Basophils 0.1 0.0 - 0.2 10e9/L    Abs Immature Granulocytes 0.0 0 - 0.4 10e9/L    Absolute Nucleated RBC 0.0    Basic metabolic panel   Result Value Ref Range    Sodium 135 133 - 144 mmol/L    Potassium 4.0 3.4 - 5.3 mmol/L    Chloride 102 94 - 109 mmol/L    Carbon Dioxide 26 20 - 32 mmol/L    Anion Gap 7 3 - 14 mmol/L    Glucose 110 (H) 70 - 99 mg/dL    Urea Nitrogen 23 7 - 30 mg/dL    Creatinine 1.12 0.66 - 1.25 mg/dL    GFR Estimate 61 >60 mL/min/1.7m2    GFR Estimate If Black 74 >60 mL/min/1.7m2    Calcium 9.0 8.5 - 10.1 mg/dL   Nt probnp inpatient (BNP)   Result Value Ref Range    N-Terminal Pro BNP Inpatient 5546 (H) 0 - 1800 pg/mL       Medications - No data to display    Assessments & Plan (with Medical Decision Making) Colin is 89 years of age.  History for heart failure with preserved ejection fraction.  Presents with shortness of breath and cough.  Does not describe any stiff acute worsening of orthopnea or PND.  Examination showed patient be fluid overloaded with lungs auscultation having diffuse crackles.  His chest x-ray appeared to show increased cephalization of CHF will the radiologist was uncertain if this could also be secondary to pulmonary fibrosis.  His BNP is double from spring 2018.  I recommend increasing furosemide from 20 mg daily to 40 g daily ×1 week.  He should follow-up with his primary clinic provider at that time.  He is not on an ACE or arm though this would not typically be helpful for him given that he has preserved systolic function.  Could consider low-dose spironolactone.  This is been shown to decrease hospitalization rates.  Would have to be careful watching for hyperkalemia.       I have reviewed the nursing notes.    I have reviewed the findings, diagnosis, plan and need for follow up with the patient.      New Prescriptions    No medications on file       Final diagnoses:   Acute on chronic diastolic congestive heart failure (H)   Cough       7/13/2018    [facility] EMERGENCY DEPARTMENT     Jaron Vinson, DO  07/14/18 0657       Jaron Vinson, DO  07/14/18 0657

## 2018-07-13 NOTE — DISCHARGE INSTRUCTIONS
Presenting cough is related to congestive heart failure.  Recommend increasing Lasix from 20 mg daily to 40 mg daily ×1 week.  Please contact your primary clinic provider to arrange for an appointment in 1 week.    Remain on a low-sodium diet.

## 2018-07-16 NOTE — PROGRESS NOTES
Clinic Care Coordination Contact  Care Team Conversations  D/I: Patient with ED visit over the weekend. Per provider note:  Assessments & Plan (with Medical Decision Making) Colin is 89 years of age.  History for heart failure with preserved ejection fraction.  Presents with shortness of breath and cough.  Does not describe any stiff acute worsening of orthopnea or PND.  Examination showed patient be fluid overloaded with lungs auscultation having diffuse crackles.  His chest x-ray appeared to show increased cephalization of CHF will the radiologist was uncertain if this could also be secondary to pulmonary fibrosis.  His BNP is double from spring 2018.  I recommend increasing furosemide from 20 mg daily to 40 g daily ×1 week.  He should follow-up with his primary clinic provider at that time.  He is not on an ACE or arm though this would not typically be helpful for him given that he has preserved systolic function.  Could consider low-dose spironolactone.  This is been shown to decrease hospitalization rates.  Would have to be careful watching for hyperkalemia.  This  RN CC reached out to patient the day before this visit. He will see PCP tomorrow. RN CC will follow up with him post PCP visit.    Edwin GRACIA,RN- BC  Clinic Care Coordinator  [facility]  Phone: [phone]

## 2018-07-17 NOTE — PROGRESS NOTES
Please inform patient that test result was within normal parameters.   Thank you.     Noelle Friedman M.D.

## 2018-07-17 NOTE — PATIENT INSTRUCTIONS
Thank you for choosing [facility].  You may be receiving a survey in the mail from Maryellen Dockery regarding your visit today.  Please take a few minutes to complete and return the survey to let us know how we are doing.      If you have questions or concerns, please contact us via [employer] or you can contact your care team at [phone].    Our Clinic hours are:  Monday 6:40 am  to 7:00 pm  Tuesday -Friday 6:40 am to 5:00 pm    The [place] outpatient lab hours are:  Monday - Friday 6:10 am to 4:45 pm  Saturdays 7:00 am to 11:00 am  Appointments are required, call [phone]    If you have clinical questions after hours or would like to schedule an appointment,  call the clinic at [phone].

## 2018-07-17 NOTE — LETTER
My Heart Failure Action Plan   Name: Colin Shell    YOB: 1927   Date: 7/17/2018    My doctor: Jono Drake     [facility]     [address] [address] [phone]  My Diagnosis: Systolic Heart Failure   My Ejection Fraction: 45% - 49%    My Exercise Goal: 30 minutes daily  .     My Weight Goal: 168 lbs  Wt Readings from Last 2 Encounters:   07/17/18 174 lb (78.9 kg)   07/09/18 177 lb (80.3 kg)     Weigh yourself daily using the same scale. If you gain more than 2 pounds in 24 hours or 5 pounds in a week increase your diuretic to Lasix (Furosemide) 40 mg     My Diet Goal: No added salt    Emergency Room Visits:    Our goal is to improve your quality of life and help you avoid a visit to the emergency room or hospital.  If we work together, we can achieve this goal. But, if you feel you need to call 911 or go to the emergency room, please do so.  If you go to the emergency room, please bring your list of medicines and your daily weight chart with you.       GREEN ZONE     Doing well today    Weight gained is no more than 2 pounds a day or 5 pounds a week.    No swelling in feet, ankles, legs or stomach.    No more swelling than usual.    No more trouble breathing than usual.    No change in my sleep.    No other problems. Actions:    I am doing fine.  I will take my medicine, follow my diet, see my doctor, exercise, and watch for symptoms.           YELLOW ZONE         Having a bad day or flare up    Weight gain of more than 2 pounds in one day or 5 pounds in one week.    New swelling in ankle, leg, knee or thigh.    Bloating in belly, pants feel tighter.    Swelling in hands or face.    Coughing or trouble breathing while walking or talking.    Harder to breathe last night.    Have trouble sleeping, wake up short of breath.    Much more tired than usual.    Not eating.    Pain in my chest or bad leg cramps.    Feel weak or dizzy. Actions:    I need  to take action and call my doctor or nurse today.                 RED ZONE         Need medical care now    Weight gain of 5 pounds overnight.    Chest pain or pressure that does not go away.    Feel less alert.    Wheezing or have trouble breathing when at rest.    Cannot sleep lying down.    Cannot take my water pill.    Pass out or faint. Actions:    I need to call my doctor or nurse now!    Call 911 if I have chest pain or cannot breathe.

## 2018-07-17 NOTE — PROGRESS NOTES
SUBJECTIVE:   Colin Shell is a 89 year old male who presents to clinic today for the following health issues:    Patient has a ? About continuing the lasix 40mg     Heart Failure Follow-up    Symptoms:    Shortness of breath: happens with rest and exertion - stable    Lower extremity edema: none    Chest pain: No    Using more pillows than normal: No    Cough at night: Yes-  Needs to get up to sit in recliner for cough     Weight:    Checking weight daily: Yes    Weight change: weight decrease of 1-2  lb    Cardiology visits, ER/UC, or hospital admissions since last visit: ER/UC - 7-13-18    Medication side effects: none      Amount of exercise or physical activity: None    Problems taking medications regularly: No    Medication side effects: none    Diet: low salt      ED/UC Followup:    Facility: [facility]   Date of visit: 7-13-18  Reason for visit: CHF  Current Status: Patient is still coughing        Patient will need a order or ref to continue PT at [facility] for arms and legs for balance and strength        89 yr old male here following discharge from a [facility] home for rehab. Was also seen in the ED on the 13 th of July for cough and shortness of breath. He has past medical history that is significant for pulmonary fibrosis. He also has some cardiomyopathy secondary to ischemia. He is on medical management for this including metoprolol, aspirin , and furosemide. He had a question on the dosing of this as he was asked to double on the dose when he was last seen in the ED so presently he is on 40 mg presently . He appears to be back at his dry weight which is right around 170 lbs. He says breathing is better but he is still coughing quite a bit. Cough is dry in nature. No shortness of breath and no chest pain associated with it.     Problem list and histories reviewed & adjusted, as indicated.  Additional history: as documented    Patient Active Problem List   Diagnosis     Hypertension goal BP (blood  pressure) < 140/90     Gout     Coronary artery disease involving coronary bypass graft of native heart without angina pectoris     Chronic kidney disease, stage III (moderate)     Obesity     Benign prostatic hyperplasia     Hyperlipidemia LDL goal <100     Bronchiectasis (H)     Cholelithiasis     Fibrosis of lung (H)     Pulmonary nodule, right     Advance Care Planning     Chronic systolic CHF (congestive heart failure) (H)     Paroxysmal atrial fibrillation (H)     Dyspnea     Bronchiectasis without complication (H)     Lymphedema     Ischemic cardiomyopathy     Cardiac pacemaker in situ     DDD (degenerative disc disease), lumbar     Hiatal hernia     Past Surgical History:   Procedure Laterality Date     BIOPSY ARTERY TEMPORAL Left 9/18/2017    Procedure: BIOPSY ARTERY TEMPORAL;  Left Temporal Biopsy ;  Surgeon: Ivana Phan MD;  Location: [facility] OR     SURGICAL HISTORY OF -       heel spurs     SURGICAL HISTORY OF -   2005, 2006    bilateral eye cataract     SURGICAL HISTORY OF -   1996    CABG x 5 vessels     SURGICAL HISTORY OF -   2010    circumcision     TONSILLECTOMY & ADENOIDECTOMY         Social History   Substance Use Topics     Smoking status: Former Smoker     Smokeless tobacco: Never Used      Comment: Quit at age 32     Alcohol use Yes      Comment: 12 pack a year     Family History   Problem Relation Age of Onset     Diabetes Father      Diabetes Brother      Diabetes Brother      Hypertension Son      Unknown/Adopted Maternal Grandmother      Unknown/Adopted Maternal Grandfather      Unknown/Adopted Paternal Grandmother      Unknown/Adopted Paternal Grandfather      Dementia Sister          Current Outpatient Prescriptions   Medication Sig Dispense Refill     allopurinol (ZYLOPRIM) 100 MG tablet Take 0.5 tablets (50 mg) by mouth daily 30 tablet 11     aspirin  MG EC tablet Take 1 tablet (325 mg) by mouth daily 90 tablet 3     cetirizine (ZYRTEC) 10 MG tablet Take 1 tablet (10 mg)  "by mouth every evening 30 tablet 1     finasteride (PROSCAR) 5 MG tablet Take 1 tablet (5 mg) by mouth daily 90 tablet 3     furosemide (LASIX) 20 MG tablet Take 1 tablet (20 mg) by mouth daily (Patient taking differently: Take 40 mg by mouth daily ) 60 tablet 11     guaiFENesin (MUCINEX) 600 MG 12 hr tablet Take 600 mg by mouth daily        metoprolol succinate (TOPROL-XL) 25 MG 24 hr tablet Take 2 tablets (50 mg) by mouth daily 180 tablet 3     Multiple Vitamins-Minerals (CENTRUM SILVER) per tablet Take 1 tablet by mouth daily       Allergies   Allergen Reactions     Flomax [Tamsulosin Hydrochloride]      Hctz Other (See Comments)     Caused loss of balance     Lisinopril Cough     Simvastatin Nausea and Vomiting and Diarrhea     Vytorin Nausea and Diarrhea     BP Readings from Last 3 Encounters:   07/17/18 120/68   07/13/18 171/88   07/09/18 119/71    Wt Readings from Last 3 Encounters:   07/17/18 174 lb (78.9 kg)   07/09/18 177 lb (80.3 kg)   07/02/18 179 lb (81.2 kg)                  Labs reviewed in EPIC    Reviewed and updated as needed this visit by clinical staff       Reviewed and updated as needed this visit by Provider         ROS:  Constitutional, HEENT, cardiovascular, pulmonary, gi and gu systems are negative, except as otherwise noted.    OBJECTIVE:     /68 (BP Location: Left arm, Cuff Size: Adult Regular)  Pulse 71  Temp 97  F (36.1  C) (Tympanic)  Ht 5' 10\" (1.778 m)  Wt 174 lb (78.9 kg)  SpO2 95%  BMI 24.97 kg/m2  Body mass index is 24.97 kg/(m^2).  GENERAL: healthy, alert and no distress  EYES: Eyes grossly normal to inspection, PERRL and conjunctivae and sclerae normal  HENT: ear canals and TM's normal, nose and mouth without ulcers or lesions  NECK: no adenopathy, no asymmetry, masses, or scars and thyroid normal to palpation  RESP: lungs clear to auscultation - no rales, rhonchi or wheezes  CV: regular rate and rhythm, normal S1 S2, no S3 or S4, no murmur, click or rub, no " peripheral edema and peripheral pulses strong  ABDOMEN: soft, nontender, no hepatosplenomegaly, no masses and bowel sounds normal  MS: no gross musculoskeletal defects noted, no edema  SKIN: no suspicious lesions or rashes  PSYCH: mentation appears normal, affect normal/bright    Diagnostic Test Results:  Results for orders placed or performed in visit on 07/17/18 (from the past 24 hour(s))   CBC with platelets differential   Result Value Ref Range    WBC 10.5 4.0 - 11.0 10e9/L    RBC Count 4.81 4.4 - 5.9 10e12/L    Hemoglobin 13.8 13.3 - 17.7 g/dL    Hematocrit 42.0 40.0 - 53.0 %    MCV 87 78 - 100 fl    MCH 28.7 26.5 - 33.0 pg    MCHC 32.9 31.5 - 36.5 g/dL    RDW 17.3 (H) 10.0 - 15.0 %    Platelet Count 317 150 - 450 10e9/L    Diff Method Automated Method     % Neutrophils 44.1 %    % Lymphocytes 33.3 %    % Monocytes 13.2 %    % Eosinophils 8.9 %    % Basophils 0.5 %    Absolute Neutrophil 4.6 1.6 - 8.3 10e9/L    Absolute Lymphocytes 3.5 0.8 - 5.3 10e9/L    Absolute Monocytes 1.4 (H) 0.0 - 1.3 10e9/L    Absolute Eosinophils 0.9 (H) 0.0 - 0.7 10e9/L    Absolute Basophils 0.1 0.0 - 0.2 10e9/L       ASSESSMENT/PLAN:   (I50.9) Congestive heart failure, unspecified congestive heart failure chronicity, unspecified congestive heart failure type (H)  (primary encounter diagnosis)  Comment: Labs reviewed with patient. Seems to be back at dry weight . Did a lot of patient education on CHF. Went over the Heart failure action plan .  Plan: BNP-N terminal pro, CBC with platelets         differential, HEART FAILURE ACTION PLAN      (R05) Allergic cough  Comment: Trial of antihistamine for dry cough   Plan: cetirizine (ZYRTEC) 10 MG tablet      (R53.81) Physical deconditioning  Comment: referred to physical therapy at [facility]  Plan: PHYSICAL THERAPY REFERRAL        FUTURE APPOINTMENTS:       - Follow-up visit as needed    Noelle Friedman MD  [facility]

## 2018-07-17 NOTE — LETTER
July 17, 2018      Colin Shell  [address] [address] [address]        Dear ,    We are writing to inform you of your test results.    Your test results fall within the expected range(s) .    Resulted Orders   CBC with platelets differential   Result Value Ref Range    WBC 10.5 4.0 - 11.0 10e9/L    RBC Count 4.81 4.4 - 5.9 10e12/L    Hemoglobin 13.8 13.3 - 17.7 g/dL    Hematocrit 42.0 40.0 - 53.0 %    MCV 87 78 - 100 fl    MCH 28.7 26.5 - 33.0 pg    MCHC 32.9 31.5 - 36.5 g/dL    RDW 17.3 (H) 10.0 - 15.0 %    Platelet Count 317 150 - 450 10e9/L    Diff Method Automated Method     % Neutrophils 44.1 %    % Lymphocytes 33.3 %    % Monocytes 13.2 %    % Eosinophils 8.9 %    % Basophils 0.5 %    Absolute Neutrophil 4.6 1.6 - 8.3 10e9/L    Absolute Lymphocytes 3.5 0.8 - 5.3 10e9/L    Absolute Monocytes 1.4 (H) 0.0 - 1.3 10e9/L    Absolute Eosinophils 0.9 (H) 0.0 - 0.7 10e9/L    Absolute Basophils 0.1 0.0 - 0.2 10e9/L       If you have any questions or concerns, please call the clinic at the number listed above.   Sincerely,        Noelle Friedman MD/ivelisse

## 2018-07-17 NOTE — MR AVS SNAPSHOT
After Visit Summary   7/17/2018    Colin Shell    MRN: [id number]           Patient Information     Date Of Birth          6/25/1927        Visit Information        Provider Department      7/17/2018 9:40 AM Noelle Friedman MD [facility]        Today's Diagnoses     Congestive heart failure, unspecified congestive heart failure chronicity, unspecified congestive heart failure type (H)    -  1    Allergic cough        Physical deconditioning          Care Instructions          Thank you for choosing [facility].  You may be receiving a survey in the mail from Maryellen Dockery regarding your visit today.  Please take a few minutes to complete and return the survey to let us know how we are doing.      If you have questions or concerns, please contact us via Jeeran or you can contact your care team at [phone].    Our Clinic hours are:  Monday 6:40 am  to 7:00 pm  Tuesday -Friday 6:40 am to 5:00 pm    The [place] outpatient lab hours are:  Monday - Friday 6:10 am to 4:45 pm  Saturdays 7:00 am to 11:00 am  Appointments are required, call [phone]    If you have clinical questions after hours or would like to schedule an appointment,  call the clinic at [phone].          Follow-ups after your visit        Additional Services     PHYSICAL THERAPY REFERRAL       *This therapy referral will be filtered to a centralized scheduling office at [facility] and the patient will receive a call to schedule an appointment at a [facility] location most convenient for them. * [facility] provides Physical Therapy evaluation and treatment and many specialty services across the [facility] system.  If requesting a specialty program, please choose from the list below.    If you have not heard from the scheduling office within 2 business days, please call [phone] for all locations, with the exception of [place], please call [phone] and  "[place], please call [phone]  Treatment: Evaluation & Treatment  Special Instructions/Modalities:   Special Programs: None    [facility]    Please be aware that coverage of these services is subject to the terms and limitations of your health insurance plan.  Call member services at your health plan with any benefit or coverage questions.      **Note to Provider:  If you are referring outside of [place] for the therapy appointment, please list the name of the location in the \"special instructions\" above, print the referral and give to the patient to schedule the appointment.                  Follow-up notes from your care team     Return in about 1 month (around 8/17/2018) for recheck CHF.      Your next 10 appointments already scheduled     Aug 14, 2018  9:20 AM CDT   SHORT with Noelle Friedman MD   [facility]    [address] [address]   [phone]            Aug 21, 2018  4:30 PM CDT   Remote ICD Check with CASEY DCR2   [facility] ([facility])    [address] [place] [address] [address]  [facility] [id number] [phone] OPT 2           This appointment is for a remote check of your debrillator.  This is not an appointment at the office.              Who to contact     If you have questions or need follow up information about today's clinic visit or your schedule please contact [facility] directly at [phone].  Normal or non-critical lab and imaging results will be communicated to you by MyChart, letter or phone within 4 business days after the clinic has received the results. If you do not hear from us within 7 days, please contact the clinic through MyChart or phone. If you have a critical or abnormal lab result, we will notify you by phone as soon as possible.  Submit refill requests through Xeroundt or call your pharmacy and they will forward the refill " "request to us. Please allow 3 business days for your refill to be completed.          Additional Information About Your Visit        Care EveryWhere ID     This is your Care EveryWhere ID. This could be used by other organizations to access your [facility] medical records  [id number]        Your Vitals Were     Pulse Temperature Height Pulse Oximetry BMI (Body Mass Index)       71 97  F (36.1  C) (Tympanic) 5' 10\" (1.778 m) 95% 24.97 kg/m2        Blood Pressure from Last 3 Encounters:   07/17/18 120/68   07/13/18 171/88   07/09/18 119/71    Weight from Last 3 Encounters:   07/17/18 174 lb (78.9 kg)   07/09/18 177 lb (80.3 kg)   07/02/18 179 lb (81.2 kg)              We Performed the Following     BNP-N terminal pro     CBC with platelets differential     HEART FAILURE ACTION PLAN     PHYSICAL THERAPY REFERRAL          Today's Medication Changes          These changes are accurate as of 7/17/18  4:20 PM.  If you have any questions, ask your nurse or doctor.               Start taking these medicines.        Dose/Directions    cetirizine 10 MG tablet   Commonly known as:  zyrTEC   Used for:  Allergic cough   Started by:  Noelle Friedman MD        Dose:  10 mg   Take 1 tablet (10 mg) by mouth every evening   Quantity:  30 tablet   Refills:  1         These medicines have changed or have updated prescriptions.        Dose/Directions    furosemide 20 MG tablet   Commonly known as:  LASIX   This may have changed:  how much to take   Used for:  Lymphedema        Dose:  20 mg   Take 1 tablet (20 mg) by mouth daily   Quantity:  60 tablet   Refills:  11            Where to get your medicines      These medications were sent to [facility] [address] [place] [address] [address]     Phone:  [phone]     cetirizine 10 MG tablet                Primary Care Provider Office Phone # Fax #    Jono Drake [phone] [address]  [facility] [address]        Goals        General    Improve chronic symptoms (pt-stated)     Notes - Note edited  7/11/2018  3:12 PM by Edwin Marley, RN    Goal Statement: I want to keep my heart failure controlled  Measure of Success: my breathing will improve and my weight will go down  Supportive Steps to Achieve: I will weigh daily and record                                                  I will take 2 lasix tabs until instructions change                                                  I will eat a low salt diet  Barriers: Did not understand that he should increase to 2 lasix tablets, has not been recording weights  Strengths: patient willing to take needed steps  Date to Achieve By: 7/14/18      Medication 1 (pt-stated)     Notes - Note edited  7/11/2018  3:11 PM by Edwin Marley, RN    Goal Statement: I will increase my Lasix to help decrease my weight  Measure of Success: Weight will drop to 204 lb range and my leg swelling will decreas  Supportive Steps to Achieve:increase your lasix to two tablets as instructed by Cardiology  Barriers: Cardiologist did not write new script so bottle states 1 tablet  Strengths: Patient willing to increase to help with fluid  Date to Achieve By:7/15/18        Equal Access to Services     [facility] : Hadii lanny lewis hadasho Sobernadine, waaxda luqadaha, qaybta kaalmada adeegyayasmin, fina lake . So [facility] [phone].    ATENCIÓN: Si habla español, tiene a rojo disposición servicios gratuitos de asistencia lingüística. Llame al [phone].    We comply with applicable federal civil rights laws and [place] laws. We do not discriminate on the basis of race, color, national origin, age, disability, sex, sexual orientation, or gender identity.            Thank you!     Thank you for choosing [facility]  for your care. Our goal is always to provide you with excellent care. Hearing back from our patients is one way we can continue  to improve our services. Please take a few minutes to complete the written survey that you may receive in the mail after your visit with us. Thank you!             Your Updated Medication List - Protect others around you: Learn how to safely use, store and throw away your medicines at www.disposemymeds.org.          This list is accurate as of 7/17/18  4:20 PM.  Always use your most recent med list.                   Brand Name Dispense Instructions for use Diagnosis    allopurinol 100 MG tablet    ZYLOPRIM    30 tablet    Take 0.5 tablets (50 mg) by mouth daily    Gout of foot, unspecified cause, unspecified chronicity, unspecified laterality       aspirin 325 MG EC tablet     90 tablet    Take 1 tablet (325 mg) by mouth daily        CENTRUM SILVER per tablet      Take 1 tablet by mouth daily        cetirizine 10 MG tablet    zyrTEC    30 tablet    Take 1 tablet (10 mg) by mouth every evening    Allergic cough       finasteride 5 MG tablet    PROSCAR    90 tablet    Take 1 tablet (5 mg) by mouth daily    Benign non-nodular prostatic hyperplasia without lower urinary tract symptoms       furosemide 20 MG tablet    LASIX    60 tablet    Take 1 tablet (20 mg) by mouth daily    Lymphedema       guaiFENesin 600 MG 12 hr tablet    MUCINEX     Take 600 mg by mouth daily        metoprolol succinate 25 MG 24 hr tablet    TOPROL-XL    180 tablet    Take 2 tablets (50 mg) by mouth daily    Paroxysmal atrial fibrillation (H), Coronary artery disease involving native coronary artery of native heart without angina pectoris

## 2018-07-17 NOTE — LETTER
My Heart Failure Action Plan   Name: Colin Shell    YOB: 1927   Date: 7/17/2018    My doctor: Jono Drake     [facility]     [address] [id number] [phone]  My Diagnosis: Systolic Heart Failure   My Ejection Fraction: 45% - 49%    My Exercise Goal: 30 minutes daily  .     My Weight Goal: 170lbs  Wt Readings from Last 2 Encounters:   07/17/18 174 lb (78.9 kg)   07/09/18 177 lb (80.3 kg)     Weigh yourself daily using the same scale. If you gain more than 2 pounds in 24 hours or 5 pounds in a week increase your diuretic to Lasix (Furosemide)    My Diet Goal: No added salt    Emergency Room Visits:    Our goal is to improve your quality of life and help you avoid a visit to the emergency room or hospital.  If we work together, we can achieve this goal. But, if you feel you need to call 911 or go to the emergency room, please do so.  If you go to the emergency room, please bring your list of medicines and your daily weight chart with you.       GREEN ZONE     Doing well today    Weight gained is no more than 2 pounds a day or 5 pounds a week.    No swelling in feet, ankles, legs or stomach.    No more swelling than usual.    No more trouble breathing than usual.    No change in my sleep.    No other problems. Actions:    I am doing fine.  I will take my medicine, follow my diet, see my doctor, exercise, and watch for symptoms.           YELLOW ZONE         Having a bad day or flare up    Weight gain of more than 2 pounds in one day or 5 pounds in one week.    New swelling in ankle, leg, knee or thigh.    Bloating in belly, pants feel tighter.    Swelling in hands or face.    Coughing or trouble breathing while walking or talking.    Harder to breathe last night.    Have trouble sleeping, wake up short of breath.    Much more tired than usual.    Not eating.    Pain in my chest or bad leg cramps.    Feel weak or dizzy. Actions:    I need to take  action and call my doctor or nurse today.                 RED ZONE         Need medical care now    Weight gain of 5 pounds overnight.    Chest pain or pressure that does not go away.    Feel less alert.    Wheezing or have trouble breathing when at rest.    Cannot sleep lying down.    Cannot take my water pill.    Pass out or faint. Actions:    I need to call my doctor or nurse now!    Call 911 if I have chest pain or cannot breathe.

## 2018-07-19 NOTE — PROGRESS NOTES
Please inform patient that test result showed some improvement .  Thank you.     Noelle Friedman M.D.

## 2018-07-19 NOTE — TELEPHONE ENCOUNTER
Form from [place]-Consult[facility] Pharmacists Medication Review. Orders were signed, faxed and sent to [facility].    [facility]

## 2018-07-24 NOTE — PROGRESS NOTES
Clinic Care Coordination Contact  Clinic Care Coordination Contact  OUTREACH  Referral Information:  Referral Source: ED Follow-Up  Primary Diagnosis: Other (include Comment box) (Pain/limited mobility R/T spinal Stenosis)  No chief complaint on file.  [place] Utilization:   Clinic Utilization  Difficulty keeping appointments No  Utilization    Last refreshed: 2018  7:19 PM:  No Show Count (past year) 4       Last refreshed: 2018  7:19 PM:  ED visits 5       Last refreshed: 2018  7:19 PM:  Hospital admissions 2          Current as of: 2018  7:19 PM           Clinical Concerns:  Current Medical Concerns: ED visit for worsening CHF symptoms. Has since followed up with PCP and symptoms improved. In person HF education done at that time.  Current Behavioral Concerns:None  Education Provided to patient: Reiterated role of CCC, heart failure education reinforced    Pain  Chronic pain (GOAL): No  Health Maintenance Reviewed:    Clinical Pathway: Clinic Care Coordination CHF Assessment    Discharge:  Hospital summary:   Day of hospital discharge: 18  What recommendations were made for follow up after your recent hospitalization? PCP f/u-he has     CHF:  Home scale available:  Yes  Home scale weight this mornin-he read med 7 days of weights. They were all 165 or 166  Hospital discharge weight: 174 at last PCP visit  Current weight:166  Heart Failure Zones sheet on refrigerator or available: Yes  Any increased SOB since hospital discharge:  No  Any increased edema since hospital discharge:  No  What number to call for YELLOW zones:      Symptom Review:   Heart Failure Symptoms  Shortness of breath:: No  Wheezing or noisy breathing?: No  Cough: Yes  Is your cough:: Loose  Increased sputum: No  Fever: No  Chest pain: : No  Heartbeat: Regular  Dizzy or Lightheaded: No  Checking weight daily? : Yes  Weight?: Unchanged  Today's Weight?: 166 lb (75.3 kg)  Weight increase more than 2 lbs in 24  hours?: No  Weight Increase more than 5 lbs in 1 week? : No  Does the patient have understanding of Diuretic self-management?: N/A  Diet:: No added salt  Appetite:: Normal  Bloating:: None  Urination:: Normal  Fatigue: No  Weakness (Heaviness in limbs):: No  What Heart Failure zone are you currently in?: Green  Overall your CHF symptoms are (GOAL):: Improving  How confident are you with the plan we have identified?: 8     Functional Status:  Bed or wheelchair confined: No  Mobility Status: Independent w/Device (cane)    Living Situation:  Current living arrangement:: I live alone, I live in a private home (son lives down the street and assists as needed)  Type of residence: (had been at [facility] for elective admit for strengthening)    Diet/Exercise/Sleep:  Diet: No added salt  Inadequate nutrition (GOAL): No  Food Insecurity: No  Tube Feeding: No  Exercise: Currently not exercising (moving about the house)  Inadequate activity/exercise (GOAL):: No  Significant changes in sleep pattern (GOAL): No    Transportation:  Transportation concerns (GOAL):: No  Transportation means:: Accessible car, Regular car, Family patient reports he still drives     Psychosocial:  Denominational or spiritual beliefs that impact treatment: No  Mental health DX: No  Mental health management concern (GOAL): No  Informal Support system: Children     Financial/Insurance:   Financial/Insurance concerns (GOAL): No     Resources and Interventions:  Current Resources: None He reports that his OP therapy would be oop as he has used all of his days when he was at [facility]. He reports he is out and about daily and feeling stronger  Community Resources:  (OP Therapies)  Equipment Currently Used at Home: cane, straight, walker, rolling    Advance Care Plan/Directive  Advanced Care Plans/Directives on file: Yes  Type Advanced Care Plans/Directives: Advanced Directive - On File     Goals:   Goals        General    Improve chronic symptoms (pt-stated)     Notes  "- Note edited  7/11/2018  3:12 PM by Edwin Marley, RN    Goal Statement: I want to keep my heart failure controlled  Measure of Success: my breathing will improve and my weight will go down  Supportive Steps to Achieve: I will weigh daily and record                                                  I will take 2 lasix tabs until instructions change                                                  I will eat a low salt diet  Barriers: Did not understand that he should increase to 2 lasix tablets, has not been recording weights  Strengths: patient willing to take needed steps  Date to Achieve By: 7/14/18      Medication 1 (pt-stated)     Notes - Note edited  7/11/2018  3:11 PM by Edwin Marley RN    Goal Statement: I will increase my Lasix to help decrease my weight  Measure of Success: Weight will drop to 204 lb range and my leg swelling will decreas  Supportive Steps to Achieve:increase your lasix to two tablets as instructed by Cardiology  Barriers: Cardiologist did not write new script so bottle states 1 tablet  Strengths: Patient willing to increase to help with fluid  Date to Achieve By:7/15/18          Patient/Caregiver understanding: Patient reports that he is feeling much better. Still has a productive cough of sm amt yellow sputum. Reports that the worst time for the cough is at night. Discussed using cough syrup. He thought he might try but reports \"I am sleeping when it happens so I can't take cough syrup.\" Discussed that he could take before bed or have at his bedside for when he wakes up. Discussed current HF symptoms (see assessment). He has HF AP and refers to it.Symptoms are stable/improved and he is in zone green. Is aware of PCP f/u scheduled for 8/14. Discussed following up with him after that. He thought that would be OK but really feels it is unnecessary. Reports, \"I am 89 yrs old, you have nothing that will make me better.\" Discussed quality of life being a good reason to check in " "regarding heart failure. He then said\"that's what you people don't understand, I have no quality of life and that is just the way it is.\"     Future Appointments              [facility]    In 4 weeks CASEY DCR2 [facility] [place] PSA CLIN          Plan: RN rd will outreach after next PCP appointment and likely disenroll/graduate him if goals are met and he is stable.    Edwin GRACIA,RN- BC  Clinic Care Coordinator  [facility]  Phone: [phone]            "

## 2018-08-15 NOTE — PROGRESS NOTES
Clinic Care Coordination Contact    Situation: Patient chart reviewed by care coordinator.    Background: Patient enrolled with CCC after a hospitalization in April for Bronchiectasis with underlying heart failure. Has had 2 ED visits in the last 4 months for acute on chronic heart failure. Initially, wanted to engage with CCC. Goals were seyt and met. However on last otreach after most recent ED visit, he indicated that he no longer felt the need to work with CCC. He stated that he is 89 and has had a good life but feels his health will fail and that his quality of life is compromised and will continue to be as he is 89 nd there is nothing that can be done to make things better. Kept him on to review chart after PCP visit scheduled for yesterday. It appears that he did not show or that he had cancelled (there are no notes to indicate what happened).     Assessment: Patient declined further outreach on last encounter. He had met original goals that were set but declined to set anymore.    Plan/Recommendations: No further outreaches will be done at this time.    Edwin MOOREN,RN- BC  Clinic Care Coordinator  [facility]  Phone: [phone]

## 2018-08-21 NOTE — MR AVS SNAPSHOT
After Visit Summary   8/21/2018    Colin Shell    MRN: [id number]           Patient Information     Date Of Birth          6/25/1927        Visit Information        Provider Department      8/21/2018 4:30 PM CASEY DCR2 [facility]        Today's Diagnoses     ICD (implantable cardioverter-defibrillator) in place    -  1    Ischemic cardiomyopathy           Follow-ups after your visit        Your next 10 appointments already scheduled     Aug 21, 2018  4:30 PM CDT   Remote ICD Check with CASEY DCR2   [facility] ([facility])    [id number]  [facility] [address]   [phone] OPT 2           This appointment is for a remote check of your debrillator.  This is not an appointment at the office.            Aug 23, 2018  9:00 AM CDT   SHORT with Eric Brasher MD   [facility] ([facility]    [address] [address]   [phone]            Dec 13, 2018  4:30 PM CST   Remote ICD Check with CASEY DCR2   [facility] ([facility])    [address]  [facility] [address]   [phone] OPT 2           This appointment is for a remote check of your debrillator.  This is not an appointment at the office.              Who to contact     If you have questions or need follow up information about today's clinic visit or your schedule please contact [facility] directly at [phone].  Normal or non-critical lab and imaging results will be communicated to you by MyChart, letter or phone within 4 business days after the clinic has received the results. If you do not hear from us within 7 days, please contact the clinic through MyChart or phone. If you have a critical or abnormal lab result, we will notify you by phone as soon as possible.  Submit refill  requests through [email or web address] or call your pharmacy and they will forward the refill request to us. Please allow 3 business days for your refill to be completed.          Additional Information About Your Visit        Care EveryWhere ID     This is your Care EveryWhere ID. This could be used by other organizations to access your [facility] medical records  [id number]         Blood Pressure from Last 3 Encounters:   07/17/18 120/68   07/13/18 171/88   07/09/18 119/71    Weight from Last 3 Encounters:   07/17/18 78.9 kg (174 lb)   07/09/18 80.3 kg (177 lb)   07/02/18 81.2 kg (179 lb)              We Performed the Following     ICD DEVICE INTERROGAT REMOTE (04860)     INTERROGATION DEVICE EVAL REMOTE, PACER/ICD (25512)          Today's Medication Changes          These changes are accurate as of 8/21/18  8:31 AM.  If you have any questions, ask your nurse or doctor.               These medicines have changed or have updated prescriptions.        Dose/Directions    furosemide 20 MG tablet   Commonly known as:  LASIX   This may have changed:  how much to take   Used for:  Lymphedema        Dose:  20 mg   Take 1 tablet (20 mg) by mouth daily   Quantity:  60 tablet   Refills:  11                Primary Care Provider Office Phone # Fax #    Jono Drake [phone] [address] [address]        Equal Access to Services     ALEXIS PACE AH: Hadii aad ku hadasho Sobernadine, waaxda luqadaha, qaybta kaalmada tim, fina vo. So [facility] [phone].    ATENCIÓN: Si habla español, tiene a rojo disposición servicios gratuitos de asistencia lingüística. Laureano al [phone].    We comply with applicable federal civil rights laws and [place] laws. We do not discriminate on the basis of race, color, national origin, age, disability, sex, sexual orientation, or gender identity.            Thank you!     Thank you for choosing [facility]  CARE   [place]  for your care. Our goal is always to provide you with excellent care. Hearing back from our patients is one way we can continue to improve our services. Please take a few minutes to complete the written survey that you may receive in the mail after your visit with us. Thank you!             Your Updated Medication List - Protect others around you: Learn how to safely use, store and throw away your medicines at www.disposemymeds.org.          This list is accurate as of 8/21/18  8:31 AM.  Always use your most recent med list.                   Brand Name Dispense Instructions for use Diagnosis    allopurinol 100 MG tablet    ZYLOPRIM    30 tablet    Take 0.5 tablets (50 mg) by mouth daily    Gout of foot, unspecified cause, unspecified chronicity, unspecified laterality       aspirin 325 MG EC tablet     90 tablet    Take 1 tablet (325 mg) by mouth daily        CENTRUM SILVER per tablet      Take 1 tablet by mouth daily        cetirizine 10 MG tablet    zyrTEC    30 tablet    Take 1 tablet (10 mg) by mouth every evening    Allergic cough       finasteride 5 MG tablet    PROSCAR    90 tablet    Take 1 tablet (5 mg) by mouth daily    Benign non-nodular prostatic hyperplasia without lower urinary tract symptoms       furosemide 20 MG tablet    LASIX    60 tablet    Take 1 tablet (20 mg) by mouth daily    Lymphedema       guaiFENesin 600 MG 12 hr tablet    MUCINEX     Take 600 mg by mouth daily        metoprolol succinate 25 MG 24 hr tablet    TOPROL-XL    180 tablet    Take 2 tablets (50 mg) by mouth daily    Paroxysmal atrial fibrillation (H), Coronary artery disease involving native coronary artery of native heart without angina pectoris

## 2018-08-21 NOTE — PROGRESS NOTES
Medtronic Evera (D) ICD Remote Device Check  AP: 4 % : 35 %  Mode: DDD         Presenting Rhythm: AF with VS and   Heart Rate: good variability  Sensing: WNL    Pacing Threshold: WNL    Impedance: WNL  Battery Status: 8.3 yrs estimated longevity   Atrial Arrhythmia: since 5/1/2018, 73% AF burden. Pt declines AC and takes ASA 325mg daily. 10 episode of AT/AF with EGMs with the 11th episode currently in progress. 5 of the EGMs show aflutter with max A rates around 200 bpm. The rest of the EGMs show AF with A rates up to 600. Average V rates 64-83 bpm. (2 episodes do have average V rates 90's-100 bpm, these episodes are only 1 minute and 3 minutes in duration)  Ventricular Arrhythmia: none  ATP: none    Shocks: none  Other: Optivol was elevated back in March-May, appears to have resolved now with slight upward trend in August. See graph below.     Care Plan: remote in 3 months, scheduled. Order in [facility] for pt to see Dr. Erickson in July, scheduling note says pt declined. Order in for OV with Bette YEPEZ in September, this has not been scheduled yet. Pt is seen in [facility]. Called pt, no answer, left message with results and plan, asked pt to call [facility] to st up OV with Bette YEPEZ and call [facility] with any questions about ICD check.   JAMES RN

## 2018-08-23 NOTE — PROGRESS NOTES
SUBJECTIVE:   Colin Shell is a 89 year old male who presents to clinic today for the following health issues:      Left ankle Swelling    Onset: just noticed it a couple days ago    Description:   Location: left ankle  Character: swelling    Intensity: mild    Progression of Symptoms: same    Accompanying Signs & Symptoms:  Other symptoms: none    History:   Previous similar pain: YES- has had swelling in the past, takes water pills, is wondering if he needs to continues       Precipitating factors:   Trauma or overuse: no     Alleviating factors:  Improved by: nothing    Therapies Tried and outcome: na        Verified above history with patient.    Denies chest pain, dyspnea, palpitation, rapid weight gain, HA, BOV, dizziness or urinary changes.    Problem list and histories reviewed & adjusted, as indicated.  Additional history: as documented    Patient Active Problem List   Diagnosis     Hypertension goal BP (blood pressure) < 140/90     Gout     Coronary artery disease involving coronary bypass graft of native heart without angina pectoris     Chronic kidney disease, stage III (moderate)     Obesity     Benign prostatic hyperplasia     Hyperlipidemia LDL goal <100     Bronchiectasis (H)     Cholelithiasis     Fibrosis of lung (H)     Pulmonary nodule, right     Advance Care Planning     Chronic systolic CHF (congestive heart failure) (H)     Paroxysmal atrial fibrillation (H)     Dyspnea     Bronchiectasis without complication (H)     Lymphedema     Ischemic cardiomyopathy     Cardiac pacemaker in situ     DDD (degenerative disc disease), lumbar     Hiatal hernia     Past Surgical History:   Procedure Laterality Date     BIOPSY ARTERY TEMPORAL Left 9/18/2017    Procedure: BIOPSY ARTERY TEMPORAL;  Left Temporal Biopsy ;  Surgeon: Ivana Phan MD;  Location: [facility] OR     SURGICAL HISTORY OF -       heel spurs     SURGICAL HISTORY OF -   2005, 2006    bilateral eye cataract     SURGICAL HISTORY OF  -   1996    CABG x 5 vessels     SURGICAL HISTORY OF -   2010    circumcision     TONSILLECTOMY & ADENOIDECTOMY         Social History   Substance Use Topics     Smoking status: Former Smoker     Smokeless tobacco: Never Used      Comment: Quit at age 32     Alcohol use Yes      Comment: 12 pack a year     Family History   Problem Relation Age of Onset     Diabetes Father      Diabetes Brother      Diabetes Brother      Hypertension Son      Unknown/Adopted Maternal Grandmother      Unknown/Adopted Maternal Grandfather      Unknown/Adopted Paternal Grandmother      Unknown/Adopted Paternal Grandfather      Dementia Sister          Current Outpatient Prescriptions   Medication Sig Dispense Refill     ACE/ARB/ARNI NOT PRESCRIBED, INTENTIONAL, Please choose reason not prescribed, below       allopurinol (ZYLOPRIM) 100 MG tablet Take 0.5 tablets (50 mg) by mouth daily 30 tablet 11     aspirin  MG EC tablet Take 1 tablet (325 mg) by mouth daily 90 tablet 3     cetirizine (ZYRTEC) 10 MG tablet Take 1 tablet (10 mg) by mouth every evening 30 tablet 1     finasteride (PROSCAR) 5 MG tablet Take 1 tablet (5 mg) by mouth daily 90 tablet 3     guaiFENesin (MUCINEX) 600 MG 12 hr tablet Take 600 mg by mouth daily        metoprolol succinate (TOPROL-XL) 25 MG 24 hr tablet Take 2 tablets (50 mg) by mouth daily 180 tablet 3     Multiple Vitamins-Minerals (CENTRUM SILVER) per tablet Take 1 tablet by mouth daily       furosemide (LASIX) 20 MG tablet Take 1 tablet (20 mg) by mouth daily (Patient not taking: Reported on 8/23/2018) 60 tablet 11     Allergies   Allergen Reactions     Flomax [Tamsulosin Hydrochloride]      Hctz Other (See Comments)     Caused loss of balance     Lisinopril Cough     Simvastatin Nausea and Vomiting and Diarrhea     Vytorin Nausea and Diarrhea     BP Readings from Last 3 Encounters:   08/23/18 128/66   07/17/18 120/68   07/13/18 171/88    Wt Readings from Last 3 Encounters:   08/23/18 176 lb 12.8 oz  "(80.2 kg)   07/17/18 174 lb (78.9 kg)   07/09/18 177 lb (80.3 kg)                  Labs reviewed in EPIC    Reviewed and updated as needed this visit by clinical staff  Tobacco  Allergies  Meds  Problems  Med Hx  Surg Hx  Fam Hx  Soc Hx        Reviewed and updated as needed this visit by Provider  Allergies  Meds  Problems         ROS:  C: NEGATIVE for fever, chills or change in weight  I: NEGATIVE for worrisome rashes, moles or lesions  E: NEGATIVE for vision changes or irritation  R: NEGATIVE for significant cough or SOB  CV: NEGATIVE for chest pain, palpitations or peripheral edema  GI: NEGATIVE for nausea, abdominal pain, heartburn, or change in bowel habits  : NEGATIVE for frequency, dysuria, or hematuria  M: NEGATIVE for significant arthralgias or myalgia  N: NEGATIVE for weakness, dizziness or paresthesias  E: NEGATIVE for temperature intolerance, skin/hair changes  H: NEGATIVE for bleeding problems    OBJECTIVE:                                                    /66  Pulse 58  Temp 96.8  F (36  C) (Tympanic)  Ht 5' 10\" (1.778 m)  Wt 176 lb 12.8 oz (80.2 kg)  SpO2 98%  BMI 25.37 kg/m2  Body mass index is 25.37 kg/(m^2).  GENERAL:  alert and no distress, ambulatory w/o assist  NECK: no tenderness, no adenopathy,  Thyroid not enlarged  RESP: lungs clear to auscultation - no rales, no rhonchi, no wheezes  CV: regular rates and rhythm, no murmur  MS: no pitting edema either leg; left lower leg visually slightly bigger than right; old venous graft harvest scar present left  medial lower leg; left ankle with possibly trace bogginess but no TTP or erythema; no varicosities; pedal pulses fair  SKIN: no suspicious lesions, no rashes  ABD:  nontender    Diagnostic test results:  Diagnostic Test Results:  Results for orders placed or performed in visit on 07/17/18   BNP-N terminal pro   Result Value Ref Range    N-Terminal Pro Bnp 3740 (H) 0 - 450 pg/mL   CBC with platelets differential   Result " Value Ref Range    WBC 10.5 4.0 - 11.0 10e9/L    RBC Count 4.81 4.4 - 5.9 10e12/L    Hemoglobin 13.8 13.3 - 17.7 g/dL    Hematocrit 42.0 40.0 - 53.0 %    MCV 87 78 - 100 fl    MCH 28.7 26.5 - 33.0 pg    MCHC 32.9 31.5 - 36.5 g/dL    RDW 17.3 (H) 10.0 - 15.0 %    Platelet Count 317 150 - 450 10e9/L    Diff Method Automated Method     % Neutrophils 44.1 %    % Lymphocytes 33.3 %    % Monocytes 13.2 %    % Eosinophils 8.9 %    % Basophils 0.5 %    Absolute Neutrophil 4.6 1.6 - 8.3 10e9/L    Absolute Lymphocytes 3.5 0.8 - 5.3 10e9/L    Absolute Monocytes 1.4 (H) 0.0 - 1.3 10e9/L    Absolute Eosinophils 0.9 (H) 0.0 - 0.7 10e9/L    Absolute Basophils 0.1 0.0 - 0.2 10e9/L        ASSESSMENT/PLAN:                                                        ICD-10-CM    1. Left ankle swelling M25.472    2. Chronic systolic CHF (congestive heart failure) (H) I50.22 ACE/ARB/ARNI NOT PRESCRIBED, INTENTIONAL,      No clinically appreciable edema today. No clinical signs of CHF exacerbation.  Patient's weight is stable.  Patient said the discrepancy of lower leg circumference has been chronic and his baseline.  No clear indication to restart diuresis.  Continue compression stockings to legs.  Return precautions discussed and given to patient.      Follow up with Provider - prn   Patient Instructions   No sign of actual fluid retention in the left ankle/foot.  You may be prone to have mild edema in left leg due to the graft harvest for your heart before.    Continue wearing  Compression stockings on both legs.    If with both legs swelling, leg pain, chest pain, shortness of breath or redness of skin on legs, see provider promptly.    See cardiology as scheduled.    Get a flu shot mid-September to October 2018.        Eric Brasher MD  [facility]

## 2018-08-23 NOTE — PATIENT INSTRUCTIONS
No sign of actual fluid retention in the left ankle/foot.  You may be prone to have mild edema in left leg due to the graft harvest for your heart before.    Continue wearing  Compression stockings on both legs.    If with both legs swelling, leg pain, chest pain, shortness of breath or redness of skin on legs, see provider promptly.    See cardiology as scheduled.    Get a flu shot mid-September to October 2018.

## 2018-08-23 NOTE — MR AVS SNAPSHOT
After Visit Summary   8/23/2018    Colin Shell    MRN: [id number]           Patient Information     Date Of Birth          6/25/1927        Visit Information        Provider Department      8/23/2018 10:20 AM Eric Brasher MD [facility]        Today's Diagnoses     Left ankle swelling    -  1    Chronic systolic CHF (congestive heart failure) (H)          Care Instructions    No sign of actual fluid retention in the left ankle/foot.  You may be prone to have mild edema in left leg due to the graft harvest for your heart before.    Continue wearing  Compression stockings on both legs.    If with both legs swelling, leg pain, chest pain, shortness of breath or redness of skin on legs, see provider promptly.    See cardiology as scheduled.    Get a flu shot mid-September to October 2018.            Follow-ups after your visit        Your next 10 appointments already scheduled     Dec 13, 2018  4:30 PM CST   Remote ICD Check with CASEY DCR2   [facility] ([facility])    [address] [place] [address] [phone] OPT 2           This appointment is for a remote check of your debrillator.  This is not an appointment at the office.              Who to contact     If you have questions or need follow up information about today's clinic visit or your schedule please contact [facility] directly at [phone].  Normal or non-critical lab and imaging results will be communicated to you by MyChart, letter or phone within 4 business days after the clinic has received the results. If you do not hear from us within 7 days, please contact the clinic through MyChart or phone. If you have a critical or abnormal lab result, we will notify you by phone as soon as possible.  Submit refill requests through [employer] or call your pharmacy and they will forward the refill request to us. Please allow 3 business  "days for your refill to be completed.          Additional Information About Your Visit        Care EveryWhere ID     This is your Care EveryWhere ID. This could be used by other organizations to access your [place] medical records  [id number]        Your Vitals Were     Pulse Temperature Height Pulse Oximetry BMI (Body Mass Index)       58 96.8  F (36  C) (Tympanic) 5' 10\" (1.778 m) 98% 25.37 kg/m2        Blood Pressure from Last 3 Encounters:   08/23/18 128/66   07/17/18 120/68   07/13/18 171/88    Weight from Last 3 Encounters:   08/23/18 176 lb 12.8 oz (80.2 kg)   07/17/18 174 lb (78.9 kg)   07/09/18 177 lb (80.3 kg)              Today, you had the following     No orders found for display         Today's Medication Changes          These changes are accurate as of 8/23/18 11:11 AM.  If you have any questions, ask your nurse or doctor.               Start taking these medicines.        Dose/Directions    ACE/ARB/ARNI NOT PRESCRIBED (INTENTIONAL)   Used for:  Chronic systolic CHF (congestive heart failure) (H)   Started by:  Eric Brasher MD        Please choose reason not prescribed, below   Refills:  0            Where to get your medicines      Some of these will need a paper prescription and others can be bought over the counter.  Ask your nurse if you have questions.     You don't need a prescription for these medications     ACE/ARB/ARNI NOT PRESCRIBED (INTENTIONAL)                Primary Care Provider Office Phone # Fax #    Jono Drake [phone] [address] [address]        Equal Access to Services     [facility] : Hadii lanny Ramirez, waaxda luqadaha, qaybta kaalfina pacheco. So [facility] [phone].    ATENCIÓN: Si habla español, tiene a rojo disposición servicios gratuitos de asistencia lingüística. Llame al [phone].    We comply with applicable federal civil rights laws and " [place] laws. We do not discriminate on the basis of race, color, national origin, age, disability, sex, sexual orientation, or gender identity.            Thank you!     Thank you for choosing [facility]  for your care. Our goal is always to provide you with excellent care. Hearing back from our patients is one way we can continue to improve our services. Please take a few minutes to complete the written survey that you may receive in the mail after your visit with us. Thank you!             Your Updated Medication List - Protect others around you: Learn how to safely use, store and throw away your medicines at www.disposemymeds.org.          This list is accurate as of 8/23/18 11:11 AM.  Always use your most recent med list.                   Brand Name Dispense Instructions for use Diagnosis    ACE/ARB/ARNI NOT PRESCRIBED (INTENTIONAL)      Please choose reason not prescribed, below    Chronic systolic CHF (congestive heart failure) (H)       allopurinol 100 MG tablet    ZYLOPRIM    30 tablet    Take 0.5 tablets (50 mg) by mouth daily    Gout of foot, unspecified cause, unspecified chronicity, unspecified laterality       aspirin 325 MG EC tablet     90 tablet    Take 1 tablet (325 mg) by mouth daily        CENTRUM SILVER per tablet      Take 1 tablet by mouth daily        cetirizine 10 MG tablet    zyrTEC    30 tablet    Take 1 tablet (10 mg) by mouth every evening    Allergic cough       finasteride 5 MG tablet    PROSCAR    90 tablet    Take 1 tablet (5 mg) by mouth daily    Benign non-nodular prostatic hyperplasia without lower urinary tract symptoms       furosemide 20 MG tablet    LASIX    60 tablet    Take 1 tablet (20 mg) by mouth daily    Lymphedema       guaiFENesin 600 MG 12 hr tablet    MUCINEX     Take 600 mg by mouth daily        metoprolol succinate 25 MG 24 hr tablet    TOPROL-XL    180 tablet    Take 2 tablets (50 mg) by mouth daily    Paroxysmal atrial fibrillation (H), Coronary  artery disease involving native coronary artery of native heart without angina pectoris

## 2018-08-24 NOTE — ED AVS SNAPSHOT
[facility] Emergency Department    [address] [place] [address]    Phone:  [phone]    Fax:  [phone]                                       Colin Shell   MRN: [id number]    Department:  [facility] Emergency Department   Date of Visit:  8/24/2018           Patient Information     Date Of Birth          6/25/1927        Your diagnoses for this visit were:     Transient right leg weakness        You were seen by Manjinder Leung MD.        Discharge Instructions       Return if symptoms recur or new concerning symptoms develop    Your next 10 appointments already scheduled     Dec 13, 2018  4:30 PM CST   Remote ICD Check with CASEY DCR2   [facility] ([facility])    [address]  [facility] [facility] [place] [address] [phone] OPT 2           This appointment is for a remote check of your debrillator.  This is not an appointment at the office.              24 Hour Appointment Hotline       To make an appointment at any [facility], call [phone] ([phone]). If you don't have a family doctor or clinic, we will help you find one. [facility] are conveniently located to serve the needs of you and your family.             Review of your medicines      Our records show that you are taking the medicines listed below. If these are incorrect, please call your family doctor or clinic.        Dose / Directions Last dose taken    ACE/ARB/ARNI NOT PRESCRIBED (INTENTIONAL)        Please choose reason not prescribed, below   Refills:  0        allopurinol 100 MG tablet   Commonly known as:  ZYLOPRIM   Dose:  50 mg   Quantity:  30 tablet        Take 0.5 tablets (50 mg) by mouth daily   Refills:  11        aspirin 325 MG EC tablet   Dose:  325 mg   Quantity:  90 tablet        Take 1 tablet (325 mg) by mouth daily   Refills:  3        CENTRUM SILVER per tablet   Dose:  1 tablet        Take 1 tablet by mouth daily   Refills:  0         cetirizine 10 MG tablet   Commonly known as:  zyrTEC   Dose:  10 mg   Quantity:  30 tablet        Take 1 tablet (10 mg) by mouth every evening   Refills:  1        finasteride 5 MG tablet   Commonly known as:  PROSCAR   Dose:  5 mg   Quantity:  90 tablet        Take 1 tablet (5 mg) by mouth daily   Refills:  3        furosemide 20 MG tablet   Commonly known as:  LASIX   Dose:  20 mg   Quantity:  60 tablet        Take 1 tablet (20 mg) by mouth daily   Refills:  11        guaiFENesin 600 MG 12 hr tablet   Commonly known as:  MUCINEX   Dose:  600 mg        Take 600 mg by mouth daily   Refills:  0        metoprolol succinate 25 MG 24 hr tablet   Commonly known as:  TOPROL-XL   Dose:  50 mg   Quantity:  180 tablet        Take 2 tablets (50 mg) by mouth daily   Refills:  3                Orders Needing Specimen Collection     None      Pending Results     No orders found from 8/22/2018 to 8/25/2018.            Pending Culture Results     No orders found from 8/22/2018 to 8/25/2018.            Pending Results Instructions     If you had any lab results that were not finalized at the time of your Discharge, you can call the ED Lab Result RN at [phone]. You will be contacted by this team for any positive Lab results or changes in treatment. The nurses are available 7 days a week from 10A to 6:30P.  You can leave a message 24 hours per day and they will return your call.        Test Results From Your Hospital Stay               Thank you for choosing [facility]       Thank you for choosing [facility] for your care. Our goal is always to provide you with excellent care. Hearing back from our patients is one way we can continue to improve our services. Please take a few minutes to complete the written survey that you may receive in the mail after you visit with us. Thank you!        Care EveryWhere ID     This is your Care EveryWhere ID. This could be used by other organizations to access your [facility] medical  records  [id number]        Equal Access to Services     ALEXIS PACE : Priya Ramirez, kwan law, fina cedeno. So [facility] [phone].    ATENCIÓN: Si habla español, tiene a rojo disposición servicios gratuitos de asistencia lingüística. Llame al [phone].    We comply with applicable federal civil rights laws and [place] laws. We do not discriminate on the basis of race, color, national origin, age, disability, sex, sexual orientation, or gender identity.            After Visit Summary       This is your record. Keep this with you and show to your community pharmacist(s) and doctor(s) at your next visit.

## 2018-08-24 NOTE — ED AVS SNAPSHOT
[facility] Emergency Department    [address] [address]    Phone:  [phone]    Fax:  [phone]                                       Colin Shell   MRN: [id number]    Department:  [facility] Emergency Department   Date of Visit:  8/24/2018           After Visit Summary Signature Page     I have received my discharge instructions, and my questions have been answered. I have discussed any challenges I see with this plan with the nurse or doctor.    ..........................................................................................................................................  Patient/Patient Representative Signature      ..........................................................................................................................................  Patient Representative Print Name and Relationship to Patient    ..................................................               ................................................  Date                                            Time    ..........................................................................................................................................  Reviewed by Signature/Title    ...................................................              ..............................................  Date                                                            Time          22EPIC Rev 08/18

## 2018-08-25 NOTE — ED TRIAGE NOTES
"Attempted to stand from chair and R leg \"buckeled..it just gave out\"  Pt sat back into chain did not fall   R leg has been \"achy\" since  No history of same or similar  Reports he can not bear weight on it no known injury denies    Was at chiropractor today had low back worked on  "

## 2018-08-25 NOTE — ED PROVIDER NOTES
"  History     Chief Complaint   Patient presents with     Extremity Weakness     rt leg     HPI    Colin Shell is a 89 year old male who presents with an episode of right leg weakness.  He states that this evening he went to get up out of a chair and to go to the bathroom and got up and twisted and had sudden collapse of his right knee.  There was no associated pain.  After that he felt like his whole leg was numb.  He does have a known history of severe spinal stenosis and multilevel lumbar degenerative disc disease.  He has been getting chiropractic treatments for the past few months now down to once per week.  He says he did not have any weakness or numbness in his upper extremities or in the other leg.  He had no visual disturbance including no loss of vision or diplopia.  He had no difficulty with word finding or slurred speech.  After he arrived in the emergency department the numbness in his leg resolved completely.  He has had similar symptoms in the past attributed to \"sciatica.\"  He recalls no recent specific injury.  By the time I saw him in the emergency department he says his symptoms have resolved completely and he wishes to go home.    Problem List:    Patient Active Problem List    Diagnosis Date Noted     Coronary artery disease involving coronary bypass graft of native heart without angina pectoris      Priority: High     Hx of CABG 1996    ECHO 01/2017: \"Left ventricular systolic function is mildly reduced.The visual ejection  fraction is estimated at 45%.Moderate basal to mid inferior wall hypokinesia\"    NM MPI 01/2017: \"Myocardial perfusion imaging using single isotope technique  demonstrated transmural scar of the inferior basal inferolateral wall  with mild ellie-infarct ischemia in the basal inferior septal wall.   2. Gated images demonstrated normal LV cavity size with mildly reduced  LV systolic function.  The left ventricular systolic function is 46%.  3. Compared to the prior study " "from no prior study .\"         Cardiac pacemaker in situ 06/27/2018     Priority: Medium     DDD (degenerative disc disease), lumbar 06/27/2018     Priority: Medium     Hiatal hernia 06/27/2018     Priority: Medium     Ischemic cardiomyopathy      Priority: Medium     mild- EF 45-50%       Bronchiectasis without complication (H) 04/27/2018     Priority: Medium     Lymphedema 04/27/2018     Priority: Medium     Dyspnea 04/18/2018     Priority: Medium     Paroxysmal atrial fibrillation (H) 02/20/2018     Priority: Medium     Chronic systolic CHF (congestive heart failure) (H) 06/13/2016     Priority: Medium     Advance Care Planning 10/16/2012     Priority: Medium     Advance Care Planning: Receipt of ACP document:  Received: Health Care Directive which was witnessed or notarized on 8/26/09.  Document not previously scanned.  Validation form completed and scanned.  Code Status reflects choices in most recent ACP document. Confirmed/documented designated decision maker(s). See permanent comments section of demographics in clinical tab. View document(s) and details by clicking on code status. Added by Victorino Aguilera on 4/9/2015.  Patient is DNR/DNI             Cholelithiasis 05/09/2012     Priority: Medium     2012 assymptomatic   IMO update changed this record. Please review for accuracy       Fibrosis of lung (H) 05/09/2012     Priority: Medium     Suggested pulmonology in 2012 to eval--pt deferred this.  (Problem list name updated by automated process. Provider to review and confirm.)       Pulmonary nodule, right 05/09/2012     Priority: Medium     May 2012 ct showed right sided probable benign nodule.  No further follow-up needed. After stability noted on CT 2017.       Bronchiectasis (H) 04/30/2012     Priority: Medium     Noted on 2012 ct       Hyperlipidemia LDL goal <100 10/31/2010     Priority: Medium     Chronic kidney disease, stage III (moderate) 07/13/2009     Priority: Medium     Hypertension goal BP " (blood pressure) < 140/90 05/20/2005     Priority: Medium     Benign prostatic hyperplasia      Priority: Low     history of elevated PSAs       Obesity 07/13/2009     Priority: Low     Gout 05/20/2005     Priority: Low        Past Medical History:    Past Medical History:   Diagnosis Date     Arrhythmia      Chronic systolic CHF (congestive heart failure) (H) 6/13/2016     Congestive heart failure (H)      Dizziness and giddiness 11/24/2007     Foreign body in unspecified site on external eye      Hypertension      Ischemic cardiomyopathy      Microscopic hematuria      Renal disease      Rhabdomyolysis        Past Surgical History:    Past Surgical History:   Procedure Laterality Date     BIOPSY ARTERY TEMPORAL Left 9/18/2017    Procedure: BIOPSY ARTERY TEMPORAL;  Left Temporal Biopsy ;  Surgeon: Ivana Phan MD;  Location: [facility] OR     SURGICAL HISTORY OF -       heel spurs     SURGICAL HISTORY OF -   2005, 2006    bilateral eye cataract     SURGICAL HISTORY OF -   1996    CABG x 5 vessels     SURGICAL HISTORY OF -   2010    circumcision     TONSILLECTOMY & ADENOIDECTOMY         Family History:    Family History   Problem Relation Age of Onset     Diabetes Father      Diabetes Brother      Diabetes Brother      Hypertension Son      Unknown/Adopted Maternal Grandmother      Unknown/Adopted Maternal Grandfather      Unknown/Adopted Paternal Grandmother      Unknown/Adopted Paternal Grandfather      Dementia Sister        Social History:  Marital Status:   [2]  Social History   Substance Use Topics     Smoking status: Former Smoker     Smokeless tobacco: Never Used      Comment: Quit at age 32     Alcohol use Yes      Comment: 12 pack a year        Medications:      ACE/ARB/ARNI NOT PRESCRIBED, INTENTIONAL,   allopurinol (ZYLOPRIM) 100 MG tablet   aspirin  MG EC tablet   cetirizine (ZYRTEC) 10 MG tablet   finasteride (PROSCAR) 5 MG tablet   furosemide (LASIX) 20 MG tablet   guaiFENesin  "(MUCINEX) 600 MG 12 hr tablet   metoprolol succinate (TOPROL-XL) 25 MG 24 hr tablet   Multiple Vitamins-Minerals (CENTRUM SILVER) per tablet         Review of Systems  Further problem focused system review negative.    Physical Exam   BP: 159/70  Pulse: 61  Temp: 97.8  F (36.6  C)  Resp: 16  Height: 177.8 cm (5' 10\")  Weight: 77.1 kg (170 lb)  SpO2: 97 %      Physical Exam  Nursing note and vitals were reviewed.  Constitutional: Awake and alert, adequately nourished and developed appearing 89-year-old in no apparent discomfort, who does not appear acutely ill, and who answers questions appropriately and cooperates with examination.  HEENT: EOMI.   Neck: Freely mobile.    Pulmonary/Chest: Breathing is unlabored.    Musculoskeletal: Extremities are warm and well-perfused and without edema  Neurological: Alert, oriented, thought content logical, coherent.  Motor strength is intact in the lower extremities bilaterally on manual muscle testing.  Motor tone is normal.  Gait is normal.  Skin: Warm, dry, no rashes.  Psychiatric: Affect broad and appropriate.      ED Course     ED Course     Procedures               Critical Care time:  none               No results found for this or any previous visit (from the past 24 hour(s)).    Medications - No data to display    Assessments & Plan (with Medical Decision Making)     89-year-old male presented with an episode of his leg collapsing when he stood up to go to the bathroom.  This was followed by a period of numbness lasting half an hour to an hour that he says involved his \"whole leg.\"  He had no other neurologic symptoms.  I have low suspicion that this represented a stroke syndrome.  This is much more likely a local phenomenon either arising from his spinal stenosis or radicular nerve root impingement or local compression of the sciatic nerve or other peripheral nerve.  Symptoms have resolved completely and he desires no further workup.  I agree no further workup is " indicated at this time.  He understands he should return if he develops recurrence of symptoms or new concerning symptoms especially if he develops difficulty with upper extremity weakness or numbness associated with lower extremity or difficulty with his speech, swallowing, vision, or other symptoms that might suggest a central cause.    I have reviewed the nursing notes.    I have reviewed the findings, diagnosis, plan and need for follow up with the patient.       New Prescriptions    No medications on file       Final diagnoses:   Transient right leg weakness       8/24/2018   [facility] EMERGENCY DEPARTMENT     Manjinder Leung MD  08/24/18 [id number]

## 2018-09-05 NOTE — PROGRESS NOTES
Covering for PCP.    His TSH is borderline high. Free T4 (thyroid hormone ) is well within range though.  Recheck TSH with reflex to FT4 in 1 month, then follow up with PCP to discuss result.    Please call to notify patient. no

## 2018-09-05 NOTE — PATIENT INSTRUCTIONS
Thank you for choosing [facility].  You may be receiving a survey in the mail from Maryellen Dockery regarding your visit today.  Please take a few minutes to complete and return the survey to let us know how we are doing.      If you have questions or concerns, please contact us via [employer] or you can contact your care team at [phone].    Our Clinic hours are:  Monday 6:40 am  to 7:00 pm  Tuesday -Friday 6:40 am to 5:00 pm    The [facility] outpatient lab hours are:  Monday - Friday 6:10 am to 4:45 pm  Saturdays 7:00 am to 11:00 am  Appointments are required, call [phone]    If you have clinical questions after hours or would like to schedule an appointment,  call the clinic at [phone].

## 2018-09-05 NOTE — MR AVS SNAPSHOT
After Visit Summary   9/5/2018    Colin Shell    MRN: [id number]           Patient Information     Date Of Birth          6/25/1927        Visit Information        Provider Department      9/5/2018 10:20 AM Eric Brasher MD [facility]        Today's Diagnoses     Pain of both heels    -  1    Need for prophylactic vaccination and inoculation against influenza          Care Instructions          Thank you for choosing [facility].  You may be receiving a survey in the mail from [facility] regarding your visit today.  Please take a few minutes to complete and return the survey to let us know how we are doing.      If you have questions or concerns, please contact us via Tuee or you can contact your care team at [phone].    Our Clinic hours are:  Monday 6:40 am  to 7:00 pm  Tuesday -Friday 6:40 am to 5:00 pm    The [facility] outpatient lab hours are:  Monday - Friday 6:10 am to 4:45 pm  Saturdays 7:00 am to 11:00 am  Appointments are required, call [phone]    If you have clinical questions after hours or would like to schedule an appointment,  call the clinic at [phone].            Follow-ups after your visit        Follow-up notes from your care team     Return if symptoms worsen or fail to improve.      Your next 10 appointments already scheduled     Dec 13, 2018  4:30 PM CST   Remote ICD Check with CASEY DCR2   [facility] ([facility])    [address] [place] [address] [phone] OPT 2           This appointment is for a remote check of your debrillator.  This is not an appointment at the office.              Who to contact     If you have questions or need follow up information about today's clinic visit or your schedule please contact [facility] directly at [phone].  Normal or non-critical lab and imaging results will be communicated to you by [facility]  "letter or phone within 4 business days after the clinic has received the results. If you do not hear from us within 7 days, please contact the clinic through [employer] or phone. If you have a critical or abnormal lab result, we will notify you by phone as soon as possible.  Submit refill requests through [employer] or call your pharmacy and they will forward the refill request to us. Please allow 3 business days for your refill to be completed.          Additional Information About Your Visit        Care EveryWhere ID     This is your Care EveryWhere ID. This could be used by other organizations to access your [facility] medical records  [id number]        Your Vitals Were     Pulse Temperature Respirations Height Pulse Oximetry BMI (Body Mass Index)    68 97.1  F (36.2  C) (Tympanic) 16 5' 10\" (1.778 m) 100% 24.94 kg/m2       Blood Pressure from Last 3 Encounters:   09/05/18 148/66   08/24/18 159/70   08/23/18 128/66    Weight from Last 3 Encounters:   09/05/18 173 lb 12.8 oz (78.8 kg)   08/24/18 170 lb (77.1 kg)   08/23/18 176 lb 12.8 oz (80.2 kg)              We Performed the Following     FLU VACCINE, INCREASED ANTIGEN, PRESV FREE, AGE 65+ [93672]     Vaccine Administration, Initial [54024]        Primary Care Provider Office Phone # Fax #    Jono Drake [phone] [address] [address]        Equal Access to Services     ALEXIS PACE : Hadii lanny ku hadasho Soomaali, waaxda luqadaha, qaybta kaalmada tim, fina vo. So [facility] [phone].    ATENCIÓN: Si habla jocelyn, tiene a rojo disposición servicios gratuitos de asistencia lingüística. Llame al [phone].    We comply with applicable federal civil rights laws and [place] laws. We do not discriminate on the basis of race, color, national origin, age, disability, sex, sexual orientation, or gender identity.            Thank you!     Thank you for choosing [facility]" for your care. Our goal is always to provide you with excellent care. Hearing back from our patients is one way we can continue to improve our services. Please take a few minutes to complete the written survey that you may receive in the mail after your visit with us. Thank you!             Your Updated Medication List - Protect others around you: Learn how to safely use, store and throw away your medicines at www.disposemymeds.org.          This list is accurate as of 9/5/18  4:46 PM.  Always use your most recent med list.                   Brand Name Dispense Instructions for use Diagnosis    ACE/ARB/ARNI NOT PRESCRIBED (INTENTIONAL)      Please choose reason not prescribed, below    Chronic systolic CHF (congestive heart failure) (H)       allopurinol 100 MG tablet    ZYLOPRIM    30 tablet    Take 0.5 tablets (50 mg) by mouth daily    Gout of foot, unspecified cause, unspecified chronicity, unspecified laterality       aspirin 325 MG EC tablet     90 tablet    Take 1 tablet (325 mg) by mouth daily        CENTRUM SILVER per tablet      Take 1 tablet by mouth daily        cetirizine 10 MG tablet    zyrTEC    30 tablet    Take 1 tablet (10 mg) by mouth every evening    Allergic cough       finasteride 5 MG tablet    PROSCAR    90 tablet    Take 1 tablet (5 mg) by mouth daily    Benign non-nodular prostatic hyperplasia without lower urinary tract symptoms       furosemide 20 MG tablet    LASIX    60 tablet    Take 1 tablet (20 mg) by mouth daily    Lymphedema       guaiFENesin 600 MG 12 hr tablet    MUCINEX     Take 600 mg by mouth daily        metoprolol succinate 25 MG 24 hr tablet    TOPROL-XL    180 tablet    Take 2 tablets (50 mg) by mouth daily    Paroxysmal atrial fibrillation (H), Coronary artery disease involving native coronary artery of native heart without angina pectoris

## 2018-09-05 NOTE — PROGRESS NOTES
SUBJECTIVE:   Colin Shell is a 89 year old male who presents to clinic today for the following health issues:      Chief Complaint   Patient presents with     Musculoskeletal Problem     Patient has bone spurs both feet, has pain about 1 x per month lasting about 1 day, right worse than left, patient is questioning if having surgery to remove these would be an option for him with his other health conditions.     Flu Shot         Musculoskeletal problem/pain      Duration: several years now; but had bone spur surgery approx 30 years ago per patient.    Description  Location: both heels    Intensity:  mild, moderate    Accompanying signs and symptoms: none    History  Previous similar problem: YES- recurrent pain - was told he has bone spurs.   Previous evaluation:  orthopedic evaluation    Precipitating or alleviating factors:  Trauma or overuse: no   Aggravating factors include: standing    Therapies tried and outcome: rest/inactivity, acetaminophen and Ibuprofen      Problem list and histories reviewed & adjusted, as indicated.  Additional history: as documented    Patient Active Problem List   Diagnosis     Hypertension goal BP (blood pressure) < 140/90     Gout     Coronary artery disease involving coronary bypass graft of native heart without angina pectoris     Chronic kidney disease, stage III (moderate)     Obesity     Benign prostatic hyperplasia     Hyperlipidemia LDL goal <100     Bronchiectasis (H)     Cholelithiasis     Fibrosis of lung (H)     Pulmonary nodule, right     Advance Care Planning     Chronic systolic CHF (congestive heart failure) (H)     Paroxysmal atrial fibrillation (H)     Dyspnea     Bronchiectasis without complication (H)     Lymphedema     Ischemic cardiomyopathy     Cardiac pacemaker in situ     DDD (degenerative disc disease), lumbar     Hiatal hernia     Past Surgical History:   Procedure Laterality Date     BIOPSY ARTERY TEMPORAL Left 9/18/2017    Procedure: BIOPSY  ARTERY TEMPORAL;  Left Temporal Biopsy ;  Surgeon: Ivana Phan MD;  Location: [facility] OR     SURGICAL HISTORY OF -       heel spurs     SURGICAL HISTORY OF -   2005, 2006    bilateral eye cataract     SURGICAL HISTORY OF -   1996    CABG x 5 vessels     SURGICAL HISTORY OF -   2010    circumcision     TONSILLECTOMY & ADENOIDECTOMY         Social History   Substance Use Topics     Smoking status: Former Smoker     Smokeless tobacco: Never Used      Comment: Quit at age 32     Alcohol use Yes      Comment: 12 pack a year     Family History   Problem Relation Age of Onset     Diabetes Father      Diabetes Brother      Diabetes Brother      Hypertension Son      Unknown/Adopted Maternal Grandmother      Unknown/Adopted Maternal Grandfather      Unknown/Adopted Paternal Grandmother      Unknown/Adopted Paternal Grandfather      Dementia Sister          Current Outpatient Prescriptions   Medication Sig Dispense Refill     ACE/ARB/ARNI NOT PRESCRIBED, INTENTIONAL, Please choose reason not prescribed, below       allopurinol (ZYLOPRIM) 100 MG tablet Take 0.5 tablets (50 mg) by mouth daily 30 tablet 11     aspirin  MG EC tablet Take 1 tablet (325 mg) by mouth daily 90 tablet 3     cetirizine (ZYRTEC) 10 MG tablet Take 1 tablet (10 mg) by mouth every evening 30 tablet 1     finasteride (PROSCAR) 5 MG tablet Take 1 tablet (5 mg) by mouth daily 90 tablet 3     furosemide (LASIX) 20 MG tablet Take 1 tablet (20 mg) by mouth daily 60 tablet 11     guaiFENesin (MUCINEX) 600 MG 12 hr tablet Take 600 mg by mouth daily        metoprolol succinate (TOPROL-XL) 25 MG 24 hr tablet Take 2 tablets (50 mg) by mouth daily 180 tablet 3     Multiple Vitamins-Minerals (CENTRUM SILVER) per tablet Take 1 tablet by mouth daily       Allergies   Allergen Reactions     Flomax [Tamsulosin Hydrochloride]      Hctz Other (See Comments)     Caused loss of balance     Lisinopril Cough     Simvastatin Nausea and Vomiting and Diarrhea      "Vytorin Nausea and Diarrhea       Reviewed and updated as needed this visit by clinical staff  Tobacco  Allergies  Meds  Med Hx  Surg Hx  Fam Hx  Soc Hx      Reviewed and updated as needed this visit by Provider         ROS:  C: NEGATIVE for fever, chills, change in weight  I: NEGATIVE for worrisome rashes, moles or lesions  MUSCULOSKELETAL:see above  N: NEGATIVE for weakness, dizziness or paresthesias  H: NEGATIVE for bleeding problems    OBJECTIVE:                                                    /66 (BP Location: Right arm, Patient Position: Chair, Cuff Size: Adult Regular)  Pulse 68  Temp 97.1  F (36.2  C) (Tympanic)  Resp 16  Ht 5' 10\" (1.778 m)  Wt 173 lb 12.8 oz (78.8 kg)  SpO2 100%  BMI 24.94 kg/m2  Body mass index is 24.94 kg/(m^2).  GEN: alert, oriented x 3, ambulatory w/o assist  FEET: no swelling/discoloration; full range of motion all joints with no pain; no TTP of any joint or bony prominence bilaterally, including heels; mild prominence of the proximal head of the right 5th metatarsal    Diagnostic test results:  Diagnostic Test Results:  none      ASSESSMENT/PLAN:                                                        ICD-10-CM    1. Pain of both heels M79.671 Patient reports once a month, self-limited, non-disruptive pains.    M79.672 Patient was advised mildly symptomatic heel spurs usually managed conservatively.  Further discussed in evaluating need for more invasive intervention, should weigh expected quality of life benefit vs possible risks considering his significant cardiopulmonary history.  He concurred with expectant and symptomatic manaagement.  OTC APAP use discussed. AVoid NSAIDs.  Return precautions discussed and given to patient.     2. Need for prophylactic vaccination and inoculation against influenza Z23 FLU VACCINE, INCREASED ANTIGEN, PRESV FREE, AGE 65+ [69997]     Vaccine Administration, Initial [09921]       Follow up with Provider - prn   Patient " Instructions         Thank you for choosing [facility].  You may be receiving a survey in the mail from Maryellen Dockery regarding your visit today.  Please take a few minutes to complete and return the survey to let us know how we are doing.      If you have questions or concerns, please contact us via [employer] or you can contact your care team at [phone].    Our Clinic hours are:  Monday 6:40 am  to 7:00 pm  Tuesday -Friday 6:40 am to 5:00 pm    The [place] outpatient lab hours are:  Monday - Friday 6:10 am to 4:45 pm  Saturdays 7:00 am to 11:00 am  Appointments are required, call [phone]    If you have clinical questions after hours or would like to schedule an appointment,  call the clinic at [phone].        Eric Brasher MD  [facility]    Injectable Influenza Immunization Documentation    1.  Is the person to be vaccinated sick today?   No    2. Does the person to be vaccinated have an allergy to a component   of the vaccine?   No  Egg Allergy Algorithm Link    3. Has the person to be vaccinated ever had a serious reaction   to influenza vaccine in the past?   No    4. Has the person to be vaccinated ever had Guillain-Barré syndrome?   No    Form completed by patient  JOSE E Brice MA

## 2018-09-10 NOTE — TELEPHONE ENCOUNTER
"Requested Prescriptions   Pending Prescriptions Disp Refills     cetirizine (ZYRTEC) 10 MG tablet [Pharmacy Med Name: CETIRIZINE HCL 10 MG TABLET] 30 tablet 1     Sig: TAKE 1 TABLET (10 MG) BY MOUTH EVERY EVENING    Antihistamines Protocol Failed    9/9/2018  2:23 AM       Failed - Patient is 3-64 years of age    Apply weight-based dosing for peds patients age 3 - 12 years of age.    Forward request to provider for patients under the age of 3 or over the age of 64.         Passed - Recent (12 mo) or future (30 days) visit within the authorizing provider's specialty    Patient had office visit in the last 12 months or has a visit in the next 30 days with authorizing provider or within the authorizing provider's specialty.  See \"Patient Info\" tab in inbasket, or \"Choose Columns\" in Meds & Orders section of the refill encounter.            Routing refill request to provider for review/approval because:  Drug not on the [facility] [place] [place] refill protocol for age range        "

## 2018-09-27 NOTE — TELEPHONE ENCOUNTER
#30 with 1 refill authorized 9/12/18  Last Office Visit 9/5/18.  Changed to #90 day supply per [facility] Refill Policy.    Connie HUTCHINS RN

## 2018-11-12 PROBLEM — M25.812 SHOULDER IMPINGEMENT, LEFT: Status: ACTIVE | Noted: 2018-01-01

## 2018-11-12 NOTE — MR AVS SNAPSHOT
After Visit Summary   11/12/2018    Colin Shell    MRN: [id number]           Patient Information     Date Of Birth          6/25/1927        Visit Information        Provider Department      11/12/2018 2:20 PM Jono Drake MD [facility]        Today's Diagnoses     Shoulder impingement, left    -  1    Wrist injury, left, initial encounter          Care Instructions          Thank you for choosing [facility].  You may be receiving a survey in the mail from [facility] regarding your visit today.  Please take a few minutes to complete and return the survey to let us know how we are doing.      If you have questions or concerns, please contact us via [employer] or you can contact your care team at [phone].    Our Clinic hours are:  Monday 6:40 am  to 7:00 pm  Tuesday -Friday 6:40 am to 5:00 pm    The [place] outpatient lab hours are:  Monday - Friday 6:10 am to 4:45 pm  Saturdays 7:00 am to 11:00 am  Appointments are required, call [phone]    If you have clinical questions after hours or would like to schedule an appointment,  call the clinic at [phone].    (M75.42) Shoulder impingement, left  (primary encounter diagnosis)  Comment:   Plan: XR Shoulder Left 2 Views        Do the x-rays today and we will call the results. If there is no fracture, then avoid use of the left arm out to the side, overhead and throwing.   Use ice for 5-10 minutes, three times daily. Use Advil at 400 mg per dose, with food, three times daily for 5-7 days. If not better then consider a cortisone shot.   If still not better then consideration for an MRI of the shoulder would be done. Call my RN at [phone].    (S69.92XA) Wrist injury, left, initial encounter  Comment:   Plan: XR Wrist Left G/E 3 Views        We will order the wrist splint and use this most of the time for 2 weeks. Use the ice and advil as above. If not better then consideration for an MRI would be done.            "Follow-ups after your visit        Your next 10 appointments already scheduled     Dec 13, 2018  4:30 PM CST   Remote ICD Check with CASEY DCR2   [facility] ([facility])    [address]  [place] [address] [phone] OPT 2           This appointment is for a remote check of your debrillator.  This is not an appointment at the office.              Future tests that were ordered for you today     Open Future Orders        Priority Expected Expires Ordered    XR Shoulder Left 2 Views Routine 11/12/2018 11/12/2019 11/12/2018    XR Wrist Left G/E 3 Views Routine 11/12/2018 11/12/2019 11/12/2018            Who to contact     If you have questions or need follow up information about today's clinic visit or your schedule please contact [facility] directly at [phone].  Normal or non-critical lab and imaging results will be communicated to you by MyChart, letter or phone within 4 business days after the clinic has received the results. If you do not hear from us within 7 days, please contact the clinic through MyChart or phone. If you have a critical or abnormal lab result, we will notify you by phone as soon as possible.  Submit refill requests through [employer] or call your pharmacy and they will forward the refill request to us. Please allow 3 business days for your refill to be completed.          Additional Information About Your Visit        Care EveryWhere ID     This is your Care EveryWhere ID. This could be used by other organizations to access your [place] medical records  [id number]        Your Vitals Were     Pulse Temperature Respirations Height Pulse Oximetry BMI (Body Mass Index)    59 97.1  F (36.2  C) (Tympanic) 16 5' 10\" (1.778 m) 98% 25.54 kg/m2       Blood Pressure from Last 3 Encounters:   11/12/18 160/82   09/05/18 148/66   08/24/18 159/70    Weight from Last 3 Encounters:   11/12/18 178 lb (80.7 kg)   09/05/18 173 " lb 12.8 oz (78.8 kg)   08/24/18 170 lb (77.1 kg)                 Today's Medication Changes          These changes are accurate as of 11/12/18  3:04 PM.  If you have any questions, ask your nurse or doctor.               Start taking these medicines.        Dose/Directions    order for DME   Used for:  Wrist injury, left, initial encounter   Started by:  Jono Drake MD        Equipment being ordered: Splint for the left wrist.   Quantity:  1 Device   Refills:  0            Where to get your medicines      Some of these will need a paper prescription and others can be bought over the counter.  Ask your nurse if you have questions.     Bring a paper prescription for each of these medications     order for DME                Primary Care Provider Office Phone # Fax #    Jono Drake [phone] [address] [facility] [address]        Equal Access to Services     ALEXIS PACE : Hadii lanny lewis hadasho Soomaali, waaxda luqadaha, qaybta kaalmada adeegyada, fina nunez hayapolinar [place] . So [facility] [phone].    ATENCIÓN: Si habla español, tiene a rojo disposición servicios gratuitos de asistencia lingüística. Llame al [phone].    We comply with applicable federal civil rights laws and [place] laws. We do not discriminate on the basis of race, color, national origin, age, disability, sex, sexual orientation, or gender identity.            Thank you!     Thank you for choosing [facility]  for your care. Our goal is always to provide you with excellent care. Hearing back from our patients is one way we can continue to improve our services. Please take a few minutes to complete the written survey that you may receive in the mail after your visit with us. Thank you!             Your Updated Medication List - Protect others around you: Learn how to safely use, store and throw away your medicines at www.disposemymeds.org.          This list is accurate as  of 11/12/18  3:04 PM.  Always use your most recent med list.                   Brand Name Dispense Instructions for use Diagnosis    ACE/ARB/ARNI NOT PRESCRIBED (INTENTIONAL)      Please choose reason not prescribed, below    Chronic systolic CHF (congestive heart failure) (H)       allopurinol 100 MG tablet    ZYLOPRIM    30 tablet    Take 0.5 tablets (50 mg) by mouth daily    Gout of foot, unspecified cause, unspecified chronicity, unspecified laterality       aspirin 325 MG EC tablet     90 tablet    Take 1 tablet (325 mg) by mouth daily        CENTRUM SILVER per tablet      Take 1 tablet by mouth daily        cetirizine 10 MG tablet    zyrTEC    90 tablet    TAKE 1 TABLET (10 MG) BY MOUTH EVERY EVENING    Allergic cough       finasteride 5 MG tablet    PROSCAR    90 tablet    Take 1 tablet (5 mg) by mouth daily    Benign non-nodular prostatic hyperplasia without lower urinary tract symptoms       furosemide 20 MG tablet    LASIX    60 tablet    Take 1 tablet (20 mg) by mouth daily    Lymphedema       guaiFENesin 600 MG 12 hr tablet    MUCINEX     Take 600 mg by mouth daily        metoprolol succinate 25 MG 24 hr tablet    TOPROL-XL    180 tablet    Take 2 tablets (50 mg) by mouth daily    Paroxysmal atrial fibrillation (H), Coronary artery disease involving native coronary artery of native heart without angina pectoris       order for DME     1 Device    Equipment being ordered: Splint for the left wrist.    Wrist injury, left, initial encounter

## 2018-11-12 NOTE — PATIENT INSTRUCTIONS
Thank you for choosing [facility].  You may be receiving a survey in the mail from Maryellen Dockery regarding your visit today.  Please take a few minutes to complete and return the survey to let us know how we are doing.      If you have questions or concerns, please contact us via [employer] or you can contact your care team at [phone].    Our Clinic hours are:  Monday 6:40 am  to 7:00 pm  Tuesday -Friday 6:40 am to 5:00 pm    The [place] outpatient lab hours are:  Monday - Friday 6:10 am to 4:45 pm  Saturdays 7:00 am to 11:00 am  Appointments are required, call [phone]    If you have clinical questions after hours or would like to schedule an appointment,  call the clinic at [phone].    (M75.42) Shoulder impingement, left  (primary encounter diagnosis)  Comment:   Plan: XR Shoulder Left 2 Views        Do the x-rays today and we will call the results. If there is no fracture, then avoid use of the left arm out to the side, overhead and throwing.   Use ice for 5-10 minutes, three times daily. Use Advil at 400 mg per dose, with food, three times daily for 5-7 days. If not better then consider a cortisone shot.   If still not better then consideration for an MRI of the shoulder would be done. Call my RN at [phone].    (S69.92XA) Wrist injury, left, initial encounter  Comment:   Plan: XR Wrist Left G/E 3 Views        We will order the wrist splint and use this most of the time for 2 weeks. Use the ice and advil as above. If not better then consideration for an MRI would be done.

## 2018-11-12 NOTE — PROGRESS NOTES
SUBJECTIVE:   Colin Shell is a 89 year old male who presents to clinic today for the following health issues:      Musculoskeletal problem/pain      Duration: 2 months after a fall.  Was seen in the ER on 8-24-18 for transient right leg weakness.    Description  Location: Left shoulder and left wrist.      Intensity:  moderate    Accompanying signs and symptoms: Swelling for the wrist-achy.  Shoulder-no strength in the arm at all.     History  Previous similar problem: no   Previous evaluation:  Was seen in the ER on 8-24-18.  States no imaging was done.    Precipitating or alleviating factors:  Trauma or overuse: YES- Fall-ER on 8-24-18.  Aggravating factors include: Not able to lift with the left wrist.  Pain when rolling over in bed for the shoulder.     Therapies tried and outcome: Took two pain pills-one twice daily-most recent the other day.        Current Outpatient Prescriptions:      ACE/ARB/ARNI NOT PRESCRIBED, INTENTIONAL,, Please choose reason not prescribed, below, Disp: , Rfl:      allopurinol (ZYLOPRIM) 100 MG tablet, Take 0.5 tablets (50 mg) by mouth daily, Disp: 30 tablet, Rfl: 11     aspirin  MG EC tablet, Take 1 tablet (325 mg) by mouth daily, Disp: 90 tablet, Rfl: 3     finasteride (PROSCAR) 5 MG tablet, Take 1 tablet (5 mg) by mouth daily, Disp: 90 tablet, Rfl: 3     furosemide (LASIX) 20 MG tablet, Take 1 tablet (20 mg) by mouth daily, Disp: 60 tablet, Rfl: 11     guaiFENesin (MUCINEX) 600 MG 12 hr tablet, Take 600 mg by mouth daily , Disp: , Rfl:      metoprolol succinate (TOPROL-XL) 25 MG 24 hr tablet, Take 2 tablets (50 mg) by mouth daily, Disp: 180 tablet, Rfl: 3     Multiple Vitamins-Minerals (CENTRUM SILVER) per tablet, Take 1 tablet by mouth daily, Disp: , Rfl:      cetirizine (ZYRTEC) 10 MG tablet, TAKE 1 TABLET (10 MG) BY MOUTH EVERY EVENING (Patient not taking: Reported on 11/12/2018), Disp: 90 tablet, Rfl: 0    Patient Active Problem List   Diagnosis     Hypertension  "goal BP (blood pressure) < 140/90     Gout     Coronary artery disease involving coronary bypass graft of native heart without angina pectoris     Chronic kidney disease, stage III (moderate) (H)     Obesity     Benign prostatic hyperplasia     Hyperlipidemia LDL goal <100     Bronchiectasis (H)     Cholelithiasis     Fibrosis of lung (H)     Pulmonary nodule, right     Advance Care Planning     Chronic systolic CHF (congestive heart failure) (H)     Paroxysmal atrial fibrillation (H)     Dyspnea     Bronchiectasis without complication (H)     Lymphedema     Ischemic cardiomyopathy     Cardiac pacemaker in situ     DDD (degenerative disc disease), lumbar     Hiatal hernia     Shoulder impingement, left       Blood pressure 160/82, pulse 59, temperature 97.1  F (36.2  C), temperature source Tympanic, resp. rate 16, height 5' 10\" (1.778 m), weight 178 lb (80.7 kg), SpO2 98 %.    Exam:  GENERAL APPEARANCE: healthy, alert and no distress  MS: decreased range of motion of the left shoulder in abduction and extension; and tender to palpation on the left dorsum of the wrist.       (M75.42) Shoulder impingement, left  (primary encounter diagnosis)  Comment:   Plan: XR Shoulder Left 2 Views        Do the x-rays today and we will call the results. If there is no fracture, then avoid use of the left arm out to the side, overhead and throwing.   Use ice for 5-10 minutes, three times daily. Use Advil at 400 mg per dose, with food, three times daily for 5-7 days. If not better then consider a cortisone shot.   If still not better then consideration for an MRI of the shoulder would be done. Call my RN at [phone].    (T96.92XA) Wrist injury, left, initial encounter  Comment:   Plan: XR Wrist Left G/E 3 Views        We will order the wrist splint and use this most of the time for 2 weeks. Use the ice and advil as above. If not better then consideration for an MRI would be done.     Jono Drake    "

## 2018-11-16 NOTE — PROGRESS NOTES
Patient stops by clinic and states that brace that was given in clinic is defective. States that there is a wire that is protruding from the brace and causing scratches while wearing it. Spoke with Lupe MANCERA CMA who advised to bring patient to  [facility] to discuss this issue.    Patient also states that he left a pair of bifocal glasses in the xray area. Patient's glasses were found and handed back to him.     Brought patient to  [facility].     Christal VILLARREAL RN

## 2018-11-16 NOTE — MR AVS SNAPSHOT
After Visit Summary   11/16/2018    Colin Shell    MRN: [id number]           Patient Information     Date Of Birth          6/25/1927        Visit Information        Provider Department      11/16/2018 8:30 AM NOE MONTERO/DIOGENES [facility]        Today's Diagnoses     Healthcare maintenance    -  1       Follow-ups after your visit        Your next 10 appointments already scheduled     Dec 13, 2018  4:30 PM CST   Remote ICD Check with CASEY DCR2   [facility] ([facility])    [address]  [place] [address] [phone] OPT 2           This appointment is for a remote check of your debrillator.  This is not an appointment at the office.              Who to contact     If you have questions or need follow up information about today's clinic visit or your schedule please contact [facility] directly at [phone].  Normal or non-critical lab and imaging results will be communicated to you by MyChart, letter or phone within 4 business days after the clinic has received the results. If you do not hear from us within 7 days, please contact the clinic through MyChart or phone. If you have a critical or abnormal lab result, we will notify you by phone as soon as possible.  Submit refill requests through [employer] or call your pharmacy and they will forward the refill request to us. Please allow 3 business days for your refill to be completed.          Additional Information About Your Visit        Care EveryWhere ID     This is your Care EveryWhere ID. This could be used by other organizations to access your [place] medical records  [id number]         Blood Pressure from Last 3 Encounters:   11/12/18 160/82   09/05/18 148/66   08/24/18 159/70    Weight from Last 3 Encounters:   11/12/18 178 lb (80.7 kg)   09/05/18 173 lb 12.8 oz (78.8 kg)   08/24/18 170 lb (77.1 kg)              Today, you had the following      No orders found for display       Primary Care Provider Office Phone # Fax #    Jono Drake [phone] [address] [address]        Equal Access to Services     ALEXIS PACE : Hadii aad ku hadgalo Sobernadine, waaxda luqadaha, qaybta kaalmada adelinetteyada, fina moewhitney vizcainolinette robertson aleksandra vo. So [facility] [phone].    ATENCIÓN: Si habla español, tiene a rojo disposición servicios gratuitos de asistencia lingüística. Llame al [phone].    We comply with applicable federal civil rights laws and [place] laws. We do not discriminate on the basis of race, color, national origin, age, disability, sex, sexual orientation, or gender identity.            Thank you!     Thank you for choosing [facility]  for your care. Our goal is always to provide you with excellent care. Hearing back from our patients is one way we can continue to improve our services. Please take a few minutes to complete the written survey that you may receive in the mail after your visit with us. Thank you!             Your Updated Medication List - Protect others around you: Learn how to safely use, store and throw away your medicines at www.disposemymeds.org.          This list is accurate as of 11/16/18  8:58 AM.  Always use your most recent med list.                   Brand Name Dispense Instructions for use Diagnosis    ACE/ARB/ARNI NOT PRESCRIBED (INTENTIONAL)      Please choose reason not prescribed, below    Chronic systolic CHF (congestive heart failure) (H)       allopurinol 100 MG tablet    ZYLOPRIM    30 tablet    Take 0.5 tablets (50 mg) by mouth daily    Gout of foot, unspecified cause, unspecified chronicity, unspecified laterality       aspirin 325 MG EC tablet     90 tablet    Take 1 tablet (325 mg) by mouth daily        CENTRUM SILVER per tablet      Take 1 tablet by mouth daily        cetirizine 10 MG tablet    zyrTEC    90 tablet    TAKE 1 TABLET (10 MG) BY MOUTH EVERY  EVENING    Allergic cough       finasteride 5 MG tablet    PROSCAR    90 tablet    Take 1 tablet (5 mg) by mouth daily    Benign non-nodular prostatic hyperplasia without lower urinary tract symptoms       furosemide 20 MG tablet    LASIX    60 tablet    Take 1 tablet (20 mg) by mouth daily    Lymphedema       guaiFENesin 600 MG 12 hr tablet    MUCINEX     Take 600 mg by mouth daily        metoprolol succinate 25 MG 24 hr tablet    TOPROL-XL    180 tablet    Take 2 tablets (50 mg) by mouth daily    Paroxysmal atrial fibrillation (H), Coronary artery disease involving native coronary artery of native heart without angina pectoris       order for DME     1 Device    Equipment being ordered: Splint for the left wrist.    Wrist injury, left, initial encounter

## 2018-12-13 NOTE — PROGRESS NOTES
[employer]onic Evera (D) ICD Remote Device Check  AP: 1 % : 34 %  Mode: DDD         Presenting Rhythm: AF with VS  Heart Rate: good variability, stable  Sensing: WNL    Pacing Threshold: WNL    Impedance: WNL  Battery Status: 8.1 yrs estimated longevity   Atrial Arrhythmia: since last remove in August, AF burden is 96%. Pt declines AC and takes ASA only. EGMs confirm AF and aflutter. V rate is controlled.   Ventricular Arrhythmia: none  ATP: none    Shocks: none  Care Plan: remote in 3 months, scheduled. Pt was due for f/u OV with Bette YEPEZ in August, but scheduling notes say he wants to postpone appointments until December. Called pt, no answer, left message with results and asked him to call scheduling to set up OV.

## 2019-01-01 ENCOUNTER — ANCILLARY PROCEDURE (OUTPATIENT)
Dept: CARDIOLOGY | Facility: CLINIC | Age: 84
End: 2019-01-01
Attending: INTERNAL MEDICINE
Payer: COMMERCIAL

## 2019-01-01 ENCOUNTER — OFFICE VISIT (OUTPATIENT)
Dept: FAMILY MEDICINE | Facility: CLINIC | Age: 84
End: 2019-01-01
Payer: COMMERCIAL

## 2019-01-01 ENCOUNTER — TELEPHONE (OUTPATIENT)
Dept: FAMILY MEDICINE | Facility: CLINIC | Age: 84
End: 2019-01-01

## 2019-01-01 VITALS
WEIGHT: 192.8 LBS | OXYGEN SATURATION: 97 % | DIASTOLIC BLOOD PRESSURE: 82 MMHG | RESPIRATION RATE: 16 BRPM | HEIGHT: 68 IN | BODY MASS INDEX: 29.22 KG/M2 | HEART RATE: 69 BPM | TEMPERATURE: 97.4 F | SYSTOLIC BLOOD PRESSURE: 166 MMHG

## 2019-01-01 VITALS
RESPIRATION RATE: 24 BRPM | BODY MASS INDEX: 28.43 KG/M2 | TEMPERATURE: 97.8 F | HEIGHT: 68 IN | SYSTOLIC BLOOD PRESSURE: 154 MMHG | OXYGEN SATURATION: 94 % | DIASTOLIC BLOOD PRESSURE: 74 MMHG | WEIGHT: 187.6 LBS | HEART RATE: 73 BPM

## 2019-01-01 VITALS
HEART RATE: 81 BPM | WEIGHT: 193 LBS | TEMPERATURE: 97 F | SYSTOLIC BLOOD PRESSURE: 136 MMHG | BODY MASS INDEX: 29.25 KG/M2 | DIASTOLIC BLOOD PRESSURE: 70 MMHG | RESPIRATION RATE: 28 BRPM | OXYGEN SATURATION: 90 % | HEIGHT: 68 IN

## 2019-01-01 VITALS
HEIGHT: 68 IN | DIASTOLIC BLOOD PRESSURE: 76 MMHG | RESPIRATION RATE: 16 BRPM | BODY MASS INDEX: 28.34 KG/M2 | SYSTOLIC BLOOD PRESSURE: 144 MMHG | OXYGEN SATURATION: 97 % | TEMPERATURE: 96.2 F | WEIGHT: 187 LBS | HEART RATE: 58 BPM

## 2019-01-01 DIAGNOSIS — M10.00 IDIOPATHIC GOUT, UNSPECIFIED CHRONICITY, UNSPECIFIED SITE: Primary | ICD-10-CM

## 2019-01-01 DIAGNOSIS — N40.0 BENIGN NON-NODULAR PROSTATIC HYPERPLASIA WITHOUT LOWER URINARY TRACT SYMPTOMS: ICD-10-CM

## 2019-01-01 DIAGNOSIS — R05.8 ALLERGIC COUGH: ICD-10-CM

## 2019-01-01 DIAGNOSIS — R73.9 HYPERGLYCEMIA: Primary | ICD-10-CM

## 2019-01-01 DIAGNOSIS — R60.0 LOCALIZED EDEMA: Primary | ICD-10-CM

## 2019-01-01 DIAGNOSIS — I48.0 PAROXYSMAL ATRIAL FIBRILLATION (H): ICD-10-CM

## 2019-01-01 DIAGNOSIS — I25.10 CORONARY ARTERY DISEASE INVOLVING NATIVE CORONARY ARTERY OF NATIVE HEART WITHOUT ANGINA PECTORIS: ICD-10-CM

## 2019-01-01 DIAGNOSIS — Z95.810 ICD (IMPLANTABLE CARDIOVERTER-DEFIBRILLATOR) IN PLACE: ICD-10-CM

## 2019-01-01 DIAGNOSIS — M25.531 RIGHT WRIST PAIN: Primary | ICD-10-CM

## 2019-01-01 DIAGNOSIS — M10.00 IDIOPATHIC GOUT, UNSPECIFIED CHRONICITY, UNSPECIFIED SITE: Chronic | ICD-10-CM

## 2019-01-01 LAB
ANION GAP SERPL CALCULATED.3IONS-SCNC: 6 MMOL/L (ref 3–14)
BASOPHILS # BLD AUTO: 0.1 10E9/L (ref 0–0.2)
BASOPHILS NFR BLD AUTO: 0.4 %
BUN SERPL-MCNC: 29 MG/DL (ref 7–30)
CALCIUM SERPL-MCNC: 8.8 MG/DL (ref 8.5–10.1)
CHLORIDE SERPL-SCNC: 104 MMOL/L (ref 94–109)
CO2 SERPL-SCNC: 26 MMOL/L (ref 20–32)
CREAT SERPL-MCNC: 1 MG/DL (ref 0.66–1.25)
DIFFERENTIAL METHOD BLD: ABNORMAL
EOSINOPHIL # BLD AUTO: 0.4 10E9/L (ref 0–0.7)
EOSINOPHIL NFR BLD AUTO: 3.6 %
ERYTHROCYTE [DISTWIDTH] IN BLOOD BY AUTOMATED COUNT: 14.1 % (ref 10–15)
ERYTHROCYTE [SEDIMENTATION RATE] IN BLOOD BY WESTERGREN METHOD: 29 MM/H (ref 0–20)
GFR SERPL CREATININE-BSD FRML MDRD: 65 ML/MIN/{1.73_M2}
GLUCOSE SERPL-MCNC: 187 MG/DL (ref 70–99)
HCT VFR BLD AUTO: 45.3 % (ref 40–53)
HGB BLD-MCNC: 15.1 G/DL (ref 13.3–17.7)
LYMPHOCYTES # BLD AUTO: 3.1 10E9/L (ref 0.8–5.3)
LYMPHOCYTES NFR BLD AUTO: 27.4 %
MCH RBC QN AUTO: 31.7 PG (ref 26.5–33)
MCHC RBC AUTO-ENTMCNC: 33.3 G/DL (ref 31.5–36.5)
MCV RBC AUTO: 95 FL (ref 78–100)
MDC_IDC_LEAD_IMPLANT_DT: NORMAL
MDC_IDC_LEAD_IMPLANT_DT: NORMAL
MDC_IDC_LEAD_LOCATION: NORMAL
MDC_IDC_LEAD_LOCATION: NORMAL
MDC_IDC_LEAD_LOCATION_DETAIL_1: NORMAL
MDC_IDC_LEAD_LOCATION_DETAIL_1: NORMAL
MDC_IDC_LEAD_MFG: NORMAL
MDC_IDC_LEAD_MFG: NORMAL
MDC_IDC_LEAD_MODEL: NORMAL
MDC_IDC_LEAD_MODEL: NORMAL
MDC_IDC_LEAD_POLARITY_TYPE: NORMAL
MDC_IDC_LEAD_POLARITY_TYPE: NORMAL
MDC_IDC_LEAD_SERIAL: NORMAL
MDC_IDC_LEAD_SERIAL: NORMAL
MDC_IDC_MSMT_BATTERY_DTM: NORMAL
MDC_IDC_MSMT_BATTERY_REMAINING_LONGEVITY: 88 MO
MDC_IDC_MSMT_BATTERY_RRT_TRIGGER: 2.73
MDC_IDC_MSMT_BATTERY_STATUS: NORMAL
MDC_IDC_MSMT_BATTERY_VOLTAGE: 2.98 V
MDC_IDC_MSMT_CAP_CHARGE_DTM: NORMAL
MDC_IDC_MSMT_CAP_CHARGE_ENERGY: 18 J
MDC_IDC_MSMT_CAP_CHARGE_TIME: 3.79
MDC_IDC_MSMT_CAP_CHARGE_TYPE: NORMAL
MDC_IDC_MSMT_LEADCHNL_RA_IMPEDANCE_VALUE: 494 OHM
MDC_IDC_MSMT_LEADCHNL_RA_PACING_THRESHOLD_AMPLITUDE: 0.88 V
MDC_IDC_MSMT_LEADCHNL_RA_PACING_THRESHOLD_PULSEWIDTH: 0.4 MS
MDC_IDC_MSMT_LEADCHNL_RA_SENSING_INTR_AMPL: 2.75 MV
MDC_IDC_MSMT_LEADCHNL_RA_SENSING_INTR_AMPL: 2.75 MV
MDC_IDC_MSMT_LEADCHNL_RV_IMPEDANCE_VALUE: 342 OHM
MDC_IDC_MSMT_LEADCHNL_RV_IMPEDANCE_VALUE: 399 OHM
MDC_IDC_MSMT_LEADCHNL_RV_PACING_THRESHOLD_AMPLITUDE: 0.5 V
MDC_IDC_MSMT_LEADCHNL_RV_PACING_THRESHOLD_PULSEWIDTH: 0.4 MS
MDC_IDC_MSMT_LEADCHNL_RV_SENSING_INTR_AMPL: 10.25 MV
MDC_IDC_MSMT_LEADCHNL_RV_SENSING_INTR_AMPL: 10.25 MV
MDC_IDC_PG_IMPLANT_DTM: NORMAL
MDC_IDC_PG_MFG: NORMAL
MDC_IDC_PG_MODEL: NORMAL
MDC_IDC_PG_SERIAL: NORMAL
MDC_IDC_PG_TYPE: NORMAL
MDC_IDC_SESS_CLINIC_NAME: NORMAL
MDC_IDC_SESS_DTM: NORMAL
MDC_IDC_SESS_TYPE: NORMAL
MDC_IDC_SET_BRADY_AT_MODE_SWITCH_RATE: 171 {BEATS}/MIN
MDC_IDC_SET_BRADY_HYSTRATE: NORMAL
MDC_IDC_SET_BRADY_LOWRATE: 50 {BEATS}/MIN
MDC_IDC_SET_BRADY_MAX_SENSOR_RATE: 130 {BEATS}/MIN
MDC_IDC_SET_BRADY_MAX_TRACKING_RATE: 130 {BEATS}/MIN
MDC_IDC_SET_BRADY_MODE: NORMAL
MDC_IDC_SET_BRADY_PAV_DELAY_LOW: 230 MS
MDC_IDC_SET_BRADY_SAV_DELAY_LOW: 250 MS
MDC_IDC_SET_LEADCHNL_RA_PACING_AMPLITUDE: 2 V
MDC_IDC_SET_LEADCHNL_RA_PACING_ANODE_ELECTRODE_1: NORMAL
MDC_IDC_SET_LEADCHNL_RA_PACING_ANODE_LOCATION_1: NORMAL
MDC_IDC_SET_LEADCHNL_RA_PACING_CAPTURE_MODE: NORMAL
MDC_IDC_SET_LEADCHNL_RA_PACING_CATHODE_ELECTRODE_1: NORMAL
MDC_IDC_SET_LEADCHNL_RA_PACING_CATHODE_LOCATION_1: NORMAL
MDC_IDC_SET_LEADCHNL_RA_PACING_POLARITY: NORMAL
MDC_IDC_SET_LEADCHNL_RA_PACING_PULSEWIDTH: 0.4 MS
MDC_IDC_SET_LEADCHNL_RA_SENSING_ANODE_ELECTRODE_1: NORMAL
MDC_IDC_SET_LEADCHNL_RA_SENSING_ANODE_LOCATION_1: NORMAL
MDC_IDC_SET_LEADCHNL_RA_SENSING_CATHODE_ELECTRODE_1: NORMAL
MDC_IDC_SET_LEADCHNL_RA_SENSING_CATHODE_LOCATION_1: NORMAL
MDC_IDC_SET_LEADCHNL_RA_SENSING_POLARITY: NORMAL
MDC_IDC_SET_LEADCHNL_RA_SENSING_SENSITIVITY: 0.3 MV
MDC_IDC_SET_LEADCHNL_RV_PACING_AMPLITUDE: 2 V
MDC_IDC_SET_LEADCHNL_RV_PACING_ANODE_ELECTRODE_1: NORMAL
MDC_IDC_SET_LEADCHNL_RV_PACING_ANODE_LOCATION_1: NORMAL
MDC_IDC_SET_LEADCHNL_RV_PACING_CAPTURE_MODE: NORMAL
MDC_IDC_SET_LEADCHNL_RV_PACING_CATHODE_ELECTRODE_1: NORMAL
MDC_IDC_SET_LEADCHNL_RV_PACING_CATHODE_LOCATION_1: NORMAL
MDC_IDC_SET_LEADCHNL_RV_PACING_POLARITY: NORMAL
MDC_IDC_SET_LEADCHNL_RV_PACING_PULSEWIDTH: 0.4 MS
MDC_IDC_SET_LEADCHNL_RV_SENSING_ANODE_ELECTRODE_1: NORMAL
MDC_IDC_SET_LEADCHNL_RV_SENSING_ANODE_LOCATION_1: NORMAL
MDC_IDC_SET_LEADCHNL_RV_SENSING_CATHODE_ELECTRODE_1: NORMAL
MDC_IDC_SET_LEADCHNL_RV_SENSING_CATHODE_LOCATION_1: NORMAL
MDC_IDC_SET_LEADCHNL_RV_SENSING_POLARITY: NORMAL
MDC_IDC_SET_LEADCHNL_RV_SENSING_SENSITIVITY: 0.3 MV
MDC_IDC_SET_ZONE_DETECTION_BEATS_DENOMINATOR: 40 {BEATS}
MDC_IDC_SET_ZONE_DETECTION_BEATS_NUMERATOR: 30 {BEATS}
MDC_IDC_SET_ZONE_DETECTION_INTERVAL: 250 MS
MDC_IDC_SET_ZONE_DETECTION_INTERVAL: 320 MS
MDC_IDC_SET_ZONE_DETECTION_INTERVAL: 350 MS
MDC_IDC_SET_ZONE_DETECTION_INTERVAL: 360 MS
MDC_IDC_SET_ZONE_DETECTION_INTERVAL: 360 MS
MDC_IDC_SET_ZONE_TYPE: NORMAL
MDC_IDC_STAT_AT_BURDEN_PERCENT: 100 %
MDC_IDC_STAT_AT_DTM_END: NORMAL
MDC_IDC_STAT_AT_DTM_START: NORMAL
MDC_IDC_STAT_BRADY_AP_VP_PERCENT: 0 %
MDC_IDC_STAT_BRADY_AP_VS_PERCENT: 0 %
MDC_IDC_STAT_BRADY_AS_VP_PERCENT: 19.01 %
MDC_IDC_STAT_BRADY_AS_VS_PERCENT: 80.98 %
MDC_IDC_STAT_BRADY_DTM_END: NORMAL
MDC_IDC_STAT_BRADY_DTM_START: NORMAL
MDC_IDC_STAT_BRADY_RA_PERCENT_PACED: 0 %
MDC_IDC_STAT_BRADY_RV_PERCENT_PACED: 19.65 %
MDC_IDC_STAT_EPISODE_RECENT_COUNT: 0
MDC_IDC_STAT_EPISODE_RECENT_COUNT_DTM_END: NORMAL
MDC_IDC_STAT_EPISODE_RECENT_COUNT_DTM_START: NORMAL
MDC_IDC_STAT_EPISODE_TOTAL_COUNT: 0
MDC_IDC_STAT_EPISODE_TOTAL_COUNT: 129
MDC_IDC_STAT_EPISODE_TOTAL_COUNT: 4
MDC_IDC_STAT_EPISODE_TOTAL_COUNT_DTM_END: NORMAL
MDC_IDC_STAT_EPISODE_TOTAL_COUNT_DTM_START: NORMAL
MDC_IDC_STAT_EPISODE_TYPE: NORMAL
MDC_IDC_STAT_TACHYTHERAPY_ATP_DELIVERED_RECENT: 0
MDC_IDC_STAT_TACHYTHERAPY_ATP_DELIVERED_TOTAL: 0
MDC_IDC_STAT_TACHYTHERAPY_RECENT_DTM_END: NORMAL
MDC_IDC_STAT_TACHYTHERAPY_RECENT_DTM_START: NORMAL
MDC_IDC_STAT_TACHYTHERAPY_SHOCKS_ABORTED_RECENT: 0
MDC_IDC_STAT_TACHYTHERAPY_SHOCKS_ABORTED_TOTAL: 0
MDC_IDC_STAT_TACHYTHERAPY_SHOCKS_DELIVERED_RECENT: 0
MDC_IDC_STAT_TACHYTHERAPY_SHOCKS_DELIVERED_TOTAL: 0
MDC_IDC_STAT_TACHYTHERAPY_TOTAL_DTM_END: NORMAL
MDC_IDC_STAT_TACHYTHERAPY_TOTAL_DTM_START: NORMAL
MONOCYTES # BLD AUTO: 1.2 10E9/L (ref 0–1.3)
MONOCYTES NFR BLD AUTO: 10.8 %
NEUTROPHILS # BLD AUTO: 6.6 10E9/L (ref 1.6–8.3)
NEUTROPHILS NFR BLD AUTO: 57.8 %
PLATELET # BLD AUTO: 226 10E9/L (ref 150–450)
POTASSIUM SERPL-SCNC: 4 MMOL/L (ref 3.4–5.3)
RBC # BLD AUTO: 4.76 10E12/L (ref 4.4–5.9)
SODIUM SERPL-SCNC: 136 MMOL/L (ref 133–144)
URATE SERPL-MCNC: 7.4 MG/DL (ref 3.5–7.2)
WBC # BLD AUTO: 11.4 10E9/L (ref 4–11)

## 2019-01-01 PROCEDURE — 99214 OFFICE O/P EST MOD 30 MIN: CPT | Performed by: FAMILY MEDICINE

## 2019-01-01 PROCEDURE — 85025 COMPLETE CBC W/AUTO DIFF WBC: CPT | Performed by: FAMILY MEDICINE

## 2019-01-01 PROCEDURE — 36415 COLL VENOUS BLD VENIPUNCTURE: CPT | Performed by: FAMILY MEDICINE

## 2019-01-01 PROCEDURE — 93296 REM INTERROG EVL PM/IDS: CPT | Performed by: INTERNAL MEDICINE

## 2019-01-01 PROCEDURE — 99213 OFFICE O/P EST LOW 20 MIN: CPT | Performed by: FAMILY MEDICINE

## 2019-01-01 PROCEDURE — 80048 BASIC METABOLIC PNL TOTAL CA: CPT | Performed by: FAMILY MEDICINE

## 2019-01-01 PROCEDURE — 84550 ASSAY OF BLOOD/URIC ACID: CPT | Performed by: FAMILY MEDICINE

## 2019-01-01 PROCEDURE — 99212 OFFICE O/P EST SF 10 MIN: CPT | Performed by: FAMILY MEDICINE

## 2019-01-01 PROCEDURE — 93295 DEV INTERROG REMOTE 1/2/MLT: CPT | Performed by: INTERNAL MEDICINE

## 2019-01-01 PROCEDURE — 85652 RBC SED RATE AUTOMATED: CPT | Performed by: FAMILY MEDICINE

## 2019-01-01 RX ORDER — CETIRIZINE HYDROCHLORIDE 10 MG/1
TABLET ORAL
Qty: 90 TABLET | Refills: 0 | Status: SHIPPED | OUTPATIENT
Start: 2019-01-01

## 2019-01-01 RX ORDER — FINASTERIDE 5 MG/1
TABLET, FILM COATED ORAL
Qty: 90 TABLET | Refills: 3 | Status: SHIPPED | OUTPATIENT
Start: 2019-01-01

## 2019-01-01 RX ORDER — FINASTERIDE 5 MG/1
TABLET, FILM COATED ORAL
Qty: 30 TABLET | Refills: 0 | Status: SHIPPED | OUTPATIENT
Start: 2019-01-01 | End: 2019-01-01

## 2019-01-01 RX ORDER — PREDNISONE 20 MG/1
40 TABLET ORAL DAILY
Qty: 10 TABLET | Refills: 1 | Status: SHIPPED | OUTPATIENT
Start: 2019-01-01 | End: 2019-01-01

## 2019-01-01 RX ORDER — PREDNISONE 20 MG/1
40 TABLET ORAL DAILY
Qty: 10 TABLET | Refills: 0 | Status: SHIPPED | OUTPATIENT
Start: 2019-01-01 | End: 2019-01-01

## 2019-01-01 RX ORDER — METOPROLOL SUCCINATE 25 MG/1
TABLET, EXTENDED RELEASE ORAL
Qty: 180 TABLET | Refills: 2 | Status: SHIPPED | OUTPATIENT
Start: 2019-01-01

## 2019-01-01 ASSESSMENT — MIFFLIN-ST. JEOR
SCORE: 1496.1
SCORE: 1469.79
SCORE: 1472.51
SCORE: 1497

## 2019-01-01 ASSESSMENT — PAIN SCALES - GENERAL: PAINLEVEL: EXTREME PAIN (8)

## 2019-01-03 NOTE — TELEPHONE ENCOUNTER
"Requested Prescriptions   Pending Prescriptions Disp Refills     metoprolol succinate ER (TOPROL-XL) 25 MG 24 hr tablet [Pharmacy Med Name: METOPROLOL SUCC ER 25 MG TAB] 180 tablet 2     Sig: TAKE 2 TABLETS BY MOUTH ONCE DAILY    Beta-Blockers Protocol Failed - 1/3/2019  9:37 AM       Failed - Blood pressure under 140/90 in past 12 months    BP Readings from Last 3 Encounters:   11/12/18 160/82   09/05/18 148/66   08/24/18 159/70                Passed - Patient is age 6 or older       Passed - Recent (12 mo) or future (30 days) visit within the authorizing provider's specialty    Patient had office visit in the last 12 months or has a visit in the next 30 days with authorizing provider or within the authorizing provider's specialty.  See \"Patient Info\" tab in inbasket, or \"Choose Columns\" in Meds & Orders section of the refill encounter.      Last Written Prescription Date:  2/13/18  Last Fill Quantity: 180,  # refills: 3   Last office visit: 11/16/2018 with prescribing provider:     Future Office Visit:                  "

## 2019-02-18 NOTE — TELEPHONE ENCOUNTER
"Requested Prescriptions   Pending Prescriptions Disp Refills     cetirizine (ZYRTEC) 10 MG tablet 90 tablet 0    Last Written Prescription Date:  9/27/18  Last Fill Quantity: 90 tab,  # refills: 0   Last office visit: 11/16/2018 with prescribing provider:  Nurse   Future Office Visit:     Sig: TAKE 1 TABLET (10 MG) BY MOUTH EVERY EVENING    Antihistamines Protocol Failed - 2/18/2019 12:15 PM       Failed - Patient is 3-64 years of age    Apply weight-based dosing for peds patients age 3 - 12 years of age.    Forward request to provider for patients under the age of 3 or over the age of 64.         Passed - Recent (12 mo) or future (30 days) visit within the authorizing provider's specialty    Patient had office visit in the last 12 months or has a visit in the next 30 days with authorizing provider or within the authorizing provider's specialty.  See \"Patient Info\" tab in inbasket, or \"Choose Columns\" in Meds & Orders section of the refill encounter.             Passed - Medication is active on med list          "

## 2019-02-20 NOTE — TELEPHONE ENCOUNTER
Pt takes for cough.    Routing refill request to provider for review/approval because:  Drug not on the [facility] refill protocol:  Pt fails protocol due to advanced age.    Adamaris Arvizu RN

## 2019-04-01 NOTE — TELEPHONE ENCOUNTER
"Requested Prescriptions   Pending Prescriptions Disp Refills     finasteride (PROSCAR) 5 MG tablet [Pharmacy Med Name: FINASTERIDE 5 MG TABLET] 90 tablet 3    Last Written Prescription Date:  1/17/18  Last Fill Quantity: 90 tab,  # refills: 3   Last office visit: 11/16/2018 with prescribing provider:  Nurse   Future Office Visit:     Sig: TAKE 1 TABLET BY MOUTH DAILY    Alpha Blockers Failed - 3/30/2019 12:38 AM       Failed - Blood pressure under 140/90 in past 12 months    BP Readings from Last 3 Encounters:   11/12/18 160/82   09/05/18 148/66   08/24/18 159/70                Passed - Recent (12 mo) or future (30 days) visit within the authorizing provider's specialty    Patient had office visit in the last 12 months or has a visit in the next 30 days with authorizing provider or within the authorizing provider's specialty.  See \"Patient Info\" tab in inbasket, or \"Choose Columns\" in Meds & Orders section of the refill encounter.             Passed - Patient does not have Tadalafil, Vardenafil, or Sildenafil on their medication list       Passed - Medication is active on med list       Passed - Patient is 18 years of age or older          "

## 2019-04-03 NOTE — PATIENT INSTRUCTIONS
Possible gout on the wrist that just has not improved since no treatment has been given for the last year.    Start prednisone 40 mg once a day for 5 days.    You will be contacted in 1-2 days for results of your lab tests.    Follow up in 1 week for recheck.      Thank you for choosing [facility].  You may be receiving an email and/or telephone survey request from [facility] Customer Experience regarding your visit today.  Please take a few minutes to respond to the survey to let us know how we are doing.      If you have questions or concerns, please contact us via [employer] or you can contact your care team at [phone].    Our Clinic hours are:  Monday 6:40 am  to 7:00 pm  Tuesday -Friday 6:40 am to 5:00 pm    The [place] outpatient lab hours are:  Monday - Friday 6:10 am to 4:45 pm  Saturdays 7:00 am to 11:00 am  Appointments are required, call [phone]    If you have clinical questions after hours or would like to schedule an appointment,  call the clinic at [phone].    Patient Education     Treating Gout Attacks     Raising the joint above the level of your heart can help reduce gout symptoms.     Gout is a disease that affects the joints. It is caused by excess uric acid in your blood that may lead to crystals forming in your joints. Left untreated, it can lead to painful foot and joint deformities and even kidney problems. But, by treating gout early, you can relieve pain and help prevent future problems. Gout can usually be treated with medicine and proper diet. In severe cases, surgery may be needed.  Gout attacks are painful and often happen more than once. Taking medicines may reduce pain and prevent attacks in the future. There are also some things you can do at home to relieve symptoms.  Medicines for gout  Your healthcare provider may prescribe a daily medicine to reduce levels of uric acid. Reducing your uric acid levels may help prevent gout attacks. Allopurinol is one commonly used  medicine taken daily to reduce uric acid levels. Other daily medicines used to reduce uric acid levels include febuxostat, lesinurad, and probencid. Other medicines can help relieve pain and swelling during an acute attack. Medicines such as NSAIDs (nonsteroidal anti-inflammatory medicines), steroids, and colchicine may be prescribed for intermittent use to relieve an acute gout attack. Be sure to take your medicine as directed.  What you can do  Below are some things you can do at home to relieve gout symptoms. Your healthcare provider may have other tips.    Rest the painful joint as much as you can.    Raise the painful joint so it is at a level higher than your heart.    Use ice for 10 minutes every 1 to 2 hours as possible.  How can I prevent gout?  With a little effort, you may be able to prevent gout attacks in the future. Here are some things you can do:    Don't eat foods high in purines  ? Certain meats (red meat, processed meat, turkey)  ? Organ meats (kidney, liver, sweetbread)  ? Shellfish (lobster, crab, shrimp, scallop, mussel)  ? Certain fish (anchovy, sardine, herring, mackerel)    Take any medicines prescribed by your healthcare provider.    Lose weight if you need to.    Reduce high fructose corn syrup in meals and drinks.    Reduce or cut out alcohol, particularly beer, but also red wine and spirits.    Control blood pressure, diabetes, and cholesterol.    Drink plenty of water to help flush uric acid from your body.  Date Last Reviewed: 4/1/2018 2000-2018 [employer]. [address] [place] [address], [place] [address]. All rights reserved. This information is not intended as a substitute for professional medical care. Always follow your healthcare professional's instructions.

## 2019-04-03 NOTE — PROGRESS NOTES
SUBJECTIVE:   Colin Shell is a 89 year old male who presents to clinic today for the following health issues:      Joint Pain    Onset: 1 year    Description:   Location: left wrist and right wrist  Character: Sharp and Dull ache    Intensity: 8/10    Progression of Symptoms: same    Accompanying Signs & Symptoms:  Other symptoms: radiation of pain to finger and forearm, weakness of arm, swelling and redness in the hands in the morning    History:   Previous similar pain: no       Precipitating factors:   Trauma or overuse: no     Alleviating factors:  Improved by: nothing    Therapies Tried and outcome: has not tried anything    Verified above history with patient.    Patient states the swelling and pain on right wrist has been constant everyday for the past one year.    Patient has hx of gout. On Allopurinol.    Problem list and histories reviewed & adjusted, as indicated.  Additional history: as documented    Patient Active Problem List   Diagnosis     Hypertension goal BP (blood pressure) < 140/90     Gout     Coronary artery disease involving coronary bypass graft of native heart without angina pectoris     Chronic kidney disease, stage III (moderate) (H)     Obesity     Benign prostatic hyperplasia     Hyperlipidemia LDL goal <100     Bronchiectasis (H)     Cholelithiasis     Fibrosis of lung (H)     Pulmonary nodule, right     Advance Care Planning     Chronic systolic CHF (congestive heart failure) (H)     Paroxysmal atrial fibrillation (H)     Dyspnea     Bronchiectasis without complication (H)     Lymphedema     Ischemic cardiomyopathy     Cardiac pacemaker in situ     DDD (degenerative disc disease), lumbar     Hiatal hernia     Shoulder impingement, left     Past Surgical History:   Procedure Laterality Date     BIOPSY ARTERY TEMPORAL Left 9/18/2017    Procedure: BIOPSY ARTERY TEMPORAL;  Left Temporal Biopsy ;  Surgeon: Ivana Phan MD;  Location: [facility] OR     SURGICAL HISTORY OF -        heel spurs     SURGICAL HISTORY OF -   2005, 2006    bilateral eye cataract     SURGICAL HISTORY OF -   1996    CABG x 5 vessels     SURGICAL HISTORY OF -   2010    circumcision     TONSILLECTOMY & ADENOIDECTOMY         Social History     Tobacco Use     Smoking status: Former Smoker     Smokeless tobacco: Never Used     Tobacco comment: Quit at age 32   Substance Use Topics     Alcohol use: Yes     Comment: 12 pack a year     Family History   Problem Relation Age of Onset     Diabetes Father      Diabetes Brother      Diabetes Brother      Hypertension Son      Unknown/Adopted Maternal Grandmother      Unknown/Adopted Maternal Grandfather      Unknown/Adopted Paternal Grandmother      Unknown/Adopted Paternal Grandfather      Dementia Sister          Current Outpatient Medications   Medication Sig Dispense Refill     ACE/ARB/ARNI NOT PRESCRIBED, INTENTIONAL, Please choose reason not prescribed, below       allopurinol (ZYLOPRIM) 100 MG tablet Take 0.5 tablets (50 mg) by mouth daily 30 tablet 11     aspirin  MG EC tablet Take 1 tablet (325 mg) by mouth daily 90 tablet 3     cetirizine (ZYRTEC) 10 MG tablet TAKE 1 TABLET (10 MG) BY MOUTH EVERY EVENING 90 tablet 0     finasteride (PROSCAR) 5 MG tablet TAKE 1 TABLET BY MOUTH DAILY 30 tablet 0     furosemide (LASIX) 20 MG tablet Take 1 tablet (20 mg) by mouth daily 60 tablet 11     metoprolol succinate ER (TOPROL-XL) 25 MG 24 hr tablet TAKE 2 TABLETS BY MOUTH ONCE DAILY 180 tablet 2     Multiple Vitamins-Minerals (CENTRUM SILVER) per tablet Take 1 tablet by mouth daily       predniSONE (DELTASONE) 20 MG tablet Take 40 mg by mouth daily for 5 days For the wrist swelling. 10 tablet 0     guaiFENesin (MUCINEX) 600 MG 12 hr tablet Take 600 mg by mouth daily        order for DME Equipment being ordered: Splint for the left wrist. 1 Device 0     Allergies   Allergen Reactions     Flomax [Tamsulosin Hydrochloride]      Hctz Other (See Comments)     Caused loss of  "balance     Lisinopril Cough     Simvastatin Nausea and Vomiting and Diarrhea     Vytorin Nausea and Diarrhea       Reviewed and updated as needed this visit by clinical staff  Tobacco  Allergies  Meds  Problems  Med Hx  Surg Hx  Fam Hx  Soc Hx        Reviewed and updated as needed this visit by Provider  Tobacco  Allergies  Meds  Problems  Med Hx  Surg Hx  Fam Hx         ROS:  C: NEGATIVE for fever, chills, change in weight  I: NEGATIVE for worrisome rashes, moles or lesions  MUSCULOSKELETAL:see above  N: NEGATIVE for weakness, dizziness or paresthesias  H: NEGATIVE for bleeding problems    OBJECTIVE:                                                    /74 (BP Location: Right arm, Patient Position: Chair, Cuff Size: Adult Regular)   Pulse 73   Temp 97.8  F (36.6  C) (Tympanic)   Resp 24   Ht 1.715 m (5' 7.5\")   Wt 85.1 kg (187 lb 9.6 oz)   SpO2 94%   BMI 28.95 kg/m    Body mass index is 28.95 kg/m .  GENERAL:  alert and no distress  MS: mild swelling but no erythema of right wrist with mild radial aspect TTP; decreased range of motion of right wrist due to pain/swelling; negative Tinel's; no other joint swelling  SKIN: no rash    Diagnostic test results:  Diagnostic Test Results:  none      ASSESSMENT/PLAN:                                                        ICD-10-CM    1. Right wrist pain M25.531 predniSONE (DELTASONE) 20 MG tablet     Uric acid     CBC with platelets differential     Erythrocyte sedimentation rate auto   2. Idiopathic gout, unspecified chronicity, unspecified site M10.00 Uric acid     Basic metabolic panel     CBC with platelets differential     Chronic swelling per patient report.  Differentials include gout, other inflammatory arthritides, DJD, strain (not consistent with the chronicity)  With patient's hx of gout, will treat with prednisone empirically.   Check labs today.  Consider rheum work up if persistent.  Gout prevention reviewed with patient.  Return " precautions discussed and given to patient.      Follow up with Provider - 1 week if not resolved.   Patient Instructions     Possible gout on the wrist that just has not improved since no treatment has been given for the last year.    Start prednisone 40 mg once a day for 5 days.    You will be contacted in 1-2 days for results of your lab tests.    Follow up in 1 week for recheck.      Thank you for choosing [facility].  You may be receiving an email and/or telephone survey request from [facility] Customer Experience regarding your visit today.  Please take a few minutes to respond to the survey to let us know how we are doing.      If you have questions or concerns, please contact us via [employer] or you can contact your care team at [phone].    Our Clinic hours are:  Monday 6:40 am  to 7:00 pm  Tuesday -Friday 6:40 am to 5:00 pm    The [place] outpatient lab hours are:  Monday - Friday 6:10 am to 4:45 pm  Saturdays 7:00 am to 11:00 am  Appointments are required, call [phone]    If you have clinical questions after hours or would like to schedule an appointment,  call the clinic at [phone].    Patient Education     Treating Gout Attacks     Raising the joint above the level of your heart can help reduce gout symptoms.     Gout is a disease that affects the joints. It is caused by excess uric acid in your blood that may lead to crystals forming in your joints. Left untreated, it can lead to painful foot and joint deformities and even kidney problems. But, by treating gout early, you can relieve pain and help prevent future problems. Gout can usually be treated with medicine and proper diet. In severe cases, surgery may be needed.  Gout attacks are painful and often happen more than once. Taking medicines may reduce pain and prevent attacks in the future. There are also some things you can do at home to relieve symptoms.  Medicines for gout  Your healthcare provider may prescribe a daily medicine  to reduce levels of uric acid. Reducing your uric acid levels may help prevent gout attacks. Allopurinol is one commonly used medicine taken daily to reduce uric acid levels. Other daily medicines used to reduce uric acid levels include febuxostat, lesinurad, and probencid. Other medicines can help relieve pain and swelling during an acute attack. Medicines such as NSAIDs (nonsteroidal anti-inflammatory medicines), steroids, and colchicine may be prescribed for intermittent use to relieve an acute gout attack. Be sure to take your medicine as directed.  What you can do  Below are some things you can do at home to relieve gout symptoms. Your healthcare provider may have other tips.    Rest the painful joint as much as you can.    Raise the painful joint so it is at a level higher than your heart.    Use ice for 10 minutes every 1 to 2 hours as possible.  How can I prevent gout?  With a little effort, you may be able to prevent gout attacks in the future. Here are some things you can do:    Don't eat foods high in purines  ? Certain meats (red meat, processed meat, turkey)  ? Organ meats (kidney, liver, sweetbread)  ? Shellfish (lobster, crab, shrimp, scallop, mussel)  ? Certain fish (anchovy, sardine, herring, mackerel)    Take any medicines prescribed by your healthcare provider.    Lose weight if you need to.    Reduce high fructose corn syrup in meals and drinks.    Reduce or cut out alcohol, particularly beer, but also red wine and spirits.    Control blood pressure, diabetes, and cholesterol.    Drink plenty of water to help flush uric acid from your body.  Date Last Reviewed: 4/1/2018 2000-2018 [employer]. [address] [place] [address], [place] [address]. All rights reserved. This information is not intended as a substitute for professional medical care. Always follow your healthcare professional's instructions.               Eric Brasher MD  [facility]

## 2019-04-09 NOTE — PROGRESS NOTES
SUBJECTIVE:   Colin Shell is a 89 year old male who presents to clinic today for the following   health issues:  Chief Complaint   Patient presents with     Musculoskeletal Problem     Pt here for a f/u on right wrist pain, doing much better.     Follow up on right wrist pain that was suspected as gout last visit.  Pt feeling much better.  Swelling is much less along with the pain.  Pt finished taking prednisone.  Patient denies any new joint pain or other complaint.    Additional history: as documented    Reviewed  and updated as needed this visit by clinical staff  Tobacco  Allergies  Meds  Med Hx  Surg Hx  Fam Hx  Soc Hx        Reviewed and updated as needed this visit by Provider  Tobacco  Allergies  Meds  Problems  Med Hx  Surg Hx  Fam Hx         Patient Active Problem List   Diagnosis     Hypertension goal BP (blood pressure) < 140/90     Gout     Coronary artery disease involving coronary bypass graft of native heart without angina pectoris     Chronic kidney disease, stage III (moderate) (H)     Obesity     Benign prostatic hyperplasia     Hyperlipidemia LDL goal <100     Bronchiectasis (H)     Cholelithiasis     Fibrosis of lung (H)     Pulmonary nodule, right     Advance Care Planning     Chronic systolic CHF (congestive heart failure) (H)     Paroxysmal atrial fibrillation (H)     Dyspnea     Bronchiectasis without complication (H)     Lymphedema     Ischemic cardiomyopathy     Cardiac pacemaker in situ     DDD (degenerative disc disease), lumbar     Hiatal hernia     Shoulder impingement, left     Past Surgical History:   Procedure Laterality Date     BIOPSY ARTERY TEMPORAL Left 9/18/2017    Procedure: BIOPSY ARTERY TEMPORAL;  Left Temporal Biopsy ;  Surgeon: Ivana Phan MD;  Location: [facility] OR     SURGICAL HISTORY OF -       heel spurs     SURGICAL HISTORY OF -   2005, 2006    bilateral eye cataract     SURGICAL HISTORY OF -   1996    CABG x 5 vessels     SURGICAL HISTORY  OF - 2010    circumcision     TONSILLECTOMY & ADENOIDECTOMY         Social History     Tobacco Use     Smoking status: Former Smoker     Smokeless tobacco: Never Used     Tobacco comment: Quit at age 32   Substance Use Topics     Alcohol use: Yes     Comment: 12 pack a year     Family History   Problem Relation Age of Onset     Diabetes Father      Diabetes Brother      Diabetes Brother      Hypertension Son      Unknown/Adopted Maternal Grandmother      Unknown/Adopted Maternal Grandfather      Unknown/Adopted Paternal Grandmother      Unknown/Adopted Paternal Grandfather      Dementia Sister          Current Outpatient Medications   Medication Sig Dispense Refill     allopurinol (ZYLOPRIM) 100 MG tablet Take 0.5 tablets (50 mg) by mouth daily 30 tablet 11     aspirin  MG EC tablet Take 1 tablet (325 mg) by mouth daily 90 tablet 3     cetirizine (ZYRTEC) 10 MG tablet TAKE 1 TABLET (10 MG) BY MOUTH EVERY EVENING 90 tablet 0     finasteride (PROSCAR) 5 MG tablet TAKE 1 TABLET BY MOUTH DAILY 30 tablet 0     furosemide (LASIX) 20 MG tablet Take 1 tablet (20 mg) by mouth daily 60 tablet 11     metoprolol succinate ER (TOPROL-XL) 25 MG 24 hr tablet TAKE 2 TABLETS BY MOUTH ONCE DAILY 180 tablet 2     Multiple Vitamins-Minerals (CENTRUM SILVER) per tablet Take 1 tablet by mouth daily       ACE/ARB/ARNI NOT PRESCRIBED, INTENTIONAL, Please choose reason not prescribed, below       guaiFENesin (MUCINEX) 600 MG 12 hr tablet Take 600 mg by mouth daily        order for DME Equipment being ordered: Splint for the left wrist. 1 Device 0     Allergies   Allergen Reactions     Flomax [Tamsulosin Hydrochloride]      Hctz Other (See Comments)     Caused loss of balance     Lisinopril Cough     Simvastatin Nausea and Vomiting and Diarrhea     Vytorin Nausea and Diarrhea       ROS:  C: NEGATIVE for fever, chills, change in weight  I: NEGATIVE for worrisome rashes, moles or lesions  MUSCULOSKELETAL:see above  N: NEGATIVE for  "weakness, dizziness or paresthesias  H: NEGATIVE for bleeding problems    OBJECTIVE:                                                    /76   Pulse 58   Temp 96.2  F (35.7  C) (Tympanic)   Resp 16   Ht 1.715 m (5' 7.5\")   Wt 84.8 kg (187 lb)   SpO2 97%   BMI 28.86 kg/m    Body mass index is 28.86 kg/m .  GENERAL:  alert and no distress  RIGHT WRIST/HAND: no swelling/discoloration/deformity; full range of motion of all joints with no pain; no hypoesthesia    SKIN: no suspicious lesions, no rashes    Diagnostic test results:  Diagnostic Test Results:  Results for orders placed or performed in visit on 04/03/19   Uric acid   Result Value Ref Range    Uric Acid 7.4 (H) 3.5 - 7.2 mg/dL   Basic metabolic panel   Result Value Ref Range    Sodium 136 133 - 144 mmol/L    Potassium 4.0 3.4 - 5.3 mmol/L    Chloride 104 94 - 109 mmol/L    Carbon Dioxide 26 20 - 32 mmol/L    Anion Gap 6 3 - 14 mmol/L    Glucose 187 (H) 70 - 99 mg/dL    Urea Nitrogen 29 7 - 30 mg/dL    Creatinine 1.00 0.66 - 1.25 mg/dL    GFR Estimate 65 >60 mL/min/[1.73_m2]    GFR Estimate If Black 76 >60 mL/min/[1.73_m2]    Calcium 8.8 8.5 - 10.1 mg/dL   CBC with platelets differential   Result Value Ref Range    WBC 11.4 (H) 4.0 - 11.0 10e9/L    RBC Count 4.76 4.4 - 5.9 10e12/L    Hemoglobin 15.1 13.3 - 17.7 g/dL    Hematocrit 45.3 40.0 - 53.0 %    MCV 95 78 - 100 fl    MCH 31.7 26.5 - 33.0 pg    MCHC 33.3 31.5 - 36.5 g/dL    RDW 14.1 10.0 - 15.0 %    Platelet Count 226 150 - 450 10e9/L    % Neutrophils 57.8 %    % Lymphocytes 27.4 %    % Monocytes 10.8 %    % Eosinophils 3.6 %    % Basophils 0.4 %    Absolute Neutrophil 6.6 1.6 - 8.3 10e9/L    Absolute Lymphocytes 3.1 0.8 - 5.3 10e9/L    Absolute Monocytes 1.2 0.0 - 1.3 10e9/L    Absolute Eosinophils 0.4 0.0 - 0.7 10e9/L    Absolute Basophils 0.1 0.0 - 0.2 10e9/L    Diff Method Automated Method    Erythrocyte sedimentation rate auto   Result Value Ref Range    Sed Rate 29 (H) 0 - 20 mm/h    "     ASSESSMENT/PLAN:                                                        ICD-10-CM    1. Idiopathic gout, unspecified chronicity, unspecified site M10.00      Resolved right wrist swelling/gout attack.  No additional treatment needed.  Reinforced dietary and lifestyle changes to prevent future flares.  Return precautions discussed and given to patient.      Follow up with Provider - prn   Patient Instructions   The gout attack on your right wrist has resolved.  No additional treatment is needed.    Be sure to push oral fluids.    Follow lifestyle and diet changes to prevent gout attacks.    Patient Education     Gout Diet  Gout is a painful condition caused by an excess of uric acid, a waste product made by the body. Uric acid forms crystals that collect in the joints. The immune response to these crystals brings on symptoms of joint pain and swelling. This is called a gout attack. Often, medications and diet changes are combined to manage gout. Below are some guidelines for changing your diet to help you manage gout and prevent attacks. Your healthcare provider will help you determine the best eating plan for you.  Eating to manage gout  Weight loss for those who are overweight may help reduce gout attacks.  Eat less of these foods  Eating too many foods containing purines may raise the levels of uric acid in your body. This raises your risk for a gout attack. Try to limit these foods and drinks:    Alcohol, such as beer and red wine. You may be told to avoid alcohol completely.    Soft drinks that contain sugar or high fructose corn syrup    Certain fish, including anchovies, sardines, fish eggs, and herring    Shellfish    Certain meats, such as red meat, hot dogs, luncheon meats, and turkey    Organ meats, such as liver, kidneys, and sweetbreads    Legumes, such as dried beans and peas    Other high fat foods such as gravy, whole milk, and high fat cheeses    Vegetables such as asparagus, cauliflower,  spinach, and mushrooms used to be thought to contribute to an increased risk for a gout attack, but recent studies show that high purine vegetables don't increase the risk for a gout attack.  Eat more of these foods  Other foods may be helpful for people with gout. Add some of these foods to your diet:    Cherries contain chemicals that may lower uric acid.    Omega fatty acids. These are found in some fatty fish such as salmon, certain oils (flax, olive, or nut), and nuts themselves. Omega fatty acids may help prevent inflammation due to gout.    Dairy products that are low-fat or fat-free, such as cheese and yogurt    Complex carbohydrate foods, including whole grains, brown rice, oats, and beans    Coffee, in moderation    Water, approximately 64 ounces per day  Follow-up care  Follow up with your healthcare provider as advised.  When to seek medical advice  Call your healthcare provider right away if any of these occur:    Return of gout symptoms, usually at night:    Severe pain, swelling, and heat in a joint, especially the base of the big toe    Affected joint is hard to move    Skin of the affected joint is purple or red    Fever of 100.4 F (38 C) or higher    Pain that doesn't get better even with prescribed medicine   Date Last Reviewed: 6/1/2018 2000-2018 [employer]. [address] [place] [address], [place] [address]. All rights reserved. This information is not intended as a substitute for professional medical care. Always follow your healthcare professional's instructions.               Eric Brasher MD  [facility] [place]

## 2019-04-09 NOTE — PATIENT INSTRUCTIONS
The gout attack on your right wrist has resolved.  No additional treatment is needed.    Be sure to push oral fluids.    Follow lifestyle and diet changes to prevent gout attacks.    Patient Education     Gout Diet  Gout is a painful condition caused by an excess of uric acid, a waste product made by the body. Uric acid forms crystals that collect in the joints. The immune response to these crystals brings on symptoms of joint pain and swelling. This is called a gout attack. Often, medications and diet changes are combined to manage gout. Below are some guidelines for changing your diet to help you manage gout and prevent attacks. Your healthcare provider will help you determine the best eating plan for you.  Eating to manage gout  Weight loss for those who are overweight may help reduce gout attacks.  Eat less of these foods  Eating too many foods containing purines may raise the levels of uric acid in your body. This raises your risk for a gout attack. Try to limit these foods and drinks:    Alcohol, such as beer and red wine. You may be told to avoid alcohol completely.    Soft drinks that contain sugar or high fructose corn syrup    Certain fish, including anchovies, sardines, fish eggs, and herring    Shellfish    Certain meats, such as red meat, hot dogs, luncheon meats, and turkey    Organ meats, such as liver, kidneys, and sweetbreads    Legumes, such as dried beans and peas    Other high fat foods such as gravy, whole milk, and high fat cheeses    Vegetables such as asparagus, cauliflower, spinach, and mushrooms used to be thought to contribute to an increased risk for a gout attack, but recent studies show that high purine vegetables don't increase the risk for a gout attack.  Eat more of these foods  Other foods may be helpful for people with gout. Add some of these foods to your diet:    Cherries contain chemicals that may lower uric acid.    Omega fatty acids. These are found in some fatty fish such as  salmon, certain oils (flax, olive, or nut), and nuts themselves. Omega fatty acids may help prevent inflammation due to gout.    Dairy products that are low-fat or fat-free, such as cheese and yogurt    Complex carbohydrate foods, including whole grains, brown rice, oats, and beans    Coffee, in moderation    Water, approximately 64 ounces per day  Follow-up care  Follow up with your healthcare provider as advised.  When to seek medical advice  Call your healthcare provider right away if any of these occur:    Return of gout symptoms, usually at night:    Severe pain, swelling, and heat in a joint, especially the base of the big toe    Affected joint is hard to move    Skin of the affected joint is purple or red    Fever of 100.4 F (38 C) or higher    Pain that doesn't get better even with prescribed medicine   Date Last Reviewed: 6/1/2018 2000-2018 [employer]. [address] [place] [address], [place] [address]. All rights reserved. This information is not intended as a substitute for professional medical care. Always follow your healthcare professional's instructions.

## 2019-04-11 NOTE — PROGRESS NOTES
[employer] Evera (D) ICD Remote Device Check  AP: <0.1% : 20 %  Mode: DDD         Presenting Rhythm: AF with VS   Heart Rate: Stable with adequate variability   Sensing: WNL    Pacing Threshold: RA not obtained. RV is WNL    Impedance: WNL  Battery Status: 7.3 years   Atrial Arrhythmia: 100% AT/AF burden since 12/13/19. He is on 325mg aspirin.   Ventricular Arrhythmia: 0  ATP: 0    Shocks: 0    Care Plan: Follow up in July with in clinic device check. Patient is overdue for follow up with [facility] but declines follow up. MARYANN, RN    I have reviewed and interpreted the device interrogation, settings, programming and nurse's summary. The device is functioning within normal device parameters. I agree with the current findings, assessment and plan.

## 2019-04-15 NOTE — TELEPHONE ENCOUNTER
Routing refill request to provider for review/approval because:  Blood pressure has been above goal.    Thank you    Umm BAUER RN

## 2019-04-15 NOTE — TELEPHONE ENCOUNTER
"Requested Prescriptions   Pending Prescriptions Disp Refills     finasteride (PROSCAR) 5 MG tablet [Pharmacy Med Name: FINASTERIDE 5 MG TABLET] 90 tablet 3     Sig: TAKE 1 TABLET BY MOUTH DAILY   Last Written Prescription Date:  4/1/19  Last Fill Quantity: 30 tab,  # refills: 0   Last office visit: 4/9/2019 with prescribing provider:  LEIDY Brasher   Future Office Visit:        Alpha Blockers Failed - 4/13/2019  9:06 AM        Failed - Blood pressure under 140/90 in past 12 months     BP Readings from Last 3 Encounters:   04/09/19 144/76   04/03/19 154/74   11/12/18 160/82                 Passed - Recent (12 mo) or future (30 days) visit within the authorizing provider's specialty     Patient had office visit in the last 12 months or has a visit in the next 30 days with authorizing provider or within the authorizing provider's specialty.  See \"Patient Info\" tab in inbasket, or \"Choose Columns\" in Meds & Orders section of the refill encounter.              Passed - Patient does not have Tadalafil, Vardenafil, or Sildenafil on their medication list        Passed - Medication is active on med list        Passed - Patient is 18 years of age or older          "

## 2019-05-07 NOTE — PATIENT INSTRUCTIONS
Start prednisone as prescribed.  There is a refill for it for use for a future flare.    If with frequent flares, contact provider again. Possible referral to specialist.    Be sure to take Metoprolol XL as prescribed. You have a prescription at the pharmacy.  Patient Education     Treating Gout Attacks     Raising the joint above the level of your heart can help reduce gout symptoms.     Gout is a disease that affects the joints. It is caused by excess uric acid in your blood that may lead to crystals forming in your joints. Left untreated, it can lead to painful foot and joint deformities and even kidney problems. But, by treating gout early, you can relieve pain and help prevent future problems. Gout can usually be treated with medicine and proper diet. In severe cases, surgery may be needed.  Gout attacks are painful and often happen more than once. Taking medicines may reduce pain and prevent attacks in the future. There are also some things you can do at home to relieve symptoms.  Medicines for gout  Your healthcare provider may prescribe a daily medicine to reduce levels of uric acid. Reducing your uric acid levels may help prevent gout attacks. Allopurinol is one commonly used medicine taken daily to reduce uric acid levels. Other daily medicines used to reduce uric acid levels include febuxostat, lesinurad, and probencid. Other medicines can help relieve pain and swelling during an acute attack. Medicines such as NSAIDs (nonsteroidal anti-inflammatory medicines), steroids, and colchicine may be prescribed for intermittent use to relieve an acute gout attack. Be sure to take your medicine as directed.  What you can do  Below are some things you can do at home to relieve gout symptoms. Your healthcare provider may have other tips.    Rest the painful joint as much as you can.    Raise the painful joint so it is at a level higher than your heart.    Use ice for 10 minutes every 1 to 2 hours as possible.  How  can I prevent gout?  With a little effort, you may be able to prevent gout attacks in the future. Here are some things you can do:    Don't eat foods high in purines  ? Certain meats (red meat, processed meat, turkey)  ? Organ meats (kidney, liver, sweetbread)  ? Shellfish (lobster, crab, shrimp, scallop, mussel)  ? Certain fish (anchovy, sardine, herring, mackerel)    Take any medicines prescribed by your healthcare provider.    Lose weight if you need to.    Reduce high fructose corn syrup in meals and drinks.    Reduce or cut out alcohol, particularly beer, but also red wine and spirits.    Control blood pressure, diabetes, and cholesterol.    Drink plenty of water to help flush uric acid from your body.  Date Last Reviewed: 4/1/2018 2000-2018 [employer]. [address] [place] [address], [place] [address]. All rights reserved. This information is not intended as a substitute for professional medical care. Always follow your healthcare professional's instructions.

## 2019-05-07 NOTE — PROGRESS NOTES
Chief Complaint   Patient presents with     Edema     Pt here for right hand swelling.      Forms     handicap parking form     Concern - swelling in right hand, on and off  Onset: 1 yr ago, just swelled up about 1 wk ago again    Description:   Pt gets swelling in right hand, was tx for gout 1 month ago    Intensity: severe, depending on movement    Progression of Symptoms:  worsening    Accompanying Signs & Symptoms:  Swelling and painful    Previous history of similar problem:   History of gout    Precipitating factors:   Worsened by: none    Alleviating factors:  Improved by: none    Therapies Tried and outcome: prednisone helped    Verified above history with patient and son.      Additional history: as documented    Reviewed  and updated as needed this visit by clinical staff  Tobacco  Allergies  Meds  Problems  Med Hx  Surg Hx  Fam Hx  Soc Hx          Reviewed and updated as needed this visit by Provider  Tobacco  Allergies  Meds  Problems  Med Hx  Surg Hx  Fam Hx         Patient Active Problem List   Diagnosis     Hypertension goal BP (blood pressure) < 140/90     Gout     Coronary artery disease involving coronary bypass graft of native heart without angina pectoris     Chronic kidney disease, stage III (moderate) (H)     Obesity     Benign prostatic hyperplasia     Hyperlipidemia LDL goal <100     Bronchiectasis (H)     Cholelithiasis     Fibrosis of lung (H)     Pulmonary nodule, right     Advance Care Planning     Chronic systolic CHF (congestive heart failure) (H)     Paroxysmal atrial fibrillation (H)     Dyspnea     Bronchiectasis without complication (H)     Lymphedema     Ischemic cardiomyopathy     Cardiac pacemaker in situ     DDD (degenerative disc disease), lumbar     Hiatal hernia     Shoulder impingement, left     Past Surgical History:   Procedure Laterality Date     BIOPSY ARTERY TEMPORAL Left 9/18/2017    Procedure: BIOPSY ARTERY TEMPORAL;  Left Temporal Biopsy ;  Surgeon:  Ivana Phan MD;  Location: [facility] OR     SURGICAL HISTORY OF -       heel spurs     SURGICAL HISTORY OF -   2005, 2006    bilateral eye cataract     SURGICAL HISTORY OF -   1996    CABG x 5 vessels     SURGICAL HISTORY OF -   2010    circumcision     TONSILLECTOMY & ADENOIDECTOMY         Social History     Tobacco Use     Smoking status: Former Smoker     Smokeless tobacco: Never Used     Tobacco comment: Quit at age 32   Substance Use Topics     Alcohol use: Yes     Comment: 12 pack a year     Family History   Problem Relation Age of Onset     Diabetes Father      Diabetes Brother      Diabetes Brother      Hypertension Son      Unknown/Adopted Maternal Grandmother      Unknown/Adopted Maternal Grandfather      Unknown/Adopted Paternal Grandmother      Unknown/Adopted Paternal Grandfather      Dementia Sister          Current Outpatient Medications   Medication Sig Dispense Refill     allopurinol (ZYLOPRIM) 100 MG tablet Take 0.5 tablets (50 mg) by mouth daily 30 tablet 11     aspirin  MG EC tablet Take 1 tablet (325 mg) by mouth daily 90 tablet 3     cetirizine (ZYRTEC) 10 MG tablet TAKE 1 TABLET (10 MG) BY MOUTH EVERY EVENING 90 tablet 0     finasteride (PROSCAR) 5 MG tablet TAKE 1 TABLET BY MOUTH DAILY 90 tablet 3     furosemide (LASIX) 20 MG tablet Take 1 tablet (20 mg) by mouth daily 60 tablet 11     guaiFENesin (MUCINEX) 600 MG 12 hr tablet Take 600 mg by mouth daily        Multiple Vitamins-Minerals (CENTRUM SILVER) per tablet Take 1 tablet by mouth daily       predniSONE (DELTASONE) 20 MG tablet Take 40 mg by mouth daily for 5 days. 10 tablet 1     ACE/ARB/ARNI NOT PRESCRIBED, INTENTIONAL, Please choose reason not prescribed, below       metoprolol succinate ER (TOPROL-XL) 25 MG 24 hr tablet TAKE 2 TABLETS BY MOUTH ONCE DAILY 180 tablet 2     order for DME Equipment being ordered: Splint for the left wrist. 1 Device 0     Allergies   Allergen Reactions     Flomax [Tamsulosin Hydrochloride]   "    Hctz Other (See Comments)     Caused loss of balance     Lisinopril Cough     Simvastatin Nausea and Vomiting and Diarrhea     Vytorin Nausea and Diarrhea       ROS:  C: NEGATIVE for fever, chills, change in weight  I: NEGATIVE for worrisome rashes, moles or lesions  MUSCULOSKELETAL:see above  N: NEGATIVE for weakness, dizziness or paresthesias  H: NEGATIVE for bleeding problems    OBJECTIVE:                                                    /82   Pulse 69   Temp 97.4  F (36.3  C) (Tympanic)   Resp 16   Ht 1.715 m (5' 7.5\")   Wt 87.5 kg (192 lb 12.8 oz)   SpO2 97%   BMI 29.75 kg/m    Body mass index is 29.75 kg/m .  GENERAL:  alert and no distress  MS: mild swelling and no erythema of right wrist with moderate TTP; limited range of motion of right wrist due to pain/swelling; no other joint swelling/tenderness  SKIN: see above    Diagnostic test results:  Diagnostic Test Results:  none      ASSESSMENT/PLAN:                                                        ICD-10-CM    1. Idiopathic gout, unspecified chronicity, unspecified site M10.00 predniSONE (DELTASONE) 20 MG tablet     Discussed with patient course, management and prevention of gout attacks.  Prednisone chosen due to age and medical history.  Dietary changes to prevent gout attacks reinfroced in patient.  Patient and son asked about daily steroid treatment to prevent episodes. Discussed with them indications for such tregimen; discussed possible risks.  At this time, his risks may outweigh the benefits of chronic steroid treatment.  Consider rheum consult if more frequent flares.  Return precautions discussed and given to patient.        Follow up with Provider - 1 week if not resolved   Patient Instructions     Start prednisone as prescribed.  There is a refill for it for use for a future flare.    If with frequent flares, contact provider again. Possible referral to specialist.    Be sure to take Metoprolol XL as prescribed. You have a " prescription at the pharmacy.  Patient Education     Treating Gout Attacks     Raising the joint above the level of your heart can help reduce gout symptoms.     Gout is a disease that affects the joints. It is caused by excess uric acid in your blood that may lead to crystals forming in your joints. Left untreated, it can lead to painful foot and joint deformities and even kidney problems. But, by treating gout early, you can relieve pain and help prevent future problems. Gout can usually be treated with medicine and proper diet. In severe cases, surgery may be needed.  Gout attacks are painful and often happen more than once. Taking medicines may reduce pain and prevent attacks in the future. There are also some things you can do at home to relieve symptoms.  Medicines for gout  Your healthcare provider may prescribe a daily medicine to reduce levels of uric acid. Reducing your uric acid levels may help prevent gout attacks. Allopurinol is one commonly used medicine taken daily to reduce uric acid levels. Other daily medicines used to reduce uric acid levels include febuxostat, lesinurad, and probencid. Other medicines can help relieve pain and swelling during an acute attack. Medicines such as NSAIDs (nonsteroidal anti-inflammatory medicines), steroids, and colchicine may be prescribed for intermittent use to relieve an acute gout attack. Be sure to take your medicine as directed.  What you can do  Below are some things you can do at home to relieve gout symptoms. Your healthcare provider may have other tips.    Rest the painful joint as much as you can.    Raise the painful joint so it is at a level higher than your heart.    Use ice for 10 minutes every 1 to 2 hours as possible.  How can I prevent gout?  With a little effort, you may be able to prevent gout attacks in the future. Here are some things you can do:    Don't eat foods high in purines  ? Certain meats (red meat, processed meat, turkey)  ? Organ  meats (kidney, liver, sweetbread)  ? Shellfish (lobster, crab, shrimp, scallop, mussel)  ? Certain fish (anchovy, sardine, herring, mackerel)    Take any medicines prescribed by your healthcare provider.    Lose weight if you need to.    Reduce high fructose corn syrup in meals and drinks.    Reduce or cut out alcohol, particularly beer, but also red wine and spirits.    Control blood pressure, diabetes, and cholesterol.    Drink plenty of water to help flush uric acid from your body.  Date Last Reviewed: 4/1/2018 2000-2018 [employer]. [address] [place] [address]. All rights reserved. This information is not intended as a substitute for professional medical care. Always follow your healthcare professional's instructions.               Eric Brasher MD  [facility] - [facility]ILY [facility]

## 2019-05-23 NOTE — PATIENT INSTRUCTIONS
(R60.0) Localized edema  (primary encounter diagnosis)  Comment:   Plan: we discussed the cause of the swelling of the legs is the recent prednisone. Do not use prednisone for the joints in the future.   Use the lower sodium diet and elevate the legs when not up. Use the compression stocking on the left leg to the knee when up sitting or standing.   This will resolve. Avoid stimulants as in caffeine and cold medications, or allergy pills. You may stop Zyrtec until feeling well.

## 2019-05-23 NOTE — PROGRESS NOTES
Subjective     Colin Shell is a 89 year old male who presents to clinic today for the following health issues:    HPI   EDEMA AND OTHER SIDE EFFECTS      Duration: Was seen in clinic on 5-7-19 and was prescribed Prednisone to take for swelling of the right wrist and hand.     Description (location/character/radiation): Treated for gout with Prednisone for the right hand.  This is better.  The swelling of the legs started while on the Prednisone treatment.  When he was done with the treatment he started to have hallucinations.     Intensity:  moderate    Accompanying signs and symptoms: Muscle weakness.     History (similar episodes/previous evaluation): Similar symptoms when on Prednisone one year ago. They feel he is having a reaction to the medication.    Precipitating or alleviating factors: The swelling is better in the morning, will get worse as the day goes on.    Therapies tried and outcome: None         Current Outpatient Medications:      ACE/ARB/ARNI NOT PRESCRIBED, INTENTIONAL,, Please choose reason not prescribed, below, Disp: , Rfl:      allopurinol (ZYLOPRIM) 100 MG tablet, Take 0.5 tablets (50 mg) by mouth daily, Disp: 30 tablet, Rfl: 11     aspirin  MG EC tablet, Take 1 tablet (325 mg) by mouth daily, Disp: 90 tablet, Rfl: 3     cetirizine (ZYRTEC) 10 MG tablet, TAKE 1 TABLET (10 MG) BY MOUTH EVERY EVENING, Disp: 90 tablet, Rfl: 0     finasteride (PROSCAR) 5 MG tablet, TAKE 1 TABLET BY MOUTH DAILY, Disp: 90 tablet, Rfl: 3     furosemide (LASIX) 20 MG tablet, Take 1 tablet (20 mg) by mouth daily, Disp: 60 tablet, Rfl: 11     guaiFENesin (MUCINEX) 600 MG 12 hr tablet, Take 600 mg by mouth daily , Disp: , Rfl:      metoprolol succinate ER (TOPROL-XL) 25 MG 24 hr tablet, TAKE 2 TABLETS BY MOUTH ONCE DAILY, Disp: 180 tablet, Rfl: 2     Multiple Vitamins-Minerals (CENTRUM SILVER) per tablet, Take 1 tablet by mouth daily, Disp: , Rfl:      order for DME, Equipment being ordered: Splint for the  "left wrist., Disp: 1 Device, Rfl: 0    Patient Active Problem List   Diagnosis     Hypertension goal BP (blood pressure) < 140/90     Gout     Coronary artery disease involving coronary bypass graft of native heart without angina pectoris     Chronic kidney disease, stage III (moderate) (H)     Obesity     Benign prostatic hyperplasia     Hyperlipidemia LDL goal <100     Bronchiectasis (H)     Cholelithiasis     Fibrosis of lung (H)     Pulmonary nodule, right     Advance Care Planning     Chronic systolic CHF (congestive heart failure) (H)     Paroxysmal atrial fibrillation (H)     Dyspnea     Bronchiectasis without complication (H)     Lymphedema     Ischemic cardiomyopathy     Cardiac pacemaker in situ     DDD (degenerative disc disease), lumbar     Hiatal hernia     Shoulder impingement, left       Blood pressure 136/70, pulse 81, temperature 97  F (36.1  C), temperature source Tympanic, resp. rate 28, height 1.715 m (5' 7.5\"), weight 87.5 kg (193 lb), SpO2 90 %.    Exam:  GENERAL APPEARANCE: healthy, alert and no distress  EYES: EOMI,  PERRL  NECK: no adenopathy, no asymmetry, masses, or scars and thyroid normal to palpation  RESP: lungs clear to auscultation - no rales, rhonchi or wheezes  LEGS: moderate edema of the left leg to the knee; the right has minimal edema.   CV: regular rates and rhythm, normal S1 S2, no S3 or S4 and no murmur, click or rub -  PSYCH: mentation appears normal and affect normal/bright      (R60.0) Localized edema  (primary encounter diagnosis)  Comment:   Plan: we discussed the cause of the swelling of the legs is the recent prednisone. Do not use prednisone for the joints in the future.   Use the lower sodium diet and elevate the legs when not up. Use the compression stocking on the left leg to the knee when up sitting or standing.   This will resolve. Avoid stimulants as in caffeine and cold medications, or allergy pills. You may stop Zyrtec until feeling well.     Jono Zheng " [employer]

## 2019-07-05 DIAGNOSIS — I89.0 LYMPHEDEMA: ICD-10-CM

## 2019-07-05 RX ORDER — FUROSEMIDE 20 MG
TABLET ORAL
Qty: 60 TABLET | Refills: 11 | OUTPATIENT
Start: 2019-07-05

## 2019-08-03 DIAGNOSIS — R05.8 ALLERGIC COUGH: ICD-10-CM

## 2019-08-03 RX ORDER — CETIRIZINE HYDROCHLORIDE 10 MG/1
TABLET ORAL
Qty: 90 TABLET | Refills: 0 | OUTPATIENT
Start: 2019-08-03

## 2023-05-17 NOTE — LETTER
April 1, 2019      Colin Shell  [address] [facility] [address]        Dear Colin,     Your medication is being filled for 1 month refill only due to:  You are due for an office visit to follow up for your blood pressure.    Please call [phone] to arrange your appointment.    Thank you.    Sincerely,        Dr Drake / Adamaris Arvizu RN        lar           No

## 2023-10-20 NOTE — LETTER
5/21/2018    Jono Drake MD  [address] [facility] [address]    RE: Colin Shell       Dear Colleague,    I had the pleasure of seeing Colin Shell in the [facility].      Cardiology Progress Note    Date of Service: 05/21/2018  Patient seen today in follow up of: CHF  Primary cardiologist: Dr. Erickson    HPI:  Colin Shell is a very pleasant 89 year old male with a history of CAD s/p CABG in 1986, syncope, atrial dysrhythmia including atrial fibrillation and ventricular dysrhythmia for which he underwent ICD implantation in early 2017, hypertension and hyperlipidemia. He is a patient of Dr. Sommers and has been followed closely in clinic. In regards to anticoagulation, he has decline full anticoagulation and has remained on aspirin alone.      He was seen in clinic in February of this year and was doing well at that time.  Since then, he was in the emergency department 3 times for complaints of cough and shortness of breath.  He was also admitted 1 of these times from 4/18 through 4/20.  During his first ER visit, he underwent a CAT scan.  This showed patchy pulmonary groundglass opacities throughout the left lung.  He was also noted to have mild bronchiectasis bilaterally and small bilateral pleural effusions.  Antibiotics were prescribed for pneumonia.  About a week later he re-presented to the emergency room.  His symptoms had not improved with antibiotic therapy. He was treated with cough medicine and a DuoNeb.  He was instructed to follow-up with his PCP and consider a pulmonology follow-up.  Again, 2 weeks later he presented with similar symptoms.  A repeat chest CT without contrast showed some improvement in his patchy infiltrates.  Small bilateral pleural effusions were again noted.  At that point he was admitted for further workup and treatment. He was again treated with antibiotics.  A repeat echocardiogram during this admission showed normal LV  Please see the attached refill request.   size.  There was moderate concentric LVH.  The LVEF was 45-50%.  Mild aortic stenosis was noted.  Overall, these findings were unchanged from October 2017.  He followed up with his PCP post hospital and at that point was started on a low-dose of Lasix, 20 mg daily.    He then presented for his annual device clinic check about a week ago and was added on to Umm Cabrera PA-C's schedule.  He noted significant lower extremity edema as some exertional dyspnea and orthopnea.  He continued to have a cough which was productive at times. His device check showed intermittent atrial fibrillation although his heart rates appear to be fairly well controlled when is in atrial fibrillation. His OptiVol index did show an increase in his fluid starting in mid March and his weight was up 9 pounds on our clinic scale. His lasix dose was increased to 40 mg daily and a one week follow up was recommended.  He was also referred to pulmonology and has an appointment with them at the end of this month.    I met him on 5/7 for [facility] clinic enrollment. He had been seen in the ED the day prior as he was unable to walk due to significant leg swelling. He received a dose of IV lasix and was referred to the lymphedema clinic. His weight had dropped 15 lbs but he hadn't really noted much symptomatic improvement. He continued to report a productive cough and was still unable to sleep in bed. He continued to have to stop several times when walking to his mailbox which is about 300 ft. I had him continue on the higher dose of 40 mg of lasix daily.    He returns today for follow up. He has been following with the lymphedema therapy. Since I last saw him, his weight is down to 180 lbs on his home scale. He symptomatic feels much better. His dyspnea has significantly improved. He is now able to sleep in bed at night. He is also able to walk to and from the mailbox without stopping. His peripheral edema has also significantly improved since our  last visit. He is trying to read the labels and be more cautious about his sodium intake.    ASSESSMENT/PLAN:  1.  Exertional dyspnea.   2.  Mild ischemic cardiomyopathy. LVEF 45 - 50% by recent echocardiogram.   3.  Paroxsymal atrial fibrillation. He has declined full anticoagulation and remains on aspirin 325 mg daily. He is rate controlled on Metoprolol XL 50 mg daily.   4.  CAD. S/p CABG in 1986.  He will continue on aspirin and statin.  5.  ICD in place.     Colin returns today for follow up. Over the last three weeks he has lost 35 lbs on his own scale. Symptomatically, he has significantly improved on the higher dose of lasix and the lymphedema clinic has been very beneficial for him.  His basic metabolic shows overall stable renal function although his GFR has been trending down. His electrolytes are normal. Today, I recommend we cut back his lasix to 20 mg daily and see how he does. He will continue to be cautious with his sodium. It is difficult for him to get to appointments, so I will see him back for re-evaluation in 3 - 4 months. In the meantime I asked him to keep an eye on his weights. He will call us if his weight hits 185 lbs or higher on his home scale. I would then increase his lasix to 40 mg for a few days.    Orders this Visit:  Orders Placed This Encounter   Procedures     Basic metabolic panel     Follow-Up with [facility] - JIMI visit     Orders Placed This Encounter   Medications     furosemide (LASIX) 20 MG tablet     Sig: Take 1 tablet (20 mg) by mouth daily     Dispense:  60 tablet     Refill:  1     Medications Discontinued During This Encounter   Medication Reason     furosemide (LASIX) 20 MG tablet Reorder       CURRENT MEDICATIONS:  Current Outpatient Prescriptions   Medication Sig Dispense Refill     albuterol (PROAIR HFA/PROVENTIL HFA/VENTOLIN HFA) 108 (90 BASE) MCG/ACT Inhaler Inhale 2 puffs into the lungs every 4 hours as needed for shortness of breath / dyspnea or wheezing  WITH SPACER 1 Inhaler 0     allopurinol (ZYLOPRIM) 100 MG tablet Take 0.5 tablets (50 mg) by mouth daily 30 tablet      aspirin  MG EC tablet Take 1 tablet (325 mg) by mouth daily 90 tablet 3     atorvastatin (LIPITOR) 40 MG tablet Take 1 tablet (40 mg) by mouth daily 90 tablet 3     finasteride (PROSCAR) 5 MG tablet Take 1 tablet (5 mg) by mouth daily 90 tablet 3     furosemide (LASIX) 20 MG tablet Take 2 tablets (40 mg) by mouth daily 60 tablet 1     guaiFENesin (MUCINEX) 600 MG 12 hr tablet Take 600 mg by mouth 2 times daily       Magnesium Hydroxide (NAVA MILK OF MAGNESIA PO) Take 30 mLs by mouth daily as needed        metoprolol succinate (TOPROL-XL) 25 MG 24 hr tablet Take 2 tablets (50 mg) by mouth daily 180 tablet 3     Multiple Vitamins-Minerals (CENTRUM SILVER) per tablet Take 1 tablet by mouth daily       triamcinolone (KENALOG) 0.1 % ointment Apply topically 2 times daily Apply sparingly to affected area twice times daily for the next 7-10 days. 30 g 1     ALLERGIES  Allergies   Allergen Reactions     Flomax [Tamsulosin Hydrochloride]      Hctz Other (See Comments)     Caused loss of balance     Lisinopril Cough     Simvastatin Nausea and Vomiting and Diarrhea     Vytorin Nausea and Diarrhea     PAST MEDICAL HISTORY:  Past Medical History:   Diagnosis Date     Arrhythmia      Chronic systolic CHF (congestive heart failure) (H) 6/13/2016     Congestive heart failure (H)      Dizziness and giddiness 11/24/2007    uncertain etiology- MRI/MRA head , carotid duplex normal 2007     Foreign body in unspecified site on external eye      Hypertension      Ischemic cardiomyopathy     mild- EF 45-50%     Microscopic hematuria      microscopic hematuria 2001 with  IVP and cystoscopy     Renal disease      Rhabdomyolysis     2003- in setting of Ecoli UTI, gemfibrozil/lipitor     PAST SURGICAL HISTORY:  Past Surgical History:   Procedure Laterality Date     BIOPSY ARTERY TEMPORAL Left 9/18/2017    Procedure:  BIOPSY ARTERY TEMPORAL;  Left Temporal Biopsy ;  Surgeon: Ivana Phan MD;  Location: [facility] OR     SURGICAL HISTORY OF -       heel spurs     SURGICAL HISTORY OF -   2005, 2006    bilateral eye cataract     SURGICAL HISTORY OF -   1996    CABG x 5 vessels     SURGICAL HISTORY OF -   2010    circumcision     TONSILLECTOMY & ADENOIDECTOMY       FAMILY HISTORY:  Family History   Problem Relation Age of Onset     DIABETES Father      DIABETES Brother      DIABETES Brother      Hypertension Son      Unknown/Adopted Maternal Grandmother      Unknown/Adopted Maternal Grandfather      Unknown/Adopted Paternal Grandmother      Unknown/Adopted Paternal Grandfather      Dementia Sister      SOCIAL HISTORY:  Social History     Social History     Marital status:      Spouse name: N/A     Number of children: N/A     Years of education: N/A     Social History Main Topics     Smoking status: Former Smoker     Smokeless tobacco: Never Used      Comment: Quit at age 32     Alcohol use Yes      Comment: 12 pack a year     Drug use: No     Sexual activity: Yes     Other Topics Concern     Parent/Sibling W/ Cabg, Mi Or Angioplasty Before 65f 55m? No     Social History Narrative     Review of Systems:  Skin:  Negative     Eyes:  Negative    ENT:  Negative    Respiratory:  Positive for dyspnea on exertion;cough  Cardiovascular:  chest pain;syncope or near-syncope;Negative for;palpitations;dizziness;lightheadedness;fatigue;edema    Gastroenterology: Negative    Genitourinary:  Negative    Musculoskeletal:  Negative    Neurologic:  Positive for numbness or tingling of feet;memory problems  Psychiatric:  Negative for anxiety;depression  Heme/Lymph/Imm:  Negative for bleeding disorder  Endocrine:  Negative for thyroid disorder;diabetes     Physical Exam:  Vitals: /57  Pulse 53  Wt 81.6 kg (180 lb)  SpO2 96%  BMI 25.83 kg/m2   Wt Readings from Last 4 Encounters:   05/21/18 81.6 kg (180 lb)   05/07/18 90.7 kg (200 lb)    05/06/18 93 kg (205 lb)   05/01/18 98 kg (216 lb)     GEN: well nourished, in no acute distress.  HEENT:  Pupils equal, round. Sclerae nonicteric.   NECK: Supple, no masses appreciated.   C/V:  IRR, systolic murmur heard best at the RUSB  RESP: Respirations are unlabored. Breath sounds with bibasilar crackles, L > R. Otherwise clear.  GI: Abdomen soft, nontender.  EXTREM: trace LE edema. Compression stockings in place.  NEURO: Alert and oriented, cooperative.  SKIN: Warm and dry.     Recent Lab Results:  LIPID RESULTS:  Lab Results   Component Value Date    CHOL 100 05/01/2018    HDL 34 (L) 05/01/2018    LDL 50 05/01/2018    TRIG 81 05/01/2018    CHOLHDLRATIO 5.0 12/11/2014     LIVER ENZYME RESULTS:  Lab Results   Component Value Date    AST 62 (H) 04/01/2018    ALT 40 05/01/2018     CBC RESULTS:  Lab Results   Component Value Date    WBC 11.1 (H) 05/06/2018    RBC 3.86 (L) 05/06/2018    HGB 11.2 (L) 05/06/2018    HCT 34.1 (L) 05/06/2018    MCV 88 05/06/2018    MCH 29.0 05/06/2018    MCHC 32.8 05/06/2018    RDW 15.5 (H) 05/06/2018     05/06/2018     BMP RESULTS:  Lab Results   Component Value Date     05/21/2018    POTASSIUM 4.3 05/21/2018    CHLORIDE 106 05/21/2018    CO2 26 05/21/2018    ANIONGAP 6 05/21/2018     (H) 05/21/2018    BUN 27 05/21/2018    CR 1.14 05/21/2018    GFRESTIMATED 60 (L) 05/21/2018    GFRESTBLACK 73 05/21/2018    MARTA 8.4 (L) 05/21/2018      A1C RESULTS:  Lab Results   Component Value Date    A1C 6.3 (H) 12/07/2007     INR RESULTS:  No results found for: INR    New/Pertinent imaging results since last visit:  None      Thank you for allowing me to participate in the care of your patient.    Sincerely,     Rita Samuel PA-C     [facility]

## 2025-02-19 NOTE — PROGRESS NOTES
Faraz Cerrato,     If you are due for any health screening(s) below please notify me so we can arrange them to be ordered and scheduled. Most healthy patients at your age complete them, but you are free to accept or refuse.     If you can't do it, I'll definitely understand. If you can, I'd certainly appreciate it!    Tests to Keep You Healthy    Mammogram: Met on 3/26/2024  Colon Cancer Screening: DUE  Last Blood Pressure <= 139/89 (2/19/2025): Yes      Its time for your colon cancer screening     Colorectal cancer is one of the leading causes of cancer death for men and women but it doesnt have to be. Screenings can prevent colorectal cancer or find it early enough to treat and cure the disease.     Our records indicate that you may be overdue for colon cancer screening. A colonoscopy or stool screening test can help identify patients at risk for developing colon cancer. Cancer screenings save lives, so schedule yours today to stay healthy.     A colonoscopy is the preferred test for detecting colon cancer. It is needed only once every 10 years if results are negative. While you are sedated, a flexible, lighted tube with a tiny camera is inserted into the rectum and advanced through the colon to look for cancers.     An alternative screening test that is used at home and returned to the lab may also be used. It detects hidden blood in bowel movements which could indicate cancer in the colon. If results are positive, you will need a colonoscopy to determine if the blood is a sign of cancer. This type of follow up (diagnostic) colonoscopy usually requires additional copays as required by your insurance provider.     If you recently had your colon cancer screening performed outside of [facility], please let your Health care team know so that they can update your health record. Please contact your PCP if you have any questions.                 Service Date: 02/26/2018      HISTORY OF PRESENT ILLNESS:  Mr. Shell is a pleasant 89-year-old gentleman who presents to Cardiology office today for a 1-month followup after a change in his medical therapy.      Cardiovascular history includes coronary artery disease status post coronary artery bypass grafting in 1986, a history of syncope, atrial dysrhythmia and ventricular dysrhythmia for which he underwent ICD implantation in early 2017, hypertension and hyperlipidemia.  He was last seen by Dr. Erickson in January.  At that time Toprol-XL at 25 mg daily was added to his regimen.  Subsequently, he was evaluated by his primary care provider and this dose was increased to 50 mg daily.  Previously, he had been on Xarelto due to the history of atrial fibrillation and an elevated CHADS-VASc score; however, with the new year the cost of the medication increased significantly.  The patient states that he has been not taking it due to the financial burden.      At this point, he states that he is doing well from a cardiac standpoint.  He denies any chest discomfort, dyspnea on exertion, orthopnea or PND.  He has chronic lower extremity edema, worse in the left leg (where his bypass veins were harvested from).  This has been stable.  He really does not have any new questions today.  He does state that he is not interested in any other anticoagulation besides aspirin.      CURRENT CARDIAC MEDICATIONS:   1.  Toprol-XL 25 mg 2 tablets daily.   2.  Losartan 100 mg daily.   3.  Aspirin 325 mg daily.   4.  Atorvastatin 40 mg daily.   5.  Amlodipine 10 mg daily.      The remainder of his medications, allergies and review of systems were reviewed and as are documented separately.      PHYSICAL EXAMINATION:   GENERAL:  The patient is a pleasant 89-year-old gentleman who appears younger than his stated age.  He is in no apparent distress.   VITAL SIGNS:  His blood pressure is 133/64, pulse is 50, weight is 207 pounds.  This is  overall stable.   LUNGS:  His breathing is nonlabored.  He does have some crackles in the bases bilaterally.   CARDIAC:  Reveals a regular rate and rhythm.  He does have a 2/6 systolic ejection murmur heard best at the upper sternal borders.   ABDOMEN:  Soft, nontender, nondistended.   EXTREMITIES:  Lower extremities show trace edema on the right, 1+ edema on the left.   NEUROLOGIC:  Alert and oriented.      DIAGNOSTIC STUDIES:  His basic metabolic panel from 02/07 shows a sodium of 142, potassium of 4.3, BUN of 21 and creatinine of 1.1.      His last remote ICD check was on 01/31.  The presenting rhythm was A sensed, V paced.  He is about 7% atrial paced and 64% V paced.  He did have 57 episodes of atrial tachycardia/atrial fibrillation since his last check.  The longest episode was about 36 hours.  Ventricular response rate was well controlled between 70 and 100.  The total atrial fibrillation/flutter burden was about 24%.      ASSESSMENT AND PLAN:  The patient is a 89-year-old gentleman with a history of coronary artery disease and prior coronary artery bypass surgery as well as a history of both atrial and ventricular dysrhythmia for which he previously underwent permanent pacemaker with ICD implantation.  He continues to have paroxysmal atrial fibrillation.  He previously was on Xarelto, but is unable to afford this due to its financial burden.  We discussed other options today including warfarin versus seeing if another NOAC is more affordable for him.  However, at this point he is not interested and insists that he will just continue with aspirin 325 mg daily.  The risks were discussed with the patient and he is willing to accept these risks.  He also is not wishing to pursue any other changes to his medication.  His blood pressure had been running a bit elevated previously, but has improved at his last couple of office visits.  For the time being, he will continue his current cardiac medication regimen  unchanged.      He will follow up in the Device Clinic in early May as previously arranged.      The patient was asked to follow up with Dr. Erickson in May; however, he felt that this visit would be too early.  He was agreeable to following up in  or July.  Repeat lab work will be performed prior to that time.      Thank you for allowing me to participate in the care of this pleasant patient.         TOMMIE GONZALEZ PA-C             D: 2018   T: 2018   MT: [id number]      Name:     HALI ANAYA   MRN:      [id number]        Account:      [id number]   :      [id number]           Service Date: 2018      Document: [id number]

## (undated) DEVICE — GEL ULTRASOUND AQUASONIC 20GM 01-01

## (undated) DEVICE — SU MONOCRYL 4-0 PS-2 27" UND Y426H

## (undated) DEVICE — ESU PENCIL SMOKE EVAC W/ROCKER SWITCH 0703-047-000

## (undated) DEVICE — ESU ELEC NDL 1" COATED/INSULATED E1465

## (undated) DEVICE — DRAPE SHEET MED 44X70" 9355

## (undated) DEVICE — SYR EAR BULB 3OZ 0035830

## (undated) DEVICE — SU SILK 3-0 TIE 12X30" A304H

## (undated) DEVICE — PREP SKIN SCRUB TRAY 4461A

## (undated) DEVICE — DRAPE STERI TOWEL SM 1000

## (undated) DEVICE — ESU CORD BIPOLAR GREEN 10-4000

## (undated) DEVICE — SPONGE RAY-TEC 4X8" 7318

## (undated) DEVICE — NDL 30GA 0.5" 305106

## (undated) DEVICE — SU VICRYL 3-0 TIE 12X18" J904T

## (undated) DEVICE — LINEN TOWEL PACK X5 5464

## (undated) DEVICE — DRSG TELFA 3"X8" NON25720

## (undated) DEVICE — PREP POVIDONE IODINE SOLUTION 10% 120ML

## (undated) DEVICE — Device

## (undated) DEVICE — BLADE KNIFE SURG 15 371115

## (undated) DEVICE — TUBING SUCTION 10'X3/16" N510

## (undated) DEVICE — SYR 03ML LL W/O NDL 309657

## (undated) DEVICE — SOL NACL 0.9% IRRIG 1000ML BOTTLE 2F7124

## (undated) DEVICE — PAD CHUX UNDERPAD 23X24" 7136

## (undated) DEVICE — SOL WATER IRRIG 1000ML BOTTLE 2F7114

## (undated) DEVICE — LINEN TOWEL PACK X6 WHITE 5487

## (undated) DEVICE — LABEL MEDICATION SYSTEM 3303-P

## (undated) DEVICE — ESU GROUND PAD ADULT W/CORD E7507

## (undated) DEVICE — ESU ELEC BLADE 2.75" COATED/INSULATED E1455

## (undated) DEVICE — PREP POVIDONE IODINE SCRUB 7.5% 120ML

## (undated) DEVICE — DRSG STERI STRIP 1/2X4" R1547

## (undated) RX ORDER — BUPIVACAINE HYDROCHLORIDE 2.5 MG/ML
INJECTION, SOLUTION EPIDURAL; INFILTRATION; INTRACAUDAL
Status: DISPENSED
Start: 2017-02-14

## (undated) RX ORDER — FENTANYL CITRATE 50 UG/ML
INJECTION, SOLUTION INTRAMUSCULAR; INTRAVENOUS
Status: DISPENSED
Start: 2017-02-14

## (undated) RX ORDER — HEPARIN SODIUM 1000 [USP'U]/ML
INJECTION, SOLUTION INTRAVENOUS; SUBCUTANEOUS
Status: DISPENSED
Start: 2017-02-14

## (undated) RX ORDER — LIDOCAINE HYDROCHLORIDE 10 MG/ML
INJECTION, SOLUTION EPIDURAL; INFILTRATION; INTRACAUDAL; PERINEURAL
Status: DISPENSED
Start: 2017-02-14

## (undated) RX ORDER — CEFAZOLIN SODIUM 2 G/100ML
INJECTION, SOLUTION INTRAVENOUS
Status: DISPENSED
Start: 2017-09-18